# Patient Record
Sex: FEMALE | Race: WHITE | NOT HISPANIC OR LATINO | Employment: UNEMPLOYED | RURAL
[De-identification: names, ages, dates, MRNs, and addresses within clinical notes are randomized per-mention and may not be internally consistent; named-entity substitution may affect disease eponyms.]

---

## 2020-06-10 ENCOUNTER — HISTORICAL (OUTPATIENT)
Dept: ADMINISTRATIVE | Facility: HOSPITAL | Age: 85
End: 2020-06-10

## 2020-06-10 LAB
ALBUMIN SERPL BCP-MCNC: 3.6 G/DL (ref 3.5–5)
ALBUMIN/GLOB SERPL: 1.2 {RATIO}
ALP SERPL-CCNC: 91 U/L (ref 55–142)
ALT SERPL W P-5'-P-CCNC: 25 U/L (ref 13–56)
AST SERPL W P-5'-P-CCNC: 19 U/L (ref 15–37)
BASOPHILS # BLD AUTO: 0.06 X10E3/UL (ref 0–0.2)
BASOPHILS NFR BLD AUTO: 0.5 % (ref 0–1)
BILIRUB SERPL-MCNC: 0.4 MG/DL (ref 0–1.2)
BUN SERPL-MCNC: 19 MG/DL (ref 7–18)
BUN/CREAT SERPL: 21.8
CALCIUM SERPL-MCNC: 8.9 MG/DL (ref 8.5–10.1)
CHLORIDE SERPL-SCNC: 110 MMOL/L (ref 98–107)
CO2 SERPL-SCNC: 32 MMOL/L (ref 21–32)
CREAT SERPL-MCNC: 0.87 MG/DL (ref 0.55–1.02)
EOSINOPHIL # BLD AUTO: 0.16 X10E3/UL (ref 0–0.5)
EOSINOPHIL NFR BLD AUTO: 1.3 % (ref 1–4)
EOSINOPHIL NFR BLD MANUAL: 1 % (ref 1–4)
ERYTHROCYTE [DISTWIDTH] IN BLOOD BY AUTOMATED COUNT: 14 % (ref 11.5–14.5)
GLOBULIN SER-MCNC: 3 G/DL (ref 2–4)
GLUCOSE SERPL-MCNC: 114 MG/DL (ref 74–106)
HCT VFR BLD AUTO: 43 % (ref 38–47)
HGB BLD-MCNC: 13.7 G/DL (ref 12–16)
LYMPHOCYTES # BLD AUTO: 7.12 X10E3/UL (ref 1–4.8)
LYMPHOCYTES NFR BLD AUTO: 58.1 % (ref 27–41)
LYMPHOCYTES NFR BLD MANUAL: 67 % (ref 27–41)
MCH RBC QN AUTO: 30.5 PG (ref 27–31)
MCHC RBC AUTO-ENTMCNC: 31.9 G/DL (ref 32–36)
MCV RBC AUTO: 96 FL (ref 80–96)
MONOCYTES # BLD AUTO: 1.26 X10E3/UL (ref 0–0.8)
MONOCYTES NFR BLD AUTO: 10.3 % (ref 2–6)
MPC BLD CALC-MCNC: 10.5 FL (ref 9.4–12.4)
NEUTROPHILS # BLD AUTO: 3.65 X10E3/UL (ref 1.8–7.7)
NEUTROPHILS NFR BLD AUTO: 29.8 % (ref 53–65)
NEUTS SEG NFR BLD MANUAL: 32 % (ref 50–62)
PLATELET # BLD AUTO: 162 X10E3/UL (ref 150–400)
PLATELET MORPHOLOGY: NORMAL
POTASSIUM SERPL-SCNC: 4.1 MMOL/L (ref 3.5–5.1)
PROT SERPL-MCNC: 6.6 G/DL (ref 6.4–8.2)
RBC # BLD AUTO: 4.49 X10E6/UL (ref 4.2–5.4)
RBC MORPH BLD: NORMAL
REACTIVE LYMPHOCYTES: ABNORMAL
SMUDGE CELLS BLD QL SMEAR: ABNORMAL
SODIUM SERPL-SCNC: 147 MMOL/L (ref 136–145)
WBC # BLD AUTO: 12.25 X10E3/UL (ref 4.5–11)

## 2020-08-10 ENCOUNTER — HISTORICAL (OUTPATIENT)
Dept: ADMINISTRATIVE | Facility: HOSPITAL | Age: 85
End: 2020-08-10

## 2020-08-10 LAB
ALBUMIN SERPL BCP-MCNC: 3.1 G/DL (ref 3.5–5)
ALBUMIN/GLOB SERPL: 0.8 {RATIO}
ALP SERPL-CCNC: 91 U/L (ref 55–142)
ALT SERPL W P-5'-P-CCNC: 26 U/L (ref 13–56)
ANION GAP SERPL CALCULATED.3IONS-SCNC: 9 MMOL/L
AST SERPL W P-5'-P-CCNC: 24 U/L (ref 15–37)
BACTERIA #/AREA URNS HPF: ABNORMAL /HPF
BASOPHILS # BLD AUTO: 0.04 X10E3/UL (ref 0–0.2)
BASOPHILS NFR BLD AUTO: 0.5 % (ref 0–1)
BILIRUB SERPL-MCNC: 0.3 MG/DL (ref 0–1.2)
BILIRUB UR QL STRIP: ABNORMAL MG/DL
BUN SERPL-MCNC: 15 MG/DL (ref 7–18)
BUN/CREAT SERPL: 16.7
CALCIUM SERPL-MCNC: 8.5 MG/DL (ref 8.5–10.1)
CHLORIDE SERPL-SCNC: 103 MMOL/L (ref 98–107)
CLARITY UR: ABNORMAL
CLARITY UR: ABNORMAL
CO2 SERPL-SCNC: 31 MMOL/L (ref 21–32)
COLOR UR: ABNORMAL
COLOR UR: ABNORMAL
CREAT SERPL-MCNC: 0.9 MG/DL (ref 0.55–1.02)
D DIMER PPP FEU-MCNC: 0.58 MG/ML (ref 0–0.47)
EOSINOPHIL # BLD AUTO: 0.06 X10E3/UL (ref 0–0.5)
EOSINOPHIL NFR BLD AUTO: 0.7 % (ref 1–4)
EOSINOPHIL NFR BLD MANUAL: 1 % (ref 1–4)
ERYTHROCYTE [DISTWIDTH] IN BLOOD BY AUTOMATED COUNT: 13.9 % (ref 11.5–14.5)
GLOBULIN SER-MCNC: 3.7 G/DL (ref 2–4)
GLUCOSE SERPL-MCNC: 132 MG/DL (ref 74–106)
GLUCOSE UR STRIP-MCNC: NEGATIVE MG/DL
HCT VFR BLD AUTO: 42.4 % (ref 38–47)
HGB BLD-MCNC: 13.8 G/DL (ref 12–16)
KETONES UR STRIP-SCNC: ABNORMAL MG/DL
LEUKOCYTE ESTERASE UR QL STRIP: ABNORMAL LEU/UL
LYMPHOCYTES # BLD AUTO: 3.77 X10E3/UL (ref 1–4.8)
LYMPHOCYTES NFR BLD AUTO: 43.5 % (ref 27–41)
LYMPHOCYTES NFR BLD MANUAL: 47 % (ref 27–41)
MCH RBC QN AUTO: 30.1 PG (ref 27–31)
MCHC RBC AUTO-ENTMCNC: 32.5 G/DL (ref 32–36)
MCV RBC AUTO: 93 FL (ref 80–96)
MONOCYTES # BLD AUTO: 1.53 X10E3/UL (ref 0–0.8)
MONOCYTES NFR BLD AUTO: 17.7 % (ref 2–6)
MONOCYTES NFR BLD MANUAL: 6 % (ref 2–6)
MPC BLD CALC-MCNC: 10.6 FL (ref 9.4–12.4)
NEUTROPHILS # BLD AUTO: 3.26 X10E3/UL (ref 1.8–7.7)
NEUTROPHILS NFR BLD AUTO: 37.6 % (ref 53–65)
NEUTS BAND NFR BLD MANUAL: 9 % (ref 1–5)
NEUTS SEG NFR BLD MANUAL: 37 % (ref 50–62)
NITRITE UR QL STRIP: NEGATIVE
PH UR STRIP: 5.5 PH UNITS (ref 5–8)
PLATELET # BLD AUTO: 101 X10E3/UL (ref 150–400)
PLATELET MORPHOLOGY: ABNORMAL
POTASSIUM SERPL-SCNC: 3.3 MMOL/L (ref 3.5–5.1)
PROT SERPL-MCNC: 6.8 G/DL (ref 6.4–8.2)
PROT UR QL STRIP: 100 MG/DL
RBC # BLD AUTO: 4.58 X10E6/UL (ref 4.2–5.4)
RBC # UR STRIP: ABNORMAL ERY/UL
RBC #/AREA URNS HPF: ABNORMAL /HPF (ref 0–3)
RBC MORPH BLD: NORMAL
REACTIVE LYMPHOCYTES: ABNORMAL
SMUDGE CELLS BLD QL SMEAR: ABNORMAL
SODIUM SERPL-SCNC: 140 MMOL/L (ref 136–145)
SP GR UR STRIP: 1.02 (ref 1–1.03)
SQUAMOUS #/AREA URNS LPF: ABNORMAL /LPF
UROBILINOGEN UR STRIP-ACNC: 1 MG/DL
WBC # BLD AUTO: 8.66 X10E3/UL (ref 4.5–11)
WBC #/AREA URNS HPF: ABNORMAL /HPF (ref 0–5)

## 2020-08-12 LAB
REPORT: NORMAL

## 2020-08-13 ENCOUNTER — HISTORICAL (OUTPATIENT)
Dept: ADMINISTRATIVE | Facility: HOSPITAL | Age: 85
End: 2020-08-13

## 2020-08-13 LAB
ALBUMIN SERPL BCP-MCNC: 2.5 G/DL (ref 3.5–5)
ALBUMIN/GLOB SERPL: 0.7 {RATIO}
ALP SERPL-CCNC: 83 U/L (ref 55–142)
ALT SERPL W P-5'-P-CCNC: 36 U/L (ref 13–56)
ANION GAP SERPL CALCULATED.3IONS-SCNC: 7 MMOL/L
ANISOCYTOSIS BLD QL SMEAR: ABNORMAL
AST SERPL W P-5'-P-CCNC: 35 U/L (ref 15–37)
BACTERIA #/AREA URNS HPF: ABNORMAL /HPF
BASOPHILS # BLD AUTO: 0.02 X10E3/UL (ref 0–0.2)
BASOPHILS NFR BLD AUTO: 0.2 % (ref 0–1)
BILIRUB SERPL-MCNC: 0.2 MG/DL (ref 0–1.2)
BILIRUB UR QL STRIP: NEGATIVE MG/DL
BUN SERPL-MCNC: 15 MG/DL (ref 7–18)
BUN/CREAT SERPL: 17.6
CALCIUM SERPL-MCNC: 8.3 MG/DL (ref 8.5–10.1)
CHLORIDE SERPL-SCNC: 107 MMOL/L (ref 98–107)
CLARITY UR: CLEAR
CLARITY UR: CLEAR
CO2 SERPL-SCNC: 32 MMOL/L (ref 21–32)
COLOR UR: YELLOW
COLOR UR: YELLOW
CREAT SERPL-MCNC: 0.85 MG/DL (ref 0.55–1.02)
CRP SERPL-MCNC: 8.77 MG/DL (ref 0–0.8)
EOSINOPHIL # BLD AUTO: 0 X10E3/UL (ref 0–0.5)
EOSINOPHIL NFR BLD AUTO: 0 % (ref 1–4)
EOSINOPHIL NFR BLD MANUAL: 6 % (ref 1–4)
ERYTHROCYTE [DISTWIDTH] IN BLOOD BY AUTOMATED COUNT: 13.9 % (ref 11.5–14.5)
ERYTHROCYTE [SEDIMENTATION RATE] IN BLOOD BY WESTERGREN METHOD: 50 MM/HR (ref 0–42)
GLOBULIN SER-MCNC: 3.4 G/DL (ref 2–4)
GLUCOSE SERPL-MCNC: 180 MG/DL (ref 74–106)
GLUCOSE UR STRIP-MCNC: ABNORMAL MG/DL
HCT VFR BLD AUTO: 36.7 % (ref 38–47)
HGB BLD-MCNC: 14.6 G/DL (ref 12–16)
KETONES UR STRIP-SCNC: NEGATIVE MG/DL
LEUKOCYTE ESTERASE UR QL STRIP: ABNORMAL LEU/UL
LYMPHOCYTES # BLD AUTO: 3.67 X10E3/UL (ref 1–4.8)
LYMPHOCYTES NFR BLD AUTO: 39 % (ref 27–41)
LYMPHOCYTES NFR BLD MANUAL: 40 % (ref 27–41)
MACROCYTES BLD QL SMEAR: ABNORMAL
MAGNESIUM SERPL-MCNC: 1.4 MG/DL (ref 1.7–2.3)
MCH RBC QN AUTO: 37 PG (ref 27–31)
MCHC RBC AUTO-ENTMCNC: 39.8 G/DL (ref 32–36)
MCV RBC AUTO: 93 FL (ref 80–96)
MICROCYTES BLD QL SMEAR: ABNORMAL
MONOCYTES # BLD AUTO: 1.14 X10E3/UL (ref 0–0.8)
MONOCYTES NFR BLD AUTO: 12.1 % (ref 2–6)
MONOCYTES NFR BLD MANUAL: 6 % (ref 2–6)
MPC BLD CALC-MCNC: 11 FL (ref 9.4–12.4)
NEUTROPHILS # BLD AUTO: 4.59 X10E3/UL (ref 1.8–7.7)
NEUTROPHILS NFR BLD AUTO: 48.7 % (ref 53–65)
NEUTS BAND NFR BLD MANUAL: 8 % (ref 1–5)
NEUTS SEG NFR BLD MANUAL: 40 % (ref 50–62)
NITRITE UR QL STRIP: NEGATIVE
PH UR STRIP: 6 PH UNITS (ref 5–8)
PLATELET # BLD AUTO: 133 X10E3/UL (ref 150–400)
PLATELET MORPHOLOGY: ABNORMAL
POTASSIUM SERPL-SCNC: 3.6 MMOL/L (ref 3.5–5.1)
PREALB SERPL NEPH-MCNC: 8 MG/DL (ref 20–40)
PROT SERPL-MCNC: 5.9 G/DL (ref 6.4–8.2)
PROT UR QL STRIP: NEGATIVE MG/DL
RBC # BLD AUTO: 3.95 X10E6/UL (ref 4.2–5.4)
RBC # UR STRIP: ABNORMAL ERY/UL
RBC #/AREA URNS HPF: ABNORMAL /HPF (ref 0–3)
RBC MORPH BLD: NORMAL
SODIUM SERPL-SCNC: 142 MMOL/L (ref 136–145)
SP GR UR STRIP: 1.02 (ref 1–1.03)
SQUAMOUS #/AREA URNS LPF: ABNORMAL /LPF
TSH SERPL DL<=0.005 MIU/L-ACNC: 0.61 UIU/ML (ref 0.36–3.74)
UROBILINOGEN UR STRIP-ACNC: 0.2 MG/DL
WBC # BLD AUTO: 9.42 X10E3/UL (ref 4.5–11)
WBC #/AREA URNS HPF: ABNORMAL /HPF (ref 0–5)

## 2020-08-16 ENCOUNTER — HISTORICAL (OUTPATIENT)
Dept: ADMINISTRATIVE | Facility: HOSPITAL | Age: 85
End: 2020-08-16

## 2020-08-16 LAB
HCO3 UR-SCNC: 37 MMOL/L (ref 21–28)
PCO2 BLDA: 61 MM HG (ref 35–48)
PH SMN: 7.39 PH UNITS (ref 7.35–7.45)
PO2 BLDA: 51 MM HG (ref 83–108)
POC A-ADO2: 229
POC BASE EXCESS ARTERIAL: 9.8 MMOL/L (ref -2–3)
POC SATURATED O2: 85 % (ref 95–98)
REPORT: NORMAL

## 2020-08-17 ENCOUNTER — HISTORICAL (OUTPATIENT)
Dept: ADMINISTRATIVE | Facility: HOSPITAL | Age: 85
End: 2020-08-17

## 2020-08-17 LAB
ALBUMIN SERPL BCP-MCNC: 2.7 G/DL (ref 3.5–5)
ALBUMIN/GLOB SERPL: 0.6 {RATIO}
ALP SERPL-CCNC: 98 U/L (ref 55–142)
ALT SERPL W P-5'-P-CCNC: 48 U/L (ref 13–56)
ANION GAP SERPL CALCULATED.3IONS-SCNC: 7 MMOL/L
AST SERPL W P-5'-P-CCNC: 50 U/L (ref 15–37)
BASOPHILS # BLD AUTO: 0.04 X10E3/UL (ref 0–0.2)
BASOPHILS NFR BLD AUTO: 0.1 % (ref 0–1)
BILIRUB SERPL-MCNC: 0.6 MG/DL (ref 0–1.2)
BUN SERPL-MCNC: 12 MG/DL (ref 7–18)
BUN/CREAT SERPL: 16.9
CALCIUM SERPL-MCNC: 8.4 MG/DL (ref 8.5–10.1)
CHLORIDE SERPL-SCNC: 99 MMOL/L (ref 98–107)
CO2 SERPL-SCNC: 36 MMOL/L (ref 21–32)
CREAT SERPL-MCNC: 0.71 MG/DL (ref 0.55–1.02)
CRP SERPL-MCNC: >18 UG/ML (ref 0–0.8)
D DIMER PPP FEU-MCNC: 1.19 UG/ML (ref 0–0.47)
EOSINOPHIL # BLD AUTO: 0 X10E3/UL (ref 0–0.5)
EOSINOPHIL NFR BLD AUTO: 0 % (ref 1–4)
ERYTHROCYTE [DISTWIDTH] IN BLOOD BY AUTOMATED COUNT: 13.2 % (ref 11.5–14.5)
FERRITIN SERPL-MCNC: 350 NG/ML (ref 8–252)
GLOBULIN SER-MCNC: 4.5 G/DL (ref 2–4)
GLUCOSE SERPL-MCNC: 165 MG/DL (ref 74–106)
GLUCOSE SERPL-MCNC: 181 MG/DL (ref 70–105)
GLUCOSE SERPL-MCNC: 182 MG/DL (ref 70–105)
GLUCOSE SERPL-MCNC: 185 MG/DL (ref 70–105)
HCO3 UR-SCNC: 43 MMOL/L (ref 21–28)
HCT VFR BLD AUTO: 41.3 % (ref 38–47)
HGB BLD-MCNC: 13.7 G/DL (ref 12–16)
IMM GRANULOCYTES # BLD AUTO: 0.27 X10E3/UL (ref 0–0.04)
IMM GRANULOCYTES NFR BLD: 1 % (ref 0–0.4)
LYMPHOCYTES # BLD AUTO: 10.75 X10E3/UL (ref 1–4.8)
LYMPHOCYTES NFR BLD AUTO: 39.6 % (ref 27–41)
LYMPHOCYTES NFR BLD MANUAL: 41 % (ref 27–41)
MAGNESIUM SERPL-MCNC: 1.8 MG/DL (ref 1.7–2.3)
MCH RBC QN AUTO: 29.7 PG (ref 27–31)
MCHC RBC AUTO-ENTMCNC: 33.2 G/DL (ref 32–36)
MCV RBC AUTO: 89.4 FL (ref 80–96)
MONOCYTES # BLD AUTO: 1.49 X10E3/UL (ref 0–0.8)
MONOCYTES NFR BLD AUTO: 5.5 % (ref 2–6)
MONOCYTES NFR BLD MANUAL: 6 % (ref 2–6)
MPC BLD CALC-MCNC: 10.1 FL (ref 9.4–12.4)
NEUTROPHILS # BLD AUTO: 14.62 X10E3/UL (ref 1.8–7.7)
NEUTROPHILS NFR BLD AUTO: 53.8 % (ref 53–65)
NEUTS BAND NFR BLD MANUAL: 6 % (ref 1–5)
NEUTS SEG NFR BLD MANUAL: 47 % (ref 50–62)
NRBC # BLD AUTO: 0 X10E3/UL (ref 0–0)
NRBC BLD MANUAL-RTO: 1 /100 (ref 0–0)
NRBC, AUTO (.00): 0 /100 (ref 0–0)
OVALOCYTES BLD QL SMEAR: ABNORMAL
PCO2 BLDA: 65 MM HG (ref 35–48)
PH SMN: 7.43 PH UNITS (ref 7.35–7.45)
PLATELET # BLD AUTO: 265 X10E3/UL (ref 150–400)
PLATELET MORPHOLOGY: ABNORMAL
PO2 BLDA: 55 MM HG (ref 83–108)
POC A-ADO2: 399
POC BASE EXCESS ARTERIAL: 15.8 MMOL/L (ref -2–3)
POC SATURATED O2: 89 % (ref 95–98)
POLYCHROMASIA BLD QL SMEAR: ABNORMAL
POTASSIUM SERPL-SCNC: 2.7 MMOL/L (ref 3.5–5.1)
PROT SERPL-MCNC: 7.2 G/DL (ref 6.4–8.2)
RBC # BLD AUTO: 4.62 X10E6/UL (ref 4.2–5.4)
SMUDGE CELLS BLD QL SMEAR: 33
SODIUM SERPL-SCNC: 139 MMOL/L (ref 136–145)
WBC # BLD AUTO: 27.17 X10E3/UL (ref 4.5–11)

## 2020-08-18 ENCOUNTER — HISTORICAL (OUTPATIENT)
Dept: ADMINISTRATIVE | Facility: HOSPITAL | Age: 85
End: 2020-08-18

## 2020-08-18 LAB
BASOPHILS # BLD AUTO: 0.02 X10E3/UL (ref 0–0.2)
BASOPHILS NFR BLD AUTO: 0.1 % (ref 0–1)
EOSINOPHIL # BLD AUTO: 0 X10E3/UL (ref 0–0.5)
EOSINOPHIL NFR BLD AUTO: 0 % (ref 1–4)
ERYTHROCYTE [DISTWIDTH] IN BLOOD BY AUTOMATED COUNT: 13 % (ref 11.5–14.5)
GLUCOSE SERPL-MCNC: 140 MG/DL (ref 70–105)
GLUCOSE SERPL-MCNC: 214 MG/DL (ref 70–105)
GLUCOSE SERPL-MCNC: 231 MG/DL (ref 70–105)
GLUCOSE SERPL-MCNC: 251 MG/DL (ref 70–105)
HCT VFR BLD AUTO: 41.3 % (ref 38–47)
HGB BLD-MCNC: 13.7 G/DL (ref 12–16)
IMM GRANULOCYTES # BLD AUTO: 0.13 X10E3/UL (ref 0–0.04)
IMM GRANULOCYTES NFR BLD: 0.7 % (ref 0–0.4)
LYMPHOCYTES # BLD AUTO: 7.95 X10E3/UL (ref 1–4.8)
LYMPHOCYTES NFR BLD AUTO: 42.7 % (ref 27–41)
LYMPHOCYTES NFR BLD MANUAL: 17 % (ref 27–41)
MAGNESIUM SERPL-MCNC: 2.4 MG/DL (ref 1.7–2.3)
MCH RBC QN AUTO: 29.3 PG (ref 27–31)
MCHC RBC AUTO-ENTMCNC: 33.2 G/DL (ref 32–36)
MCV RBC AUTO: 88.4 FL (ref 80–96)
MONOCYTES # BLD AUTO: 0.36 X10E3/UL (ref 0–0.8)
MONOCYTES NFR BLD AUTO: 1.9 % (ref 2–6)
MONOCYTES NFR BLD MANUAL: 1 % (ref 2–6)
MPC BLD CALC-MCNC: 10.3 FL (ref 9.4–12.4)
NEUTROPHILS # BLD AUTO: 10.17 X10E3/UL (ref 1.8–7.7)
NEUTROPHILS NFR BLD AUTO: 54.6 % (ref 53–65)
NEUTS BAND NFR BLD MANUAL: 1 % (ref 1–5)
NEUTS SEG NFR BLD MANUAL: 81 % (ref 50–62)
NRBC # BLD AUTO: 0 X10E3/UL (ref 0–0)
NRBC, AUTO (.00): 0 /100 (ref 0–0)
OVALOCYTES BLD QL SMEAR: ABNORMAL
PLATELET # BLD AUTO: 237 X10E3/UL (ref 150–400)
PLATELET MORPHOLOGY: ABNORMAL
POTASSIUM SERPL-SCNC: 3.1 MMOL/L (ref 3.5–5.1)
RBC # BLD AUTO: 4.67 X10E6/UL (ref 4.2–5.4)
SMUDGE CELLS BLD QL SMEAR: 36
WBC # BLD AUTO: 18.63 X10E3/UL (ref 4.5–11)

## 2020-08-19 ENCOUNTER — HISTORICAL (OUTPATIENT)
Dept: ADMINISTRATIVE | Facility: HOSPITAL | Age: 85
End: 2020-08-19

## 2020-08-19 LAB
ANION GAP SERPL CALCULATED.3IONS-SCNC: 4 MMOL/L
BASOPHILS # BLD AUTO: 0.03 X10E3/UL (ref 0–0.2)
BASOPHILS NFR BLD AUTO: 0.2 % (ref 0–1)
BUN SERPL-MCNC: 41 MG/DL (ref 7–18)
CALCIUM SERPL-MCNC: 8.7 MG/DL (ref 8.5–10.1)
CHLORIDE SERPL-SCNC: 99 MMOL/L (ref 98–107)
CO2 SERPL-SCNC: 43 MMOL/L (ref 21–32)
CREAT SERPL-MCNC: 1.18 MG/DL (ref 0.55–1.02)
EOSINOPHIL # BLD AUTO: 0 X10E3/UL (ref 0–0.5)
EOSINOPHIL NFR BLD AUTO: 0 % (ref 1–4)
ERYTHROCYTE [DISTWIDTH] IN BLOOD BY AUTOMATED COUNT: 12.6 % (ref 11.5–14.5)
GLUCOSE SERPL-MCNC: 154 MG/DL (ref 74–106)
GLUCOSE SERPL-MCNC: 239 MG/DL (ref 70–105)
HCT VFR BLD AUTO: 41.7 % (ref 38–47)
HGB BLD-MCNC: 13.4 G/DL (ref 12–16)
IMM GRANULOCYTES # BLD AUTO: 0.2 X10E3/UL (ref 0–0.04)
IMM GRANULOCYTES NFR BLD: 1 % (ref 0–0.4)
LYMPHOCYTES # BLD AUTO: 9.28 X10E3/UL (ref 1–4.8)
LYMPHOCYTES NFR BLD AUTO: 47 % (ref 27–41)
LYMPHOCYTES NFR BLD MANUAL: 35 % (ref 27–41)
MCH RBC QN AUTO: 29.3 PG (ref 27–31)
MCHC RBC AUTO-ENTMCNC: 32.1 G/DL (ref 32–36)
MCV RBC AUTO: 91.2 FL (ref 80–96)
MONOCYTES # BLD AUTO: 0.57 X10E3/UL (ref 0–0.8)
MONOCYTES NFR BLD AUTO: 2.9 % (ref 2–6)
MONOCYTES NFR BLD MANUAL: 1 % (ref 2–6)
MPC BLD CALC-MCNC: 10.6 FL (ref 9.4–12.4)
NEUTROPHILS # BLD AUTO: 9.65 X10E3/UL (ref 1.8–7.7)
NEUTROPHILS NFR BLD AUTO: 48.9 % (ref 53–65)
NEUTS BAND NFR BLD MANUAL: 1 % (ref 1–5)
NEUTS SEG NFR BLD MANUAL: 63 % (ref 50–62)
NRBC # BLD AUTO: 0 X10E3/UL (ref 0–0)
NRBC, AUTO (.00): 0 /100 (ref 0–0)
OVALOCYTES BLD QL SMEAR: ABNORMAL
PLATELET # BLD AUTO: 274 X10E3/UL (ref 150–400)
PLATELET MORPHOLOGY: ABNORMAL
POTASSIUM SERPL-SCNC: 3.2 MMOL/L (ref 3.5–5.1)
RBC # BLD AUTO: 4.57 X10E6/UL (ref 4.2–5.4)
SMUDGE CELLS BLD QL SMEAR: 31
SODIUM SERPL-SCNC: 143 MMOL/L (ref 136–145)
WBC # BLD AUTO: 19.73 X10E3/UL (ref 4.5–11)

## 2020-08-20 ENCOUNTER — HISTORICAL (OUTPATIENT)
Dept: ADMINISTRATIVE | Facility: HOSPITAL | Age: 85
End: 2020-08-20

## 2020-08-20 LAB
GLUCOSE SERPL-MCNC: 144 MG/DL (ref 70–105)
GLUCOSE SERPL-MCNC: 224 MG/DL (ref 70–105)

## 2020-08-21 ENCOUNTER — HISTORICAL (OUTPATIENT)
Dept: ADMINISTRATIVE | Facility: HOSPITAL | Age: 85
End: 2020-08-21

## 2020-08-21 LAB
ANION GAP SERPL CALCULATED.3IONS-SCNC: 5 MMOL/L
BASOPHILS # BLD AUTO: 0.03 X10E3/UL (ref 0–0.2)
BASOPHILS NFR BLD AUTO: 0.2 % (ref 0–1)
BUN SERPL-MCNC: 34 MG/DL (ref 7–18)
CALCIUM SERPL-MCNC: 8.6 MG/DL (ref 8.5–10.1)
CHLORIDE SERPL-SCNC: 99 MMOL/L (ref 98–107)
CO2 SERPL-SCNC: 44 MMOL/L (ref 21–32)
CREAT SERPL-MCNC: 0.89 MG/DL (ref 0.55–1.02)
EOSINOPHIL # BLD AUTO: 0.03 X10E3/UL (ref 0–0.5)
EOSINOPHIL NFR BLD AUTO: 0.2 % (ref 1–4)
ERYTHROCYTE [DISTWIDTH] IN BLOOD BY AUTOMATED COUNT: 12.8 % (ref 11.5–14.5)
GLUCOSE SERPL-MCNC: 118 MG/DL (ref 74–106)
GLUCOSE SERPL-MCNC: 219 MG/DL (ref 70–105)
GLUCOSE SERPL-MCNC: 232 MG/DL (ref 70–105)
GLUCOSE SERPL-MCNC: 309 MG/DL (ref 70–105)
HCT VFR BLD AUTO: 45.6 % (ref 38–47)
HGB BLD-MCNC: 14.5 G/DL (ref 12–16)
IMM GRANULOCYTES # BLD AUTO: 0.24 X10E3/UL (ref 0–0.04)
IMM GRANULOCYTES NFR BLD: 1.2 % (ref 0–0.4)
LYMPHOCYTES # BLD AUTO: 10.34 X10E3/UL (ref 1–4.8)
LYMPHOCYTES NFR BLD AUTO: 53.5 % (ref 27–41)
LYMPHOCYTES NFR BLD MANUAL: 38 % (ref 27–41)
MCH RBC QN AUTO: 29.1 PG (ref 27–31)
MCHC RBC AUTO-ENTMCNC: 31.8 G/DL (ref 32–36)
MCV RBC AUTO: 91.6 FL (ref 80–96)
MONOCYTES # BLD AUTO: 0.72 X10E3/UL (ref 0–0.8)
MONOCYTES NFR BLD AUTO: 3.7 % (ref 2–6)
MONOCYTES NFR BLD MANUAL: 4 % (ref 2–6)
MPC BLD CALC-MCNC: 10.4 FL (ref 9.4–12.4)
NEUTROPHILS # BLD AUTO: 7.98 X10E3/UL (ref 1.8–7.7)
NEUTROPHILS NFR BLD AUTO: 41.2 % (ref 53–65)
NEUTS BAND NFR BLD MANUAL: 7 % (ref 1–5)
NEUTS SEG NFR BLD MANUAL: 51 % (ref 50–62)
NRBC # BLD AUTO: 0 X10E3/UL (ref 0–0)
NRBC, AUTO (.00): 0 /100 (ref 0–0)
OVALOCYTES BLD QL SMEAR: ABNORMAL
PLATELET # BLD AUTO: 266 X10E3/UL (ref 150–400)
PLATELET MORPHOLOGY: ABNORMAL
POTASSIUM SERPL-SCNC: 2.7 MMOL/L (ref 3.5–5.1)
RBC # BLD AUTO: 4.98 X10E6/UL (ref 4.2–5.4)
SMUDGE CELLS BLD QL SMEAR: 61
SODIUM SERPL-SCNC: 145 MMOL/L (ref 136–145)
WBC # BLD AUTO: 19.34 X10E3/UL (ref 4.5–11)

## 2020-08-22 ENCOUNTER — HISTORICAL (OUTPATIENT)
Dept: ADMINISTRATIVE | Facility: HOSPITAL | Age: 85
End: 2020-08-22

## 2020-08-22 LAB
ANION GAP SERPL CALCULATED.3IONS-SCNC: 7 MMOL/L
BASOPHILS # BLD AUTO: 0.04 X10E3/UL (ref 0–0.2)
BASOPHILS NFR BLD AUTO: 0.2 % (ref 0–1)
BUN SERPL-MCNC: 34 MG/DL (ref 7–18)
CALCIUM SERPL-MCNC: 8.7 MG/DL (ref 8.5–10.1)
CHLORIDE SERPL-SCNC: 102 MMOL/L (ref 98–107)
CO2 SERPL-SCNC: 39 MMOL/L (ref 21–32)
CREAT SERPL-MCNC: 0.88 MG/DL (ref 0.55–1.02)
EOSINOPHIL # BLD AUTO: 0.03 X10E3/UL (ref 0–0.5)
EOSINOPHIL NFR BLD AUTO: 0.1 % (ref 1–4)
EOSINOPHIL NFR BLD MANUAL: 1 % (ref 1–4)
ERYTHROCYTE [DISTWIDTH] IN BLOOD BY AUTOMATED COUNT: 12.7 % (ref 11.5–14.5)
GLUCOSE SERPL-MCNC: 115 MG/DL (ref 74–106)
GLUCOSE SERPL-MCNC: 208 MG/DL (ref 70–105)
GLUCOSE SERPL-MCNC: 223 MG/DL (ref 70–105)
GLUCOSE SERPL-MCNC: 311 MG/DL (ref 70–105)
HCT VFR BLD AUTO: 43.3 % (ref 38–47)
HGB BLD-MCNC: 13.9 G/DL (ref 12–16)
IMM GRANULOCYTES # BLD AUTO: 0.32 X10E3/UL (ref 0–0.04)
IMM GRANULOCYTES NFR BLD: 1.6 % (ref 0–0.4)
LYMPHOCYTES # BLD AUTO: 9.24 X10E3/UL (ref 1–4.8)
LYMPHOCYTES NFR BLD AUTO: 46.2 % (ref 27–41)
LYMPHOCYTES NFR BLD MANUAL: 39 % (ref 27–41)
MCH RBC QN AUTO: 30 PG (ref 27–31)
MCHC RBC AUTO-ENTMCNC: 32.1 G/DL (ref 32–36)
MCV RBC AUTO: 93.5 FL (ref 80–96)
MONOCYTES # BLD AUTO: 0.64 X10E3/UL (ref 0–0.8)
MONOCYTES NFR BLD AUTO: 3.2 % (ref 2–6)
MONOCYTES NFR BLD MANUAL: 2 % (ref 2–6)
MPC BLD CALC-MCNC: 10.9 FL (ref 9.4–12.4)
NEUTROPHILS # BLD AUTO: 9.75 X10E3/UL (ref 1.8–7.7)
NEUTROPHILS NFR BLD AUTO: 48.7 % (ref 53–65)
NEUTS SEG NFR BLD MANUAL: 58 % (ref 50–62)
NRBC # BLD AUTO: 0 X10E3/UL (ref 0–0)
NRBC, AUTO (.00): 0 /100 (ref 0–0)
PLATELET # BLD AUTO: 242 X10E3/UL (ref 150–400)
PLATELET MORPHOLOGY: ABNORMAL
POTASSIUM SERPL-SCNC: 4.2 MMOL/L (ref 3.5–5.1)
RBC # BLD AUTO: 4.63 X10E6/UL (ref 4.2–5.4)
RBC MORPH BLD: NORMAL
SMUDGE CELLS BLD QL SMEAR: 47
SODIUM SERPL-SCNC: 144 MMOL/L (ref 136–145)
WBC # BLD AUTO: 20.02 X10E3/UL (ref 4.5–11)

## 2020-08-23 ENCOUNTER — HISTORICAL (OUTPATIENT)
Dept: ADMINISTRATIVE | Facility: HOSPITAL | Age: 85
End: 2020-08-23

## 2020-08-23 LAB
ANION GAP SERPL CALCULATED.3IONS-SCNC: 9 MMOL/L
BASOPHILS # BLD AUTO: 0.03 X10E3/UL (ref 0–0.2)
BASOPHILS NFR BLD AUTO: 0.2 % (ref 0–1)
BUN SERPL-MCNC: 30 MG/DL (ref 7–18)
CALCIUM SERPL-MCNC: 9.1 MG/DL (ref 8.5–10.1)
CHLORIDE SERPL-SCNC: 104 MMOL/L (ref 98–107)
CO2 SERPL-SCNC: 33 MMOL/L (ref 21–32)
CREAT SERPL-MCNC: 0.79 MG/DL (ref 0.55–1.02)
CRENATED CELLS: ABNORMAL
EOSINOPHIL # BLD AUTO: 0.02 X10E3/UL (ref 0–0.5)
EOSINOPHIL NFR BLD AUTO: 0.1 % (ref 1–4)
ERYTHROCYTE [DISTWIDTH] IN BLOOD BY AUTOMATED COUNT: 12.8 % (ref 11.5–14.5)
GLUCOSE SERPL-MCNC: 165 MG/DL (ref 70–105)
GLUCOSE SERPL-MCNC: 208 MG/DL (ref 70–105)
GLUCOSE SERPL-MCNC: 224 MG/DL (ref 74–106)
HCT VFR BLD AUTO: 42.1 % (ref 38–47)
HGB BLD-MCNC: 13.1 G/DL (ref 12–16)
IMM GRANULOCYTES # BLD AUTO: 0.34 X10E3/UL (ref 0–0.04)
IMM GRANULOCYTES NFR BLD: 1.8 % (ref 0–0.4)
LYMPHOCYTES # BLD AUTO: 8.65 X10E3/UL (ref 1–4.8)
LYMPHOCYTES NFR BLD AUTO: 45.8 % (ref 27–41)
LYMPHOCYTES NFR BLD MANUAL: 37 % (ref 27–41)
MCH RBC QN AUTO: 29.4 PG (ref 27–31)
MCHC RBC AUTO-ENTMCNC: 31.1 G/DL (ref 32–36)
MCV RBC AUTO: 94.6 FL (ref 80–96)
MONOCYTES # BLD AUTO: 0.35 X10E3/UL (ref 0–0.8)
MONOCYTES NFR BLD AUTO: 1.9 % (ref 2–6)
MONOCYTES NFR BLD MANUAL: 1 % (ref 2–6)
MPC BLD CALC-MCNC: 11.2 FL (ref 9.4–12.4)
NEUTROPHILS # BLD AUTO: 9.5 X10E3/UL (ref 1.8–7.7)
NEUTROPHILS NFR BLD AUTO: 50.2 % (ref 53–65)
NEUTS BAND NFR BLD MANUAL: 10 % (ref 1–5)
NEUTS SEG NFR BLD MANUAL: 52 % (ref 50–62)
NRBC # BLD AUTO: 0 X10E3/UL (ref 0–0)
NRBC, AUTO (.00): 0 /100 (ref 0–0)
OVALOCYTES BLD QL SMEAR: ABNORMAL
PLATELET # BLD AUTO: 241 X10E3/UL (ref 150–400)
PLATELET MORPHOLOGY: ABNORMAL
POTASSIUM SERPL-SCNC: 5.6 MMOL/L (ref 3.5–5.1)
RBC # BLD AUTO: 4.45 X10E6/UL (ref 4.2–5.4)
SMUDGE CELLS BLD QL SMEAR: 56
SODIUM SERPL-SCNC: 140 MMOL/L (ref 136–145)
WBC # BLD AUTO: 18.89 X10E3/UL (ref 4.5–11)

## 2020-08-24 ENCOUNTER — HISTORICAL (OUTPATIENT)
Dept: ADMINISTRATIVE | Facility: HOSPITAL | Age: 85
End: 2020-08-24

## 2020-08-24 LAB
ANION GAP SERPL CALCULATED.3IONS-SCNC: 5 MMOL/L
BUN SERPL-MCNC: 26 MG/DL (ref 7–18)
CALCIUM SERPL-MCNC: 8.4 MG/DL (ref 8.5–10.1)
CHLORIDE SERPL-SCNC: 107 MMOL/L (ref 98–107)
CO2 SERPL-SCNC: 35 MMOL/L (ref 21–32)
CREAT SERPL-MCNC: 0.77 MG/DL (ref 0.55–1.02)
GLUCOSE SERPL-MCNC: 115 MG/DL (ref 74–106)
MAGNESIUM SERPL-MCNC: 2.2 MG/DL (ref 1.7–2.3)
PHOSPHATE SERPL-MCNC: 3.8 MG/DL (ref 2.5–4.5)
POC IONIZED CALCIUM: 1.22 MMOL/L (ref 1.15–1.35)
POTASSIUM SERPL-SCNC: 4.2 MMOL/L (ref 3.5–5.1)
SODIUM SERPL-SCNC: 143 MMOL/L (ref 136–145)

## 2020-08-25 ENCOUNTER — HISTORICAL (OUTPATIENT)
Dept: ADMINISTRATIVE | Facility: HOSPITAL | Age: 85
End: 2020-08-25

## 2020-08-25 LAB
ANION GAP SERPL CALCULATED.3IONS-SCNC: 4 MMOL/L
BASOPHILS # BLD AUTO: 0.03 X10E3/UL (ref 0–0.2)
BASOPHILS NFR BLD AUTO: 0.2 % (ref 0–1)
BUN SERPL-MCNC: 27 MG/DL (ref 7–18)
CALCIUM SERPL-MCNC: 8.7 MG/DL (ref 8.5–10.1)
CHLORIDE SERPL-SCNC: 106 MMOL/L (ref 98–107)
CO2 SERPL-SCNC: 35 MMOL/L (ref 21–32)
CREAT SERPL-MCNC: 0.74 MG/DL (ref 0.55–1.02)
EOSINOPHIL # BLD AUTO: 0.02 X10E3/UL (ref 0–0.5)
EOSINOPHIL NFR BLD AUTO: 0.1 % (ref 1–4)
ERYTHROCYTE [DISTWIDTH] IN BLOOD BY AUTOMATED COUNT: 13 % (ref 11.5–14.5)
GLUCOSE SERPL-MCNC: 162 MG/DL (ref 74–106)
GLUCOSE SERPL-MCNC: 192 MG/DL (ref 70–105)
HCT VFR BLD AUTO: 39.5 % (ref 38–47)
HGB BLD-MCNC: 12.1 G/DL (ref 12–16)
IMM GRANULOCYTES # BLD AUTO: 0.36 X10E3/UL (ref 0–0.04)
IMM GRANULOCYTES NFR BLD: 2.2 % (ref 0–0.4)
LYMPHOCYTES # BLD AUTO: 6.92 X10E3/UL (ref 1–4.8)
LYMPHOCYTES NFR BLD AUTO: 41.4 % (ref 27–41)
LYMPHOCYTES NFR BLD MANUAL: 28 % (ref 27–41)
MCH RBC QN AUTO: 29.3 PG (ref 27–31)
MCHC RBC AUTO-ENTMCNC: 30.6 G/DL (ref 32–36)
MCV RBC AUTO: 95.6 FL (ref 80–96)
MONOCYTES # BLD AUTO: 0.64 X10E3/UL (ref 0–0.8)
MONOCYTES NFR BLD AUTO: 3.8 % (ref 2–6)
MONOCYTES NFR BLD MANUAL: 3 % (ref 2–6)
MPC BLD CALC-MCNC: 11.6 FL (ref 9.4–12.4)
NEUTROPHILS # BLD AUTO: 8.75 X10E3/UL (ref 1.8–7.7)
NEUTROPHILS NFR BLD AUTO: 52.3 % (ref 53–65)
NEUTS BAND NFR BLD MANUAL: 6 % (ref 1–5)
NEUTS SEG NFR BLD MANUAL: 63 % (ref 50–62)
NRBC # BLD AUTO: 0 X10E3/UL (ref 0–0)
NRBC, AUTO (.00): 0 /100 (ref 0–0)
OVALOCYTES BLD QL SMEAR: ABNORMAL
PLATELET # BLD AUTO: 226 X10E3/UL (ref 150–400)
PLATELET MORPHOLOGY: ABNORMAL
POTASSIUM SERPL-SCNC: 5 MMOL/L (ref 3.5–5.1)
RBC # BLD AUTO: 4.13 X10E6/UL (ref 4.2–5.4)
SMUDGE CELLS BLD QL SMEAR: 31
SODIUM SERPL-SCNC: 140 MMOL/L (ref 136–145)
WBC # BLD AUTO: 16.72 X10E3/UL (ref 4.5–11)

## 2020-08-26 ENCOUNTER — HISTORICAL (OUTPATIENT)
Dept: ADMINISTRATIVE | Facility: HOSPITAL | Age: 85
End: 2020-08-26

## 2020-08-26 LAB
ANION GAP SERPL CALCULATED.3IONS-SCNC: 4 MMOL/L
ANISOCYTOSIS BLD QL SMEAR: ABNORMAL
BASOPHILS # BLD AUTO: 0.04 X10E3/UL (ref 0–0.2)
BASOPHILS NFR BLD AUTO: 0.3 % (ref 0–1)
BUN SERPL-MCNC: 30 MG/DL (ref 7–18)
CALCIUM SERPL-MCNC: 9.1 MG/DL (ref 8.5–10.1)
CHLORIDE SERPL-SCNC: 108 MMOL/L (ref 98–107)
CO2 SERPL-SCNC: 34 MMOL/L (ref 21–32)
CREAT SERPL-MCNC: 0.72 MG/DL (ref 0.55–1.02)
CRENATED CELLS: ABNORMAL
EOSINOPHIL # BLD AUTO: 0.02 X10E3/UL (ref 0–0.5)
EOSINOPHIL NFR BLD AUTO: 0.1 % (ref 1–4)
EOSINOPHIL NFR BLD MANUAL: 1 % (ref 1–4)
ERYTHROCYTE [DISTWIDTH] IN BLOOD BY AUTOMATED COUNT: 13.2 % (ref 11.5–14.5)
GLUCOSE SERPL-MCNC: 135 MG/DL (ref 74–106)
HCT VFR BLD AUTO: 35.8 % (ref 38–47)
HGB BLD-MCNC: 11 G/DL (ref 12–16)
IMM GRANULOCYTES # BLD AUTO: 0.35 X10E3/UL (ref 0–0.04)
IMM GRANULOCYTES NFR BLD: 2.6 % (ref 0–0.4)
LYMPHOCYTES # BLD AUTO: 5.56 X10E3/UL (ref 1–4.8)
LYMPHOCYTES NFR BLD AUTO: 40.9 % (ref 27–41)
LYMPHOCYTES NFR BLD MANUAL: 40 % (ref 27–41)
MCH RBC QN AUTO: 29.5 PG (ref 27–31)
MCHC RBC AUTO-ENTMCNC: 30.7 G/DL (ref 32–36)
MCV RBC AUTO: 96 FL (ref 80–96)
MONOCYTES # BLD AUTO: 0.66 X10E3/UL (ref 0–0.8)
MONOCYTES NFR BLD AUTO: 4.8 % (ref 2–6)
MONOCYTES NFR BLD MANUAL: 2 % (ref 2–6)
MPC BLD CALC-MCNC: 12.4 FL (ref 9.4–12.4)
NEUTROPHILS # BLD AUTO: 6.98 X10E3/UL (ref 1.8–7.7)
NEUTROPHILS NFR BLD AUTO: 51.3 % (ref 53–65)
NEUTS SEG NFR BLD MANUAL: 57 % (ref 50–62)
NRBC # BLD AUTO: 0 X10E3/UL (ref 0–0)
NRBC, AUTO (.00): 0 /100 (ref 0–0)
OVALOCYTES BLD QL SMEAR: ABNORMAL
PLATELET # BLD AUTO: 159 X10E3/UL (ref 150–400)
PLATELET MORPHOLOGY: ABNORMAL
POTASSIUM SERPL-SCNC: 4.1 MMOL/L (ref 3.5–5.1)
RBC # BLD AUTO: 3.73 X10E6/UL (ref 4.2–5.4)
SODIUM SERPL-SCNC: 142 MMOL/L (ref 136–145)
WBC # BLD AUTO: 13.61 X10E3/UL (ref 4.5–11)

## 2020-08-27 ENCOUNTER — HISTORICAL (OUTPATIENT)
Dept: ADMINISTRATIVE | Facility: HOSPITAL | Age: 85
End: 2020-08-27

## 2020-08-27 LAB — GLUCOSE SERPL-MCNC: 214 MG/DL (ref 70–105)

## 2020-08-28 ENCOUNTER — HISTORICAL (OUTPATIENT)
Dept: ADMINISTRATIVE | Facility: HOSPITAL | Age: 85
End: 2020-08-28

## 2020-08-28 LAB — GLUCOSE SERPL-MCNC: 121 MG/DL (ref 70–105)

## 2020-10-05 ENCOUNTER — HISTORICAL (OUTPATIENT)
Dept: ADMINISTRATIVE | Facility: HOSPITAL | Age: 85
End: 2020-10-05

## 2020-10-07 LAB
INSULIN SERPL-ACNC: NORMAL U[IU]/ML
LAB AP CLINICAL INFORMATION: NORMAL
LAB AP CORRECTED - HISTORICAL: NORMAL
LAB AP DIAGNOSIS - HISTORICAL: NORMAL
LAB AP GROSS PATHOLOGY - HISTORICAL: NORMAL
LAB AP SPECIMEN SUBMITTED - HISTORICAL: NORMAL

## 2021-09-24 ENCOUNTER — OFFICE VISIT (OUTPATIENT)
Dept: DERMATOLOGY | Facility: CLINIC | Age: 86
End: 2021-09-24
Payer: MEDICARE

## 2021-09-24 VITALS — HEIGHT: 62 IN | RESPIRATION RATE: 18 BRPM | WEIGHT: 149 LBS | BODY MASS INDEX: 27.42 KG/M2

## 2021-09-24 DIAGNOSIS — L57.0 ACTINIC KERATOSES: ICD-10-CM

## 2021-09-24 DIAGNOSIS — L82.1 SEBORRHEIC KERATOSES: ICD-10-CM

## 2021-09-24 DIAGNOSIS — Z80.8 FAMILY HISTORY OF MELANOMA: ICD-10-CM

## 2021-09-24 DIAGNOSIS — L82.0 SEBORRHEIC KERATOSES, INFLAMED: ICD-10-CM

## 2021-09-24 DIAGNOSIS — L57.8 OTHER SKIN CHANGES DUE TO CHRONIC EXPOSURE TO NONIONIZING RADIATION: Primary | ICD-10-CM

## 2021-09-24 PROCEDURE — 17003 DESTRUCT PREMALG LES 2-14: CPT | Mod: XU,,, | Performed by: DERMATOLOGY

## 2021-09-24 PROCEDURE — 1160F PR REVIEW ALL MEDS BY PRESCRIBER/CLIN PHARMACIST DOCUMENTED: ICD-10-PCS | Mod: CPTII,,, | Performed by: DERMATOLOGY

## 2021-09-24 PROCEDURE — 1101F PR PT FALLS ASSESS DOC 0-1 FALLS W/OUT INJ PAST YR: ICD-10-PCS | Mod: CPTII,,, | Performed by: DERMATOLOGY

## 2021-09-24 PROCEDURE — 3288F PR FALLS RISK ASSESSMENT DOCUMENTED: ICD-10-PCS | Mod: CPTII,,, | Performed by: DERMATOLOGY

## 2021-09-24 PROCEDURE — 17000 PR DESTRUCTION(LASER SURGERY,CRYOSURGERY,CHEMOSURGERY),PREMALIGNANT LESIONS,FIRST LESION: ICD-10-PCS | Mod: XU,,, | Performed by: DERMATOLOGY

## 2021-09-24 PROCEDURE — 1159F PR MEDICATION LIST DOCUMENTED IN MEDICAL RECORD: ICD-10-PCS | Mod: CPTII,,, | Performed by: DERMATOLOGY

## 2021-09-24 PROCEDURE — 99213 PR OFFICE/OUTPT VISIT, EST, LEVL III, 20-29 MIN: ICD-10-PCS | Mod: 25,,, | Performed by: DERMATOLOGY

## 2021-09-24 PROCEDURE — 17110 DESTRUCTION B9 LES UP TO 14: CPT | Mod: ,,, | Performed by: DERMATOLOGY

## 2021-09-24 PROCEDURE — 17000 DESTRUCT PREMALG LESION: CPT | Mod: XU,,, | Performed by: DERMATOLOGY

## 2021-09-24 PROCEDURE — 1159F MED LIST DOCD IN RCRD: CPT | Mod: CPTII,,, | Performed by: DERMATOLOGY

## 2021-09-24 PROCEDURE — 1101F PT FALLS ASSESS-DOCD LE1/YR: CPT | Mod: CPTII,,, | Performed by: DERMATOLOGY

## 2021-09-24 PROCEDURE — 1160F RVW MEDS BY RX/DR IN RCRD: CPT | Mod: CPTII,,, | Performed by: DERMATOLOGY

## 2021-09-24 PROCEDURE — 99213 OFFICE O/P EST LOW 20 MIN: CPT | Mod: 25,,, | Performed by: DERMATOLOGY

## 2021-09-24 PROCEDURE — 3288F FALL RISK ASSESSMENT DOCD: CPT | Mod: CPTII,,, | Performed by: DERMATOLOGY

## 2021-09-24 PROCEDURE — 17110 PR DESTRUCTION BENIGN LESIONS UP TO 14: ICD-10-PCS | Mod: ,,, | Performed by: DERMATOLOGY

## 2021-09-24 PROCEDURE — 17003 DESTRUCTION, PREMALIGNANT LESIONS; SECOND THROUGH 14 LESIONS: ICD-10-PCS | Mod: XU,,, | Performed by: DERMATOLOGY

## 2021-09-24 RX ORDER — ROPINIROLE 0.5 MG/1
0.5 TABLET, FILM COATED ORAL NIGHTLY PRN
COMMUNITY
Start: 2021-09-10

## 2021-09-24 RX ORDER — MELOXICAM 7.5 MG/1
7.5 TABLET ORAL 2 TIMES DAILY
Status: ON HOLD | COMMUNITY
Start: 2021-08-29 | End: 2022-12-01

## 2021-09-24 RX ORDER — ROSUVASTATIN CALCIUM 10 MG/1
10 TABLET, COATED ORAL DAILY
Status: ON HOLD | COMMUNITY
Start: 2021-09-10 | End: 2022-10-31

## 2021-09-24 RX ORDER — POTASSIUM CHLORIDE 750 MG/1
TABLET, EXTENDED RELEASE ORAL
Status: ON HOLD | COMMUNITY
Start: 2021-06-16 | End: 2022-10-31

## 2021-09-24 RX ORDER — NITROFURANTOIN 25; 75 MG/1; MG/1
100 CAPSULE ORAL NIGHTLY
Status: ON HOLD | COMMUNITY
Start: 2021-07-01 | End: 2022-12-01

## 2021-09-24 RX ORDER — ISOSORBIDE MONONITRATE 30 MG/1
TABLET, EXTENDED RELEASE ORAL
Status: ON HOLD | COMMUNITY
Start: 2021-08-29 | End: 2022-10-31

## 2021-09-24 RX ORDER — LEVOTHYROXINE SODIUM 50 UG/1
50 TABLET ORAL
COMMUNITY
Start: 2021-09-10

## 2021-09-24 RX ORDER — MONTELUKAST SODIUM 10 MG/1
TABLET ORAL NIGHTLY
COMMUNITY
Start: 2021-09-10

## 2021-09-24 RX ORDER — OMEPRAZOLE 40 MG/1
40 CAPSULE, DELAYED RELEASE ORAL EVERY MORNING
COMMUNITY
Start: 2021-09-10

## 2021-09-24 RX ORDER — TRAVOPROST OPHTHALMIC SOLUTION 0.04 MG/ML
1 SOLUTION OPHTHALMIC NIGHTLY
COMMUNITY
Start: 2021-08-27

## 2021-09-24 RX ORDER — DICLOFENAC SODIUM 10 MG/G
GEL TOPICAL
Status: ON HOLD | COMMUNITY
Start: 2021-08-03 | End: 2022-12-01

## 2021-09-24 RX ORDER — NIFEDIPINE 30 MG/1
TABLET, FILM COATED, EXTENDED RELEASE ORAL
Status: ON HOLD | COMMUNITY
Start: 2021-09-10 | End: 2022-10-31

## 2021-09-24 RX ORDER — AMPICILLIN 500 MG/1
CAPSULE ORAL
Status: ON HOLD | COMMUNITY
Start: 2021-07-08 | End: 2022-10-31

## 2021-09-24 RX ORDER — PRIMIDONE 50 MG/1
100 TABLET ORAL 2 TIMES DAILY
Status: ON HOLD | COMMUNITY
Start: 2021-09-10 | End: 2022-12-01

## 2021-09-24 RX ORDER — LOSARTAN POTASSIUM 50 MG/1
50 TABLET ORAL 2 TIMES DAILY
Status: ON HOLD | COMMUNITY
Start: 2021-09-10 | End: 2022-10-31

## 2021-09-24 RX ORDER — GABAPENTIN 100 MG/1
100 CAPSULE ORAL NIGHTLY
COMMUNITY
Start: 2021-09-10 | End: 2023-05-22 | Stop reason: ALTCHOICE

## 2021-11-21 ENCOUNTER — HOSPITAL ENCOUNTER (EMERGENCY)
Facility: HOSPITAL | Age: 86
Discharge: HOME | End: 2021-11-21
Payer: MEDICARE

## 2021-11-21 VITALS
OXYGEN SATURATION: 92 % | TEMPERATURE: 98 F | HEIGHT: 62 IN | WEIGHT: 178 LBS | HEART RATE: 68 BPM | BODY MASS INDEX: 32.76 KG/M2 | SYSTOLIC BLOOD PRESSURE: 104 MMHG | RESPIRATION RATE: 19 BRPM | DIASTOLIC BLOOD PRESSURE: 74 MMHG

## 2021-11-21 DIAGNOSIS — R10.9 ABDOMINAL PAIN: Primary | ICD-10-CM

## 2021-11-21 LAB
BACTERIA #/AREA URNS HPF: ABNORMAL /HPF
BILIRUB UR QL STRIP: ABNORMAL
CLARITY UR: CLEAR
COLOR UR: ABNORMAL
GLUCOSE UR STRIP-MCNC: 250 MG/DL
KETONES UR STRIP-SCNC: 15 MG/DL
LEUKOCYTE ESTERASE UR QL STRIP: ABNORMAL
MUCOUS THREADS #/AREA URNS HPF: ABNORMAL /HPF
NITRITE UR QL STRIP: POSITIVE
PH UR STRIP: 5 PH UNITS
PROT UR QL STRIP: >=300
RBC # UR STRIP: NEGATIVE /UL
RBC #/AREA URNS HPF: ABNORMAL /HPF
SP GR UR STRIP: 1.02
SQUAMOUS #/AREA URNS LPF: ABNORMAL /LPF
UROBILINOGEN UR STRIP-ACNC: 4 MG/DL
WBC #/AREA URNS HPF: ABNORMAL /HPF

## 2021-11-21 PROCEDURE — 99283 EMERGENCY DEPT VISIT LOW MDM: CPT | Mod: GF | Performed by: NURSE PRACTITIONER

## 2021-11-21 PROCEDURE — 81001 URINALYSIS AUTO W/SCOPE: CPT | Performed by: NURSE PRACTITIONER

## 2021-11-21 PROCEDURE — 96372 THER/PROPH/DIAG INJ SC/IM: CPT

## 2021-11-21 PROCEDURE — 25000003 PHARM REV CODE 250: Performed by: NURSE PRACTITIONER

## 2021-11-21 PROCEDURE — 99284 EMERGENCY DEPT VISIT MOD MDM: CPT

## 2021-11-21 PROCEDURE — 63600175 PHARM REV CODE 636 W HCPCS: Performed by: NURSE PRACTITIONER

## 2021-11-21 PROCEDURE — 81003 URINALYSIS AUTO W/O SCOPE: CPT | Performed by: NURSE PRACTITIONER

## 2021-11-21 RX ORDER — SYRING-NEEDL,DISP,INSUL,0.3 ML 29 G X1/2"
296 SYRINGE, EMPTY DISPOSABLE MISCELLANEOUS
Status: COMPLETED | OUTPATIENT
Start: 2021-11-21 | End: 2021-11-21

## 2021-11-21 RX ORDER — CIPROFLOXACIN 500 MG/1
500 TABLET ORAL 2 TIMES DAILY
Status: ON HOLD | COMMUNITY
End: 2022-10-31

## 2021-11-21 RX ORDER — KETOROLAC TROMETHAMINE 30 MG/ML
30 INJECTION, SOLUTION INTRAMUSCULAR; INTRAVENOUS
Status: COMPLETED | OUTPATIENT
Start: 2021-11-21 | End: 2021-11-21

## 2021-11-21 RX ORDER — PHENAZOPYRIDINE HYDROCHLORIDE 200 MG/1
200 TABLET, FILM COATED ORAL 3 TIMES DAILY PRN
Status: ON HOLD | COMMUNITY
End: 2022-12-01

## 2021-11-21 RX ADMIN — KETOROLAC TROMETHAMINE 30 MG: 30 INJECTION, SOLUTION INTRAMUSCULAR at 12:11

## 2021-11-21 RX ADMIN — Medication 296 ML: at 12:11

## 2021-11-23 ENCOUNTER — TELEPHONE (OUTPATIENT)
Dept: EMERGENCY MEDICINE | Facility: HOSPITAL | Age: 86
End: 2021-11-23
Payer: MEDICARE

## 2021-11-27 ENCOUNTER — HOSPITAL ENCOUNTER (INPATIENT)
Facility: HOSPITAL | Age: 86
LOS: 3 days | Discharge: HOME | DRG: 392 | End: 2021-11-30
Attending: FAMILY MEDICINE | Admitting: FAMILY MEDICINE
Payer: MEDICARE

## 2021-11-27 DIAGNOSIS — R10.9 INTRACTABLE ABDOMINAL PAIN: Primary | ICD-10-CM

## 2021-11-27 DIAGNOSIS — K59.00 CONSTIPATION: ICD-10-CM

## 2021-11-27 PROBLEM — I10 HTN (HYPERTENSION): Status: ACTIVE | Noted: 2021-11-27

## 2021-11-27 PROBLEM — D72.829 LEUKOCYTOSIS: Status: ACTIVE | Noted: 2021-11-27

## 2021-11-27 PROBLEM — E78.5 HLD (HYPERLIPIDEMIA): Status: ACTIVE | Noted: 2021-11-27

## 2021-11-27 PROBLEM — K21.9 GERD (GASTROESOPHAGEAL REFLUX DISEASE): Status: ACTIVE | Noted: 2021-11-27

## 2021-11-27 LAB
ALBUMIN SERPL BCP-MCNC: 3.3 G/DL (ref 3.5–5)
ALBUMIN/GLOB SERPL: 1 {RATIO}
ALP SERPL-CCNC: 85 U/L (ref 55–142)
ALT SERPL W P-5'-P-CCNC: 17 U/L (ref 13–56)
AMYLASE SERPL-CCNC: 37 U/L (ref 25–115)
ANION GAP SERPL CALCULATED.3IONS-SCNC: 10 MMOL/L (ref 7–16)
ANISOCYTOSIS BLD QL SMEAR: NORMAL
AST SERPL W P-5'-P-CCNC: 25 U/L (ref 15–37)
BACTERIA #/AREA URNS HPF: ABNORMAL /HPF
BASOPHILS # BLD AUTO: 0.03 K/UL (ref 0–0.2)
BASOPHILS NFR BLD AUTO: 0.2 % (ref 0–1)
BILIRUB SERPL-MCNC: 0.6 MG/DL (ref 0–1.2)
BILIRUB UR QL STRIP: ABNORMAL
BUN SERPL-MCNC: 18 MG/DL (ref 7–18)
BUN/CREAT SERPL: 26 (ref 6–20)
CALCIUM SERPL-MCNC: 8.8 MG/DL (ref 8.5–10.1)
CHLORIDE SERPL-SCNC: 105 MMOL/L (ref 98–107)
CLARITY UR: CLEAR
CO2 SERPL-SCNC: 30 MMOL/L (ref 21–32)
COLOR UR: YELLOW
CREAT SERPL-MCNC: 0.69 MG/DL (ref 0.55–1.02)
DIFFERENTIAL METHOD BLD: ABNORMAL
EOSINOPHIL # BLD AUTO: 0.1 K/UL (ref 0–0.5)
EOSINOPHIL NFR BLD AUTO: 0.5 % (ref 1–4)
ERYTHROCYTE [DISTWIDTH] IN BLOOD BY AUTOMATED COUNT: 14 % (ref 11.5–14.5)
FLUAV AG UPPER RESP QL IA.RAPID: NEGATIVE
FLUBV AG UPPER RESP QL IA.RAPID: NEGATIVE
GLOBULIN SER-MCNC: 3.4 G/DL (ref 2–4)
GLUCOSE SERPL-MCNC: 135 MG/DL (ref 74–106)
GLUCOSE UR STRIP-MCNC: NEGATIVE MG/DL
HCT VFR BLD AUTO: 40.9 % (ref 38–47)
HGB BLD-MCNC: 13.3 G/DL (ref 12–16)
KETONES UR STRIP-SCNC: ABNORMAL MG/DL
LACTATE SERPL-SCNC: 0.7 MMOL/L (ref 0.4–2)
LEUKOCYTE ESTERASE UR QL STRIP: NEGATIVE
LIPASE SERPL-CCNC: 79 U/L (ref 73–393)
LYMPHOCYTES # BLD AUTO: 8.37 K/UL (ref 1–4.8)
LYMPHOCYTES NFR BLD AUTO: 44.7 % (ref 27–41)
LYMPHOCYTES NFR BLD MANUAL: 40 % (ref 27–41)
MACROCYTES BLD QL SMEAR: NORMAL
MCH RBC QN AUTO: 30.9 PG (ref 27–31)
MCHC RBC AUTO-ENTMCNC: 32.5 G/DL (ref 32–36)
MCV RBC AUTO: 94.9 FL (ref 80–96)
MICROCYTES BLD QL SMEAR: NORMAL
MONOCYTES # BLD AUTO: 0.64 K/UL (ref 0–0.8)
MONOCYTES NFR BLD AUTO: 3.4 % (ref 2–6)
MONOCYTES NFR BLD MANUAL: 5 % (ref 2–6)
MPC BLD CALC-MCNC: 11.5 FL (ref 9.4–12.4)
NEUTROPHILS # BLD AUTO: 9.58 K/UL (ref 1.8–7.7)
NEUTROPHILS NFR BLD AUTO: 51.2 % (ref 53–65)
NEUTS BAND NFR BLD MANUAL: 5 % (ref 1–5)
NEUTS SEG NFR BLD MANUAL: 50 % (ref 50–62)
NITRITE UR QL STRIP: NEGATIVE
NRBC BLD MANUAL-RTO: NORMAL %
PH UR STRIP: 7 PH UNITS
PLATELET # BLD AUTO: 171 K/UL (ref 150–400)
POTASSIUM SERPL-SCNC: 4.7 MMOL/L (ref 3.5–5.1)
PROT SERPL-MCNC: 6.7 G/DL (ref 6.4–8.2)
PROT UR QL STRIP: 30
RBC # BLD AUTO: 4.31 M/UL (ref 4.2–5.4)
RBC # UR STRIP: NEGATIVE /UL
RBC #/AREA URNS HPF: ABNORMAL /HPF
SARS-COV+SARS-COV-2 AG RESP QL IA.RAPID: NEGATIVE
SODIUM SERPL-SCNC: 140 MMOL/L (ref 136–145)
SP GR UR STRIP: 1.02
SQUAMOUS #/AREA URNS LPF: ABNORMAL /LPF
UROBILINOGEN UR STRIP-ACNC: 0.2 MG/DL
WBC # BLD AUTO: 18.72 K/UL (ref 4.5–11)
WBC #/AREA URNS HPF: ABNORMAL /HPF

## 2021-11-27 PROCEDURE — 99285 EMERGENCY DEPT VISIT HI MDM: CPT | Mod: GF | Performed by: NURSE PRACTITIONER

## 2021-11-27 PROCEDURE — 80053 COMPREHEN METABOLIC PANEL: CPT | Performed by: NURSE PRACTITIONER

## 2021-11-27 PROCEDURE — S5010 5% DEXTROSE AND 0.45% SALINE: HCPCS | Performed by: NURSE PRACTITIONER

## 2021-11-27 PROCEDURE — 81003 URINALYSIS AUTO W/O SCOPE: CPT | Performed by: NURSE PRACTITIONER

## 2021-11-27 PROCEDURE — 36415 COLL VENOUS BLD VENIPUNCTURE: CPT | Performed by: NURSE PRACTITIONER

## 2021-11-27 PROCEDURE — 96375 TX/PRO/DX INJ NEW DRUG ADDON: CPT

## 2021-11-27 PROCEDURE — 11000001 HC ACUTE MED/SURG PRIVATE ROOM

## 2021-11-27 PROCEDURE — 81001 URINALYSIS AUTO W/SCOPE: CPT | Performed by: NURSE PRACTITIONER

## 2021-11-27 PROCEDURE — 96374 THER/PROPH/DIAG INJ IV PUSH: CPT

## 2021-11-27 PROCEDURE — 99285 EMERGENCY DEPT VISIT HI MDM: CPT | Mod: 25

## 2021-11-27 PROCEDURE — 85025 COMPLETE CBC W/AUTO DIFF WBC: CPT | Performed by: NURSE PRACTITIONER

## 2021-11-27 PROCEDURE — 83690 ASSAY OF LIPASE: CPT | Performed by: NURSE PRACTITIONER

## 2021-11-27 PROCEDURE — 87040 BLOOD CULTURE FOR BACTERIA: CPT | Performed by: NURSE PRACTITIONER

## 2021-11-27 PROCEDURE — 25000003 PHARM REV CODE 250: Performed by: NURSE PRACTITIONER

## 2021-11-27 PROCEDURE — G0378 HOSPITAL OBSERVATION PER HR: HCPCS

## 2021-11-27 PROCEDURE — 87428 SARSCOV & INF VIR A&B AG IA: CPT | Performed by: NURSE PRACTITIONER

## 2021-11-27 PROCEDURE — 82150 ASSAY OF AMYLASE: CPT | Performed by: NURSE PRACTITIONER

## 2021-11-27 PROCEDURE — 83605 ASSAY OF LACTIC ACID: CPT | Performed by: NURSE PRACTITIONER

## 2021-11-27 PROCEDURE — 99223 PR INITIAL HOSPITAL CARE,LEVL III: ICD-10-PCS | Mod: AI,,, | Performed by: FAMILY MEDICINE

## 2021-11-27 PROCEDURE — 96376 TX/PRO/DX INJ SAME DRUG ADON: CPT

## 2021-11-27 PROCEDURE — 87040 BLOOD CULTURE FOR BACTERIA: CPT | Mod: 91 | Performed by: NURSE PRACTITIONER

## 2021-11-27 PROCEDURE — 63600175 PHARM REV CODE 636 W HCPCS: Performed by: NURSE PRACTITIONER

## 2021-11-27 PROCEDURE — 94761 N-INVAS EAR/PLS OXIMETRY MLT: CPT

## 2021-11-27 PROCEDURE — 99900035 HC TECH TIME PER 15 MIN (STAT)

## 2021-11-27 PROCEDURE — 27000221 HC OXYGEN, UP TO 24 HOURS

## 2021-11-27 PROCEDURE — 99223 1ST HOSP IP/OBS HIGH 75: CPT | Mod: AI,,, | Performed by: FAMILY MEDICINE

## 2021-11-27 RX ORDER — ISOSORBIDE MONONITRATE 30 MG/1
30 TABLET, EXTENDED RELEASE ORAL DAILY
Status: DISCONTINUED | OUTPATIENT
Start: 2021-11-28 | End: 2021-11-30 | Stop reason: HOSPADM

## 2021-11-27 RX ORDER — ASPIRIN 81 MG/1
81 TABLET ORAL DAILY
COMMUNITY

## 2021-11-27 RX ORDER — POLYETHYLENE GLYCOL 3350 17 G/17G
17 POWDER, FOR SOLUTION ORAL DAILY
Status: DISCONTINUED | OUTPATIENT
Start: 2021-11-28 | End: 2021-11-30 | Stop reason: HOSPADM

## 2021-11-27 RX ORDER — CLINDAMYCIN PHOSPHATE 600 MG/50ML
600 INJECTION, SOLUTION INTRAVENOUS
Status: DISCONTINUED | OUTPATIENT
Start: 2021-11-27 | End: 2021-11-29

## 2021-11-27 RX ORDER — MORPHINE SULFATE 10 MG/ML
2 INJECTION INTRAMUSCULAR; INTRAVENOUS; SUBCUTANEOUS EVERY 4 HOURS PRN
Status: DISCONTINUED | OUTPATIENT
Start: 2021-11-27 | End: 2021-11-30 | Stop reason: HOSPADM

## 2021-11-27 RX ORDER — LEVOTHYROXINE SODIUM 50 UG/1
50 TABLET ORAL
Status: DISCONTINUED | OUTPATIENT
Start: 2021-11-28 | End: 2021-11-30 | Stop reason: HOSPADM

## 2021-11-27 RX ORDER — POTASSIUM CHLORIDE 750 MG/1
10 TABLET, EXTENDED RELEASE ORAL DAILY
Status: DISCONTINUED | OUTPATIENT
Start: 2021-11-28 | End: 2021-11-30 | Stop reason: HOSPADM

## 2021-11-27 RX ORDER — ONDANSETRON 2 MG/ML
4 INJECTION INTRAMUSCULAR; INTRAVENOUS
Status: COMPLETED | OUTPATIENT
Start: 2021-11-27 | End: 2021-11-27

## 2021-11-27 RX ORDER — DEXTROSE MONOHYDRATE AND SODIUM CHLORIDE 5; .45 G/100ML; G/100ML
INJECTION, SOLUTION INTRAVENOUS CONTINUOUS
Status: DISCONTINUED | OUTPATIENT
Start: 2021-11-27 | End: 2021-11-30 | Stop reason: HOSPADM

## 2021-11-27 RX ORDER — ACETAMINOPHEN 325 MG/1
650 TABLET ORAL EVERY 8 HOURS PRN
Status: DISCONTINUED | OUTPATIENT
Start: 2021-11-27 | End: 2021-11-30 | Stop reason: HOSPADM

## 2021-11-27 RX ORDER — ROPINIROLE 0.25 MG/1
0.5 TABLET, FILM COATED ORAL NIGHTLY
Status: DISCONTINUED | OUTPATIENT
Start: 2021-11-27 | End: 2021-11-30 | Stop reason: HOSPADM

## 2021-11-27 RX ORDER — PANTOPRAZOLE SODIUM 40 MG/1
40 TABLET, DELAYED RELEASE ORAL DAILY
Status: DISCONTINUED | OUTPATIENT
Start: 2021-11-28 | End: 2021-11-30 | Stop reason: HOSPADM

## 2021-11-27 RX ORDER — LOSARTAN POTASSIUM 50 MG/1
50 TABLET ORAL DAILY
Status: DISCONTINUED | OUTPATIENT
Start: 2021-11-28 | End: 2021-11-30 | Stop reason: HOSPADM

## 2021-11-27 RX ORDER — NIFEDIPINE 30 MG/1
30 TABLET, EXTENDED RELEASE ORAL DAILY
Status: DISCONTINUED | OUTPATIENT
Start: 2021-11-28 | End: 2021-11-30 | Stop reason: HOSPADM

## 2021-11-27 RX ORDER — MONTELUKAST SODIUM 10 MG/1
10 TABLET ORAL NIGHTLY
Status: DISCONTINUED | OUTPATIENT
Start: 2021-11-27 | End: 2021-11-30 | Stop reason: HOSPADM

## 2021-11-27 RX ORDER — ONDANSETRON 2 MG/ML
4 INJECTION INTRAMUSCULAR; INTRAVENOUS EVERY 8 HOURS PRN
Status: DISCONTINUED | OUTPATIENT
Start: 2021-11-27 | End: 2021-11-30 | Stop reason: HOSPADM

## 2021-11-27 RX ORDER — MORPHINE SULFATE 10 MG/ML
2 INJECTION INTRAMUSCULAR; INTRAVENOUS; SUBCUTANEOUS
Status: COMPLETED | OUTPATIENT
Start: 2021-11-27 | End: 2021-11-27

## 2021-11-27 RX ORDER — GABAPENTIN 100 MG/1
100 CAPSULE ORAL NIGHTLY
Status: DISCONTINUED | OUTPATIENT
Start: 2021-11-27 | End: 2021-11-30 | Stop reason: HOSPADM

## 2021-11-27 RX ORDER — TALC
6 POWDER (GRAM) TOPICAL NIGHTLY PRN
Status: DISCONTINUED | OUTPATIENT
Start: 2021-11-27 | End: 2021-11-30 | Stop reason: HOSPADM

## 2021-11-27 RX ORDER — ASPIRIN 81 MG/1
81 TABLET ORAL DAILY
Status: DISCONTINUED | OUTPATIENT
Start: 2021-11-28 | End: 2021-11-30 | Stop reason: HOSPADM

## 2021-11-27 RX ORDER — SODIUM CHLORIDE 0.9 % (FLUSH) 0.9 %
10 SYRINGE (ML) INJECTION
Status: DISCONTINUED | OUTPATIENT
Start: 2021-11-27 | End: 2021-11-30 | Stop reason: HOSPADM

## 2021-11-27 RX ORDER — PRIMIDONE 50 MG/1
50 TABLET ORAL NIGHTLY
Status: DISCONTINUED | OUTPATIENT
Start: 2021-11-27 | End: 2021-11-30 | Stop reason: HOSPADM

## 2021-11-27 RX ORDER — ATORVASTATIN CALCIUM 40 MG/1
40 TABLET, FILM COATED ORAL DAILY
Status: DISCONTINUED | OUTPATIENT
Start: 2021-11-28 | End: 2021-11-30 | Stop reason: HOSPADM

## 2021-11-27 RX ADMIN — GABAPENTIN 100 MG: 100 CAPSULE ORAL at 08:11

## 2021-11-27 RX ADMIN — PRIMIDONE 50 MG: 50 TABLET ORAL at 08:11

## 2021-11-27 RX ADMIN — MONTELUKAST SODIUM 10 MG: 10 TABLET, COATED ORAL at 08:11

## 2021-11-27 RX ADMIN — DEXTROSE AND SODIUM CHLORIDE: 5; 450 INJECTION, SOLUTION INTRAVENOUS at 07:11

## 2021-11-27 RX ADMIN — ONDANSETRON 4 MG: 2 INJECTION INTRAMUSCULAR; INTRAVENOUS at 02:11

## 2021-11-27 RX ADMIN — MORPHINE SULFATE 2 MG: 10 INJECTION INTRAVENOUS at 02:11

## 2021-11-27 RX ADMIN — ROPINIROLE HYDROCHLORIDE 0.5 MG: 0.25 TABLET, FILM COATED ORAL at 08:11

## 2021-11-27 RX ADMIN — CLINDAMYCIN IN 5 PERCENT DEXTROSE 600 MG: 12 INJECTION, SOLUTION INTRAVENOUS at 06:11

## 2021-11-28 PROBLEM — D72.829 LEUKOCYTOSIS: Status: RESOLVED | Noted: 2021-11-27 | Resolved: 2021-11-28

## 2021-11-28 LAB
ALBUMIN SERPL BCP-MCNC: 2.8 G/DL (ref 3.5–5)
ALBUMIN/GLOB SERPL: 1 {RATIO}
ALP SERPL-CCNC: 70 U/L (ref 55–142)
ALT SERPL W P-5'-P-CCNC: 15 U/L (ref 13–56)
ANION GAP SERPL CALCULATED.3IONS-SCNC: 6 MMOL/L (ref 7–16)
AST SERPL W P-5'-P-CCNC: 9 U/L (ref 15–37)
BASOPHILS # BLD AUTO: 0.02 K/UL (ref 0–0.2)
BASOPHILS NFR BLD AUTO: 0.2 % (ref 0–1)
BILIRUB SERPL-MCNC: 0.3 MG/DL (ref 0–1.2)
BUN SERPL-MCNC: 15 MG/DL (ref 7–18)
BUN/CREAT SERPL: 16 (ref 6–20)
CALCIUM SERPL-MCNC: 8.3 MG/DL (ref 8.5–10.1)
CHLORIDE SERPL-SCNC: 105 MMOL/L (ref 98–107)
CO2 SERPL-SCNC: 33 MMOL/L (ref 21–32)
CREAT SERPL-MCNC: 0.96 MG/DL (ref 0.55–1.02)
DIFFERENTIAL METHOD BLD: ABNORMAL
EOSINOPHIL # BLD AUTO: 0.16 K/UL (ref 0–0.5)
EOSINOPHIL NFR BLD AUTO: 1.7 % (ref 1–4)
EOSINOPHIL NFR BLD MANUAL: 1 % (ref 1–4)
ERYTHROCYTE [DISTWIDTH] IN BLOOD BY AUTOMATED COUNT: 14 % (ref 11.5–14.5)
GLOBULIN SER-MCNC: 2.9 G/DL (ref 2–4)
GLUCOSE SERPL-MCNC: 124 MG/DL (ref 74–106)
HCT VFR BLD AUTO: 38.5 % (ref 38–47)
HGB BLD-MCNC: 11.9 G/DL (ref 12–16)
LYMPHOCYTES # BLD AUTO: 5.19 K/UL (ref 1–4.8)
LYMPHOCYTES NFR BLD AUTO: 53.6 % (ref 27–41)
LYMPHOCYTES NFR BLD MANUAL: 49 % (ref 27–41)
MCH RBC QN AUTO: 30.2 PG (ref 27–31)
MCHC RBC AUTO-ENTMCNC: 30.9 G/DL (ref 32–36)
MCV RBC AUTO: 97.7 FL (ref 80–96)
MONOCYTES # BLD AUTO: 0.54 K/UL (ref 0–0.8)
MONOCYTES NFR BLD AUTO: 5.6 % (ref 2–6)
MONOCYTES NFR BLD MANUAL: 7 % (ref 2–6)
MPC BLD CALC-MCNC: 10.9 FL (ref 9.4–12.4)
NEUTROPHILS # BLD AUTO: 3.78 K/UL (ref 1.8–7.7)
NEUTROPHILS NFR BLD AUTO: 38.9 % (ref 53–65)
NEUTS SEG NFR BLD MANUAL: 42 % (ref 50–62)
NRBC BLD MANUAL-RTO: ABNORMAL %
PLATELET # BLD AUTO: 147 K/UL (ref 150–400)
PLATELET MORPHOLOGY: NORMAL
POTASSIUM SERPL-SCNC: 4.1 MMOL/L (ref 3.5–5.1)
PROT SERPL-MCNC: 5.7 G/DL (ref 6.4–8.2)
RBC # BLD AUTO: 3.94 M/UL (ref 4.2–5.4)
RBC MORPH BLD: NORMAL
SODIUM SERPL-SCNC: 140 MMOL/L (ref 136–145)
WBC # BLD AUTO: 9.69 K/UL (ref 4.5–11)

## 2021-11-28 PROCEDURE — 27000947

## 2021-11-28 PROCEDURE — 11000001 HC ACUTE MED/SURG PRIVATE ROOM

## 2021-11-28 PROCEDURE — 85025 COMPLETE CBC W/AUTO DIFF WBC: CPT | Performed by: NURSE PRACTITIONER

## 2021-11-28 PROCEDURE — 27000676 HC TUBING PRIMARY PLUMSET

## 2021-11-28 PROCEDURE — 80053 COMPREHEN METABOLIC PANEL: CPT | Performed by: NURSE PRACTITIONER

## 2021-11-28 PROCEDURE — 36415 COLL VENOUS BLD VENIPUNCTURE: CPT | Performed by: NURSE PRACTITIONER

## 2021-11-28 PROCEDURE — 99232 SBSQ HOSP IP/OBS MODERATE 35: CPT | Mod: ,,, | Performed by: FAMILY MEDICINE

## 2021-11-28 PROCEDURE — 94761 N-INVAS EAR/PLS OXIMETRY MLT: CPT

## 2021-11-28 PROCEDURE — 96365 THER/PROPH/DIAG IV INF INIT: CPT

## 2021-11-28 PROCEDURE — 84075 ASSAY ALKALINE PHOSPHATASE: CPT | Performed by: NURSE PRACTITIONER

## 2021-11-28 PROCEDURE — 96376 TX/PRO/DX INJ SAME DRUG ADON: CPT

## 2021-11-28 PROCEDURE — S5010 5% DEXTROSE AND 0.45% SALINE: HCPCS | Performed by: NURSE PRACTITIONER

## 2021-11-28 PROCEDURE — 25000003 PHARM REV CODE 250: Performed by: NURSE PRACTITIONER

## 2021-11-28 PROCEDURE — 84460 ALANINE AMINO (ALT) (SGPT): CPT | Performed by: NURSE PRACTITIONER

## 2021-11-28 PROCEDURE — 99900035 HC TECH TIME PER 15 MIN (STAT)

## 2021-11-28 PROCEDURE — 63600175 PHARM REV CODE 636 W HCPCS: Performed by: NURSE PRACTITIONER

## 2021-11-28 PROCEDURE — 27000221 HC OXYGEN, UP TO 24 HOURS

## 2021-11-28 PROCEDURE — G0378 HOSPITAL OBSERVATION PER HR: HCPCS

## 2021-11-28 PROCEDURE — 99232 PR SUBSEQUENT HOSPITAL CARE,LEVL II: ICD-10-PCS | Mod: ,,, | Performed by: FAMILY MEDICINE

## 2021-11-28 RX ORDER — DEXTROSE MONOHYDRATE 5 G/100ML
INJECTION INTRAVENOUS
Status: DISPENSED
Start: 2021-11-28 | End: 2021-11-28

## 2021-11-28 RX ORDER — DEXTROSE MONOHYDRATE 5 G/100ML
INJECTION INTRAVENOUS
Status: DISPENSED
Start: 2021-11-28 | End: 2021-11-29

## 2021-11-28 RX ADMIN — LOSARTAN POTASSIUM 50 MG: 50 TABLET, FILM COATED ORAL at 09:11

## 2021-11-28 RX ADMIN — ROPINIROLE HYDROCHLORIDE 0.5 MG: 0.25 TABLET, FILM COATED ORAL at 09:11

## 2021-11-28 RX ADMIN — NIFEDIPINE 30 MG: 30 TABLET, FILM COATED, EXTENDED RELEASE ORAL at 09:11

## 2021-11-28 RX ADMIN — LEVOTHYROXINE SODIUM 50 MCG: 0.05 TABLET ORAL at 06:11

## 2021-11-28 RX ADMIN — DEXTROSE AND SODIUM CHLORIDE: 5; 450 INJECTION, SOLUTION INTRAVENOUS at 06:11

## 2021-11-28 RX ADMIN — ATORVASTATIN CALCIUM 40 MG: 40 TABLET, FILM COATED ORAL at 09:11

## 2021-11-28 RX ADMIN — PRIMIDONE 50 MG: 50 TABLET ORAL at 09:11

## 2021-11-28 RX ADMIN — GABAPENTIN 100 MG: 100 CAPSULE ORAL at 09:11

## 2021-11-28 RX ADMIN — ASPIRIN 81 MG: 81 TABLET, COATED ORAL at 09:11

## 2021-11-28 RX ADMIN — ISOSORBIDE MONONITRATE 30 MG: 30 TABLET, EXTENDED RELEASE ORAL at 09:11

## 2021-11-28 RX ADMIN — CLINDAMYCIN IN 5 PERCENT DEXTROSE 600 MG: 12 INJECTION, SOLUTION INTRAVENOUS at 02:11

## 2021-11-28 RX ADMIN — POTASSIUM CHLORIDE 10 MEQ: 10 TABLET, EXTENDED RELEASE ORAL at 09:11

## 2021-11-28 RX ADMIN — MORPHINE SULFATE 2 MG: 10 INJECTION INTRAVENOUS at 11:11

## 2021-11-28 RX ADMIN — POLYETHYLENE GLYCOL 3350 17 G: 17 POWDER, FOR SOLUTION ORAL at 09:11

## 2021-11-28 RX ADMIN — CLINDAMYCIN IN 5 PERCENT DEXTROSE 600 MG: 12 INJECTION, SOLUTION INTRAVENOUS at 10:11

## 2021-11-28 RX ADMIN — MORPHINE SULFATE 2 MG: 10 INJECTION INTRAVENOUS at 02:11

## 2021-11-28 RX ADMIN — MONTELUKAST SODIUM 10 MG: 10 TABLET, COATED ORAL at 09:11

## 2021-11-28 RX ADMIN — DEXTROSE AND SODIUM CHLORIDE: 5; 450 INJECTION, SOLUTION INTRAVENOUS at 09:11

## 2021-11-28 RX ADMIN — PANTOPRAZOLE SODIUM 40 MG: 40 TABLET, DELAYED RELEASE ORAL at 09:11

## 2021-11-28 RX ADMIN — CLINDAMYCIN IN 5 PERCENT DEXTROSE 600 MG: 12 INJECTION, SOLUTION INTRAVENOUS at 06:11

## 2021-11-29 LAB
ALBUMIN SERPL BCP-MCNC: 2.9 G/DL (ref 3.5–5)
ALBUMIN/GLOB SERPL: 0.9 {RATIO}
ALP SERPL-CCNC: 73 U/L (ref 55–142)
ALT SERPL W P-5'-P-CCNC: 12 U/L (ref 13–56)
ANION GAP SERPL CALCULATED.3IONS-SCNC: 11 MMOL/L (ref 7–16)
AST SERPL W P-5'-P-CCNC: 12 U/L (ref 15–37)
BILIRUB SERPL-MCNC: 0.3 MG/DL (ref 0–1.2)
BUN SERPL-MCNC: 10 MG/DL (ref 7–18)
BUN/CREAT SERPL: 13 (ref 6–20)
CALCIUM SERPL-MCNC: 8.7 MG/DL (ref 8.5–10.1)
CHLORIDE SERPL-SCNC: 105 MMOL/L (ref 98–107)
CO2 SERPL-SCNC: 29 MMOL/L (ref 21–32)
CREAT SERPL-MCNC: 0.75 MG/DL (ref 0.55–1.02)
GLOBULIN SER-MCNC: 3.1 G/DL (ref 2–4)
GLUCOSE SERPL-MCNC: 128 MG/DL (ref 74–106)
POTASSIUM SERPL-SCNC: 4.3 MMOL/L (ref 3.5–5.1)
PROT SERPL-MCNC: 6 G/DL (ref 6.4–8.2)
SODIUM SERPL-SCNC: 141 MMOL/L (ref 136–145)

## 2021-11-29 PROCEDURE — S5010 5% DEXTROSE AND 0.45% SALINE: HCPCS | Performed by: NURSE PRACTITIONER

## 2021-11-29 PROCEDURE — 96376 TX/PRO/DX INJ SAME DRUG ADON: CPT

## 2021-11-29 PROCEDURE — 63600175 PHARM REV CODE 636 W HCPCS: Performed by: NURSE PRACTITIONER

## 2021-11-29 PROCEDURE — 96361 HYDRATE IV INFUSION ADD-ON: CPT

## 2021-11-29 PROCEDURE — 96374 THER/PROPH/DIAG INJ IV PUSH: CPT

## 2021-11-29 PROCEDURE — 11000001 HC ACUTE MED/SURG PRIVATE ROOM

## 2021-11-29 PROCEDURE — 99232 SBSQ HOSP IP/OBS MODERATE 35: CPT | Mod: ,,, | Performed by: FAMILY MEDICINE

## 2021-11-29 PROCEDURE — 25000003 PHARM REV CODE 250: Performed by: FAMILY MEDICINE

## 2021-11-29 PROCEDURE — 25000003 PHARM REV CODE 250: Performed by: NURSE PRACTITIONER

## 2021-11-29 PROCEDURE — 96365 THER/PROPH/DIAG IV INF INIT: CPT

## 2021-11-29 PROCEDURE — 27000221 HC OXYGEN, UP TO 24 HOURS

## 2021-11-29 PROCEDURE — 80053 COMPREHEN METABOLIC PANEL: CPT | Performed by: NURSE PRACTITIONER

## 2021-11-29 PROCEDURE — 36415 COLL VENOUS BLD VENIPUNCTURE: CPT | Performed by: NURSE PRACTITIONER

## 2021-11-29 PROCEDURE — 96375 TX/PRO/DX INJ NEW DRUG ADDON: CPT

## 2021-11-29 PROCEDURE — 25000003 PHARM REV CODE 250

## 2021-11-29 PROCEDURE — 99232 PR SUBSEQUENT HOSPITAL CARE,LEVL II: ICD-10-PCS | Mod: ,,, | Performed by: FAMILY MEDICINE

## 2021-11-29 PROCEDURE — 94761 N-INVAS EAR/PLS OXIMETRY MLT: CPT

## 2021-11-29 PROCEDURE — G0378 HOSPITAL OBSERVATION PER HR: HCPCS

## 2021-11-29 PROCEDURE — 27000947

## 2021-11-29 RX ORDER — CLINDAMYCIN PHOSPHATE 150 MG/ML
INJECTION, SOLUTION INTRAVENOUS
Status: COMPLETED
Start: 2021-11-29 | End: 2021-11-29

## 2021-11-29 RX ORDER — CLINDAMYCIN PHOSPHATE 600 MG/50ML
600 INJECTION, SOLUTION INTRAVENOUS
Status: DISCONTINUED | OUTPATIENT
Start: 2021-11-29 | End: 2021-11-29

## 2021-11-29 RX ORDER — SODIUM CHLORIDE 9 MG/ML
INJECTION, SOLUTION INTRAVENOUS
Status: COMPLETED
Start: 2021-11-29 | End: 2021-11-29

## 2021-11-29 RX ORDER — CLINDAMYCIN PHOSPHATE 600 MG/50ML
600 INJECTION, SOLUTION INTRAVENOUS
Status: DISCONTINUED | OUTPATIENT
Start: 2021-11-29 | End: 2021-11-30 | Stop reason: HOSPADM

## 2021-11-29 RX ORDER — DEXTROSE MONOHYDRATE 5 G/100ML
INJECTION INTRAVENOUS
Status: COMPLETED
Start: 2021-11-29 | End: 2021-11-29

## 2021-11-29 RX ADMIN — LOSARTAN POTASSIUM 50 MG: 50 TABLET, FILM COATED ORAL at 08:11

## 2021-11-29 RX ADMIN — MORPHINE SULFATE 2 MG: 10 INJECTION INTRAVENOUS at 11:11

## 2021-11-29 RX ADMIN — GABAPENTIN 100 MG: 100 CAPSULE ORAL at 08:11

## 2021-11-29 RX ADMIN — POTASSIUM CHLORIDE 10 MEQ: 10 TABLET, EXTENDED RELEASE ORAL at 08:11

## 2021-11-29 RX ADMIN — CLINDAMYCIN IN 5 PERCENT DEXTROSE 600 MG: 12 INJECTION, SOLUTION INTRAVENOUS at 06:11

## 2021-11-29 RX ADMIN — DEXTROSE AND SODIUM CHLORIDE: 5; 450 INJECTION, SOLUTION INTRAVENOUS at 06:11

## 2021-11-29 RX ADMIN — LEVOTHYROXINE SODIUM 50 MCG: 0.05 TABLET ORAL at 06:11

## 2021-11-29 RX ADMIN — ATORVASTATIN CALCIUM 40 MG: 40 TABLET, FILM COATED ORAL at 08:11

## 2021-11-29 RX ADMIN — PANTOPRAZOLE SODIUM 40 MG: 40 TABLET, DELAYED RELEASE ORAL at 08:11

## 2021-11-29 RX ADMIN — ASPIRIN 81 MG: 81 TABLET, COATED ORAL at 08:11

## 2021-11-29 RX ADMIN — ROPINIROLE HYDROCHLORIDE 0.5 MG: 0.25 TABLET, FILM COATED ORAL at 08:11

## 2021-11-29 RX ADMIN — CLINDAMYCIN IN 5 PERCENT DEXTROSE 600 MG: 12 INJECTION, SOLUTION INTRAVENOUS at 10:11

## 2021-11-29 RX ADMIN — MORPHINE SULFATE 2 MG: 10 INJECTION INTRAVENOUS at 03:11

## 2021-11-29 RX ADMIN — CLINDAMYCIN PHOSPHATE 600 MG: 150 INJECTION, SOLUTION INTRAMUSCULAR; INTRAVENOUS at 03:11

## 2021-11-29 RX ADMIN — PRIMIDONE 50 MG: 50 TABLET ORAL at 08:11

## 2021-11-29 RX ADMIN — ISOSORBIDE MONONITRATE 30 MG: 30 TABLET, EXTENDED RELEASE ORAL at 08:11

## 2021-11-29 RX ADMIN — SODIUM CHLORIDE 100 ML: 9 INJECTION, SOLUTION INTRAVENOUS at 10:11

## 2021-11-29 RX ADMIN — DEXTROSE MONOHYDRATE 50 ML: 5 INJECTION INTRAVENOUS at 02:11

## 2021-11-29 RX ADMIN — POLYETHYLENE GLYCOL 3350 17 G: 17 POWDER, FOR SOLUTION ORAL at 08:11

## 2021-11-29 RX ADMIN — MONTELUKAST SODIUM 10 MG: 10 TABLET, COATED ORAL at 08:11

## 2021-11-29 RX ADMIN — NIFEDIPINE 30 MG: 30 TABLET, FILM COATED, EXTENDED RELEASE ORAL at 08:11

## 2021-11-29 RX ADMIN — CLINDAMYCIN PHOSPHATE 600 MG: 150 INJECTION, SOLUTION INTRAMUSCULAR; INTRAVENOUS at 10:11

## 2021-11-30 VITALS
TEMPERATURE: 98 F | RESPIRATION RATE: 20 BRPM | HEIGHT: 62 IN | BODY MASS INDEX: 27.05 KG/M2 | DIASTOLIC BLOOD PRESSURE: 63 MMHG | WEIGHT: 147 LBS | OXYGEN SATURATION: 96 % | SYSTOLIC BLOOD PRESSURE: 172 MMHG | HEART RATE: 68 BPM

## 2021-11-30 LAB
ALBUMIN SERPL BCP-MCNC: 2.9 G/DL (ref 3.5–5)
ALBUMIN/GLOB SERPL: 0.9 {RATIO}
ALP SERPL-CCNC: 76 U/L (ref 55–142)
ALT SERPL W P-5'-P-CCNC: 12 U/L (ref 13–56)
ANION GAP SERPL CALCULATED.3IONS-SCNC: 8 MMOL/L (ref 7–16)
AST SERPL W P-5'-P-CCNC: 12 U/L (ref 15–37)
BILIRUB SERPL-MCNC: 0.3 MG/DL (ref 0–1.2)
BUN SERPL-MCNC: 6 MG/DL (ref 7–18)
BUN/CREAT SERPL: 8 (ref 6–20)
CALCIUM SERPL-MCNC: 8.7 MG/DL (ref 8.5–10.1)
CHLORIDE SERPL-SCNC: 106 MMOL/L (ref 98–107)
CO2 SERPL-SCNC: 30 MMOL/L (ref 21–32)
CREAT SERPL-MCNC: 0.76 MG/DL (ref 0.55–1.02)
GLOBULIN SER-MCNC: 3.1 G/DL (ref 2–4)
GLUCOSE SERPL-MCNC: 137 MG/DL (ref 74–106)
POTASSIUM SERPL-SCNC: 3.9 MMOL/L (ref 3.5–5.1)
PROT SERPL-MCNC: 6 G/DL (ref 6.4–8.2)
SODIUM SERPL-SCNC: 140 MMOL/L (ref 136–145)

## 2021-11-30 PROCEDURE — 96366 THER/PROPH/DIAG IV INF ADDON: CPT

## 2021-11-30 PROCEDURE — 96376 TX/PRO/DX INJ SAME DRUG ADON: CPT

## 2021-11-30 PROCEDURE — 99239 HOSP IP/OBS DSCHRG MGMT >30: CPT | Mod: ,,, | Performed by: FAMILY MEDICINE

## 2021-11-30 PROCEDURE — 99239 PR HOSPITAL DISCHARGE DAY,>30 MIN: ICD-10-PCS | Mod: ,,, | Performed by: FAMILY MEDICINE

## 2021-11-30 PROCEDURE — 27000941

## 2021-11-30 PROCEDURE — 36415 COLL VENOUS BLD VENIPUNCTURE: CPT | Performed by: NURSE PRACTITIONER

## 2021-11-30 PROCEDURE — S5010 5% DEXTROSE AND 0.45% SALINE: HCPCS | Performed by: NURSE PRACTITIONER

## 2021-11-30 PROCEDURE — 94761 N-INVAS EAR/PLS OXIMETRY MLT: CPT

## 2021-11-30 PROCEDURE — 27000221 HC OXYGEN, UP TO 24 HOURS

## 2021-11-30 PROCEDURE — 96361 HYDRATE IV INFUSION ADD-ON: CPT

## 2021-11-30 PROCEDURE — G0378 HOSPITAL OBSERVATION PER HR: HCPCS

## 2021-11-30 PROCEDURE — 25000003 PHARM REV CODE 250: Performed by: FAMILY MEDICINE

## 2021-11-30 PROCEDURE — 80053 COMPREHEN METABOLIC PANEL: CPT | Performed by: NURSE PRACTITIONER

## 2021-11-30 PROCEDURE — 25000003 PHARM REV CODE 250: Performed by: NURSE PRACTITIONER

## 2021-11-30 RX ADMIN — LEVOTHYROXINE SODIUM 50 MCG: 0.05 TABLET ORAL at 05:11

## 2021-11-30 RX ADMIN — ASPIRIN 81 MG: 81 TABLET, COATED ORAL at 08:11

## 2021-11-30 RX ADMIN — DEXTROSE AND SODIUM CHLORIDE: 5; 450 INJECTION, SOLUTION INTRAVENOUS at 05:11

## 2021-11-30 RX ADMIN — CLINDAMYCIN IN 5 PERCENT DEXTROSE 600 MG: 12 INJECTION, SOLUTION INTRAVENOUS at 11:11

## 2021-11-30 RX ADMIN — POLYETHYLENE GLYCOL 3350 17 G: 17 POWDER, FOR SOLUTION ORAL at 08:11

## 2021-11-30 RX ADMIN — ISOSORBIDE MONONITRATE 30 MG: 30 TABLET, EXTENDED RELEASE ORAL at 08:11

## 2021-11-30 RX ADMIN — PANTOPRAZOLE SODIUM 40 MG: 40 TABLET, DELAYED RELEASE ORAL at 08:11

## 2021-11-30 RX ADMIN — POTASSIUM CHLORIDE 10 MEQ: 10 TABLET, EXTENDED RELEASE ORAL at 08:11

## 2021-11-30 RX ADMIN — ATORVASTATIN CALCIUM 40 MG: 40 TABLET, FILM COATED ORAL at 08:11

## 2021-11-30 RX ADMIN — CLINDAMYCIN IN 5 PERCENT DEXTROSE 600 MG: 12 INJECTION, SOLUTION INTRAVENOUS at 03:11

## 2021-11-30 RX ADMIN — LOSARTAN POTASSIUM 50 MG: 50 TABLET, FILM COATED ORAL at 08:11

## 2021-11-30 RX ADMIN — NIFEDIPINE 30 MG: 30 TABLET, FILM COATED, EXTENDED RELEASE ORAL at 08:11

## 2021-12-02 RX ORDER — VALACYCLOVIR HYDROCHLORIDE 1 G/1
1000 TABLET, FILM COATED ORAL 3 TIMES DAILY
Qty: 30 TABLET | Refills: 0 | Status: SHIPPED | OUTPATIENT
Start: 2021-12-02 | End: 2021-12-03 | Stop reason: SDUPTHER

## 2021-12-03 RX ORDER — VALACYCLOVIR HYDROCHLORIDE 1 G/1
1000 TABLET, FILM COATED ORAL 3 TIMES DAILY
Qty: 21 TABLET | Refills: 0 | Status: SHIPPED | OUTPATIENT
Start: 2021-12-03 | End: 2021-12-10

## 2021-12-03 RX ORDER — VALACYCLOVIR HYDROCHLORIDE 1 G/1
1000 TABLET, FILM COATED ORAL 3 TIMES DAILY
Qty: 30 TABLET | Refills: 0 | Status: ON HOLD | OUTPATIENT
Start: 2021-12-03 | End: 2022-12-08 | Stop reason: HOSPADM

## 2021-12-04 LAB
BACTERIA BLD CULT: NORMAL
BACTERIA BLD CULT: NORMAL

## 2022-07-25 ENCOUNTER — HOSPITAL ENCOUNTER (EMERGENCY)
Facility: HOSPITAL | Age: 87
Discharge: HOME | End: 2022-07-25
Attending: FAMILY MEDICINE | Admitting: FAMILY MEDICINE
Payer: MEDICARE

## 2022-07-25 VITALS
SYSTOLIC BLOOD PRESSURE: 164 MMHG | HEIGHT: 62 IN | OXYGEN SATURATION: 94 % | TEMPERATURE: 98 F | BODY MASS INDEX: 26.68 KG/M2 | DIASTOLIC BLOOD PRESSURE: 82 MMHG | WEIGHT: 145 LBS | HEART RATE: 62 BPM | RESPIRATION RATE: 18 BRPM

## 2022-07-25 DIAGNOSIS — S22.41XA CLOSED FRACTURE OF MULTIPLE RIBS OF RIGHT SIDE, INITIAL ENCOUNTER: Primary | ICD-10-CM
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
## 2022-07-25 DIAGNOSIS — T14.90XA TRAUMA: ICD-10-CM

## 2022-07-25 PROCEDURE — 99284 EMERGENCY DEPT VISIT MOD MDM: CPT | Mod: 25

## 2022-07-25 PROCEDURE — 99283 EMERGENCY DEPT VISIT LOW MDM: CPT | Performed by: FAMILY MEDICINE

## 2022-07-25 RX ORDER — TIZANIDINE 4 MG/1
4 TABLET ORAL EVERY 6 HOURS PRN
Qty: 30 TABLET | Refills: 2 | Status: SHIPPED | OUTPATIENT
Start: 2022-07-25 | End: 2022-07-25 | Stop reason: SDUPTHER

## 2022-07-25 RX ORDER — TIZANIDINE 4 MG/1
4 TABLET ORAL EVERY 6 HOURS PRN
Qty: 30 TABLET | Refills: 2 | Status: ON HOLD | OUTPATIENT
Start: 2022-07-25 | End: 2022-12-01

## 2022-07-25 NOTE — ED PROVIDER NOTES
Encounter Date: 7/25/2022       History     Chief Complaint   Patient presents with    Fall     Patient fell on a board that was loose on the floor and fell to her right ribcage.      Review of patient's allergies indicates:   Allergen Reactions    Bactrim [sulfamethoxazole-trimethoprim]     Ceftin [cefuroxime axetil]     Iodinated contrast media Other (See Comments)     Past Medical History:   Diagnosis Date    Actinic keratoses     Arthritis     COPD (chronic obstructive pulmonary disease)     Frequent UTI     GERD (gastroesophageal reflux disease)     High cholesterol     HTN (hypertension)      Past Surgical History:   Procedure Laterality Date    APPENDECTOMY      HYSTERECTOMY      pessary       Family History   Problem Relation Age of Onset    Hypertension Daughter     Diabetes Daughter     Melanoma Daughter      Social History     Tobacco Use    Smoking status: Never    Smokeless tobacco: Never   Substance Use Topics    Alcohol use: Never    Drug use: Never     Review of Systems   Constitutional: Negative.  Negative for fever.   HENT: Negative.  Negative for sore throat.    Eyes: Negative.    Respiratory: Negative.  Negative for shortness of breath.    Cardiovascular: Negative.  Negative for chest pain.   Gastrointestinal: Negative.  Negative for nausea.   Endocrine: Negative.    Genitourinary: Negative.  Negative for dysuria.   Musculoskeletal: Negative.  Negative for back pain.        Patient with right rib pain and left knee pain   Skin: Negative.  Negative for rash.   Allergic/Immunologic: Negative.    Neurological: Negative.  Negative for weakness.   Hematological: Negative.  Does not bruise/bleed easily.   Psychiatric/Behavioral: Negative.     All other systems reviewed and are negative.    Physical Exam     Initial Vitals [07/25/22 1424]   BP Pulse Resp Temp SpO2   (!) 164/82 62 18 97.9 °F (36.6 °C) (!) 94 %      MAP       --         Physical Exam    Constitutional: She appears well-developed and  well-nourished.   HENT:   Head: Normocephalic and atraumatic.   Right Ear: External ear normal.   Left Ear: External ear normal.   Nose: Nose normal.   Mouth/Throat: Oropharynx is clear and moist.   Eyes: Conjunctivae and EOM are normal. Pupils are equal, round, and reactive to light.   Neck: Neck supple.   Normal range of motion.  Cardiovascular:  Normal rate, regular rhythm, normal heart sounds and intact distal pulses.           Pulmonary/Chest: Breath sounds normal.   Abdominal: Abdomen is soft. Bowel sounds are normal.   Genitourinary:    Vagina and uterus normal.     Musculoskeletal:         General: Normal range of motion.      Cervical back: Normal range of motion and neck supple.      Comments: Pain and tenderness to the right knee no sign of fracture or trauma     Neurological: She is alert and oriented to person, place, and time. She has normal strength and normal reflexes.   Skin: Skin is warm. Capillary refill takes less than 2 seconds.   Psychiatric: She has a normal mood and affect. Her behavior is normal. Judgment and thought content normal.       Medical Screening Exam   See Full Note    ED Course   Procedures  Labs Reviewed - No data to display       Imaging Results              X-Ray Ribs 2 View Right (Final result)  Result time 07/25/22 15:19:06      Final result by Chester Parson II, MD (07/25/22 15:19:06)                   Impression:      Rib fracture as described above..      Electronically signed by: Chester Parson  Date:    07/25/2022  Time:    15:19               Narrative:    EXAMINATION:  XR RIBS 2 VIEW RIGHT    CLINICAL HISTORY:  Injury, unspecified, initial encounter    COMPARISON:  None.    FINDINGS:  There is fracture of the right lateral 8th and 9th ribs with minimal displacement, detail limited osteopenia.  No subpleural hematoma or pneumothorax is present.                                       X-Ray Chest PA And Lateral (Final result)  Result time 07/25/22 15:17:14      Final  result by Chester Parson II, MD (07/25/22 15:17:14)                   Impression:      Chronic lung changes.  No acute process.      Electronically signed by: Chester Parson  Date:    07/25/2022  Time:    15:17               Narrative:    EXAMINATION:  XR CHEST PA AND LATERAL    CLINICAL HISTORY:  Injury, unspecified, initial encounter    COMPARISON:  17 August 2020    FINDINGS:  The heart and mediastinum are normal in size and configuration.  The pulmonary vascularity is normal in caliber. Lung volumes are increased with prominent bronchial markings.  No lung infiltrates, effusions, pneumothorax or other abnormality is demonstrated.                                       X-Ray Knee 1 or 2 View Left (Final result)  Result time 07/25/22 15:19:40      Final result by Chester Parson II, MD (07/25/22 15:19:40)                   Impression:      No acute injury.      Electronically signed by: Chester Parson  Date:    07/25/2022  Time:    15:19               Narrative:    EXAMINATION:  XR KNEE 1 OR 2 VIEW LEFT    CLINICAL HISTORY:  Unspecified fall, initial encounter    COMPARISON:  None available    FINDINGS:  No evidence of fracture seen.  The alignment of the joints appears normal.  Mild to moderate medial compartment and mild remaining compartment degenerative change is present.  No soft tissue abnormality is seen.                                       Medications - No data to display                    Clinical Impression:   Final diagnoses:  [W19.XXXA] Fall  [S22.41XA] Closed fracture of multiple ribs of right side, initial encounter (Primary)        ED Disposition Condition    Discharge Stable          ED Prescriptions       Medication Sig Dispense Start Date End Date Auth. Provider    tiZANidine (ZANAFLEX) 4 MG tablet  (Status: Discontinued) Take 1 tablet (4 mg total) by mouth every 6 (six) hours as needed. 30 tablet 7/25/2022 7/25/2022 Anmol Reese,     tiZANidine (ZANAFLEX) 4 MG tablet Take 1  tablet (4 mg total) by mouth every 6 (six) hours as needed. 30 tablet 7/25/2022 -- Anmol Reese, DO          Follow-up Information    None          Anmol Reese,   09/29/22 2945

## 2022-07-25 NOTE — ED NOTES
Recd to ED with c/o fall on Saturday on rt side under arm and rib area, pt guarding. Left knee swollen and pt unable to move very well. Son at BS, pt states hurts to move and turn on rt side under arm. New orders given

## 2022-07-26 ENCOUNTER — TELEPHONE (OUTPATIENT)
Dept: EMERGENCY MEDICINE | Facility: HOSPITAL | Age: 87
End: 2022-07-26
Payer: MEDICARE

## 2022-09-29 ENCOUNTER — OFFICE VISIT (OUTPATIENT)
Dept: DERMATOLOGY | Facility: CLINIC | Age: 87
End: 2022-09-29
Payer: MEDICARE

## 2022-09-29 DIAGNOSIS — L57.0 AK (ACTINIC KERATOSIS): ICD-10-CM

## 2022-09-29 DIAGNOSIS — L82.1 SK (SEBORRHEIC KERATOSIS): ICD-10-CM

## 2022-09-29 DIAGNOSIS — L57.8 OTHER SKIN CHANGES DUE TO CHRONIC EXPOSURE TO NONIONIZING RADIATION: Primary | ICD-10-CM

## 2022-09-29 PROCEDURE — 99213 OFFICE O/P EST LOW 20 MIN: CPT | Mod: 25,,, | Performed by: DERMATOLOGY

## 2022-09-29 PROCEDURE — 17000 DESTRUCT PREMALG LESION: CPT | Mod: ,,, | Performed by: DERMATOLOGY

## 2022-09-29 PROCEDURE — 1159F PR MEDICATION LIST DOCUMENTED IN MEDICAL RECORD: ICD-10-PCS | Mod: CPTII,,, | Performed by: DERMATOLOGY

## 2022-09-29 PROCEDURE — 17003 DESTRUCTION, PREMALIGNANT LESIONS; SECOND THROUGH 14 LESIONS: ICD-10-PCS | Mod: ,,, | Performed by: DERMATOLOGY

## 2022-09-29 PROCEDURE — 1160F RVW MEDS BY RX/DR IN RCRD: CPT | Mod: CPTII,,, | Performed by: DERMATOLOGY

## 2022-09-29 PROCEDURE — 99213 PR OFFICE/OUTPT VISIT, EST, LEVL III, 20-29 MIN: ICD-10-PCS | Mod: 25,,, | Performed by: DERMATOLOGY

## 2022-09-29 PROCEDURE — 1159F MED LIST DOCD IN RCRD: CPT | Mod: CPTII,,, | Performed by: DERMATOLOGY

## 2022-09-29 PROCEDURE — 17000 PR DESTRUCTION(LASER SURGERY,CRYOSURGERY,CHEMOSURGERY),PREMALIGNANT LESIONS,FIRST LESION: ICD-10-PCS | Mod: ,,, | Performed by: DERMATOLOGY

## 2022-09-29 PROCEDURE — 17003 DESTRUCT PREMALG LES 2-14: CPT | Mod: ,,, | Performed by: DERMATOLOGY

## 2022-09-29 PROCEDURE — 1160F PR REVIEW ALL MEDS BY PRESCRIBER/CLIN PHARMACIST DOCUMENTED: ICD-10-PCS | Mod: CPTII,,, | Performed by: DERMATOLOGY

## 2022-09-29 NOTE — PATIENT INSTRUCTIONS
Sunscreen Recommendations  I recommended a broad spectrum sunscreen with a SPF of 30 or higher that is water-resistant. SPF 30 sunscreens block approximately 97 percent of the sun's harmful rays.   Sunscreens should be applied at least 15 minutes prior to expected sun exposure and then every 90 minutes after that as long as sun exposure continues.   If swimming or exercising sunscreen should be reapplied every 45 minutes to an hour after getting wet or sweating.  One ounce, or the equivalent of a shot glass full of sunscreen, is adequate to protect the skin not covered by a bathing suit.   I also recommended a lip balm with a sunscreen as well.   Healthy Sun Protective Behaviors  Sun protective clothing can be used in lieu of sunscreen but must be worn the entire time you are exposed to the sun's rays.  Seek shade between 10 a.m. and 2 p.m.  Use extra caution near water, snow, or sand as they reflect sun rays  Avoid tanning beds and consider sunless self-tanning products instead  Perform monthly self skin exams         Cryotherapy  There will likely be a blister.   Clean the area daily with dial antibacterial soap and water.   Apply vaseline as needed.   The area will take 1-2 weeks to heal.

## 2022-09-29 NOTE — PROGRESS NOTES
[facility]   Kiara Silva MD    Patient Name: Pilar Reardon  Patient YOB: 1932   Date of Service: 9/29/22    CC: Full Skin Exam    HPI: Pilar Reardon is a 89 y.o. female presents today for a full skin exam.  Patient was last seen 09/21 and dermatologic history includes Aks and family history of melanoma. Patient is concerned today about a lesion located on the left cheek.  It has been present for 6 month(s). It has not been treated in the past.  Patient is also concerned about lesions on the forehead and right eye.    Past Medical History:   Diagnosis Date    Actinic keratoses     Arthritis     COPD (chronic obstructive pulmonary disease)     Frequent UTI     GERD (gastroesophageal reflux disease)     High cholesterol     HTN (hypertension)      Past Surgical History:   Procedure Laterality Date    APPENDECTOMY      HYSTERECTOMY      pessary       Review of patient's allergies indicates:   Allergen Reactions    Bactrim [sulfamethoxazole-trimethoprim]     Ceftin [cefuroxime axetil]     Iodinated contrast media Other (See Comments)       Current Outpatient Medications:     ampicillin (PRINCIPEN) 500 MG capsule, , Disp: , Rfl:     aspirin (ECOTRIN) 81 MG EC tablet, Take 81 mg by mouth once daily., Disp: , Rfl:     ciprofloxacin HCl (CIPRO) 500 MG tablet, Take 500 mg by mouth 2 (two) times daily., Disp: , Rfl:     diclofenac sodium (VOLTAREN) 1 % Gel, , Disp: , Rfl:     gabapentin (NEURONTIN) 100 MG capsule, , Disp: , Rfl:     isosorbide mononitrate (IMDUR) 30 MG 24 hr tablet, , Disp: , Rfl:     levothyroxine (SYNTHROID) 50 MCG tablet, , Disp: , Rfl:     losartan (COZAAR) 100 MG tablet, , Disp: , Rfl:     meloxicam (MOBIC) 7.5 MG tablet, , Disp: , Rfl:     montelukast (SINGULAIR) 10 mg tablet, , Disp: , Rfl:     NIFEdipine (ADALAT CC) 30 MG TbSR, , Disp: , Rfl:     nitrofurantoin, macrocrystal-monohydrate, (MACROBID) 100 MG capsule, , Disp: , Rfl:     omeprazole (PRILOSEC) 40 MG capsule,  , Disp: , Rfl:     phenazopyridine (PYRIDIUM) 200 MG tablet, Take 200 mg by mouth 3 (three) times daily as needed for Pain., Disp: , Rfl:     potassium chloride SA (K-DUR,KLOR-CON) 10 MEQ tablet, , Disp: , Rfl:     primidone (MYSOLINE) 50 MG Tab, , Disp: , Rfl:     rOPINIRole (REQUIP) 0.5 MG tablet, , Disp: , Rfl:     rosuvastatin (CRESTOR) 10 MG tablet, , Disp: , Rfl:     tiZANidine (ZANAFLEX) 4 MG tablet, Take 1 tablet (4 mg total) by mouth every 6 (six) hours as needed., Disp: 30 tablet, Rfl: 2    travoprost (TRAVATAN Z) 0.004 % ophthalmic solution, , Disp: , Rfl:     valACYclovir (VALTREX) 1000 MG tablet, Take 1 tablet (1,000 mg total) by mouth 3 (three) times daily. for 10 days, Disp: 30 tablet, Rfl: 0    ROS: A focused review of systems was obtained and negative.     Exam: A full skin exam was performed including scalp, hair, face, neck, chest, back, abdomen, right arm, left arm, right hand, left hand, nails, right leg, and left leg.  All areas examined were normal expect as per below in assessment and plan.  General Appearance of the patient is well developed and well nourished.  Orientation: alert and oriented x 3.  Mood and affect: pleasant.    Assessment:   The primary encounter diagnosis was Other skin changes due to chronic exposure to nonionizing radiation. Diagnoses of SK (seborrheic keratosis) and AK (actinic keratosis) were also pertinent to this visit.    Plan:      Seborrheic Keratosis (L82.1)  - Stuck-on, warty, greasy brown papule with pseudo-horn cysts scattered on the trunk and extremities    Plan: Counseling.  I counseled the patient regarding the following:  Skin Care: Seborrheic Keratoses are benign. No treatment is necessary.  Expectations: Seborrheic Keratoses are benign warty growths. Patients get more of them as they age    Plan: Reassurance    Actinic Keratoses(L57.0)  - Erythematous patches and papules with hyperkeratotic scale distributed on the face and right dorsal hand.    Plan:  Counseling.  I counseled the patient regarding the following:  Skin Care: Sun protective clothing and broad spectrum sunscreen can prevent the formation of Actinic  Keratoses. AKs can resolve with cryotherapy, photodynamic therapy, imiquimod, topical 5-FU.  Expectations: Actinic Keratoses are precancerous proliferations that occur within sun damaged skin. If untreated,  a small subset of AKs can develop into Squamous Cell Carcinoma.  Contact Office if: If AKs fail to resolve despite treatment, or if you develop a side effect from therapy, such as  unbearable crusting, scabbing, redness and tenderness.    Plan: Liquid Nitrogen.  A total of 9 lesions were treated with liquid nitrogen for 2 freeze-thaw cycles lasting 5 seconds, located on the above locations.   The patient's consent was obtained including but not limited to risks of crusting, scabbing,  blistering, scarring, darker or lighter pigmentary change, recurrence, incomplete removal and infection.    Other Skin Changes Due to Chronic Exposure of Nonionizing Radiation (L57.8)    Plan: Monitoring.     Plan: Sunscreen Recommendations.  I recommended a broad spectrum sunscreen with a SPF of 30 or higher. I explained that SPF 30 sunscreens block approximately 97 percent of the  sun's harmful rays. Sunscreens should be applied at least 15 minutes prior to expected sun exposure and then every 2 hours after that as long as  sun exposure continues. If swimming or exercising sunscreen should be reapplied every 45 minutes to an hour after getting wet or sweating. One  ounce, or the equivalent of a shot glass full of sunscreen, is adequate to protect the skin not covered by a bathing suit. I also recommended a lip  balm with a sunscreen as well. Sun protective clothing can be used in lieu of sunscreen but must be worn the entire time you are exposed to the  sun's rays.          Follow up in about 1 year (around 9/29/2023).    Kiara Silva MD

## 2022-10-24 ENCOUNTER — HOSPITAL ENCOUNTER (INPATIENT)
Facility: HOSPITAL | Age: 87
LOS: 5 days | Discharge: NURSING, REHAB OR LONG-TERM CARE | DRG: 176 | End: 2022-10-31
Attending: FAMILY MEDICINE | Admitting: FAMILY MEDICINE
Payer: MEDICARE

## 2022-10-24 DIAGNOSIS — I26.99 PULMONARY EMBOLISM: ICD-10-CM

## 2022-10-24 DIAGNOSIS — R40.0 SOMNOLENCE: Primary | ICD-10-CM

## 2022-10-24 DIAGNOSIS — R42 DIZZINESS: ICD-10-CM

## 2022-10-24 LAB
ALBUMIN SERPL BCP-MCNC: 3.2 G/DL (ref 3.5–5)
ALBUMIN/GLOB SERPL: 0.9 {RATIO}
ALP SERPL-CCNC: 101 U/L (ref 55–142)
ALT SERPL W P-5'-P-CCNC: 15 U/L (ref 13–56)
ANION GAP SERPL CALCULATED.3IONS-SCNC: 4 MMOL/L (ref 7–16)
AST SERPL W P-5'-P-CCNC: 16 U/L (ref 15–37)
BACTERIA #/AREA URNS HPF: ABNORMAL /HPF
BASOPHILS # BLD AUTO: 0.02 K/UL (ref 0–0.2)
BASOPHILS NFR BLD AUTO: 0.2 % (ref 0–1)
BILIRUB SERPL-MCNC: 0.3 MG/DL (ref ?–1.2)
BILIRUB UR QL STRIP: ABNORMAL
BUN SERPL-MCNC: 23 MG/DL (ref 7–18)
BUN/CREAT SERPL: 29 (ref 6–20)
CALCIUM SERPL-MCNC: 8.2 MG/DL (ref 8.5–10.1)
CHLORIDE SERPL-SCNC: 107 MMOL/L (ref 98–107)
CLARITY UR: CLEAR
CO2 SERPL-SCNC: 37 MMOL/L (ref 21–32)
COLOR UR: YELLOW
CREAT SERPL-MCNC: 0.79 MG/DL (ref 0.55–1.02)
DIFFERENTIAL METHOD BLD: ABNORMAL
EGFR (NO RACE VARIABLE) (RUSH/TITUS): 71 ML/MIN/1.73M²
EOSINOPHIL # BLD AUTO: 0.14 K/UL (ref 0–0.5)
EOSINOPHIL NFR BLD AUTO: 1.5 % (ref 1–4)
EOSINOPHIL NFR BLD MANUAL: 1 % (ref 1–4)
ERYTHROCYTE [DISTWIDTH] IN BLOOD BY AUTOMATED COUNT: 14.4 % (ref 11.5–14.5)
GLOBULIN SER-MCNC: 3.5 G/DL (ref 2–4)
GLUCOSE SERPL-MCNC: 111 MG/DL (ref 74–106)
GLUCOSE UR STRIP-MCNC: NEGATIVE MG/DL
HCT VFR BLD AUTO: 43 % (ref 38–47)
HGB BLD-MCNC: 12.9 G/DL (ref 12–16)
KETONES UR STRIP-SCNC: NEGATIVE MG/DL
LEUKOCYTE ESTERASE UR QL STRIP: NEGATIVE
LYMPHOCYTES # BLD AUTO: 4.93 K/UL (ref 1–4.8)
LYMPHOCYTES NFR BLD AUTO: 54.2 % (ref 27–41)
LYMPHOCYTES NFR BLD MANUAL: 57 % (ref 27–41)
MACROCYTES BLD QL SMEAR: ABNORMAL
MCH RBC QN AUTO: 30.3 PG (ref 27–31)
MCHC RBC AUTO-ENTMCNC: 30 G/DL (ref 32–36)
MCV RBC AUTO: 100.9 FL (ref 80–96)
MONOCYTES # BLD AUTO: 0.52 K/UL (ref 0–0.8)
MONOCYTES NFR BLD AUTO: 5.7 % (ref 2–6)
MONOCYTES NFR BLD MANUAL: 3 % (ref 2–6)
MPC BLD CALC-MCNC: 10.8 FL (ref 9.4–12.4)
MUCOUS THREADS #/AREA URNS HPF: ABNORMAL /HPF
NEUTROPHILS # BLD AUTO: 3.48 K/UL (ref 1.8–7.7)
NEUTROPHILS NFR BLD AUTO: 38.4 % (ref 53–65)
NEUTS SEG NFR BLD MANUAL: 39 % (ref 50–62)
NITRITE UR QL STRIP: NEGATIVE
NRBC BLD MANUAL-RTO: ABNORMAL %
PH UR STRIP: 6 PH UNITS
PLATELET # BLD AUTO: 144 K/UL (ref 150–400)
PLATELET MORPHOLOGY: NORMAL
POTASSIUM SERPL-SCNC: 4.3 MMOL/L (ref 3.5–5.1)
PROT SERPL-MCNC: 6.7 G/DL (ref 6.4–8.2)
PROT UR QL STRIP: 30
RBC # BLD AUTO: 4.26 M/UL (ref 4.2–5.4)
RBC # UR STRIP: NEGATIVE /UL
RBC #/AREA URNS HPF: ABNORMAL /HPF
SODIUM SERPL-SCNC: 144 MMOL/L (ref 136–145)
SP GR UR STRIP: >=1.03
SQUAMOUS #/AREA URNS LPF: ABNORMAL /LPF
UROBILINOGEN UR STRIP-ACNC: 0.2 MG/DL
WBC # BLD AUTO: 9.09 K/UL (ref 4.5–11)
WBC #/AREA URNS HPF: ABNORMAL /HPF

## 2022-10-24 PROCEDURE — G0378 HOSPITAL OBSERVATION PER HR: HCPCS

## 2022-10-24 PROCEDURE — 80053 COMPREHEN METABOLIC PANEL: CPT | Performed by: FAMILY MEDICINE

## 2022-10-24 PROCEDURE — 27000941

## 2022-10-24 PROCEDURE — 94761 N-INVAS EAR/PLS OXIMETRY MLT: CPT

## 2022-10-24 PROCEDURE — 99900035 HC TECH TIME PER 15 MIN (STAT)

## 2022-10-24 PROCEDURE — 99284 EMERGENCY DEPT VISIT MOD MDM: CPT | Mod: 25 | Performed by: UROLOGY

## 2022-10-24 PROCEDURE — 81001 URINALYSIS AUTO W/SCOPE: CPT | Performed by: FAMILY MEDICINE

## 2022-10-24 PROCEDURE — 81003 URINALYSIS AUTO W/O SCOPE: CPT | Performed by: FAMILY MEDICINE

## 2022-10-24 PROCEDURE — A4216 STERILE WATER/SALINE, 10 ML: HCPCS | Performed by: FAMILY MEDICINE

## 2022-10-24 PROCEDURE — 25000003 PHARM REV CODE 250: Performed by: REGISTERED NURSE

## 2022-10-24 PROCEDURE — 85025 COMPLETE CBC W/AUTO DIFF WBC: CPT | Performed by: FAMILY MEDICINE

## 2022-10-24 PROCEDURE — 27000221 HC OXYGEN, UP TO 24 HOURS

## 2022-10-24 PROCEDURE — 25000003 PHARM REV CODE 250: Performed by: FAMILY MEDICINE

## 2022-10-24 PROCEDURE — 99285 EMERGENCY DEPT VISIT HI MDM: CPT | Mod: 25

## 2022-10-24 PROCEDURE — 36415 COLL VENOUS BLD VENIPUNCTURE: CPT | Performed by: FAMILY MEDICINE

## 2022-10-24 RX ORDER — IPRATROPIUM BROMIDE AND ALBUTEROL SULFATE 2.5; .5 MG/3ML; MG/3ML
3 SOLUTION RESPIRATORY (INHALATION) EVERY 6 HOURS PRN
Status: DISCONTINUED | OUTPATIENT
Start: 2022-10-24 | End: 2022-10-31 | Stop reason: HOSPADM

## 2022-10-24 RX ORDER — ACETAMINOPHEN 500 MG
5000 TABLET ORAL DAILY
Status: DISCONTINUED | OUTPATIENT
Start: 2022-10-25 | End: 2022-10-31 | Stop reason: HOSPADM

## 2022-10-24 RX ORDER — IPRATROPIUM BROMIDE AND ALBUTEROL SULFATE 2.5; .5 MG/3ML; MG/3ML
3 SOLUTION RESPIRATORY (INHALATION) EVERY 6 HOURS
Status: DISCONTINUED | OUTPATIENT
Start: 2022-10-25 | End: 2022-10-24

## 2022-10-24 RX ORDER — DEXAMETHASONE 1 MG/1
2 TABLET ORAL
Status: DISCONTINUED | OUTPATIENT
Start: 2022-10-26 | End: 2022-10-31 | Stop reason: HOSPADM

## 2022-10-24 RX ORDER — MELOXICAM 7.5 MG/1
7.5 TABLET ORAL 2 TIMES DAILY
Status: DISCONTINUED | OUTPATIENT
Start: 2022-10-24 | End: 2022-10-31 | Stop reason: HOSPADM

## 2022-10-24 RX ORDER — SERTRALINE HYDROCHLORIDE 25 MG/1
25 TABLET, FILM COATED ORAL DAILY
COMMUNITY

## 2022-10-24 RX ORDER — PHENAZOPYRIDINE HYDROCHLORIDE 100 MG/1
200 TABLET, FILM COATED ORAL 3 TIMES DAILY PRN
Status: DISCONTINUED | OUTPATIENT
Start: 2022-10-24 | End: 2022-10-31 | Stop reason: HOSPADM

## 2022-10-24 RX ORDER — TRAVOPROST OPHTHALMIC SOLUTION 0.04 MG/ML
1 SOLUTION OPHTHALMIC NIGHTLY
Status: DISCONTINUED | OUTPATIENT
Start: 2022-10-24 | End: 2022-10-31 | Stop reason: HOSPADM

## 2022-10-24 RX ORDER — ONDANSETRON 2 MG/ML
4 INJECTION INTRAMUSCULAR; INTRAVENOUS EVERY 8 HOURS PRN
Status: DISCONTINUED | OUTPATIENT
Start: 2022-10-24 | End: 2022-10-31 | Stop reason: HOSPADM

## 2022-10-24 RX ORDER — LOSARTAN POTASSIUM 50 MG/1
50 TABLET ORAL 2 TIMES DAILY
Status: DISCONTINUED | OUTPATIENT
Start: 2022-10-24 | End: 2022-10-31 | Stop reason: HOSPADM

## 2022-10-24 RX ORDER — NITROFURANTOIN 25; 75 MG/1; MG/1
100 CAPSULE ORAL NIGHTLY
Status: DISCONTINUED | OUTPATIENT
Start: 2022-10-24 | End: 2022-10-31 | Stop reason: HOSPADM

## 2022-10-24 RX ORDER — ONDANSETRON 4 MG/1
4 TABLET, FILM COATED ORAL EVERY 6 HOURS PRN
Status: DISCONTINUED | OUTPATIENT
Start: 2022-10-24 | End: 2022-10-24

## 2022-10-24 RX ORDER — ROPINIROLE 0.25 MG/1
0.5 TABLET, FILM COATED ORAL NIGHTLY
Status: DISCONTINUED | OUTPATIENT
Start: 2022-10-24 | End: 2022-10-31 | Stop reason: HOSPADM

## 2022-10-24 RX ORDER — TALC
6 POWDER (GRAM) TOPICAL NIGHTLY PRN
Status: DISCONTINUED | OUTPATIENT
Start: 2022-10-24 | End: 2022-10-31 | Stop reason: HOSPADM

## 2022-10-24 RX ORDER — MONTELUKAST SODIUM 10 MG/1
10 TABLET ORAL NIGHTLY
Status: DISCONTINUED | OUTPATIENT
Start: 2022-10-24 | End: 2022-10-31 | Stop reason: HOSPADM

## 2022-10-24 RX ORDER — ATORVASTATIN CALCIUM 40 MG/1
40 TABLET, FILM COATED ORAL NIGHTLY
Status: DISCONTINUED | OUTPATIENT
Start: 2022-10-24 | End: 2022-10-31 | Stop reason: HOSPADM

## 2022-10-24 RX ORDER — UMECLIDINIUM BROMIDE AND VILANTEROL TRIFENATATE 62.5; 25 UG/1; UG/1
2 POWDER RESPIRATORY (INHALATION) DAILY
Status: ON HOLD | COMMUNITY
End: 2022-12-08 | Stop reason: HOSPADM

## 2022-10-24 RX ORDER — ONDANSETRON 4 MG/1
4 TABLET, ORALLY DISINTEGRATING ORAL EVERY 6 HOURS PRN
Status: DISCONTINUED | OUTPATIENT
Start: 2022-10-24 | End: 2022-10-31 | Stop reason: HOSPADM

## 2022-10-24 RX ORDER — PANTOPRAZOLE SODIUM 40 MG/1
40 TABLET, DELAYED RELEASE ORAL DAILY
Status: DISCONTINUED | OUTPATIENT
Start: 2022-10-25 | End: 2022-10-31 | Stop reason: HOSPADM

## 2022-10-24 RX ORDER — LEVOTHYROXINE SODIUM 50 UG/1
50 TABLET ORAL
Status: DISCONTINUED | OUTPATIENT
Start: 2022-10-25 | End: 2022-10-31 | Stop reason: HOSPADM

## 2022-10-24 RX ORDER — SERTRALINE HYDROCHLORIDE 25 MG/1
25 TABLET, FILM COATED ORAL DAILY
Status: DISCONTINUED | OUTPATIENT
Start: 2022-10-25 | End: 2022-10-31 | Stop reason: HOSPADM

## 2022-10-24 RX ORDER — CLONIDINE HYDROCHLORIDE 0.1 MG/1
0.1 TABLET ORAL 2 TIMES DAILY PRN
COMMUNITY

## 2022-10-24 RX ORDER — CLONIDINE HYDROCHLORIDE 0.1 MG/1
0.1 TABLET ORAL EVERY 4 HOURS PRN
Status: DISCONTINUED | OUTPATIENT
Start: 2022-10-24 | End: 2022-10-31 | Stop reason: HOSPADM

## 2022-10-24 RX ORDER — ACETAMINOPHEN 500 MG
5000 TABLET ORAL DAILY
Status: ON HOLD | COMMUNITY
End: 2022-12-01

## 2022-10-24 RX ORDER — ASPIRIN 81 MG/1
81 TABLET ORAL DAILY
Status: DISCONTINUED | OUTPATIENT
Start: 2022-10-25 | End: 2022-10-31 | Stop reason: HOSPADM

## 2022-10-24 RX ORDER — ONDANSETRON 4 MG/1
4 TABLET, FILM COATED ORAL EVERY 6 HOURS PRN
Status: ON HOLD | COMMUNITY
End: 2022-12-01

## 2022-10-24 RX ORDER — PRIMIDONE 50 MG/1
100 TABLET ORAL 2 TIMES DAILY
Status: DISCONTINUED | OUTPATIENT
Start: 2022-10-24 | End: 2022-10-31 | Stop reason: HOSPADM

## 2022-10-24 RX ORDER — GABAPENTIN 100 MG/1
100 CAPSULE ORAL NIGHTLY
Status: DISCONTINUED | OUTPATIENT
Start: 2022-10-24 | End: 2022-10-31 | Stop reason: HOSPADM

## 2022-10-24 RX ORDER — DOXAZOSIN 1 MG/1
2 TABLET ORAL 2 TIMES DAILY
Status: DISCONTINUED | OUTPATIENT
Start: 2022-10-24 | End: 2022-10-31 | Stop reason: HOSPADM

## 2022-10-24 RX ORDER — DICLOFENAC SODIUM 10 MG/G
2 GEL TOPICAL DAILY PRN
Status: DISCONTINUED | OUTPATIENT
Start: 2022-10-24 | End: 2022-10-31 | Stop reason: HOSPADM

## 2022-10-24 RX ORDER — CYANOCOBALAMIN 1000 UG/ML
1000 INJECTION, SOLUTION INTRAMUSCULAR; SUBCUTANEOUS
COMMUNITY

## 2022-10-24 RX ORDER — DEXAMETHASONE 2 MG/1
2 TABLET ORAL 2 TIMES DAILY
COMMUNITY
End: 2023-05-22 | Stop reason: CLARIF

## 2022-10-24 RX ORDER — SODIUM CHLORIDE 0.9 % (FLUSH) 0.9 %
10 SYRINGE (ML) INJECTION EVERY 8 HOURS
Status: DISCONTINUED | OUTPATIENT
Start: 2022-10-24 | End: 2022-10-31 | Stop reason: HOSPADM

## 2022-10-24 RX ORDER — TIZANIDINE 4 MG/1
4 TABLET ORAL EVERY 6 HOURS PRN
Status: DISCONTINUED | OUTPATIENT
Start: 2022-10-24 | End: 2022-10-31 | Stop reason: HOSPADM

## 2022-10-24 RX ORDER — DOXAZOSIN 2 MG/1
2 TABLET ORAL 2 TIMES DAILY
Status: ON HOLD | COMMUNITY
End: 2022-12-01

## 2022-10-24 RX ORDER — SODIUM CHLORIDE 9 MG/ML
INJECTION, SOLUTION INTRAVENOUS CONTINUOUS
Status: DISCONTINUED | OUTPATIENT
Start: 2022-10-24 | End: 2022-10-29

## 2022-10-24 RX ADMIN — DOXAZOSIN 2 MG: 1 TABLET ORAL at 08:10

## 2022-10-24 RX ADMIN — SODIUM CHLORIDE: 9 INJECTION, SOLUTION INTRAVENOUS at 03:10

## 2022-10-24 RX ADMIN — NITROFURANTOIN MONOHYDRATE/MACROCRYSTALS 100 MG: 75; 25 CAPSULE ORAL at 08:10

## 2022-10-24 RX ADMIN — LOSARTAN POTASSIUM 50 MG: 50 TABLET, FILM COATED ORAL at 08:10

## 2022-10-24 RX ADMIN — GABAPENTIN 100 MG: 100 CAPSULE ORAL at 08:10

## 2022-10-24 RX ADMIN — ROPINIROLE HYDROCHLORIDE 0.5 MG: 0.25 TABLET, FILM COATED ORAL at 08:10

## 2022-10-24 RX ADMIN — MONTELUKAST 10 MG: 10 TABLET, FILM COATED ORAL at 08:10

## 2022-10-24 RX ADMIN — SODIUM CHLORIDE, PRESERVATIVE FREE 10 ML: 5 INJECTION INTRAVENOUS at 03:10

## 2022-10-24 RX ADMIN — MELOXICAM 7.5 MG: 7.5 TABLET ORAL at 08:10

## 2022-10-24 RX ADMIN — ATORVASTATIN CALCIUM 40 MG: 40 TABLET, FILM COATED ORAL at 08:10

## 2022-10-24 RX ADMIN — PRIMIDONE 100 MG: 50 TABLET ORAL at 08:10

## 2022-10-24 NOTE — ED PROVIDER NOTES
Encounter Date: 10/24/2022       History     Chief Complaint   Patient presents with    Fatigue     Daughter reports weakness and lethargy that has been going on for the past few days.      At the present time the patient comes in with altered mental status that she has experienced now for last 2 weeks.  She been diagnosed with urinary tract infection actually put on Cipro.  She seen about 3-4 doctor's at the [facility] including Cardiology and family practice but still they cannot figure out what is causing mental status change.  She has history of CML and it could be that her leukemia is causing her generalized weakness and fatigue.  But as per her daughter she is not herself.      Review of patient's allergies indicates:   Allergen Reactions    Bactrim [sulfamethoxazole-trimethoprim]     Ceftin [cefuroxime axetil]     Iodinated contrast media Other (See Comments)     Past Medical History:   Diagnosis Date    Actinic keratoses     Arthritis     COPD (chronic obstructive pulmonary disease)     Frequent UTI     GERD (gastroesophageal reflux disease)     High cholesterol     HTN (hypertension)      Past Surgical History:   Procedure Laterality Date    APPENDECTOMY      HYSTERECTOMY      pessary       Family History   Problem Relation Age of Onset    Hypertension Daughter     Diabetes Daughter     Melanoma Daughter      Social History     Tobacco Use    Smoking status: Never    Smokeless tobacco: Never   Substance Use Topics    Alcohol use: Never    Drug use: Never     Review of Systems   Constitutional:  Positive for activity change, appetite change and fatigue. Negative for fever.   HENT: Negative.  Negative for sore throat.    Eyes: Negative.    Respiratory: Negative.  Negative for shortness of breath.    Cardiovascular: Negative.  Negative for chest pain.   Gastrointestinal: Negative.  Negative for nausea.   Endocrine: Negative.    Genitourinary: Negative.  Negative for dysuria.    Musculoskeletal: Negative.  Negative for back pain.   Skin: Negative.  Negative for rash.   Allergic/Immunologic: Negative.    Neurological:  Positive for weakness.   Hematological: Negative.  Does not bruise/bleed easily.   Psychiatric/Behavioral:  Positive for dysphoric mood.    All other systems reviewed and are negative.    Physical Exam     Initial Vitals [10/24/22 1053]   BP Pulse Resp Temp SpO2   (!) 138/58 73 20 97.9 °F (36.6 °C) (!) 91 %      MAP       --         Physical Exam    Constitutional: She appears well-developed and well-nourished.   HENT:   Head: Normocephalic and atraumatic.   Right Ear: External ear normal.   Left Ear: External ear normal.   Nose: Nose normal.   Mouth/Throat: Oropharynx is clear and moist.   Eyes: Conjunctivae and EOM are normal. Pupils are equal, round, and reactive to light.   Neck: Neck supple.   Normal range of motion.  Cardiovascular:  Normal rate, regular rhythm, normal heart sounds and intact distal pulses.           Pulmonary/Chest: Breath sounds normal.   Abdominal: Abdomen is soft. Bowel sounds are normal.   Genitourinary:    Vagina and uterus normal.     Musculoskeletal:         General: Normal range of motion.      Cervical back: Normal range of motion and neck supple.     Neurological: She is alert and oriented to person, place, and time. She has normal strength and normal reflexes.   Skin: Skin is warm. Capillary refill takes less than 2 seconds.   Psychiatric: She has a normal mood and affect. Her behavior is normal. Judgment and thought content normal.       Medical Screening Exam   See Full Note    ED Course   Procedures  Labs Reviewed   URINALYSIS - Abnormal; Notable for the following components:       Result Value    Protein, UA 30 (*)     Bilirubin, UA Small (*)     Specific Gravity, UA >=1.030 (*)     All other components within normal limits   CBC WITH DIFFERENTIAL - Abnormal; Notable for the following components:    .9 (*)     MCHC 30.0 (*)      Platelet Count 144 (*)     Neutrophils % 38.4 (*)     Lymphocytes % 54.2 (*)     Lymphocytes, Absolute 4.93 (*)     All other components within normal limits   COMPREHENSIVE METABOLIC PANEL - Abnormal; Notable for the following components:    CO2 37 (*)     Anion Gap 4 (*)     Glucose 111 (*)     BUN 23 (*)     BUN/Creatinine Ratio 29 (*)     Calcium 8.2 (*)     Albumin 3.2 (*)     All other components within normal limits   MANUAL DIFFERENTIAL - Abnormal; Notable for the following components:    Segmented Neutrophils, Man % 39 (*)     Lymphocytes, Man % 57 (*)     All other components within normal limits   URINALYSIS, MICROSCOPIC - Abnormal; Notable for the following components:    Bacteria, UA Few (*)     Squamous Epithelial Cells, UA Few (*)     Mucus, UA Many (*)     All other components within normal limits   CBC W/ AUTO DIFFERENTIAL    Narrative:     The following orders were created for panel order CBC auto differential.  Procedure                               Abnormality         Status                     ---------                               -----------         ------                     CBC with Differential[[id number]]        Abnormal            Final result               Manual Differential[[id number]]          Abnormal            Final result                 Please view results for these tests on the individual orders.          Imaging Results    None          Medications   sodium chloride 0.9% flush 10 mL (10 mLs Intravenous Not Given 10/27/22 0600)   ondansetron injection 4 mg (has no administration in time range)   ondansetron injection 4 mg (has no administration in time range)   melatonin tablet 6 mg (has no administration in time range)   0.9%  NaCl infusion ( Intravenous New Bag 10/27/22 0956)   aspirin EC tablet 81 mg (81 mg Oral Given 10/27/22 0956)   cholecalciferol (vitamin D3) 125 mcg (5,000 unit) tablet 5,000 Units (5,000 Units Oral Given 10/27/22 0957)   cloNIDine tablet 0.1 mg (has no  administration in time range)   dexAMETHasone tablet 2 mg (2 mg Oral Given 10/26/22 1638)   diclofenac sodium 1 % gel 2 g (has no administration in time range)   doxazosin tablet 2 mg (2 mg Oral Given 10/27/22 0956)   gabapentin capsule 100 mg (100 mg Oral Given 10/26/22 2053)   levothyroxine tablet 50 mcg (50 mcg Oral Given 10/27/22 0550)   losartan tablet 50 mg (50 mg Oral Given 10/27/22 0957)   meloxicam tablet 7.5 mg (7.5 mg Oral Given 10/27/22 0957)   montelukast tablet 10 mg (10 mg Oral Given 10/26/22 2053)   nitrofurantoin (macrocrystal-monohydrate) 100 MG capsule 100 mg (100 mg Oral Given 10/26/22 2053)   pantoprazole EC tablet 40 mg (40 mg Oral Given 10/27/22 0957)   phenazopyridine tablet 200 mg (has no administration in time range)   primidone tablet 100 mg (100 mg Oral Given 10/27/22 0957)   rOPINIRole tablet 0.5 mg (0.5 mg Oral Given 10/26/22 2052)   atorvastatin tablet 40 mg (40 mg Oral Given 10/26/22 2053)   sertraline tablet 25 mg (25 mg Oral Given 10/27/22 0957)   tiZANidine tablet 4 mg (has no administration in time range)   travoprost 0.004 % ophthalmic solution 1 drop (1 drop Both Eyes Given 10/26/22 2108)   ondansetron disintegrating tablet 4 mg (has no administration in time range)   albuterol-ipratropium 2.5 mg-0.5 mg/3 mL nebulizer solution 3 mL (3 mLs Nebulization Given 10/27/22 0753)   famotidine (PF) 20 mg/2 mL injection (  Not Given 10/26/22 1515)   methylPREDNISolone sodium succinate (SOLU-MEDROL) 125 mg injection (  Not Given 10/26/22 1515)   diphenhydrAMINE (BENADRYL) 50 mg/mL injection (  Not Given 10/26/22 1515)   enoxaparin injection 40 mg (40 mg Subcutaneous Given 10/26/22 1755)   methylPREDNISolone sodium succinate injection 125 mg (125 mg Intravenous Given 10/26/22 1507)   diphenhydrAMINE injection 25 mg (25 mg Intravenous Given 10/26/22 1508)   famotidine (PF) injection 20 mg (20 mg Intravenous Given 10/26/22 1508)   iopamidoL (ISOVUE-370) injection 100 mL (100 mLs Intravenous  Given 10/26/22 4995)      Patient will be admitted for observation with mental status changes.  Be given IV fluids and doing a CT scan for further evaluation.  Patient is on a regular diet                 Clinical Impression:   Final diagnoses:  [R40.0] Somnolence (Primary)      ED Disposition Condition    Observation Stable                Anmol Reese,   10/27/22 1626

## 2022-10-24 NOTE — PLAN OF CARE
Problem: Adult Inpatient Plan of Care  Goal: Plan of Care Review  Outcome: Ongoing, Progressing  Goal: Patient-Specific Goal (Individualized)  Outcome: Ongoing, Progressing  Goal: Absence of Hospital-Acquired Illness or Injury  Outcome: Ongoing, Progressing  Goal: Optimal Comfort and Wellbeing  Outcome: Ongoing, Progressing  Goal: Readiness for Transition of Care  Outcome: Ongoing, Progressing     Problem: Fall Injury Risk  Goal: Absence of Fall and Fall-Related Injury  Outcome: Ongoing, Progressing     Problem: Skin Injury Risk Increased  Goal: Skin Health and Integrity  Outcome: Ongoing, Progressing     Problem: Electrolyte Imbalance  Goal: Electrolyte Balance  Outcome: Ongoing, Progressing     Problem: Adjustment to Illness COPD (Chronic Obstructive Pulmonary Disease)  Goal: Optimal Chronic Illness Coping  Outcome: Ongoing, Progressing     Problem: Functional Ability Impaired COPD (Chronic Obstructive Pulmonary Disease)  Goal: Optimal Level of Functional [place]  Outcome: Ongoing, Progressing     Problem: Infection COPD (Chronic Obstructive Pulmonary Disease)  Goal: Absence of Infection Signs and Symptoms  Outcome: Ongoing, Progressing     Problem: Oral Intake Inadequate COPD (Chronic Obstructive Pulmonary Disease)  Goal: Improved Nutrition Intake  Outcome: Ongoing, Progressing     Problem: Respiratory Compromise COPD (Chronic Obstructive Pulmonary Disease)  Goal: Effective Oxygenation and Ventilation  Outcome: Ongoing, Progressing     Problem: UTI (Urinary Tract Infection)  Goal: Improved Infection Symptoms  Outcome: Ongoing, Progressing

## 2022-10-25 LAB
ALBUMIN SERPL BCP-MCNC: 2.8 G/DL (ref 3.5–5)
ALBUMIN/GLOB SERPL: 0.9 {RATIO}
ALP SERPL-CCNC: 95 U/L (ref 55–142)
ALT SERPL W P-5'-P-CCNC: 15 U/L (ref 13–56)
ANION GAP SERPL CALCULATED.3IONS-SCNC: 6 MMOL/L (ref 7–16)
AST SERPL W P-5'-P-CCNC: 12 U/L (ref 15–37)
BASE EXCESS ARTERIAL: 11.3 MMOL/L (ref -2–2)
BASOPHILS # BLD AUTO: 0.03 K/UL (ref 0–0.2)
BASOPHILS NFR BLD AUTO: 0.3 % (ref 0–1)
BILIRUB SERPL-MCNC: 0.2 MG/DL (ref ?–1.2)
BUN SERPL-MCNC: 21 MG/DL (ref 7–18)
BUN/CREAT SERPL: 30 (ref 6–20)
CALCIUM SERPL-MCNC: 7.5 MG/DL (ref 8.5–10.1)
CHLORIDE SERPL-SCNC: 108 MMOL/L (ref 98–107)
CO2 SERPL-SCNC: 35 MMOL/L (ref 21–32)
CREAT SERPL-MCNC: 0.7 MG/DL (ref 0.55–1.02)
DIFFERENTIAL METHOD BLD: ABNORMAL
EGFR (NO RACE VARIABLE) (RUSH/TITUS): 82 ML/MIN/1.73M²
EOSINOPHIL # BLD AUTO: 0.12 K/UL (ref 0–0.5)
EOSINOPHIL NFR BLD AUTO: 1.1 % (ref 1–4)
ERYTHROCYTE [DISTWIDTH] IN BLOOD BY AUTOMATED COUNT: 14.3 % (ref 11.5–14.5)
GLOBULIN SER-MCNC: 3 G/DL (ref 2–4)
GLUCOSE SERPL-MCNC: 161 MG/DL (ref 74–106)
HCO3 ARTERIAL: 39.7 MMOL/L (ref 18–23)
HCT VFR BLD AUTO: 37.3 % (ref 38–47)
HGB BLD-MCNC: 11.4 G/DL (ref 12–16)
HGB BLD-MCNC: ABNORMAL G/DL
LYMPHOCYTES # BLD AUTO: 3.89 K/UL (ref 1–4.8)
LYMPHOCYTES NFR BLD AUTO: 37 % (ref 27–41)
MCH RBC QN AUTO: 30.6 PG (ref 27–31)
MCHC RBC AUTO-ENTMCNC: 30.6 G/DL (ref 32–36)
MCV RBC AUTO: 100.3 FL (ref 80–96)
MONOCYTES # BLD AUTO: 0.44 K/UL (ref 0–0.8)
MONOCYTES NFR BLD AUTO: 4.2 % (ref 2–6)
MPC BLD CALC-MCNC: 10.5 FL (ref 9.4–12.4)
NEUTROPHILS # BLD AUTO: 6.04 K/UL (ref 1.8–7.7)
NEUTROPHILS NFR BLD AUTO: 57.4 % (ref 53–65)
PCO2 ARTERIAL: 72
PCO2 BLDA: ABNORMAL MM[HG]
PH ARTERIAL: 7.35
PLATELET # BLD AUTO: 142 K/UL (ref 150–400)
PO2 ARTERIAL: 50
POC COHB: ABNORMAL
POC FIO2: ABNORMAL
POC IONIZED CALCIUM: ABNORMAL
POC METHB: ABNORMAL
POC O2HB: ABNORMAL
POTASSIUM BLD-SCNC: ABNORMAL MMOL/L
POTASSIUM SERPL-SCNC: 3.9 MMOL/L (ref 3.5–5.1)
PROT SERPL-MCNC: 5.8 G/DL (ref 6.4–8.2)
RBC # BLD AUTO: 3.72 M/UL (ref 4.2–5.4)
SATURATED O2 ARTERIAL, I-STAT: 83
SODIUM BLD-SCNC: ABNORMAL MMOL/L
SODIUM SERPL-SCNC: 145 MMOL/L (ref 136–145)
WBC # BLD AUTO: 10.52 K/UL (ref 4.5–11)

## 2022-10-25 PROCEDURE — 27000941

## 2022-10-25 PROCEDURE — G0378 HOSPITAL OBSERVATION PER HR: HCPCS

## 2022-10-25 PROCEDURE — 25000242 PHARM REV CODE 250 ALT 637 W/ HCPCS: Mod: GY | Performed by: REGISTERED NURSE

## 2022-10-25 PROCEDURE — 80053 COMPREHEN METABOLIC PANEL: CPT | Performed by: FAMILY MEDICINE

## 2022-10-25 PROCEDURE — 27000190 HC CPAP FULL FACE MASK W/VALVE

## 2022-10-25 PROCEDURE — 94761 N-INVAS EAR/PLS OXIMETRY MLT: CPT | Mod: XB

## 2022-10-25 PROCEDURE — 99900031 HC PATIENT EDUCATION (STAT)

## 2022-10-25 PROCEDURE — 27000221 HC OXYGEN, UP TO 24 HOURS

## 2022-10-25 PROCEDURE — 82803 BLOOD GASES ANY COMBINATION: CPT

## 2022-10-25 PROCEDURE — 99223 1ST HOSP IP/OBS HIGH 75: CPT | Mod: AI | Performed by: FAMILY MEDICINE

## 2022-10-25 PROCEDURE — 85025 COMPLETE CBC W/AUTO DIFF WBC: CPT | Performed by: FAMILY MEDICINE

## 2022-10-25 PROCEDURE — 36415 COLL VENOUS BLD VENIPUNCTURE: CPT | Performed by: FAMILY MEDICINE

## 2022-10-25 PROCEDURE — 99900035 HC TECH TIME PER 15 MIN (STAT)

## 2022-10-25 PROCEDURE — 94640 AIRWAY INHALATION TREATMENT: CPT | Mod: XB

## 2022-10-25 PROCEDURE — 25000003 PHARM REV CODE 250: Performed by: FAMILY MEDICINE

## 2022-10-25 PROCEDURE — 36600 WITHDRAWAL OF ARTERIAL BLOOD: CPT

## 2022-10-25 PROCEDURE — 25000003 PHARM REV CODE 250: Performed by: REGISTERED NURSE

## 2022-10-25 PROCEDURE — 94660 CPAP INITIATION&MGMT: CPT

## 2022-10-25 RX ADMIN — IPRATROPIUM BROMIDE AND ALBUTEROL SULFATE 3 ML: 2.5; .5 SOLUTION RESPIRATORY (INHALATION) at 01:10

## 2022-10-25 RX ADMIN — SERTRALINE HYDROCHLORIDE 25 MG: 25 TABLET ORAL at 09:10

## 2022-10-25 RX ADMIN — DOXAZOSIN 2 MG: 1 TABLET ORAL at 09:10

## 2022-10-25 RX ADMIN — ATORVASTATIN CALCIUM 40 MG: 40 TABLET, FILM COATED ORAL at 08:10

## 2022-10-25 RX ADMIN — SODIUM CHLORIDE: 9 INJECTION, SOLUTION INTRAVENOUS at 04:10

## 2022-10-25 RX ADMIN — CHOLECALCIFEROL TAB 125 MCG (5000 UNIT) 5000 UNITS: 125 TAB at 09:10

## 2022-10-25 RX ADMIN — MELOXICAM 7.5 MG: 7.5 TABLET ORAL at 08:10

## 2022-10-25 RX ADMIN — MELOXICAM 7.5 MG: 7.5 TABLET ORAL at 09:10

## 2022-10-25 RX ADMIN — PANTOPRAZOLE SODIUM 40 MG: 40 TABLET, DELAYED RELEASE ORAL at 09:10

## 2022-10-25 RX ADMIN — ASPIRIN 81 MG: 81 TABLET, COATED ORAL at 09:10

## 2022-10-25 RX ADMIN — DOXAZOSIN 2 MG: 1 TABLET ORAL at 08:10

## 2022-10-25 RX ADMIN — SODIUM CHLORIDE: 9 INJECTION, SOLUTION INTRAVENOUS at 03:10

## 2022-10-25 RX ADMIN — MONTELUKAST 10 MG: 10 TABLET, FILM COATED ORAL at 08:10

## 2022-10-25 RX ADMIN — IPRATROPIUM BROMIDE AND ALBUTEROL SULFATE 3 ML: 2.5; .5 SOLUTION RESPIRATORY (INHALATION) at 08:10

## 2022-10-25 RX ADMIN — GABAPENTIN 100 MG: 100 CAPSULE ORAL at 08:10

## 2022-10-25 RX ADMIN — TRAVOPROST OPHTHALMIC SOLUTION 1 DROP: 0.04 SOLUTION OPHTHALMIC at 08:10

## 2022-10-25 RX ADMIN — ROPINIROLE HYDROCHLORIDE 0.5 MG: 0.25 TABLET, FILM COATED ORAL at 08:10

## 2022-10-25 RX ADMIN — PRIMIDONE 100 MG: 50 TABLET ORAL at 09:10

## 2022-10-25 RX ADMIN — LEVOTHYROXINE SODIUM 50 MCG: 0.05 TABLET ORAL at 05:10

## 2022-10-25 RX ADMIN — NITROFURANTOIN MONOHYDRATE/MACROCRYSTALS 100 MG: 75; 25 CAPSULE ORAL at 08:10

## 2022-10-25 RX ADMIN — PRIMIDONE 100 MG: 50 TABLET ORAL at 08:10

## 2022-10-25 RX ADMIN — LOSARTAN POTASSIUM 50 MG: 50 TABLET, FILM COATED ORAL at 09:10

## 2022-10-25 RX ADMIN — LOSARTAN POTASSIUM 50 MG: 50 TABLET, FILM COATED ORAL at 08:10

## 2022-10-25 NOTE — H&P
[facility] - Medical Surgical Unit  Hospital Medicine  History & Physical    Patient Name: Pilar Reardon  MRN: [id number]  Patient Class: OP- Observation  Admission Date: 10/24/2022  Attending Physician: Anmol Reese DO   Primary Care Provider: Marilee Witt MD         Patient information was obtained from ER records.     Subjective:     Principal Problem:<principal problem not specified>    Chief Complaint:   Chief Complaint   Patient presents with    Fatigue     Daughter reports weakness and lethargy that has been going on for the past few days.         HPI: Patient admitted in observation because of a sudden onset of mental status change.  This has been ongoing for almost 2 weeks she has seen multiple physicians and [facility].  She has never had a sleep study.  She seems somnolent the majority of the time she is alert orient x3.  In reviewing her case I feel that hospitalization for observation to watch in going over her mental status changes would benefit her.  Ill also found that her CO2 count was elevated to 37.  She seems to be retaining CO2.  We may try a BiPAP with the settings of 10/5 this evening or tonight.  In see if it helps her.  The patient has no fever and her urinalysis seems to be normal.  She has history of CML or leukemia.  This is followed by Oncology.  She has had no recent syncopal episodes or strokes.  She has been fully evaluated by Cardiology.      Past Medical History:   Diagnosis Date    Actinic keratoses     Arthritis     COPD (chronic obstructive pulmonary disease)     Frequent UTI     GERD (gastroesophageal reflux disease)     High cholesterol     HTN (hypertension)        Past Surgical History:   Procedure Laterality Date    APPENDECTOMY      HYSTERECTOMY      pessary         Review of patient's allergies indicates:   Allergen Reactions    Bactrim [sulfamethoxazole-trimethoprim]     Ceftin [cefuroxime axetil]     Iodinated contrast media Other  (See Comments)       No current facility-administered medications on file prior to encounter.     Current Outpatient Medications on File Prior to Encounter   Medication Sig    aspirin (ECOTRIN) 81 MG EC tablet Take 81 mg by mouth once daily.    cholecalciferol, vitamin D3, 125 mcg (5,000 unit) Tab Take 5,000 Units by mouth once daily.    cloNIDine (CATAPRES) 0.1 MG tablet Take 0.1 mg by mouth every 4 (four) hours as needed. For systolic blood pressure  >170    cyanocobalamin 1,000 mcg/mL injection Inject 1,000 mcg into the muscle every 30 days. Next dose due this week    dexAMETHasone (DECADRON) 2 MG tablet Take 2 mg by mouth every Mon, Wed, Fri.    diclofenac sodium (VOLTAREN) 1 % Gel     doxazosin (CARDURA) 2 MG tablet Take 2 mg by mouth 2 (two) times a day.    gabapentin (NEURONTIN) 100 MG capsule Take 100 mg by mouth every evening.    levothyroxine (SYNTHROID) 50 MCG tablet Take 50 mcg by mouth before breakfast.    losartan (COZAAR) 50 MG tablet Take 50 mg by mouth 2 (two) times a day.    meloxicam (MOBIC) 7.5 MG tablet Take 7.5 mg by mouth 2 (two) times a day.    montelukast (SINGULAIR) 10 mg tablet Take by mouth every evening.    nitrofurantoin, macrocrystal-monohydrate, (MACROBID) 100 MG capsule Take 100 mg by mouth every evening.    omeprazole (PRILOSEC) 40 MG capsule Take 40 mg by mouth every morning.    ondansetron (ZOFRAN) 4 MG tablet Take 4 mg by mouth every 6 (six) hours as needed for Nausea.    phenazopyridine (PYRIDIUM) 200 MG tablet Take 200 mg by mouth 3 (three) times daily as needed for Pain.    primidone (MYSOLINE) 50 MG Tab Take 100 mg by mouth 2 (two) times a day.    rOPINIRole (REQUIP) 0.5 MG tablet Take 0.5 mg by mouth every evening.    rosuvastatin (CRESTOR) 10 MG tablet Take 10 mg by mouth once daily.    sertraline (ZOLOFT) 25 MG tablet Take 25 mg by mouth once daily.    tiZANidine (ZANAFLEX) 4 MG tablet Take 1 tablet (4 mg total) by mouth every 6 (six) hours as needed.     travoprost (TRAVATAN Z) 0.004 % ophthalmic solution Place 1 drop into both eyes every evening.    umeclidinium-vilanteroL (ANORO ELLIPTA) 62.5-25 mcg/actuation DsDv Inhale 2 puffs into the lungs once daily.    ampicillin (PRINCIPEN) 500 MG capsule     ciprofloxacin HCl (CIPRO) 500 MG tablet Take 500 mg by mouth 2 (two) times daily.    isosorbide mononitrate (IMDUR) 30 MG 24 hr tablet     NIFEdipine (ADALAT CC) 30 MG TbSR     potassium chloride SA (K-DUR,KLOR-CON) 10 MEQ tablet     valACYclovir (VALTREX) 1000 MG tablet Take 1 tablet (1,000 mg total) by mouth 3 (three) times daily. for 10 days     Family History       Problem Relation (Age of Onset)    Diabetes Daughter    Hypertension Daughter    Melanoma Daughter          Tobacco Use    Smoking status: Never    Smokeless tobacco: Never   Substance and Sexual Activity    Alcohol use: Never    Drug use: Never    Sexual activity: Not Currently     Review of Systems   Constitutional: Negative.    HENT: Negative.     Eyes: Negative.    Respiratory: Negative.     Cardiovascular: Negative.    Gastrointestinal: Negative.    Endocrine: Negative.    Genitourinary: Negative.    Musculoskeletal: Negative.    Skin: Negative.    Allergic/Immunologic: Negative.    Neurological: Negative.    Hematological: Negative.    Psychiatric/Behavioral: Negative.          Patient confused off and on with somnolent affect.  She is easily awaken though and can answer some questions.  She has no nausea vomiting diarrhea.  CO2 counts 37.   All other systems reviewed and are negative.  Objective:     Vital Signs (Most Recent):  Temp: 97.7 °F (36.5 °C) (10/25/22 1601)  Pulse: 80 (10/25/22 1601)  Resp: 18 (10/25/22 1601)  BP: (!) 144/72 (10/25/22 1601)  SpO2: (!) 94 % (10/25/22 1601)   Vital Signs (24h Range):  Temp:  [97.6 °F (36.4 °C)-98.7 °F (37.1 °C)] 97.7 °F (36.5 °C)  Pulse:  [68-86] 80  Resp:  [16-20] 18  SpO2:  [90 %-95 %] 94 %  BP: (120-179)/(55-82) 144/72     Weight: 63.5  kg (140 lb)  Body mass index is 25.61 kg/m².    Physical Exam  Vitals and nursing note reviewed.   Constitutional:       Appearance: Normal appearance. She is normal weight.   HENT:      Head: Normocephalic and atraumatic.      Right Ear: Tympanic membrane, ear canal and external ear normal.      Left Ear: Tympanic membrane and external ear normal.      Nose: Nose normal.      Mouth/Throat:      Mouth: Mucous membranes are moist.   Eyes:      Extraocular Movements: Extraocular movements intact.      Conjunctiva/sclera: Conjunctivae normal.      Pupils: Pupils are equal, round, and reactive to light.   Cardiovascular:      Rate and Rhythm: Normal rate.      Pulses: Normal pulses.      Heart sounds: Normal heart sounds.   Pulmonary:      Effort: Pulmonary effort is normal.      Breath sounds: Normal breath sounds.   Abdominal:      General: Abdomen is flat. Bowel sounds are normal.      Palpations: Abdomen is soft.   Musculoskeletal:         General: Normal range of motion.      Cervical back: Normal range of motion and neck supple.   Skin:     General: Skin is warm and dry.      Capillary Refill: Capillary refill takes less than 2 seconds.   Neurological:      General: No focal deficit present.      Mental Status: She is alert and oriented to person, place, and time. Mental status is at baseline.      Comments: As mentioned before the patient seems to be increased weakness with somnolence.  She has a elevated CO2 count 37.  No sign of UTI.  Blood pressure stable her oxygen saturations do fall when she is off oxygen she is on [place] home oxygen 2 L nasal cannula at all times.   Psychiatric:         Mood and Affect: Mood normal.         Behavior: Behavior normal.         Thought Content: Thought content normal.         Judgment: Judgment normal.         CRANIAL NERVES     CN III, IV, VI   Pupils are equal, round, and reactive to light.     Significant Labs: All pertinent labs within the past 24 hours have been  reviewed.    Significant Imaging: I have reviewed all pertinent imaging results/findings within the past 24 hours.    Assessment/Plan:     No notes have been filed under this hospital service.  Service: Hospital Medicine    VTE Risk Mitigation (From admission, onward)         Ordered     IP VTE HIGH RISK PATIENT  Once         10/24/22 1323     Place sequential compression device  Until discontinued         10/24/22 1323                   Anmol Reese DO  Department of Hospital Medicine   [facility] - Medical Surgical Unit

## 2022-10-25 NOTE — PLAN OF CARE
Problem: Adult Inpatient Plan of Care  Goal: Plan of Care Review  Flowsheets (Taken 10/25/2022 0328)  Plan of Care Reviewed With:   patient   daughter  Goal: Absence of Hospital-Acquired Illness or Injury  Intervention: Identify and Manage Fall Risk  Flowsheets (Taken 10/25/2022 0328)  Safety Promotion/Fall Prevention:   assistive device/personal item within reach   bed alarm set   nonskid shoes/socks when out of bed   side rails raised x 3   instructed to call staff for mobility  Intervention: Prevent Skin Injury  Flowsheets (Taken 10/25/2022 0328)  Body Position: position changed independently  Skin Protection: incontinence pads utilized  Intervention: Prevent and Manage VTE (Venous Thromboembolism) Risk  Flowsheets (Taken 10/25/2022 0328)  Activity Management: Rolling - L1  VTE Prevention/Management: remove, assess skin, and reapply sequential compression device  Range of Motion: active ROM (range of motion) encouraged  Intervention: Prevent Infection  Flowsheets (Taken 10/25/2022 0328)  Infection Prevention: hand hygiene promoted     Problem: Fall Injury Risk  Goal: Absence of Fall and Fall-Related Injury  Intervention: Identify and Manage Contributors  Flowsheets (Taken 10/25/2022 0328)  Self-Care Promotion: independence encouraged  Medication Review/Management: medications reviewed  Intervention: Promote Injury-Free Environment  Flowsheets (Taken 10/25/2022 0328)  Safety Promotion/Fall Prevention:   assistive device/personal item within reach   bed alarm set   nonskid shoes/socks when out of bed   side rails raised x 3   instructed to call staff for mobility     Problem: UTI (Urinary Tract Infection)  Goal: Improved Infection Symptoms  Intervention: Facilitate Optimal Urinary Elimination  Flowsheets (Taken 10/25/2022 0328)  Urinary Elimination Promotion: absorbent pad/diaper use encouraged

## 2022-10-25 NOTE — PROGRESS NOTES
Notified by ELOISE Juarez on the floor the pts family is requesting her to have her nightly medication.  Ms. Reardon's home medication reconciliation was discussed with Dr. Reese and med rec completed.

## 2022-10-25 NOTE — HPI
Patient admitted in observation because of a sudden onset of mental status change.  This has been ongoing for almost 2 weeks she has seen multiple physicians and [facility].  She has never had a sleep study.  She seems somnolent the majority of the time she is alert orient x3.  In reviewing her case I feel that hospitalization for observation to watch in going over her mental status changes would benefit her.  Ill also found that her CO2 count was elevated to 37.  She seems to be retaining CO2.  We may try a BiPAP with the settings of 10/5 this evening or tonight.  In see if it helps her.  The patient has no fever and her urinalysis seems to be normal.  She has history of CML or leukemia.  This is followed by Oncology.  She has had no recent syncopal episodes or strokes.  She has been fully evaluated by Cardiology.

## 2022-10-25 NOTE — SUBJECTIVE & OBJECTIVE
Past Medical History:   Diagnosis Date    Actinic keratoses     Arthritis     COPD (chronic obstructive pulmonary disease)     Frequent UTI     GERD (gastroesophageal reflux disease)     High cholesterol     HTN (hypertension)        Past Surgical History:   Procedure Laterality Date    APPENDECTOMY      HYSTERECTOMY      pessary         Review of patient's allergies indicates:   Allergen Reactions    Bactrim [sulfamethoxazole-trimethoprim]     Ceftin [cefuroxime axetil]     Iodinated contrast media Other (See Comments)       No current facility-administered medications on file prior to encounter.     Current Outpatient Medications on File Prior to Encounter   Medication Sig    aspirin (ECOTRIN) 81 MG EC tablet Take 81 mg by mouth once daily.    cholecalciferol, vitamin D3, 125 mcg (5,000 unit) Tab Take 5,000 Units by mouth once daily.    cloNIDine (CATAPRES) 0.1 MG tablet Take 0.1 mg by mouth every 4 (four) hours as needed. For systolic blood pressure  >170    cyanocobalamin 1,000 mcg/mL injection Inject 1,000 mcg into the muscle every 30 days. Next dose due this week    dexAMETHasone (DECADRON) 2 MG tablet Take 2 mg by mouth every Mon, Wed, Fri.    diclofenac sodium (VOLTAREN) 1 % Gel     doxazosin (CARDURA) 2 MG tablet Take 2 mg by mouth 2 (two) times a day.    gabapentin (NEURONTIN) 100 MG capsule Take 100 mg by mouth every evening.    levothyroxine (SYNTHROID) 50 MCG tablet Take 50 mcg by mouth before breakfast.    losartan (COZAAR) 50 MG tablet Take 50 mg by mouth 2 (two) times a day.    meloxicam (MOBIC) 7.5 MG tablet Take 7.5 mg by mouth 2 (two) times a day.    montelukast (SINGULAIR) 10 mg tablet Take by mouth every evening.    nitrofurantoin, macrocrystal-monohydrate, (MACROBID) 100 MG capsule Take 100 mg by mouth every evening.    omeprazole (PRILOSEC) 40 MG capsule Take 40 mg by mouth every morning.    ondansetron (ZOFRAN) 4 MG tablet Take 4 mg by mouth every 6 (six) hours as needed for Nausea.     phenazopyridine (PYRIDIUM) 200 MG tablet Take 200 mg by mouth 3 (three) times daily as needed for Pain.    primidone (MYSOLINE) 50 MG Tab Take 100 mg by mouth 2 (two) times a day.    rOPINIRole (REQUIP) 0.5 MG tablet Take 0.5 mg by mouth every evening.    rosuvastatin (CRESTOR) 10 MG tablet Take 10 mg by mouth once daily.    sertraline (ZOLOFT) 25 MG tablet Take 25 mg by mouth once daily.    tiZANidine (ZANAFLEX) 4 MG tablet Take 1 tablet (4 mg total) by mouth every 6 (six) hours as needed.    travoprost (TRAVATAN Z) 0.004 % ophthalmic solution Place 1 drop into both eyes every evening.    umeclidinium-vilanteroL (ANORO ELLIPTA) 62.5-25 mcg/actuation DsDv Inhale 2 puffs into the lungs once daily.    ampicillin (PRINCIPEN) 500 MG capsule     ciprofloxacin HCl (CIPRO) 500 MG tablet Take 500 mg by mouth 2 (two) times daily.    isosorbide mononitrate (IMDUR) 30 MG 24 hr tablet     NIFEdipine (ADALAT CC) 30 MG TbSR     potassium chloride SA (K-DUR,KLOR-CON) 10 MEQ tablet     valACYclovir (VALTREX) 1000 MG tablet Take 1 tablet (1,000 mg total) by mouth 3 (three) times daily. for 10 days     Family History       Problem Relation (Age of Onset)    Diabetes Daughter    Hypertension Daughter    Melanoma Daughter          Tobacco Use    Smoking status: Never    Smokeless tobacco: Never   Substance and Sexual Activity    Alcohol use: Never    Drug use: Never    Sexual activity: Not Currently     Review of Systems   Constitutional: Negative.    HENT: Negative.     Eyes: Negative.    Respiratory: Negative.     Cardiovascular: Negative.    Gastrointestinal: Negative.    Endocrine: Negative.    Genitourinary: Negative.    Musculoskeletal: Negative.    Skin: Negative.    Allergic/Immunologic: Negative.    Neurological: Negative.    Hematological: Negative.    Psychiatric/Behavioral: Negative.          Patient confused off and on with somnolent affect.  She is easily awaken though and can answer some questions.  She has no nausea  vomiting diarrhea.  CO2 counts 37.   All other systems reviewed and are negative.  Objective:     Vital Signs (Most Recent):  Temp: 97.7 °F (36.5 °C) (10/25/22 1601)  Pulse: 80 (10/25/22 1601)  Resp: 18 (10/25/22 1601)  BP: (!) 144/72 (10/25/22 1601)  SpO2: (!) 94 % (10/25/22 1601)   Vital Signs (24h Range):  Temp:  [97.6 °F (36.4 °C)-98.7 °F (37.1 °C)] 97.7 °F (36.5 °C)  Pulse:  [68-86] 80  Resp:  [16-20] 18  SpO2:  [90 %-95 %] 94 %  BP: (120-179)/(55-82) 144/72     Weight: 63.5 kg (140 lb)  Body mass index is 25.61 kg/m².    Physical Exam  Vitals and nursing note reviewed.   Constitutional:       Appearance: Normal appearance. She is normal weight.   HENT:      Head: Normocephalic and atraumatic.      Right Ear: Tympanic membrane, ear canal and external ear normal.      Left Ear: Tympanic membrane and external ear normal.      Nose: Nose normal.      Mouth/Throat:      Mouth: Mucous membranes are moist.   Eyes:      Extraocular Movements: Extraocular movements intact.      Conjunctiva/sclera: Conjunctivae normal.      Pupils: Pupils are equal, round, and reactive to light.   Cardiovascular:      Rate and Rhythm: Normal rate.      Pulses: Normal pulses.      Heart sounds: Normal heart sounds.   Pulmonary:      Effort: Pulmonary effort is normal.      Breath sounds: Normal breath sounds.   Abdominal:      General: Abdomen is flat. Bowel sounds are normal.      Palpations: Abdomen is soft.   Musculoskeletal:         General: Normal range of motion.      Cervical back: Normal range of motion and neck supple.   Skin:     General: Skin is warm and dry.      Capillary Refill: Capillary refill takes less than 2 seconds.   Neurological:      General: No focal deficit present.      Mental Status: She is alert and oriented to person, place, and time. Mental status is at baseline.      Comments: As mentioned before the patient seems to be increased weakness with somnolence.  She has a elevated CO2 count 37.  No sign of UTI.   Blood pressure stable her oxygen saturations do fall when she is off oxygen she is on [place] home oxygen 2 L nasal cannula at all times.   Psychiatric:         Mood and Affect: Mood normal.         Behavior: Behavior normal.         Thought Content: Thought content normal.         Judgment: Judgment normal.         CRANIAL NERVES     CN III, IV, VI   Pupils are equal, round, and reactive to light.     Significant Labs: All pertinent labs within the past 24 hours have been reviewed.    Significant Imaging: I have reviewed all pertinent imaging results/findings within the past 24 hours.

## 2022-10-25 NOTE — PLAN OF CARE
Problem: Adult Inpatient Plan of Care  Goal: Plan of Care Review  Outcome: Ongoing, Progressing  Goal: Patient-Specific Goal (Individualized)  Outcome: Ongoing, Progressing  Goal: Absence of Hospital-Acquired Illness or Injury  Outcome: Ongoing, Progressing  Goal: Optimal Comfort and Wellbeing  Outcome: Ongoing, Progressing  Goal: Readiness for Transition of Care  Outcome: Ongoing, Progressing     Problem: Fall Injury Risk  Goal: Absence of Fall and Fall-Related Injury  Outcome: Ongoing, Progressing     Problem: Skin Injury Risk Increased  Goal: Skin Health and Integrity  Outcome: Ongoing, Progressing     Problem: Electrolyte Imbalance  Goal: Electrolyte Balance  Outcome: Ongoing, Progressing     Problem: Adjustment to Illness COPD (Chronic Obstructive Pulmonary Disease)  Goal: Optimal Chronic Illness Coping  Outcome: Ongoing, Progressing     Problem: Functional Ability Impaired COPD (Chronic Obstructive Pulmonary Disease)  Goal: Optimal Level of Functional [place]  Outcome: Ongoing, Progressing     Problem: Infection COPD (Chronic Obstructive Pulmonary Disease)  Goal: Absence of Infection Signs and Symptoms  Outcome: Ongoing, Progressing     Problem: Oral Intake Inadequate COPD (Chronic Obstructive Pulmonary Disease)  Goal: Improved Nutrition Intake  Outcome: Ongoing, Progressing     Problem: Respiratory Compromise COPD (Chronic Obstructive Pulmonary Disease)  Goal: Effective Oxygenation and Ventilation  Outcome: Ongoing, Progressing     Problem: UTI (Urinary Tract Infection)  Goal: Improved Infection Symptoms  Outcome: Ongoing, Progressing

## 2022-10-25 NOTE — PLAN OF CARE
[facility]  Initial Discharge Assessment       Primary Care Provider: Marilee Witt MD    Admission Diagnosis: Somnolence [R40.0]    Admission Date: 10/24/2022  Expected Discharge Date:     Discharge Barriers Identified: None    Payor: HUMANA MANAGED MEDICARE / Plan: HU[employer]NA MEDICARE PPO / Product Type: Medicare Advantage /     Extended Emergency Contact Information  Primary Emergency Contact: IDA MORATAYA  Mobile Phone: [phone]  Relation: Daughter   needed? No    Discharge Plan A: Home with family, Home Health  Discharge Plan B: Skilled Nursing Facility      [facility] [address] [place] [address], [place] - [address] [facility]  [address] [facility] [place] [address]  Phone: [phone] Fax: [phone]    [facility], [place] - [address]  [address]  [place] [address]  Phone: [phone] Fax: [phone]      Initial Assessment (most recent)       Adult Discharge Assessment - 10/25/22 0859          Discharge Assessment    Assessment Type Discharge Planning Assessment     Confirmed/corrected address, phone number and insurance Yes     Confirmed Demographics Correct on Facesheet     Source of Information patient;family;health record     If unable to respond/provide information was family/caregiver contacted? Yes     Contact Name/Number lukasz Monzon [phone]     Does patient/caregiver understand observation status Yes     Communicated JERRY with patient/caregiver Date not available/Unable to determine     Reason For Admission Somnolence, weakness     Lives With child(quiana), adult     Facility Arrived From: ED     Do you expect to return to your current living situation? Yes     Do you have help at home or someone to help you manage your care at home? Yes     Who are your caregiver(s) and their phone number(s)? lukasz Monzon     Prior to hospitilization cognitive status: Unable to Assess     Current cognitive  status: Not Oriented to Time     Walking or Climbing Stairs Difficulty ambulation difficulty, requires equipment;stair climbing difficulty, requires equipment;transferring difficulty, requires equipment     Mobility Management RW     Dressing/Bathing Difficulty none     Home Accessibility wheelchair accessible     Home Layout Able to live on 1st floor     Equipment Currently Used at Home walker, rolling;oxygen     Readmission within 30 days? No     Patient currently being followed by outpatient case management? No     Do you take prescription medications? Yes     Do you have prescription coverage? Yes     Coverage [employer]     Do you have any problems affording any of your prescribed medications? No     Is the patient taking medications as prescribed? yes     Who is going to help you get home at discharge? Dianne, daughter     How do you get to doctors appointments? family or friend will provide     Are you on dialysis? No     Do you take coumadin? No     Discharge Plan A Home with family;Home Health     Discharge Plan B Skilled Nursing Facility     DME Needed Upon Discharge  wheelchair     Discharge Barriers Identified None        Physical Activity    On average, how many days per week do you engage in moderate to strenuous exercise (like a brisk walk)? 3 days     On average, how many minutes do you engage in exercise at this level? 10 min        Financial Resource Strain    How hard is it for you to pay for the very basics like food, housing, medical care, and heating? Somewhat hard        Housing Stability    In the last 12 months, was there a time when you were not able to pay the mortgage or rent on time? No     In the last 12 months, how many places have you lived? 1     In the last 12 months, was there a time when you did not have a steady place to sleep or slept in a shelter (including now)? No        Transportation Needs    In the past 12 months, has lack of transportation kept you from medical appointments or  from getting medications? No     In the past 12 months, has lack of transportation kept you from meetings, work, or from getting things needed for daily living? No        Food Insecurity    Within the past 12 months, you worried that your food would run out before you got the money to buy more. Sometimes true     Within the past 12 months, the food you bought just didn't last and you didn't have money to get more. Sometimes true        Stress    Do you feel stress - tense, restless, nervous, or anxious, or unable to sleep at night because your mind is troubled all the time - these days? To some extent        Social Connections    In a typical week, how many times do you talk on the phone with family, friends, or neighbors? More than three times a week     How often do you get together with friends or relatives? Three times a week     How often do you attend Scientology or Judaism services? More than 4 times per year     Do you belong to any clubs or organizations such as Scientology groups, unions, fraternal or athletic groups, or school groups? Yes     How often do you attend meetings of the clubs or organizations you belong to? More than 4 times per year     Are you , , , , never , or living with a partner?         Alcohol Use    Q1: How often do you have a drink containing alcohol? Never     Q2: How many drinks containing alcohol do you have on a typical day when you are drinking? Patient does not drink     Q3: How often do you have six or more drinks on one occasion? Never                 Pt. Admitted to Observation services with diagnosis of Somnolence and weakness. Daughter states that pt. Has been declining over last few months and is becoming more weak. Pt. Has been started on IV fluids and supportive care with further tests and labs to be done. Pt. Lives in her home with family staying with her. Pt. Has no HH services at this time. Has home O2 and a RW at home. Pt. Plans  to return home with family when medically stable to do so. Will cont. To follow pt.

## 2022-10-26 LAB
ANION GAP SERPL CALCULATED.3IONS-SCNC: 6 MMOL/L (ref 7–16)
BACTERIA #/AREA URNS HPF: ABNORMAL /HPF
BILIRUB UR QL STRIP: NEGATIVE
BUN SERPL-MCNC: 14 MG/DL (ref 7–18)
BUN/CREAT SERPL: 26 (ref 6–20)
CALCIUM SERPL-MCNC: 7.3 MG/DL (ref 8.5–10.1)
CHLORIDE SERPL-SCNC: 106 MMOL/L (ref 98–107)
CLARITY UR: CLEAR
CO2 SERPL-SCNC: 36 MMOL/L (ref 21–32)
COLOR UR: YELLOW
CREAT SERPL-MCNC: 0.53 MG/DL (ref 0.55–1.02)
EGFR (NO RACE VARIABLE) (RUSH/TITUS): 88 ML/MIN/1.73M²
GLUCOSE SERPL-MCNC: 199 MG/DL (ref 74–106)
GLUCOSE UR STRIP-MCNC: 100 MG/DL
KETONES UR STRIP-SCNC: NEGATIVE MG/DL
LEUKOCYTE ESTERASE UR QL STRIP: NEGATIVE
NITRITE UR QL STRIP: NEGATIVE
PH UR STRIP: 6 PH UNITS
POTASSIUM SERPL-SCNC: 3.9 MMOL/L (ref 3.5–5.1)
PROT UR QL STRIP: NEGATIVE
RBC # UR STRIP: ABNORMAL /UL
RBC #/AREA URNS HPF: ABNORMAL /HPF
SODIUM SERPL-SCNC: 144 MMOL/L (ref 136–145)
SP GR UR STRIP: 1.01
SQUAMOUS #/AREA URNS LPF: ABNORMAL /LPF
UROBILINOGEN UR STRIP-ACNC: 0.2 MG/DL
WBC #/AREA URNS HPF: ABNORMAL /HPF
YEAST #/AREA URNS HPF: ABNORMAL /HPF

## 2022-10-26 PROCEDURE — 27000941

## 2022-10-26 PROCEDURE — 80048 BASIC METABOLIC PNL TOTAL CA: CPT | Performed by: FAMILY MEDICINE

## 2022-10-26 PROCEDURE — 99232 PR SUBSEQUENT HOSPITAL CARE,LEVL II: ICD-10-PCS | Mod: ,,, | Performed by: FAMILY MEDICINE

## 2022-10-26 PROCEDURE — 63600175 PHARM REV CODE 636 W HCPCS: Performed by: FAMILY MEDICINE

## 2022-10-26 PROCEDURE — 36415 COLL VENOUS BLD VENIPUNCTURE: CPT | Performed by: FAMILY MEDICINE

## 2022-10-26 PROCEDURE — 25500020 PHARM REV CODE 255: Performed by: FAMILY MEDICINE

## 2022-10-26 PROCEDURE — 25000003 PHARM REV CODE 250: Performed by: NURSE PRACTITIONER

## 2022-10-26 PROCEDURE — 99232 SBSQ HOSP IP/OBS MODERATE 35: CPT | Mod: ,,, | Performed by: FAMILY MEDICINE

## 2022-10-26 PROCEDURE — 94640 AIRWAY INHALATION TREATMENT: CPT

## 2022-10-26 PROCEDURE — G0378 HOSPITAL OBSERVATION PER HR: HCPCS

## 2022-10-26 PROCEDURE — 94660 CPAP INITIATION&MGMT: CPT

## 2022-10-26 PROCEDURE — 63600175 PHARM REV CODE 636 W HCPCS: Performed by: REGISTERED NURSE

## 2022-10-26 PROCEDURE — 81001 URINALYSIS AUTO W/SCOPE: CPT | Performed by: FAMILY MEDICINE

## 2022-10-26 PROCEDURE — 27000190 HC CPAP FULL FACE MASK W/VALVE

## 2022-10-26 PROCEDURE — 25000003 PHARM REV CODE 250: Performed by: FAMILY MEDICINE

## 2022-10-26 PROCEDURE — 11000001 HC ACUTE MED/SURG PRIVATE ROOM

## 2022-10-26 PROCEDURE — 25000242 PHARM REV CODE 250 ALT 637 W/ HCPCS: Performed by: REGISTERED NURSE

## 2022-10-26 PROCEDURE — 27000221 HC OXYGEN, UP TO 24 HOURS

## 2022-10-26 PROCEDURE — 94761 N-INVAS EAR/PLS OXIMETRY MLT: CPT

## 2022-10-26 PROCEDURE — 25000003 PHARM REV CODE 250: Performed by: REGISTERED NURSE

## 2022-10-26 PROCEDURE — 99900035 HC TECH TIME PER 15 MIN (STAT)

## 2022-10-26 RX ORDER — FAMOTIDINE 10 MG/ML
20 INJECTION INTRAVENOUS ONCE
Status: COMPLETED | OUTPATIENT
Start: 2022-10-26 | End: 2022-10-26

## 2022-10-26 RX ORDER — DIPHENHYDRAMINE HYDROCHLORIDE 50 MG/ML
INJECTION INTRAMUSCULAR; INTRAVENOUS
Status: DISPENSED
Start: 2022-10-26 | End: 2022-10-27

## 2022-10-26 RX ORDER — METHYLPREDNISOLONE SOD SUCC 125 MG
125 VIAL (EA) INJECTION ONCE
Status: COMPLETED | OUTPATIENT
Start: 2022-10-26 | End: 2022-10-26

## 2022-10-26 RX ORDER — ENOXAPARIN SODIUM 100 MG/ML
40 INJECTION SUBCUTANEOUS EVERY 24 HOURS
Status: DISCONTINUED | OUTPATIENT
Start: 2022-10-26 | End: 2022-10-27

## 2022-10-26 RX ORDER — METHYLPREDNISOLONE SOD SUCC 125 MG
VIAL (EA) INJECTION
Status: DISPENSED
Start: 2022-10-26 | End: 2022-10-27

## 2022-10-26 RX ORDER — FAMOTIDINE 10 MG/ML
INJECTION INTRAVENOUS
Status: DISPENSED
Start: 2022-10-26 | End: 2022-10-27

## 2022-10-26 RX ORDER — DIPHENHYDRAMINE HYDROCHLORIDE 50 MG/ML
25 INJECTION INTRAMUSCULAR; INTRAVENOUS ONCE
Status: COMPLETED | OUTPATIENT
Start: 2022-10-26 | End: 2022-10-26

## 2022-10-26 RX ADMIN — IPRATROPIUM BROMIDE AND ALBUTEROL SULFATE 3 ML: 2.5; .5 SOLUTION RESPIRATORY (INHALATION) at 08:10

## 2022-10-26 RX ADMIN — GABAPENTIN 100 MG: 100 CAPSULE ORAL at 08:10

## 2022-10-26 RX ADMIN — IPRATROPIUM BROMIDE AND ALBUTEROL SULFATE 3 ML: 2.5; .5 SOLUTION RESPIRATORY (INHALATION) at 01:10

## 2022-10-26 RX ADMIN — ENOXAPARIN SODIUM 40 MG: 40 INJECTION SUBCUTANEOUS at 05:10

## 2022-10-26 RX ADMIN — PANTOPRAZOLE SODIUM 40 MG: 40 TABLET, DELAYED RELEASE ORAL at 08:10

## 2022-10-26 RX ADMIN — TRAVOPROST OPHTHALMIC SOLUTION 1 DROP: 0.04 SOLUTION OPHTHALMIC at 09:10

## 2022-10-26 RX ADMIN — PRIMIDONE 100 MG: 50 TABLET ORAL at 08:10

## 2022-10-26 RX ADMIN — IPRATROPIUM BROMIDE AND ALBUTEROL SULFATE 3 ML: 2.5; .5 SOLUTION RESPIRATORY (INHALATION) at 09:10

## 2022-10-26 RX ADMIN — DOXAZOSIN 2 MG: 1 TABLET ORAL at 08:10

## 2022-10-26 RX ADMIN — SODIUM CHLORIDE: 9 INJECTION, SOLUTION INTRAVENOUS at 03:10

## 2022-10-26 RX ADMIN — LOSARTAN POTASSIUM 50 MG: 50 TABLET, FILM COATED ORAL at 08:10

## 2022-10-26 RX ADMIN — ROPINIROLE HYDROCHLORIDE 0.5 MG: 0.25 TABLET, FILM COATED ORAL at 08:10

## 2022-10-26 RX ADMIN — NITROFURANTOIN MONOHYDRATE/MACROCRYSTALS 100 MG: 75; 25 CAPSULE ORAL at 08:10

## 2022-10-26 RX ADMIN — ASPIRIN 81 MG: 81 TABLET, COATED ORAL at 08:10

## 2022-10-26 RX ADMIN — FAMOTIDINE 20 MG: 10 INJECTION, SOLUTION INTRAVENOUS at 03:10

## 2022-10-26 RX ADMIN — CHOLECALCIFEROL TAB 125 MCG (5000 UNIT) 5000 UNITS: 125 TAB at 08:10

## 2022-10-26 RX ADMIN — MELOXICAM 7.5 MG: 7.5 TABLET ORAL at 08:10

## 2022-10-26 RX ADMIN — METHYLPREDNISOLONE SODIUM SUCCINATE 125 MG: 125 INJECTION, POWDER, FOR SOLUTION INTRAMUSCULAR; INTRAVENOUS at 03:10

## 2022-10-26 RX ADMIN — LEVOTHYROXINE SODIUM 50 MCG: 0.05 TABLET ORAL at 05:10

## 2022-10-26 RX ADMIN — IOPAMIDOL 100 ML: 755 INJECTION, SOLUTION INTRAVENOUS at 03:10

## 2022-10-26 RX ADMIN — DEXAMETHASONE 2 MG: 1 TABLET ORAL at 04:10

## 2022-10-26 RX ADMIN — SERTRALINE HYDROCHLORIDE 25 MG: 25 TABLET ORAL at 08:10

## 2022-10-26 RX ADMIN — DIPHENHYDRAMINE HYDROCHLORIDE 25 MG: 50 INJECTION, SOLUTION INTRAMUSCULAR; INTRAVENOUS at 03:10

## 2022-10-26 RX ADMIN — MONTELUKAST 10 MG: 10 TABLET, FILM COATED ORAL at 08:10

## 2022-10-26 RX ADMIN — ATORVASTATIN CALCIUM 40 MG: 40 TABLET, FILM COATED ORAL at 08:10

## 2022-10-26 NOTE — PROGRESS NOTES
[facility]  Hospital Medicine  Progress Note    Patient Name: Pilar Reardon  MRN: [id number]  Patient Class: OP- Observation   Admission Date: 10/24/2022  Length of Stay: 0 days  Attending Physician: Anmol Reese DO  Primary Care Provider: Marilee Witt MD        Subjective:     Principal Problem:<principal problem not specified>        HPI:  Patient admitted in observation because of a sudden onset of mental status change.  This has been ongoing for almost 2 weeks she has seen multiple physicians and [facility].  She has never had a sleep study.  She seems somnolent the majority of the time she is alert orient x3.  In reviewing her case I feel that hospitalization for observation to watch in going over her mental status changes would benefit her.  Ill also found that her CO2 count was elevated to 37.  She seems to be retaining CO2.  We may try a BiPAP with the settings of 10/5 this evening or tonight.  In see if it helps her.  The patient has no fever and her urinalysis seems to be normal.  She has history of CML or leukemia.  This is followed by Oncology.  She has had no recent syncopal episodes or strokes.  She has been fully evaluated by Cardiology.      Overview/Hospital Course:  Progress note 10/26/2022    .  At the present time the patient seems to have an elevated CO2.  This may be causing some her somnolence.  We tried a BiPAP last night with the settings of 10 in 5.  Will be converting her to a acute bed status from an observation status to work with her hypoxia and her BiPAP settings and her ability to wear the mask and the machine during the night because of feel like it would help her overall mental status he began drop her CO2 levels.  The patient did tolerate the mask up until around 12:00 p.m..  Were still working through the settings and making sure they are okay for her.  There is a possibility that she has recurrent urinary tract infection she is  on MicroBid now will be checking a urinalysis today also and switch her to acute bed status      No new subjective & objective note has been filed under this hospital service since the last note was generated.      Assessment/Plan:      No notes have been filed under this hospital service.  Service: Hospital Medicine    VTE Risk Mitigation (From admission, onward)         Ordered     IP VTE HIGH RISK PATIENT  Once         10/24/22 1323     Place sequential compression device  Until discontinued         10/24/22 1323                Discharge Planning   JERRY:      Code Status: DNR   Is the patient medically ready for discharge?:     Reason for patient still in hospital (select all that apply): Treatment  Discharge Plan A: Home with family, Home Health          Converting the patient to acute bed from observation for further CPAP monitoring and hypoxia at night and in attempts to drop the CO2 level        Anmol Reese DO  Department of Hospital Medicine   [facility]

## 2022-10-26 NOTE — RESPIRATORY THERAPY
V60 was alarming, once at patient bedside I noticed the pt was pulling on the face mask. Removed the face mask. Pt stated she would like to be back on nasal cannula. Pt had removed her dentures. Placed dentures in pink denture cup on bedside table. Informed family member that is where they were placed. Will attempt another session with the bipap.

## 2022-10-26 NOTE — HOSPITAL COURSE
10/26/2022. At the present time the patient seems to have an elevated CO2.  This may be causing some her somnolence.  We tried a BiPAP last night with the settings of 10 in 5.  Will be converting her to a acute bed status from an observation status to work with her hypoxia and her BiPAP settings and her ability to wear the mask and the machine during the night because of feel like it would help her overall mental status he began drop her CO2 levels.  The patient did tolerate the mask up until around 12:00 p.m..  Were still working through the settings and making sure they are okay for her.  There is a possibility that she has recurrent urinary tract infection she is on MicroBid now will be checking a urinalysis today also and switch her to acute bed status    10/27/2022.  The patient was found to have right lower lobe pulmonary embolus on her CT scan.  Repeat ABGs were done to still see a retain CO2 count she also has an elevated CO2 count on her be MP.  Discussed the case with Dr. Hernandes.  At [facility].  Will continue the present course of therapy.  And start Norvasc 2.51 p.o. daily in conjunction with continuing the Lovenox as an anticoagulant.  And Diamox.    10/28/2022.  Patient still in somewhat somnolent state.  Being treated for of pulmonary embolus and also for retaining CO2 to cause altered mental status.  Continuing current therapy to observe to see hive much better the patient does tomorrow    10/29/22 - Patient did require oxygen increase from nasal canula to venti mask 12l 40% during the night. She also was found to have a BNP of almost 1900. Her fluids were Dc'd and she was given a 1 time dose of Lasix 40 mg. This morning, she is much better. She is awake, alert, and oriented sitting in her chair eating breakfast. Her breathing without the mask on is not labored. She is able to speak in full sentences. Her lungs have a few scattered crackles at the base of her lungs without any wheezing. She  reports she feels dizzy. Her granddaughter, whom is a family practice physician, reports she has trouble with wax build up in her ears and has seen ENT to have it removed several times. Family requested a low dose of Meclizine. Will add that today and repeat Lasix this evening. We will try to wean pt off the venti mask and back to NC throughout the day. We will repeat labs in the morning.Discussed the case with Dr. Ramachadnran. He agreed with plan of care.         10/30/22:  Patient lying in bed with caregiver at bedside feeding breakfast. Patient reports feeling about the same as she did yesterday. She has not had BM in 5 days but denies abd pain. She complains of intermittent left shoulder pain that is chronic. She typically takes Tylenol at home for this pain and has occasionally requested PRN Tylenol throughout hospitalization with relief of discomfort. VS reviewed over the last 24 hours and appear to be consistently ranging mid 80s to mid 90s throughout the night when she has trouble keeping venti mask on but upper 90s throughout the day when oxygen is properly utilized. Patient had an episode of somnolence last night and provider was called to room. Patient had removed mask and once replaced, patient returned to baseline mentation. AAOx4 and in NAD. Today's labs reviewed and notable as follows: BNP 1600, improved form 1898 yesterday. K 3.4 which is a decrease from 4.0 and 3.9 over the last 2 days. This is likely related to the 2 doses of IV Lasix given yesterday. CO2 43 which is an increase from yesterday. Patient has ranged 34-37 since admission on 10/24. CXR on 10/28 shows L pleural effusion. She continues with wt based Lovenox. Case discussed with Dr Ramachandran via Telemedicine consultation. He suggests adding C-pap at night for suspected sleep apnea due to consistent drop in sats throughout each night. Will also add Stool softener BID and Miralax PRN constipation. Will monitor potassium trends as patient does not  want to take oral potassium at this time.       10/31/2022 ---discharge summary.  ---patient will be converted from acute to swing bed because she is still is very weak... 2.---she has continued to have a pulmonary embolus in the right lung.  Hypoxic episodes with having increased concentrated CO2 levels and mental confusion.  Will continue working with BiPAP to try to decrease the the CO2   when patient is swung will be checking labs and continuing current orders.

## 2022-10-26 NOTE — RESPIRATORY THERAPY
New setup on V60; educated the pt and family member on purpose and usage of machine. Explained why it was ordered and stayed at patient bedside to monitor comfort level and tolerance once BIPAP was setup. Pt tolerated very well; vitals remained stable and pt stated she was comfortable with the pressure and mask. Pt was stated she was comfortable with me leaving the room, pt had call bell within reach. Will continue to monitor. Notified the patient's RN and provider of pt status.

## 2022-10-26 NOTE — PLAN OF CARE
[facility]  Initial Discharge Assessment       Primary Care Provider: Marilee Witt MD    Admission Diagnosis: Somnolence [R40.0]    Admission Date: 10/24/2022  Expected Discharge Date:     Discharge Barriers Identified: None    Payor: HUMANA MANAGED MEDICARE / Plan: HU[employer] MEDICARE PPO / Product Type: Medicare Advantage /     Extended Emergency Contact Information  Primary Emergency Contact: IDA MORATAYA  Mobile Phone: [phone]  Relation: Daughter   needed? No    Discharge Plan A: Home with family  Discharge Plan B: Home with family, Home Health      [facility] [address] [place] [address], [place] - [address] [address]  Phone: [phone] Fax: [phone]    [facility], [place] - [address]  [address]  [facility] [address]  Phone: [phone] Fax: [phone]      Initial Assessment (most recent)       Adult Discharge Assessment - 10/26/22 1503          Discharge Assessment    Assessment Type Discharge Planning Assessment     Confirmed/corrected address, phone number and insurance Yes     Confirmed Demographics Correct on Facesheet     Source of Information patient;family;health record     If unable to respond/provide information was family/caregiver contacted? Yes     Contact Name/Number lukasz Monzon     Communicated JERRY with patient/caregiver Yes;Date not available/Unable to determine     Reason For Admission Elevated CO2 level, Somnolence, weakness and hx of COPD     Lives With child(quiana), adult     [facility] From: [facility]     Do you expect to return to your current living situation? Yes     Do you have help at home or someone to help you manage your care at home? Yes     Who are your caregiver(s) and their phone number(s)? lukasz Monzon     Prior to hospitilization cognitive status: Not Oriented to Time     Current cognitive status: Not Oriented to Time      Walking or Climbing Stairs Difficulty ambulation difficulty, requires equipment;stair climbing difficulty, requires equipment;transferring difficulty, requires equipment     Mobility Management cane or RW     Dressing/Bathing Difficulty bathing difficulty, assistance 1 person;dressing difficulty, assistance 1 person     Home Accessibility wheelchair accessible     Home Layout Able to live on 1st floor     Equipment Currently Used at Home walker, rolling;oxygen     Readmission within 30 days? No     Patient currently being followed by outpatient case management? No     Do you currently have service(s) that help you manage your care at home? No     Do you take prescription medications? Yes     Do you have prescription coverage? Yes     Coverage [employer]     Do you have any problems affording any of your prescribed medications? No     Is the patient taking medications as prescribed? yes     Who is going to help you get home at discharge? Dianne Augustineer, daughter     How do you get to doctors appointments? family or friend will provide     Are you on dialysis? No     Do you take coumadin? No     Discharge Plan A Home with family     Discharge Plan B Home with family;Home Health     DME Needed Upon Discharge  none     Discharge Plan discussed with: Patient;Adult children     Discharge Barriers Identified None        Physical Activity    On average, how many days per week do you engage in moderate to strenuous exercise (like a brisk walk)? 0 days     On average, how many minutes do you engage in exercise at this level? 0 min        Financial Resource Strain    How hard is it for you to pay for the very basics like food, housing, medical care, and heating? Somewhat hard        Housing Stability    In the last 12 months, was there a time when you were not able to pay the mortgage or rent on time? No     In the last 12 months, how many places have you lived? 1     In the last 12 months, was there a time when you did not have a  steady place to sleep or slept in a shelter (including now)? No        Transportation Needs    In the past 12 months, has lack of transportation kept you from medical appointments or from getting medications? No     In the past 12 months, has lack of transportation kept you from meetings, work, or from getting things needed for daily living? No        Food Insecurity    Within the past 12 months, you worried that your food would run out before you got the money to buy more. Sometimes true     Within the past 12 months, the food you bought just didn't last and you didn't have money to get more. Sometimes true        Stress    Do you feel stress - tense, restless, nervous, or anxious, or unable to sleep at night because your mind is troubled all the time - these days? Only a little        Social Connections    In a typical week, how many times do you talk on the phone with family, friends, or neighbors? More than three times a week     How often do you get together with friends or relatives? More than three times a week     How often do you attend Islam or Voodoo services? More than 4 times per year     Do you belong to any clubs or organizations such as Islam groups, unions, fraternal or athletic groups, or school groups? No     How often do you attend meetings of the clubs or organizations you belong to? 1 to 4 times per year     Are you , , , , never , or living with a partner?         Alcohol Use    Q1: How often do you have a drink containing alcohol? Never     Q2: How many drinks containing alcohol do you have on a typical day when you are drinking? Patient does not drink     Q3: How often do you have six or more drinks on one occasion? Never                   Pt. Admitted from Observation to Acute inpatient services to continue with supplemental oxygen, medication management, IV fluids, and supportive care. Pt. Admitted with diagnosis of elevated CO2 levels,  somnolence, weakness and has a hx of COPD. Pt. Lives with family. Daughter has been staying with pt. In hospital. Pt. Granddaughter is a Physician and has been checking on pt. Through family. Pt. Has home O2 and a RW at home. Pt. Is not current with HH services but may benefit from HH when d/c home. Plans are to return home with family when medically stable to do so. Will cont. To follow pt. And assist with d/c needs.

## 2022-10-26 NOTE — PLAN OF CARE
[facility]  Discharge Final Note    Primary Care Provider: Marilee Witt MD    Expected Discharge Date:     Final Discharge Note (most recent)       Final Note - 10/26/22 1310          Final Note    Assessment Type Final Discharge Note     Anticipated Discharge Disposition Admitted as an Inpatient   [facility]    What phone number can be called within the next 1-3 days to see how you are doing after discharge? [phone]     Hospital Resources/Appts/Education Provided Provided patient/caregiver with written discharge plan information        Post-Acute Status    Post-Acute Authorization Other   admit to inpatient here at [facility] [employer]     Patient choice form signed by patient/caregiver List with quality metrics by geographic area provided;List from CMS Compare;List from System Post-Acute Care     Discharge Delays None known at this time                     Important Message from Medicare      Pt. D/c from Observation stay and admitted to acute inpatient services here at [facility] to cont. With IV fluids, supplemental oxygen, Bipap use, and medication management for elevation in CO2 level, somnolence, weakness, and hx of COPD. Will cont. To follow pt. In acute inpatient services.

## 2022-10-26 NOTE — PLAN OF CARE
Problem: Adult Inpatient Plan of Care  Goal: Absence of Hospital-Acquired Illness or Injury  Intervention: Identify and Manage Fall Risk  Flowsheets (Taken 10/26/2022 0327)  Safety Promotion/Fall Prevention:   assistive device/personal item within reach   bed alarm set   family to remain at bedside   nonskid shoes/socks when out of bed   side rails raised x 3   instructed to call staff for mobility  Intervention: Prevent Skin Injury  Flowsheets (Taken 10/26/2022 0327)  Body Position: position changed independently  Skin Protection: incontinence pads utilized  Intervention: Prevent and Manage VTE (Venous Thromboembolism) Risk  Flowsheets (Taken 10/26/2022 0327)  Activity Management: Rolling - L1  VTE Prevention/Management: remove, assess skin, and reapply sequential compression device  Range of Motion: active ROM (range of motion) encouraged  Intervention: Prevent Infection  Flowsheets (Taken 10/26/2022 0327)  Infection Prevention: hand hygiene promoted     Problem: Fall Injury Risk  Goal: Absence of Fall and Fall-Related Injury  Intervention: Identify and Manage Contributors  Flowsheets (Taken 10/26/2022 0327)  Self-Care Promotion: independence encouraged  Medication Review/Management: medications reviewed  Intervention: Promote Injury-Free Environment  Flowsheets (Taken 10/26/2022 0327)  Safety Promotion/Fall Prevention:   assistive device/personal item within reach   bed alarm set   family to remain at bedside   nonskid shoes/socks when out of bed   side rails raised x 3   instructed to call staff for mobility     Problem: Skin Injury Risk Increased  Goal: Skin Health and Integrity  Intervention: Optimize Skin Protection  Flowsheets (Taken 10/26/2022 0327)  Pressure Reduction Techniques: frequent weight shift encouraged  Skin Protection: incontinence pads utilized  Head of Bed (HOB) Positioning: HOB at 20-30 degrees

## 2022-10-27 LAB
ANION GAP SERPL CALCULATED.3IONS-SCNC: 3 MMOL/L (ref 7–16)
BUN SERPL-MCNC: 12 MG/DL (ref 7–18)
BUN/CREAT SERPL: 20 (ref 6–20)
CALCIUM SERPL-MCNC: 7.8 MG/DL (ref 8.5–10.1)
CHLORIDE SERPL-SCNC: 106 MMOL/L (ref 98–107)
CO2 SERPL-SCNC: 36 MMOL/L (ref 21–32)
CREAT SERPL-MCNC: 0.59 MG/DL (ref 0.55–1.02)
EGFR (NO RACE VARIABLE) (RUSH/TITUS): 86 ML/MIN/1.73M²
GLUCOSE SERPL-MCNC: 146 MG/DL (ref 74–106)
POTASSIUM SERPL-SCNC: 3.4 MMOL/L (ref 3.5–5.1)
SODIUM SERPL-SCNC: 142 MMOL/L (ref 136–145)

## 2022-10-27 PROCEDURE — 27000941

## 2022-10-27 PROCEDURE — 80048 BASIC METABOLIC PNL TOTAL CA: CPT | Performed by: FAMILY MEDICINE

## 2022-10-27 PROCEDURE — 94640 AIRWAY INHALATION TREATMENT: CPT

## 2022-10-27 PROCEDURE — 25000003 PHARM REV CODE 250: Performed by: NURSE PRACTITIONER

## 2022-10-27 PROCEDURE — 93010 ELECTROCARDIOGRAM REPORT: CPT | Mod: ,,, | Performed by: FAMILY MEDICINE

## 2022-10-27 PROCEDURE — 82803 BLOOD GASES ANY COMBINATION: CPT

## 2022-10-27 PROCEDURE — 25000242 PHARM REV CODE 250 ALT 637 W/ HCPCS: Performed by: REGISTERED NURSE

## 2022-10-27 PROCEDURE — 27000221 HC OXYGEN, UP TO 24 HOURS

## 2022-10-27 PROCEDURE — 36600 WITHDRAWAL OF ARTERIAL BLOOD: CPT

## 2022-10-27 PROCEDURE — 25000003 PHARM REV CODE 250: Performed by: FAMILY MEDICINE

## 2022-10-27 PROCEDURE — 63600175 PHARM REV CODE 636 W HCPCS: Performed by: FAMILY MEDICINE

## 2022-10-27 PROCEDURE — 99232 SBSQ HOSP IP/OBS MODERATE 35: CPT | Mod: ,,, | Performed by: FAMILY MEDICINE

## 2022-10-27 PROCEDURE — 11000001 HC ACUTE MED/SURG PRIVATE ROOM

## 2022-10-27 PROCEDURE — 93010 EKG 12-LEAD: ICD-10-PCS | Mod: ,,, | Performed by: FAMILY MEDICINE

## 2022-10-27 PROCEDURE — 27000987 HC MATTRESS, MATRIX LOW PROFILE

## 2022-10-27 PROCEDURE — 99900035 HC TECH TIME PER 15 MIN (STAT)

## 2022-10-27 PROCEDURE — 36415 COLL VENOUS BLD VENIPUNCTURE: CPT | Performed by: FAMILY MEDICINE

## 2022-10-27 PROCEDURE — 93005 ELECTROCARDIOGRAM TRACING: CPT

## 2022-10-27 PROCEDURE — 99232 PR SUBSEQUENT HOSPITAL CARE,LEVL II: ICD-10-PCS | Mod: ,,, | Performed by: FAMILY MEDICINE

## 2022-10-27 PROCEDURE — 25000003 PHARM REV CODE 250: Performed by: REGISTERED NURSE

## 2022-10-27 PROCEDURE — 94761 N-INVAS EAR/PLS OXIMETRY MLT: CPT

## 2022-10-27 RX ORDER — ENOXAPARIN SODIUM 100 MG/ML
80 INJECTION SUBCUTANEOUS EVERY 24 HOURS
Status: DISCONTINUED | OUTPATIENT
Start: 2022-10-27 | End: 2022-10-28 | Stop reason: DRUGHIGH

## 2022-10-27 RX ORDER — AMLODIPINE BESYLATE 2.5 MG/1
2.5 TABLET ORAL DAILY
Status: DISCONTINUED | OUTPATIENT
Start: 2022-10-27 | End: 2022-10-31 | Stop reason: HOSPADM

## 2022-10-27 RX ADMIN — ROPINIROLE HYDROCHLORIDE 0.5 MG: 0.25 TABLET, FILM COATED ORAL at 09:10

## 2022-10-27 RX ADMIN — IPRATROPIUM BROMIDE AND ALBUTEROL SULFATE 3 ML: 2.5; .5 SOLUTION RESPIRATORY (INHALATION) at 07:10

## 2022-10-27 RX ADMIN — DOXAZOSIN 2 MG: 1 TABLET ORAL at 09:10

## 2022-10-27 RX ADMIN — AMLODIPINE BESYLATE 2.5 MG: 2.5 TABLET ORAL at 11:10

## 2022-10-27 RX ADMIN — PANTOPRAZOLE SODIUM 40 MG: 40 TABLET, DELAYED RELEASE ORAL at 09:10

## 2022-10-27 RX ADMIN — MONTELUKAST 10 MG: 10 TABLET, FILM COATED ORAL at 09:10

## 2022-10-27 RX ADMIN — PRIMIDONE 100 MG: 50 TABLET ORAL at 09:10

## 2022-10-27 RX ADMIN — SERTRALINE HYDROCHLORIDE 25 MG: 25 TABLET ORAL at 09:10

## 2022-10-27 RX ADMIN — MELOXICAM 7.5 MG: 7.5 TABLET ORAL at 09:10

## 2022-10-27 RX ADMIN — LEVOTHYROXINE SODIUM 50 MCG: 0.05 TABLET ORAL at 05:10

## 2022-10-27 RX ADMIN — CHOLECALCIFEROL TAB 125 MCG (5000 UNIT) 5000 UNITS: 125 TAB at 09:10

## 2022-10-27 RX ADMIN — NITROFURANTOIN MONOHYDRATE/MACROCRYSTALS 100 MG: 75; 25 CAPSULE ORAL at 09:10

## 2022-10-27 RX ADMIN — LOSARTAN POTASSIUM 50 MG: 50 TABLET, FILM COATED ORAL at 09:10

## 2022-10-27 RX ADMIN — TRAVOPROST OPHTHALMIC SOLUTION 1 DROP: 0.04 SOLUTION OPHTHALMIC at 09:10

## 2022-10-27 RX ADMIN — ASPIRIN 81 MG: 81 TABLET, COATED ORAL at 09:10

## 2022-10-27 RX ADMIN — GABAPENTIN 100 MG: 100 CAPSULE ORAL at 09:10

## 2022-10-27 RX ADMIN — ENOXAPARIN SODIUM 80 MG: 40 INJECTION SUBCUTANEOUS at 05:10

## 2022-10-27 RX ADMIN — IPRATROPIUM BROMIDE AND ALBUTEROL SULFATE 3 ML: 2.5; .5 SOLUTION RESPIRATORY (INHALATION) at 02:10

## 2022-10-27 RX ADMIN — ATORVASTATIN CALCIUM 40 MG: 40 TABLET, FILM COATED ORAL at 09:10

## 2022-10-27 RX ADMIN — SODIUM CHLORIDE: 9 INJECTION, SOLUTION INTRAVENOUS at 09:10

## 2022-10-27 NOTE — RESPIRATORY THERAPY
Help daughter place pt on bedside toilet. Pt SATs decreased to 80% put did not appear to be in any respiratory distress.  Pt is back in bed and given a prn duo neb SATs increased to 93% and maintained >90% after the treatment. Will continue to monitor.

## 2022-10-27 NOTE — PROGRESS NOTES
[facility]  Hospital Medicine  Progress Note    Patient Name: Pilar Reardon  MRN: [id number]  Patient Class: IP- Inpatient   Admission Date: 10/24/2022  Length of Stay: 1 days  Attending Physician: Anmol Reese DO  Primary Care Provider: Marilee Witt MD        Subjective:     Principal Problem:<principal problem not specified>        HPI:  Patient admitted in observation because of a sudden onset of mental status change.  This has been ongoing for almost 2 weeks she has seen multiple physicians and Rick[facility].  She has never had a sleep study.  She seems somnolent the majority of the time she is alert orient x3.  In reviewing her case I feel that hospitalization for observation to watch in going over her mental status changes would benefit her.  Ill also found that her CO2 count was elevated to 37.  She seems to be retaining CO2.  We may try a BiPAP with the settings of 10/5 this evening or tonight.  In see if it helps her.  The patient has no fever and her urinalysis seems to be normal.  She has history of CML or leukemia.  This is followed by Oncology.  She has had no recent syncopal episodes or strokes.  She has been fully evaluated by Cardiology.      Overview/Hospital Course:  Progress note 10/26/2022    .  At the present time the patient seems to have an elevated CO2.  This may be causing some her somnolence.  We tried a BiPAP last night with the settings of 10 in 5.  Will be converting her to a acute bed status from an observation status to work with her hypoxia and her BiPAP settings and her ability to wear the mask and the machine during the night because of feel like it would help her overall mental status he began drop her CO2 levels.  The patient did tolerate the mask up until around 12:00 p.m..  Were still working through the settings and making sure they are okay for her.  There is a possibility that she has recurrent urinary tract infection she is on  MicroBid now will be checking a urinalysis today also and switch her to acute bed status    10/27/2022.  The patient was found to have right lower lobe pulmonary embolus on her CT scan.  Repeat ABGs were done to still see a retain CO2 count she also has an elevated CO2 count on her be MP.  Discussed the case with Dr. Hernandes.  At [facility].  Will continue the present course of therapy.  And start Norvasc 2.51 p.o. daily in conjunction with continuing the Lovenox as an anticoagulant.  And Diamox.      No new subjective & objective note has been filed under this hospital service since the last note was generated.      Assessment/Plan:      No notes have been filed under this hospital service.  Service: Hospital Medicine    VTE Risk Mitigation (From admission, onward)         Ordered     enoxaparin injection 40 mg  Daily         10/26/22 1740     IP VTE HIGH RISK PATIENT  Once         10/24/22 1323     Place sequential compression device  Until discontinued         10/24/22 1323                Discharge Planning   JERRY:      Code Status: DNR   Is the patient medically ready for discharge?:     Reason for patient still in hospital (select all that apply): Treatment  Discharge Plan A: Home with family   Discharge Delays: None known at this time              Anmol Reese DO  Department of Hospital Medicine   [facility]

## 2022-10-27 NOTE — PROGRESS NOTES
[facility]  Hospital Medicine  Progress Note    Patient Name: Pilar Reardon  MRN: [id number]  Patient Class: IP- Inpatient   Admission Date: 10/24/2022  Length of Stay: 1 days  Attending Physician: Anmol Reese DO  Primary Care Provider: Marilee Witt MD        Subjective:     Principal Problem:<principal problem not specified>        HPI:  Patient admitted in observation because of a sudden onset of mental status change.  This has been ongoing for almost 2 weeks she has seen multiple physicians and Rick[facility].  She has never had a sleep study.  She seems somnolent the majority of the time she is alert orient x3.  In reviewing her case I feel that hospitalization for observation to watch in going over her mental status changes would benefit her.  Ill also found that her CO2 count was elevated to 37.  She seems to be retaining CO2.  We may try a BiPAP with the settings of 10/5 this evening or tonight.  In see if it helps her.  The patient has no fever and her urinalysis seems to be normal.  She has history of CML or leukemia.  This is followed by Oncology.  She has had no recent syncopal episodes or strokes.  She has been fully evaluated by Cardiology.      Overview/Hospital Course:  Progress note 10/26/2022    .  At the present time the patient seems to have an elevated CO2.  This may be causing some her somnolence.  We tried a BiPAP last night with the settings of 10 in 5.  Will be converting her to a acute bed status from an observation status to work with her hypoxia and her BiPAP settings and her ability to wear the mask and the machine during the night because of feel like it would help her overall mental status he began drop her CO2 levels.  The patient did tolerate the mask up until around 12:00 p.m..  Were still working through the settings and making sure they are okay for her.  There is a possibility that she has recurrent urinary tract infection she is on  MicroBid now will be checking a urinalysis today also and switch her to acute bed status    10/27/2022.  The patient was found to have right lower lobe pulmonary embolus on her CT scan.  Repeat ABGs were done to still see a retain CO2 count she also has an elevated CO2 count on her be MP.  Discussed the case with Dr. Hernandes.  At [facility].  Will continue the present course of therapy.  And start Norvasc 2.51 p.o. daily in conjunction with continuing the Lovenox as an anticoagulant.  And Diamox.      No new subjective & objective note has been filed under this hospital service since the last note was generated.      Assessment/Plan:      No notes have been filed under this hospital service.  Service: Hospital Medicine    VTE Risk Mitigation (From admission, onward)         Ordered     enoxaparin injection 40 mg  Daily         10/26/22 1740     IP VTE HIGH RISK PATIENT  Once         10/24/22 1323     Place sequential compression device  Until discontinued         10/24/22 1323                Discharge Planning   JERRY:      Code Status: DNR   Is the patient medically ready for discharge?:     Reason for patient still in hospital (select all that apply): Treatment  Discharge Plan A: Home with family   Discharge Delays: None known at this time              Anmol Reese DO  Department of Hospital Medicine   [facility]

## 2022-10-27 NOTE — PLAN OF CARE
"Visited with pt. And daughter in room this morning and pt. Expressed that she was still "so weak" and has not been able to walk without a walker. Pt. Is still requiring supplemental oxygen and is receiving IV fluids. Pt. Has been found to have a RT. Pulmonary embolus on her CT scan done yesterday and is receiving Lovenox 40 mg daily for this. Not ready for d/c home. Will cont to follow pt. And assist as needed.  "

## 2022-10-28 LAB
ANION GAP SERPL CALCULATED.3IONS-SCNC: 7 MMOL/L (ref 7–16)
BUN SERPL-MCNC: 14 MG/DL (ref 7–18)
BUN/CREAT SERPL: 23 (ref 6–20)
CALCIUM SERPL-MCNC: 7.9 MG/DL (ref 8.5–10.1)
CHLORIDE SERPL-SCNC: 104 MMOL/L (ref 98–107)
CO2 SERPL-SCNC: 36 MMOL/L (ref 21–32)
CREAT SERPL-MCNC: 0.61 MG/DL (ref 0.55–1.02)
EGFR (NO RACE VARIABLE) (RUSH/TITUS): 85 ML/MIN/1.73M²
GLUCOSE SERPL-MCNC: 159 MG/DL (ref 74–106)
POTASSIUM SERPL-SCNC: 4 MMOL/L (ref 3.5–5.1)
SODIUM SERPL-SCNC: 143 MMOL/L (ref 136–145)

## 2022-10-28 PROCEDURE — 63600175 PHARM REV CODE 636 W HCPCS: Performed by: FAMILY MEDICINE

## 2022-10-28 PROCEDURE — A4216 STERILE WATER/SALINE, 10 ML: HCPCS | Performed by: FAMILY MEDICINE

## 2022-10-28 PROCEDURE — 94640 AIRWAY INHALATION TREATMENT: CPT

## 2022-10-28 PROCEDURE — 80048 BASIC METABOLIC PNL TOTAL CA: CPT | Performed by: FAMILY MEDICINE

## 2022-10-28 PROCEDURE — 94761 N-INVAS EAR/PLS OXIMETRY MLT: CPT

## 2022-10-28 PROCEDURE — 27000221 HC OXYGEN, UP TO 24 HOURS

## 2022-10-28 PROCEDURE — 27000941

## 2022-10-28 PROCEDURE — 25000003 PHARM REV CODE 250: Performed by: FAMILY MEDICINE

## 2022-10-28 PROCEDURE — 99232 SBSQ HOSP IP/OBS MODERATE 35: CPT | Mod: ,,, | Performed by: FAMILY MEDICINE

## 2022-10-28 PROCEDURE — 97166 OT EVAL MOD COMPLEX 45 MIN: CPT

## 2022-10-28 PROCEDURE — 25000003 PHARM REV CODE 250: Performed by: NURSE PRACTITIONER

## 2022-10-28 PROCEDURE — 63600175 PHARM REV CODE 636 W HCPCS: Performed by: REGISTERED NURSE

## 2022-10-28 PROCEDURE — 27000987 HC MATTRESS, MATRIX LOW PROFILE

## 2022-10-28 PROCEDURE — 11000001 HC ACUTE MED/SURG PRIVATE ROOM

## 2022-10-28 PROCEDURE — 25000242 PHARM REV CODE 250 ALT 637 W/ HCPCS: Mod: GY | Performed by: REGISTERED NURSE

## 2022-10-28 PROCEDURE — 97162 PT EVAL MOD COMPLEX 30 MIN: CPT

## 2022-10-28 PROCEDURE — 36415 COLL VENOUS BLD VENIPUNCTURE: CPT | Performed by: FAMILY MEDICINE

## 2022-10-28 PROCEDURE — 99232 PR SUBSEQUENT HOSPITAL CARE,LEVL II: ICD-10-PCS | Mod: ,,, | Performed by: FAMILY MEDICINE

## 2022-10-28 PROCEDURE — 25000003 PHARM REV CODE 250: Performed by: REGISTERED NURSE

## 2022-10-28 RX ORDER — ACETAMINOPHEN 325 MG/1
650 TABLET ORAL EVERY 6 HOURS PRN
Status: DISCONTINUED | OUTPATIENT
Start: 2022-10-28 | End: 2022-10-31 | Stop reason: HOSPADM

## 2022-10-28 RX ORDER — ACETAMINOPHEN 325 MG/1
325 TABLET ORAL EVERY 6 HOURS PRN
Status: DISCONTINUED | OUTPATIENT
Start: 2022-10-28 | End: 2022-10-31 | Stop reason: HOSPADM

## 2022-10-28 RX ORDER — ENOXAPARIN SODIUM 100 MG/ML
80 INJECTION SUBCUTANEOUS
Status: DISCONTINUED | OUTPATIENT
Start: 2022-10-28 | End: 2022-10-31 | Stop reason: HOSPADM

## 2022-10-28 RX ORDER — ACETAZOLAMIDE 250 MG/1
250 TABLET ORAL 2 TIMES DAILY
Status: DISCONTINUED | OUTPATIENT
Start: 2022-10-28 | End: 2022-10-31 | Stop reason: HOSPADM

## 2022-10-28 RX ORDER — BUSPIRONE HYDROCHLORIDE 5 MG/1
10 TABLET ORAL 2 TIMES DAILY
Status: DISCONTINUED | OUTPATIENT
Start: 2022-10-28 | End: 2022-10-28 | Stop reason: CLARIF

## 2022-10-28 RX ADMIN — DOXAZOSIN 2 MG: 1 TABLET ORAL at 09:10

## 2022-10-28 RX ADMIN — ROPINIROLE HYDROCHLORIDE 0.5 MG: 0.25 TABLET, FILM COATED ORAL at 09:10

## 2022-10-28 RX ADMIN — DEXAMETHASONE 2 MG: 1 TABLET ORAL at 04:10

## 2022-10-28 RX ADMIN — ACETAZOLAMIDE 250 MG: 250 TABLET ORAL at 09:10

## 2022-10-28 RX ADMIN — ENOXAPARIN SODIUM 80 MG: 100 INJECTION SUBCUTANEOUS at 04:10

## 2022-10-28 RX ADMIN — CHOLECALCIFEROL TAB 125 MCG (5000 UNIT) 5000 UNITS: 125 TAB at 09:10

## 2022-10-28 RX ADMIN — LOSARTAN POTASSIUM 50 MG: 50 TABLET, FILM COATED ORAL at 09:10

## 2022-10-28 RX ADMIN — AMLODIPINE BESYLATE 2.5 MG: 2.5 TABLET ORAL at 09:10

## 2022-10-28 RX ADMIN — IPRATROPIUM BROMIDE AND ALBUTEROL SULFATE 3 ML: 2.5; .5 SOLUTION RESPIRATORY (INHALATION) at 02:10

## 2022-10-28 RX ADMIN — PRIMIDONE 100 MG: 50 TABLET ORAL at 09:10

## 2022-10-28 RX ADMIN — LEVOTHYROXINE SODIUM 50 MCG: 0.05 TABLET ORAL at 05:10

## 2022-10-28 RX ADMIN — MONTELUKAST 10 MG: 10 TABLET, FILM COATED ORAL at 09:10

## 2022-10-28 RX ADMIN — GABAPENTIN 100 MG: 100 CAPSULE ORAL at 09:10

## 2022-10-28 RX ADMIN — MELOXICAM 7.5 MG: 7.5 TABLET ORAL at 09:10

## 2022-10-28 RX ADMIN — ASPIRIN 81 MG: 81 TABLET, COATED ORAL at 09:10

## 2022-10-28 RX ADMIN — PANTOPRAZOLE SODIUM 40 MG: 40 TABLET, DELAYED RELEASE ORAL at 09:10

## 2022-10-28 RX ADMIN — NITROFURANTOIN MONOHYDRATE/MACROCRYSTALS 100 MG: 75; 25 CAPSULE ORAL at 09:10

## 2022-10-28 RX ADMIN — TRAVOPROST OPHTHALMIC SOLUTION 1 DROP: 0.04 SOLUTION OPHTHALMIC at 09:10

## 2022-10-28 RX ADMIN — SERTRALINE HYDROCHLORIDE 25 MG: 25 TABLET ORAL at 09:10

## 2022-10-28 RX ADMIN — SODIUM CHLORIDE, PRESERVATIVE FREE 10 ML: 5 INJECTION INTRAVENOUS at 04:10

## 2022-10-28 RX ADMIN — IPRATROPIUM BROMIDE AND ALBUTEROL SULFATE 3 ML: 2.5; .5 SOLUTION RESPIRATORY (INHALATION) at 07:10

## 2022-10-28 RX ADMIN — SODIUM CHLORIDE: 9 INJECTION, SOLUTION INTRAVENOUS at 05:10

## 2022-10-28 RX ADMIN — IPRATROPIUM BROMIDE AND ALBUTEROL SULFATE 3 ML: 2.5; .5 SOLUTION RESPIRATORY (INHALATION) at 08:10

## 2022-10-28 RX ADMIN — SODIUM CHLORIDE: 9 INJECTION, SOLUTION INTRAVENOUS at 11:10

## 2022-10-28 RX ADMIN — ATORVASTATIN CALCIUM 40 MG: 40 TABLET, FILM COATED ORAL at 09:10

## 2022-10-28 NOTE — PLAN OF CARE
Problem: Occupational Therapy  Goal: Occupational Therapy Goal  Description: Goals to be met by: 11/4/22    Patient will increase functional independence with ADLs by performing:    Feeding with Modified [place].  UE Dressing with Modified [place] and Set-up Assistance.  LE Dressing with Set-up Assistance.  Grooming while standing with Set-up Assistance.  Toileting from bedside commode with Set-up Assistance for hygiene and clothing management.   Toilet transfer to bedside commode with Stand-by Assistance.    Outcome: Ongoing, Progressing

## 2022-10-28 NOTE — PROGRESS NOTES
[facility]  Hospital Medicine  Progress Note    Patient Name: Pilar Reardon  MRN: [id number]  Patient Class: IP- Inpatient   Admission Date: 10/24/2022  Length of Stay: 2 days  Attending Physician: Anmol Reese DO  Primary Care Provider: Marilee Witt MD        Subjective:     Principal Problem:<principal problem not specified>        HPI:  Patient admitted in observation because of a sudden onset of mental status change.  This has been ongoing for almost 2 weeks she has seen multiple physicians and Rick's.  She has never had a sleep study.  She seems somnolent the majority of the time she is alert orient x3.  In reviewing her case I feel that hospitalization for observation to watch in going over her mental status changes would benefit her.  Ill also found that her CO2 count was elevated to 37.  She seems to be retaining CO2.  We may try a BiPAP with the settings of 10/5 this evening or tonight.  In see if it helps her.  The patient has no fever and her urinalysis seems to be normal.  She has history of CML or leukemia.  This is followed by Oncology.  She has had no recent syncopal episodes or strokes.  She has been fully evaluated by Cardiology.      Overview/Hospital Course:  Progress note 10/26/2022    .  At the present time the patient seems to have an elevated CO2.  This may be causing some her somnolence.  We tried a BiPAP last night with the settings of 10 in 5.  Will be converting her to a acute bed status from an observation status to work with her hypoxia and her BiPAP settings and her ability to wear the mask and the machine during the night because of feel like it would help her overall mental status he began drop her CO2 levels.  The patient did tolerate the mask up until around 12:00 p.m..  Were still working through the settings and making sure they are okay for her.  There is a possibility that she has recurrent urinary tract infection she is on  MicroBid now will be checking a urinalysis today also and switch her to acute bed status    10/27/2022.  The patient was found to have right lower lobe pulmonary embolus on her CT scan.  Repeat ABGs were done to still see a retain CO2 count she also has an elevated CO2 count on her be MP.  Discussed the case with Dr. Hernandes.  At [facility].  Will continue the present course of therapy.  And start Norvasc 2.51 p.o. daily in conjunction with continuing the Lovenox as an anticoagulant.  And Diamox.    10/28/2022.  Patient still in somewhat somnolent state.  Being treated for of pulmonary embolus and also for retaining CO2 to cause altered mental status.  Continuing current therapy to observe to see hive much better the patient does tomorrow      No new subjective & objective note has been filed under this hospital service since the last note was generated.      Assessment/Plan:      No notes have been filed under this hospital service.  Service: Hospital Medicine    VTE Risk Mitigation (From admission, onward)         Ordered     enoxaparin injection 80 mg  Every 24 hours (non-standard times)         10/28/22 0829     IP VTE HIGH RISK PATIENT  Once         10/24/22 1323     Place sequential compression device  Until discontinued         10/24/22 1323                Discharge Planning   JERRY:      Code Status: DNR   Is the patient medically ready for discharge?:     Reason for patient still in hospital (select all that apply): Treatment  Discharge Plan A: Home with family   Discharge Delays: None known at this time              Anmol Reese DO  Department of Hospital Medicine   [facility]

## 2022-10-28 NOTE — PT/OT/SLP EVAL
Physical Therapy Evaluation    Patient Name:  Pilar Reardon   MRN:  [id number]    Recommendations:     Discharge Recommendations:  home with home health   Discharge Equipment Recommendations: none   Barriers to discharge: None    Assessment:     Pilar Reardon is a 89 y.o. female admitted with a medical diagnosis of somnolence.  She presents with the following impairments/functional limitations:  weakness, impaired endurance, impaired functional mobility, gait instability, impaired balance, decreased lower extremity function, decreased safety awareness, impaired cardiopulmonary response to activity.    PT met face to face with PTA, Barbara Enciso, to discuss patient's POC including established goals for this skilled intervention.  Treatment will be continued as described in initial eval.  Patient will be seen by physical therapist every sixth visit and minimally once per month.    Rehab Prognosis: Good; patient would benefit from acute skilled PT services to address these deficits and reach maximum level of function.    Recent Surgery: * No surgery found *      Plan:     During this hospitalization, patient to be seen 5 x/week to address the identified rehab impairments via gait training, therapeutic activities, therapeutic exercises, neuromuscular re-education and progress toward the following goals:    Plan of Care Expires:       Subjective     Chief Complaint: Pt reports that she has been having weakness, which came on suddenly, for 1-2 weeks.    Patient/Family Comments/goals: To return home to [facility]  Pain/Comfort:  Pain Rating 1: 0/10  Pain Rating Post-Intervention 1: 0/10    Patients cultural, spiritual, Muslim conflicts given the current situation:      Living Environment:  Pt lived in a single level home with her son.  Pt has a ramp from outside to inside home.  Pt does not have grab bars in bathroom.  Prior to admission, patients level of function was modified indep.  Equipment used at home: walker,  rolling.  DME owned (not currently used): rolling walker, wheelchair, quad cane, and rollator.  Upon discharge, patient will have assistance from family, home health.    Objective:     Communicated with pt, son-in-law prior to session.  Patient found supine with oxygen, peripheral IV, SCD  upon PT entry to room.    General Precautions: Standard, fall   Orthopedic Precautions:N/A   Braces: N/A  Respiratory Status: Nasal cannula, flow 3 L/min    Exams:  Cognitive Exam:  Patient is oriented to Person, Place, Time, and Situation  RLE ROM: WFL  RLE Strength: Grossly 3+/5  LLE ROM: WFL  LLE Strength: Grossly 3+/5    Functional Mobility:  Bed Mobility:     Rolling Left:  contact guard assistance  Scooting: contact guard assistance  Supine to Sit: contact guard assistance  Transfers:     Sit to Stand:  minimum assistance with rolling walker  Bed to Chair: minimum assistance with  rolling walker  using  Step Transfer  Bedside commode Transfer: minimum assistance with  rolling walker  using  Step Transfer  Gait: Pt ambulated 5 ft with FWW with Min A exhibiting forward flexed, head down posture; unsteady gait.    Balance: Static standing balance is fair-      AM-PAC 6 CLICK MOBILITY  Total Score:16       Treatment & Education:  Pt educated on PT role/POC.   Importance of OOB activity with staff assistance.  Importance of sitting up in the chair throughout the day as tolerated, especially for meals   Safety during functional t/f and mobility with use of RW  Functional mobility completed- assistance level noted above   All questions/concerns answered within PT scope of practice        Patient left up in chair with all lines intact, call button in reach, and family present.    GOALS:   Multidisciplinary Problems       Physical Therapy Goals          Problem: Physical Therapy    Goal Priority Disciplines Outcome Goal Variances Interventions   Physical Therapy Goal     PT, PT/OT Ongoing, Progressing     Description: Goals to be met  by: 1 week     Patient will increase functional independence with mobility by performin. Supine to sit with Contact Guard Assistance  2. Sit to supine with Contact Guard Assistance  3. Sit to stand transfer with Contact Guard Assistance  4. Bed to chair transfer with Contact Guard Assistance using Rolling Walker  5. Gait  x 20 feet with Minimal Assistance using Rolling Walker.                          History:     Past Medical History:   Diagnosis Date    Actinic keratoses     Arthritis     COPD (chronic obstructive pulmonary disease)     Frequent UTI     GERD (gastroesophageal reflux disease)     High cholesterol     HTN (hypertension)        Past Surgical History:   Procedure Laterality Date    APPENDECTOMY      HYSTERECTOMY      pessary         Time Tracking:     PT Received On: 10/28/22  PT Start Time: 1330     PT Stop Time: 1420  PT Total Time (min): 50 min     Billable Minutes: Evaluation 50      10/28/2022

## 2022-10-28 NOTE — PLAN OF CARE
Problem: Physical Therapy  Goal: Physical Therapy Goal  Description: Goals to be met by: 1 week     Patient will increase functional independence with mobility by performin. Supine to sit with Contact Guard Assistance  2. Sit to supine with Contact Guard Assistance  3. Sit to stand transfer with Contact Guard Assistance  4. Bed to chair transfer with Contact Guard Assistance using Rolling Walker  5. Gait  x 20 feet with Minimal Assistance using Rolling Walker.     Outcome: Ongoing, Progressing

## 2022-10-28 NOTE — PLAN OF CARE
Problem: Adult Inpatient Plan of Care  Goal: Plan of Care Review  Outcome: Ongoing, Progressing  Goal: Patient-Specific Goal (Individualized)  Outcome: Ongoing, Progressing  Goal: Absence of Hospital-Acquired Illness or Injury  Outcome: Ongoing, Progressing  Goal: Optimal Comfort and Wellbeing  Outcome: Ongoing, Progressing  Goal: Readiness for Transition of Care  Outcome: Ongoing, Progressing     Problem: Fall Injury Risk  Goal: Absence of Fall and Fall-Related Injury  Outcome: Ongoing, Progressing     Problem: Skin Injury Risk Increased  Goal: Skin Health and Integrity  Outcome: Ongoing, Progressing     Problem: Electrolyte Imbalance  Goal: Electrolyte Balance  Outcome: Ongoing, Progressing     Problem: Adjustment to Illness COPD (Chronic Obstructive Pulmonary Disease)  Goal: Optimal Chronic Illness Coping  Outcome: Ongoing, Progressing     Problem: Functional Ability Impaired COPD (Chronic Obstructive Pulmonary Disease)  Goal: Optimal Level of Functional [place]  Outcome: Ongoing, Progressing     Problem: Infection COPD (Chronic Obstructive Pulmonary Disease)  Goal: Absence of Infection Signs and Symptoms  Outcome: Ongoing, Progressing     Problem: Oral Intake Inadequate COPD (Chronic Obstructive Pulmonary Disease)  Goal: Improved Nutrition Intake  Outcome: Ongoing, Progressing

## 2022-10-28 NOTE — PT/OT/SLP EVAL
Occupational Therapy   Evaluation    Name: Pilar Reardon  MRN: [id number]  Admitting Diagnosis:  Somnolence, weakness; pleural effusion  Recent Surgery: * No surgery found *      Recommendations:     Discharge Recommendations: home with home health  Discharge Equipment Recommendations:  walker, rolling  Barriers to discharge:       Assessment:     Pilar Reardon is a 89 y.o. female with a medical diagnosis of Somnolence, weakness.  She presents with decreased, balance, safety strength and mobility. Performance deficits affecting function: weakness, impaired endurance, impaired self care skills, impaired functional mobility, decreased upper extremity function, decreased lower extremity function, decreased safety awareness.  [facility] and was found to have lung pleural effusion. Pt was agreeable to tx and to participate in OT eval. Pt was I at PLOF with no use of a/e.    Rehab Prognosis: Fair; patient would benefit from acute skilled OT services to address these deficits and reach maximum level of function.       Plan:     Patient to be seen 5 x/week to address the above listed problems via self-care/home management, therapeutic activities, therapeutic exercises, neuromuscular re-education  Plan of Care Expires:    Plan of Care Reviewed with: patient    Subjective     Chief Complaint: decreased balance, strength and mobility  Patient/Family Comments/goals: increase to PLOF or Mod I    Occupational Profile:  Living Environment: Pt lives at home alone  Previous level of function: i  Equipment Used at Home:  walker, rolling  Assistance upon Discharge: TBD    Pain/Comfort:  Pain Rating 1: 0/10  Pain Rating Post-Intervention 1: 0/10    Patients cultural, spiritual, Congregation conflicts given the current situation:      Objective:     Communicated with: Pt prior to session.  Patient found supine with   upon OT entry to room.    General Precautions: Standard, fall   Orthopedic Precautions:     Braces:    Respiratory Status: Room air    Occupational Performance:    Bed Mobility:    Pt presented sitting sitting upright in w/c upon Ot's arrival.     Functional Mobility/Transfers:  Patient completed Sit <> Stand Transfer with minimum assistance  with  rolling walker   Patient completed Bed <> Chair Transfer using Step Transfer technique with minimum assistance with rolling walker  Functional Mobility: Pt completed a functional t/f from w/c to bed.    Activities of Daily Living:  Lower Body Dressing: maximal assistance with RW  Toileting: maximal assistance with RW    Cognitive/Visual Perceptual:  Cognitive/Psychosocial Skills:     -       Oriented to: Person, Place, Time, and Situation   -       Safety awareness/insight to disability: fair     Physical Exam:  Balance:    -       Fair minus  Upper Extremity Range of Motion:     -       Right Upper Extremity: 1/2 ROM  -       Left Upper Extremity: 1/2 ROM  Upper Extremity Strength:    -       Right Upper Extremity: 2/5  -       Left Upper Extremity: 2/5    AMPAC 6 Click ADL:  AMPAC Total Score: 14    Treatment & Education:  OT eval    Patient left supine with all lines intact and call button in reach    GOALS:   Multidisciplinary Problems       Occupational Therapy Goals          Problem: Occupational Therapy    Goal Priority Disciplines Outcome Interventions   Occupational Therapy Goal     OT, PT/OT Ongoing, Progressing    Description: Goals to be met by: 11/4/22    Patient will increase functional independence with ADLs by performing:    Feeding with Modified [place].  UE Dressing with Modified [place] and Set-up Assistance.  LE Dressing with Set-up Assistance.  Grooming while standing with Set-up Assistance.  Toileting from bedside commode with Set-up Assistance for hygiene and clothing management.   Toilet transfer to bedside commode with Stand-by Assistance.                         History:     Past Medical History:   Diagnosis Date    Actinic  keratoses     Arthritis     COPD (chronic obstructive pulmonary disease)     Frequent UTI     GERD (gastroesophageal reflux disease)     High cholesterol     HTN (hypertension)          Past Surgical History:   Procedure Laterality Date    APPENDECTOMY      HYSTERECTOMY      pessary         Time Tracking:     OT Date of Treatment:    OT Start Time:  4:00  OT Stop Time:  4:30  OT Total Time (min):      Billable Minutes:Evaluation 30    10/28/2022

## 2022-10-28 NOTE — PLAN OF CARE
Pt. Lovenox dose has been increased to 80 mg daily for diagnosis of Rt. Pulmonary embolus. Pt. Also started on Norvasc yesterday per recommendation pf Dr. Hernandes, Pulmonology. Pt. Is still receiving supplemental oxygen, nebs, IV fluids, and supportive care. Pt. Is very weak from being in hospital and may benefit from swing bed. Will discuss this with family. Pt. Is not ready for d/c home yet. Will cont. To follow pt.

## 2022-10-28 NOTE — PLAN OF CARE
Problem: Adult Inpatient Plan of Care  Goal: Plan of Care Review  Outcome: Ongoing, Progressing  Goal: Patient-Specific Goal (Individualized)  Outcome: Ongoing, Progressing  Goal: Absence of Hospital-Acquired Illness or Injury  Outcome: Ongoing, Progressing  Goal: Optimal Comfort and Wellbeing  Outcome: Ongoing, Progressing  Goal: Readiness for Transition of Care  Outcome: Ongoing, Progressing     Problem: Fall Injury Risk  Goal: Absence of Fall and Fall-Related Injury  Outcome: Ongoing, Progressing     Problem: Skin Injury Risk Increased  Goal: Skin Health and Integrity  Outcome: Ongoing, Progressing     Problem: Electrolyte Imbalance  Goal: Electrolyte Balance  Outcome: Ongoing, Progressing     Problem: Adjustment to Illness COPD (Chronic Obstructive Pulmonary Disease)  Goal: Optimal Chronic Illness Coping  Outcome: Ongoing, Progressing     Problem: Functional Ability Impaired COPD (Chronic Obstructive Pulmonary Disease)  Goal: Optimal Level of Functional [place]  Outcome: Ongoing, Progressing     Problem: Infection COPD (Chronic Obstructive Pulmonary Disease)  Goal: Absence of Infection Signs and Symptoms  Outcome: Ongoing, Progressing     Problem: Oral Intake Inadequate COPD (Chronic Obstructive Pulmonary Disease)  Goal: Improved Nutrition Intake  Outcome: Ongoing, Progressing     Problem: Respiratory Compromise COPD (Chronic Obstructive Pulmonary Disease)  Goal: Effective Oxygenation and Ventilation  Outcome: Ongoing, Progressing     Problem: UTI (Urinary Tract Infection)  Goal: Improved Infection Symptoms  Outcome: Ongoing, Progressing

## 2022-10-29 PROBLEM — I50.9 CHF (CONGESTIVE HEART FAILURE): Status: ACTIVE | Noted: 2022-10-29

## 2022-10-29 PROBLEM — I26.99 PULMONARY EMBOLISM: Status: ACTIVE | Noted: 2022-10-29

## 2022-10-29 LAB
ANION GAP SERPL CALCULATED.3IONS-SCNC: 9 MMOL/L (ref 7–16)
BASOPHILS # BLD AUTO: 0.02 K/UL (ref 0–0.2)
BASOPHILS NFR BLD AUTO: 0.2 % (ref 0–1)
BUN SERPL-MCNC: 12 MG/DL (ref 7–18)
BUN/CREAT SERPL: 21 (ref 6–20)
CALCIUM SERPL-MCNC: 8.1 MG/DL (ref 8.5–10.1)
CHLORIDE SERPL-SCNC: 103 MMOL/L (ref 98–107)
CO2 SERPL-SCNC: 34 MMOL/L (ref 21–32)
CREAT SERPL-MCNC: 0.57 MG/DL (ref 0.55–1.02)
EGFR (NO RACE VARIABLE) (RUSH/TITUS): 86 ML/MIN/1.73M²
EOSINOPHIL # BLD AUTO: 0.16 K/UL (ref 0–0.5)
EOSINOPHIL NFR BLD AUTO: 1.8 % (ref 1–4)
ERYTHROCYTE [DISTWIDTH] IN BLOOD BY AUTOMATED COUNT: 14.1 % (ref 11.5–14.5)
GLUCOSE SERPL-MCNC: 143 MG/DL (ref 74–106)
HCO3 UR-SCNC: 39.7 MMOL/L (ref 21–28)
HCT VFR BLD AUTO: 38.5 % (ref 38–47)
HGB BLD-MCNC: 11.4 G/DL (ref 12–16)
LYMPHOCYTES # BLD AUTO: 3.76 K/UL (ref 1–4.8)
LYMPHOCYTES NFR BLD AUTO: 42.2 % (ref 27–41)
MCH RBC QN AUTO: 30.1 PG (ref 27–31)
MCHC RBC AUTO-ENTMCNC: 29.6 G/DL (ref 32–36)
MCV RBC AUTO: 101.6 FL (ref 80–96)
MONOCYTES # BLD AUTO: 0.43 K/UL (ref 0–0.8)
MONOCYTES NFR BLD AUTO: 4.8 % (ref 2–6)
MPC BLD CALC-MCNC: 12.3 FL (ref 9.4–12.4)
NEUTROPHILS # BLD AUTO: 4.55 K/UL (ref 1.8–7.7)
NEUTROPHILS NFR BLD AUTO: 51 % (ref 53–65)
NT-PROBNP SERPL-MCNC: 1898 PG/ML (ref 1–450)
PCO2 BLDA: 72 MMHG (ref 35–48)
PH SMN: 7.35 [PH] (ref 7.35–7.45)
PLATELET # BLD AUTO: 130 K/UL (ref 150–400)
PO2 BLDA: 50 MMHG (ref 83–108)
POC BASE EXCESS: 11.3 MMOL/L (ref -2–3)
POC CO2: 41.9 MMOL/L
POC SATURATED O2: 83 %
POTASSIUM SERPL-SCNC: 3.9 MMOL/L (ref 3.5–5.1)
RBC # BLD AUTO: 3.79 M/UL (ref 4.2–5.4)
SODIUM SERPL-SCNC: 142 MMOL/L (ref 136–145)
WBC # BLD AUTO: 8.92 K/UL (ref 4.5–11)

## 2022-10-29 PROCEDURE — 83880 ASSAY OF NATRIURETIC PEPTIDE: CPT | Performed by: FAMILY MEDICINE

## 2022-10-29 PROCEDURE — 63600175 PHARM REV CODE 636 W HCPCS: Performed by: PHYSICIAN ASSISTANT

## 2022-10-29 PROCEDURE — 25000003 PHARM REV CODE 250: Performed by: REGISTERED NURSE

## 2022-10-29 PROCEDURE — 25000003 PHARM REV CODE 250: Performed by: NURSE PRACTITIONER

## 2022-10-29 PROCEDURE — 36415 COLL VENOUS BLD VENIPUNCTURE: CPT | Performed by: FAMILY MEDICINE

## 2022-10-29 PROCEDURE — 11000001 HC ACUTE MED/SURG PRIVATE ROOM

## 2022-10-29 PROCEDURE — 27000941

## 2022-10-29 PROCEDURE — 63600175 PHARM REV CODE 636 W HCPCS: Performed by: FAMILY MEDICINE

## 2022-10-29 PROCEDURE — 80048 BASIC METABOLIC PNL TOTAL CA: CPT | Performed by: PHYSICIAN ASSISTANT

## 2022-10-29 PROCEDURE — 27000221 HC OXYGEN, UP TO 24 HOURS

## 2022-10-29 PROCEDURE — 94640 AIRWAY INHALATION TREATMENT: CPT

## 2022-10-29 PROCEDURE — 25000242 PHARM REV CODE 250 ALT 637 W/ HCPCS: Mod: GY | Performed by: REGISTERED NURSE

## 2022-10-29 PROCEDURE — 25000003 PHARM REV CODE 250: Performed by: FAMILY MEDICINE

## 2022-10-29 PROCEDURE — 63600175 PHARM REV CODE 636 W HCPCS: Performed by: NURSE PRACTITIONER

## 2022-10-29 PROCEDURE — 85025 COMPLETE CBC W/AUTO DIFF WBC: CPT | Performed by: PHYSICIAN ASSISTANT

## 2022-10-29 PROCEDURE — 99232 SBSQ HOSP IP/OBS MODERATE 35: CPT | Mod: ,,, | Performed by: FAMILY MEDICINE

## 2022-10-29 PROCEDURE — 94761 N-INVAS EAR/PLS OXIMETRY MLT: CPT

## 2022-10-29 PROCEDURE — 36415 COLL VENOUS BLD VENIPUNCTURE: CPT | Performed by: PHYSICIAN ASSISTANT

## 2022-10-29 PROCEDURE — 99232 PR SUBSEQUENT HOSPITAL CARE,LEVL II: ICD-10-PCS | Mod: ,,, | Performed by: FAMILY MEDICINE

## 2022-10-29 PROCEDURE — A4216 STERILE WATER/SALINE, 10 ML: HCPCS | Performed by: FAMILY MEDICINE

## 2022-10-29 RX ORDER — MECLIZINE HCL 12.5 MG 12.5 MG/1
12.5 TABLET ORAL 3 TIMES DAILY PRN
Status: DISCONTINUED | OUTPATIENT
Start: 2022-10-29 | End: 2022-10-31 | Stop reason: HOSPADM

## 2022-10-29 RX ORDER — FUROSEMIDE 10 MG/ML
40 INJECTION INTRAMUSCULAR; INTRAVENOUS
Status: COMPLETED | OUTPATIENT
Start: 2022-10-29 | End: 2022-10-29

## 2022-10-29 RX ORDER — FUROSEMIDE 10 MG/ML
40 INJECTION INTRAMUSCULAR; INTRAVENOUS ONCE
Status: COMPLETED | OUTPATIENT
Start: 2022-10-29 | End: 2022-10-29

## 2022-10-29 RX ADMIN — PRIMIDONE 100 MG: 50 TABLET ORAL at 09:10

## 2022-10-29 RX ADMIN — CHOLECALCIFEROL TAB 125 MCG (5000 UNIT) 5000 UNITS: 125 TAB at 09:10

## 2022-10-29 RX ADMIN — FUROSEMIDE 40 MG: 10 INJECTION, SOLUTION INTRAMUSCULAR; INTRAVENOUS at 05:10

## 2022-10-29 RX ADMIN — DOXAZOSIN 2 MG: 1 TABLET ORAL at 09:10

## 2022-10-29 RX ADMIN — PANTOPRAZOLE SODIUM 40 MG: 40 TABLET, DELAYED RELEASE ORAL at 09:10

## 2022-10-29 RX ADMIN — LOSARTAN POTASSIUM 50 MG: 50 TABLET, FILM COATED ORAL at 09:10

## 2022-10-29 RX ADMIN — ACETAZOLAMIDE 250 MG: 250 TABLET ORAL at 09:10

## 2022-10-29 RX ADMIN — SODIUM CHLORIDE, PRESERVATIVE FREE 10 ML: 5 INJECTION INTRAVENOUS at 02:10

## 2022-10-29 RX ADMIN — AMLODIPINE BESYLATE 2.5 MG: 2.5 TABLET ORAL at 09:10

## 2022-10-29 RX ADMIN — ENOXAPARIN SODIUM 80 MG: 100 INJECTION SUBCUTANEOUS at 05:10

## 2022-10-29 RX ADMIN — MONTELUKAST 10 MG: 10 TABLET, FILM COATED ORAL at 09:10

## 2022-10-29 RX ADMIN — IPRATROPIUM BROMIDE AND ALBUTEROL SULFATE 3 ML: 2.5; .5 SOLUTION RESPIRATORY (INHALATION) at 08:10

## 2022-10-29 RX ADMIN — ROPINIROLE HYDROCHLORIDE 0.5 MG: 0.25 TABLET, FILM COATED ORAL at 09:10

## 2022-10-29 RX ADMIN — SODIUM CHLORIDE, PRESERVATIVE FREE 10 ML: 5 INJECTION INTRAVENOUS at 05:10

## 2022-10-29 RX ADMIN — NITROFURANTOIN MONOHYDRATE/MACROCRYSTALS 100 MG: 75; 25 CAPSULE ORAL at 09:10

## 2022-10-29 RX ADMIN — GABAPENTIN 100 MG: 100 CAPSULE ORAL at 09:10

## 2022-10-29 RX ADMIN — MECLIZINE 12.5 MG: 12.5 TABLET ORAL at 09:10

## 2022-10-29 RX ADMIN — LEVOTHYROXINE SODIUM 50 MCG: 0.05 TABLET ORAL at 09:10

## 2022-10-29 RX ADMIN — SERTRALINE HYDROCHLORIDE 25 MG: 25 TABLET ORAL at 09:10

## 2022-10-29 RX ADMIN — SODIUM CHLORIDE, PRESERVATIVE FREE 10 ML: 5 INJECTION INTRAVENOUS at 09:10

## 2022-10-29 RX ADMIN — FUROSEMIDE 40 MG: 10 INJECTION, SOLUTION INTRAMUSCULAR; INTRAVENOUS at 02:10

## 2022-10-29 RX ADMIN — TRAVOPROST OPHTHALMIC SOLUTION 1 DROP: 0.04 SOLUTION OPHTHALMIC at 09:10

## 2022-10-29 RX ADMIN — IPRATROPIUM BROMIDE AND ALBUTEROL SULFATE 3 ML: 2.5; .5 SOLUTION RESPIRATORY (INHALATION) at 04:10

## 2022-10-29 RX ADMIN — ASPIRIN 81 MG: 81 TABLET, COATED ORAL at 09:10

## 2022-10-29 RX ADMIN — ATORVASTATIN CALCIUM 40 MG: 40 TABLET, FILM COATED ORAL at 09:10

## 2022-10-29 RX ADMIN — MELOXICAM 7.5 MG: 7.5 TABLET ORAL at 09:10

## 2022-10-29 NOTE — RESPIRATORY THERAPY
Called to pt bedside, pt short of breath with audible wheezing. Started neb tx; increased pt oxygen and continued to monitor the pt. When questioned, Pt stated she felt a little better during the tx.  Still presented with labored breathing with use of abdominal muscles. Notified RN and provider of pt condition. Continued to increased oxygen to meet pt's needs. Placed pt on a venti mask to help stabilize sats and pt comfort. Stayed at bedside and continued to monitor pt to be sure sats were maintained.

## 2022-10-29 NOTE — RESPIRATORY THERAPY
Upon entering room pt Venti Mask was being placed back on by family member. Family at bedside stated pt just finished eating and they had just placed O2 back on pt. PO check done. O2 sat 80% on monitor. Venti Mask 40% 12L in place. Encouraged pt to take a few deep breaths through her nose. O2 sats increased to 96%. O2 sats remained 96% after 5 minutes. Placed pt on NC at 3L and O2 sat remained 98%. Will continue to monitor.

## 2022-10-29 NOTE — PROGRESS NOTES
[facility] - Medical Surgical Unit  Hospital Medicine  Progress Note    Patient Name: Pilar Reardon  MRN: [id number]  Patient Class: IP- Inpatient   Admission Date: 10/24/2022  Length of Stay: 3 days  Attending Physician: Anmol Reese DO  Primary Care Provider: Marilee Witt MD        Subjective:     Principal Problem:Pulmonary embolism        HPI:  Patient admitted in observation because of a sudden onset of mental status change.  This has been ongoing for almost 2 weeks she has seen multiple physicians and [facility].  She has never had a sleep study.  She seems somnolent the majority of the time she is alert orient x3.  In reviewing her case I feel that hospitalization for observation to watch in going over her mental status changes would benefit her.  Ill also found that her CO2 count was elevated to 37.  She seems to be retaining CO2.  We may try a BiPAP with the settings of 10/5 this evening or tonight.  In see if it helps her.  The patient has no fever and her urinalysis seems to be normal.  She has history of CML or leukemia.  This is followed by Oncology.  She has had no recent syncopal episodes or strokes.  She has been fully evaluated by Cardiology.      Overview/Hospital Course:  Progress note 10/26/2022    .  At the present time the patient seems to have an elevated CO2.  This may be causing some her somnolence.  We tried a BiPAP last night with the settings of 10 in 5.  Will be converting her to a acute bed status from an observation status to work with her hypoxia and her BiPAP settings and her ability to wear the mask and the machine during the night because of feel like it would help her overall mental status he began drop her CO2 levels.  The patient did tolerate the mask up until around 12:00 p.m..  Were still working through the settings and making sure they are okay for her.  There is a possibility that she has recurrent urinary tract infection she is on MicroBid now  will be checking a urinalysis today also and switch her to acute bed status    10/27/2022.  The patient was found to have right lower lobe pulmonary embolus on her CT scan.  Repeat ABGs were done to still see a retain CO2 count she also has an elevated CO2 count on her be MP.  Discussed the case with Dr. Hernandes.  At [facility].  Will continue the present course of therapy.  And start Norvasc 2.51 p.o. daily in conjunction with continuing the Lovenox as an anticoagulant.  And Diamox.    10/28/2022.  Patient still in somewhat somnolent state.  Being treated for of pulmonary embolus and also for retaining CO2 to cause altered mental status.  Continuing current therapy to observe to see hive much better the patient does tomorrow    10/29/22 - Patient did require oxygen increase from nasal canula to venti mask 12l 40% during the night. She also was found to have a BNP of almost 1900. Her fluids were Dc'd and she was given a 1 time dose of Lasix 40 mg. This morning, she is much better. She is awake, alert, and oriented sitting in her chair eating breakfast. Her breathing without the mask on is not labored. She is able to speak in full sentences. Her lungs have a few scattered crackles at the base of her lungs without any wheezing. She reports she feels dizzy. Her granddaughter, whom is a family practice physician, reports she has trouble with wax build up in her ears and has seen ENT to have it removed several times. Family requested a low dose of Meclizine. Will add that today and repeat Lasix this evening. We will try to wean pt off the venti mask and back to NC throughout the day. We will repeat labs in the morning.Discussed the case with Dr. Ramachandran. He agreed with plan of care.       Interval History:     Review of Systems   Constitutional: Negative.    HENT: Negative.     Eyes: Negative.    Respiratory:  Positive for shortness of breath.    Cardiovascular: Negative.  Negative for chest pain, palpitations and  leg swelling.   Gastrointestinal: Negative.  Negative for diarrhea, nausea and vomiting.   Endocrine: Negative.    Genitourinary: Negative.    Musculoskeletal: Negative.  Negative for arthralgias, back pain and neck pain.   Skin: Negative.    Neurological:  Positive for dizziness.   Psychiatric/Behavioral: Negative.     All other systems reviewed and are negative.  Objective:     Vital Signs (Most Recent):  Temp: 98 °F (36.7 °C) (10/29/22 0400)  Pulse: 72 (10/29/22 0851)  Resp: (!) 22 (10/29/22 0748)  BP: (!) 187/97 (10/29/22 0400)  SpO2: 98 % (10/29/22 0851)   Vital Signs (24h Range):  Temp:  [97.9 °F (36.6 °C)-98.2 °F (36.8 °C)] 98 °F (36.7 °C)  Pulse:  [72-86] 72  Resp:  [18-28] 22  SpO2:  [84 %-98 %] 98 %  BP: (145-187)/(71-97) 187/97     Weight: 68 kg (150 lb)  Body mass index is 27.44 kg/m².    Intake/Output Summary (Last 24 hours) at 10/29/2022 0912  Last data filed at 10/28/2022 1800  Gross per 24 hour   Intake 360 ml   Output --   Net 360 ml      Physical Exam  Vitals and nursing note reviewed. Exam conducted with a chaperone present.   Constitutional:       Appearance: Normal appearance.      Comments: Eldery, frail   HENT:      Head: Normocephalic and atraumatic.      Nose: Nose normal.      Mouth/Throat:      Mouth: Mucous membranes are moist.      Pharynx: Oropharynx is clear.   Eyes:      Extraocular Movements: Extraocular movements intact.      Pupils: Pupils are equal, round, and reactive to light.   Cardiovascular:      Rate and Rhythm: Normal rate and regular rhythm.      Pulses: Normal pulses.   Pulmonary:      Effort: Pulmonary effort is normal.      Breath sounds: Rales (scattered to bilateral lower lobes) present. No wheezing or rhonchi.   Abdominal:      General: Bowel sounds are normal. There is no distension.      Palpations: Abdomen is soft.      Tenderness: There is no abdominal tenderness. There is no guarding.   Musculoskeletal:         General: No swelling or tenderness. Normal range of  motion.      Cervical back: Normal range of motion.      Right lower leg: No edema.      Left lower leg: No edema.   Skin:     General: Skin is warm and dry.   Neurological:      General: No focal deficit present.      Mental Status: She is alert and oriented to person, place, and time. Mental status is at baseline.   Psychiatric:         Mood and Affect: Mood normal.       Significant Labs: All pertinent labs within the past 24 hours have been reviewed.    Significant Imaging: I have reviewed all pertinent imaging results/findings within the past 24 hours.      Assessment/Plan:      No notes have been filed under this hospital service.  Service: Hospital Medicine    VTE Risk Mitigation (From admission, onward)         Ordered     enoxaparin injection 80 mg  Every 24 hours (non-standard times)         10/28/22 0829     IP VTE HIGH RISK PATIENT  Once         10/24/22 1323     Place sequential compression device  Until discontinued         10/24/22 1323                Discharge Planning   JERRY:      Code Status: DNR   Is the patient medically ready for discharge?:     Reason for patient still in hospital (select all that apply): Treatment  Discharge Plan A: Home with family   Discharge Delays: None known at this time              BLANCO Pacheco  Department of Hospital Medicine   [facility] - Medical Surgical Unit

## 2022-10-29 NOTE — PLAN OF CARE
Problem: Adult Inpatient Plan of Care  Goal: Absence of Hospital-Acquired Illness or Injury  Intervention: Identify and Manage Fall Risk  Flowsheets (Taken 10/29/2022 0212)  Safety Promotion/Fall Prevention:   assistive device/personal item within reach   bed alarm set   family to remain at bedside   nonskid shoes/socks when out of bed   side rails raised x 3   instructed to call staff for mobility  Intervention: Prevent Skin Injury  Flowsheets (Taken 10/29/2022 0212)  Body Position: position changed independently  Skin Protection: incontinence pads utilized  Intervention: Prevent and Manage VTE (Venous Thromboembolism) Risk  Flowsheets (Taken 10/29/2022 0212)  Activity Management: Rolling - L1  VTE Prevention/Management: fluids promoted  Range of Motion: active ROM (range of motion) encouraged  Intervention: Prevent Infection  Flowsheets (Taken 10/29/2022 0212)  Infection Prevention: hand hygiene promoted     Problem: Fall Injury Risk  Goal: Absence of Fall and Fall-Related Injury  Intervention: Identify and Manage Contributors  Flowsheets (Taken 10/29/2022 0212)  Self-Care Promotion: independence encouraged  Medication Review/Management: medications reviewed  Intervention: Promote Injury-Free Environment  Flowsheets (Taken 10/29/2022 0212)  Safety Promotion/Fall Prevention:   assistive device/personal item within reach   bed alarm set   family to remain at bedside   nonskid shoes/socks when out of bed   side rails raised x 3   instructed to call staff for mobility

## 2022-10-29 NOTE — NURSING
T/C to provider. Informed of patient's SOB and low sats after getting up to go to bathroom. Not recovering well. Respiratory therapy giving breathing treatment at present. Family requests proBNP. Provider states will order.

## 2022-10-29 NOTE — RESPIRATORY THERAPY
Spoke with pt and family member while giving neb tx. Explained what I was hearing, and stated I would notify the nurse as well. RN was notified crackles in the lungs. Looked at pt's most current chest x-ray with shows pulmonary edema.

## 2022-10-29 NOTE — SUBJECTIVE & OBJECTIVE
Interval History:     Review of Systems   Constitutional: Negative.    HENT: Negative.     Eyes: Negative.    Respiratory:  Positive for shortness of breath.    Cardiovascular: Negative.  Negative for chest pain, palpitations and leg swelling.   Gastrointestinal: Negative.  Negative for diarrhea, nausea and vomiting.   Endocrine: Negative.    Genitourinary: Negative.    Musculoskeletal: Negative.  Negative for arthralgias, back pain and neck pain.   Skin: Negative.    Neurological:  Positive for dizziness.   Psychiatric/Behavioral: Negative.     All other systems reviewed and are negative.  Objective:     Vital Signs (Most Recent):  Temp: 98 °F (36.7 °C) (10/29/22 0400)  Pulse: 72 (10/29/22 0851)  Resp: (!) 22 (10/29/22 0748)  BP: (!) 187/97 (10/29/22 0400)  SpO2: 98 % (10/29/22 0851)   Vital Signs (24h Range):  Temp:  [97.9 °F (36.6 °C)-98.2 °F (36.8 °C)] 98 °F (36.7 °C)  Pulse:  [72-86] 72  Resp:  [18-28] 22  SpO2:  [84 %-98 %] 98 %  BP: (145-187)/(71-97) 187/97     Weight: 68 kg (150 lb)  Body mass index is 27.44 kg/m².    Intake/Output Summary (Last 24 hours) at 10/29/2022 0912  Last data filed at 10/28/2022 1800  Gross per 24 hour   Intake 360 ml   Output --   Net 360 ml      Physical Exam  Vitals and nursing note reviewed. Exam conducted with a chaperone present.   Constitutional:       Appearance: Normal appearance.      Comments: Eldery, frail   HENT:      Head: Normocephalic and atraumatic.      Nose: Nose normal.      Mouth/Throat:      Mouth: Mucous membranes are moist.      Pharynx: Oropharynx is clear.   Eyes:      Extraocular Movements: Extraocular movements intact.      Pupils: Pupils are equal, round, and reactive to light.   Cardiovascular:      Rate and Rhythm: Normal rate and regular rhythm.      Pulses: Normal pulses.   Pulmonary:      Effort: Pulmonary effort is normal.      Breath sounds: Rales (scattered to bilateral lower lobes) present. No wheezing or rhonchi.   Abdominal:      General:  Bowel sounds are normal. There is no distension.      Palpations: Abdomen is soft.      Tenderness: There is no abdominal tenderness. There is no guarding.   Musculoskeletal:         General: No swelling or tenderness. Normal range of motion.      Cervical back: Normal range of motion.      Right lower leg: No edema.      Left lower leg: No edema.   Skin:     General: Skin is warm and dry.   Neurological:      General: No focal deficit present.      Mental Status: She is alert and oriented to person, place, and time. Mental status is at baseline.   Psychiatric:         Mood and Affect: Mood normal.       Significant Labs: All pertinent labs within the past 24 hours have been reviewed.    Significant Imaging: I have reviewed all pertinent imaging results/findings within the past 24 hours.

## 2022-10-30 PROBLEM — Z79.899 DVT PROPHYLAXIS: Status: ACTIVE | Noted: 2022-10-30

## 2022-10-30 PROBLEM — M25.512 PAIN IN JOINT OF LEFT SHOULDER: Status: ACTIVE | Noted: 2022-10-30

## 2022-10-30 PROBLEM — E87.6 HYPOKALEMIA: Status: ACTIVE | Noted: 2022-10-30

## 2022-10-30 PROBLEM — K59.00 CONSTIPATION: Status: ACTIVE | Noted: 2022-10-30

## 2022-10-30 PROBLEM — R09.02 HYPOXIA: Status: ACTIVE | Noted: 2022-10-30

## 2022-10-30 LAB
ANION GAP SERPL CALCULATED.3IONS-SCNC: 1 MMOL/L (ref 7–16)
BASOPHILS # BLD AUTO: 0.01 K/UL (ref 0–0.2)
BASOPHILS NFR BLD AUTO: 0.2 % (ref 0–1)
BUN SERPL-MCNC: 18 MG/DL (ref 7–18)
BUN/CREAT SERPL: 21 (ref 6–20)
CALCIUM SERPL-MCNC: 8 MG/DL (ref 8.5–10.1)
CHLORIDE SERPL-SCNC: 102 MMOL/L (ref 98–107)
CO2 SERPL-SCNC: 43 MMOL/L (ref 21–32)
CREAT SERPL-MCNC: 0.84 MG/DL (ref 0.55–1.02)
DIFFERENTIAL METHOD BLD: ABNORMAL
EGFR (NO RACE VARIABLE) (RUSH/TITUS): 66 ML/MIN/1.73M²
EOSINOPHIL # BLD AUTO: 0.22 K/UL (ref 0–0.5)
EOSINOPHIL NFR BLD AUTO: 3.3 % (ref 1–4)
ERYTHROCYTE [DISTWIDTH] IN BLOOD BY AUTOMATED COUNT: 13.8 % (ref 11.5–14.5)
GLUCOSE SERPL-MCNC: 115 MG/DL (ref 74–106)
HCO3 UR-SCNC: 42 MMOL/L (ref 21–28)
HCT VFR BLD AUTO: 36.3 % (ref 38–47)
HGB BLD-MCNC: 10.7 G/DL (ref 12–16)
LYMPHOCYTES # BLD AUTO: 3.21 K/UL (ref 1–4.8)
LYMPHOCYTES NFR BLD AUTO: 48.4 % (ref 27–41)
MCH RBC QN AUTO: 30.1 PG (ref 27–31)
MCHC RBC AUTO-ENTMCNC: 29.5 G/DL (ref 32–36)
MCV RBC AUTO: 102 FL (ref 80–96)
MONOCYTES # BLD AUTO: 0.29 K/UL (ref 0–0.8)
MONOCYTES NFR BLD AUTO: 4.4 % (ref 2–6)
MPC BLD CALC-MCNC: 11.2 FL (ref 9.4–12.4)
NEUTROPHILS # BLD AUTO: 2.9 K/UL (ref 1.8–7.7)
NEUTROPHILS NFR BLD AUTO: 43.7 % (ref 53–65)
NT-PROBNP SERPL-MCNC: 1611 PG/ML (ref 1–450)
PCO2 BLDA: 76 MMHG (ref 35–48)
PH SMN: 7.35 [PH] (ref 7.35–7.45)
PLATELET # BLD AUTO: 134 K/UL (ref 150–400)
PO2 BLDA: 43 MMHG (ref 83–108)
POC BASE EXCESS: 13.2 MMOL/L (ref -2–3)
POC CO2: 44.3 MMOL/L
POC SATURATED O2: 76 %
POTASSIUM SERPL-SCNC: 3.4 MMOL/L (ref 3.5–5.1)
RBC # BLD AUTO: 3.56 M/UL (ref 4.2–5.4)
SODIUM SERPL-SCNC: 143 MMOL/L (ref 136–145)
WBC # BLD AUTO: 6.63 K/UL (ref 4.5–11)

## 2022-10-30 PROCEDURE — 27000221 HC OXYGEN, UP TO 24 HOURS

## 2022-10-30 PROCEDURE — 94640 AIRWAY INHALATION TREATMENT: CPT

## 2022-10-30 PROCEDURE — A4216 STERILE WATER/SALINE, 10 ML: HCPCS | Performed by: FAMILY MEDICINE

## 2022-10-30 PROCEDURE — 27000941

## 2022-10-30 PROCEDURE — 85025 COMPLETE CBC W/AUTO DIFF WBC: CPT | Performed by: NURSE PRACTITIONER

## 2022-10-30 PROCEDURE — 99900031 HC PATIENT EDUCATION (STAT)

## 2022-10-30 PROCEDURE — 83880 ASSAY OF NATRIURETIC PEPTIDE: CPT | Performed by: NURSE PRACTITIONER

## 2022-10-30 PROCEDURE — 99232 SBSQ HOSP IP/OBS MODERATE 35: CPT | Mod: ,,, | Performed by: FAMILY MEDICINE

## 2022-10-30 PROCEDURE — 25000242 PHARM REV CODE 250 ALT 637 W/ HCPCS: Mod: GY | Performed by: REGISTERED NURSE

## 2022-10-30 PROCEDURE — 94660 CPAP INITIATION&MGMT: CPT

## 2022-10-30 PROCEDURE — 99232 PR SUBSEQUENT HOSPITAL CARE,LEVL II: ICD-10-PCS | Mod: ,,, | Performed by: FAMILY MEDICINE

## 2022-10-30 PROCEDURE — 63600175 PHARM REV CODE 636 W HCPCS: Performed by: FAMILY MEDICINE

## 2022-10-30 PROCEDURE — 94761 N-INVAS EAR/PLS OXIMETRY MLT: CPT

## 2022-10-30 PROCEDURE — 25000003 PHARM REV CODE 250: Performed by: REGISTERED NURSE

## 2022-10-30 PROCEDURE — 80048 BASIC METABOLIC PNL TOTAL CA: CPT | Performed by: NURSE PRACTITIONER

## 2022-10-30 PROCEDURE — 27000190 HC CPAP FULL FACE MASK W/VALVE

## 2022-10-30 PROCEDURE — 25000003 PHARM REV CODE 250: Performed by: FAMILY MEDICINE

## 2022-10-30 PROCEDURE — 99900035 HC TECH TIME PER 15 MIN (STAT)

## 2022-10-30 PROCEDURE — 11000001 HC ACUTE MED/SURG PRIVATE ROOM

## 2022-10-30 PROCEDURE — 25000003 PHARM REV CODE 250: Performed by: NURSE PRACTITIONER

## 2022-10-30 PROCEDURE — 36415 COLL VENOUS BLD VENIPUNCTURE: CPT | Performed by: NURSE PRACTITIONER

## 2022-10-30 RX ORDER — DOCUSATE SODIUM 100 MG/1
100 CAPSULE, LIQUID FILLED ORAL 2 TIMES DAILY
Status: DISCONTINUED | OUTPATIENT
Start: 2022-10-30 | End: 2022-10-31 | Stop reason: HOSPADM

## 2022-10-30 RX ORDER — POLYETHYLENE GLYCOL 3350 17 G/17G
17 POWDER, FOR SOLUTION ORAL 2 TIMES DAILY PRN
Status: DISCONTINUED | OUTPATIENT
Start: 2022-10-30 | End: 2022-10-31 | Stop reason: HOSPADM

## 2022-10-30 RX ADMIN — PRIMIDONE 100 MG: 50 TABLET ORAL at 09:10

## 2022-10-30 RX ADMIN — ACETAZOLAMIDE 250 MG: 250 TABLET ORAL at 09:10

## 2022-10-30 RX ADMIN — DOCUSATE SODIUM 100 MG: 100 CAPSULE, LIQUID FILLED ORAL at 09:10

## 2022-10-30 RX ADMIN — SODIUM CHLORIDE, PRESERVATIVE FREE 10 ML: 5 INJECTION INTRAVENOUS at 09:10

## 2022-10-30 RX ADMIN — CHOLECALCIFEROL TAB 125 MCG (5000 UNIT) 5000 UNITS: 125 TAB at 09:10

## 2022-10-30 RX ADMIN — GABAPENTIN 100 MG: 100 CAPSULE ORAL at 09:10

## 2022-10-30 RX ADMIN — DOXAZOSIN 2 MG: 1 TABLET ORAL at 09:10

## 2022-10-30 RX ADMIN — LOSARTAN POTASSIUM 50 MG: 50 TABLET, FILM COATED ORAL at 09:10

## 2022-10-30 RX ADMIN — ATORVASTATIN CALCIUM 40 MG: 40 TABLET, FILM COATED ORAL at 09:10

## 2022-10-30 RX ADMIN — ENOXAPARIN SODIUM 80 MG: 100 INJECTION SUBCUTANEOUS at 04:10

## 2022-10-30 RX ADMIN — PANTOPRAZOLE SODIUM 40 MG: 40 TABLET, DELAYED RELEASE ORAL at 09:10

## 2022-10-30 RX ADMIN — IPRATROPIUM BROMIDE AND ALBUTEROL SULFATE 3 ML: 2.5; .5 SOLUTION RESPIRATORY (INHALATION) at 02:10

## 2022-10-30 RX ADMIN — IPRATROPIUM BROMIDE AND ALBUTEROL SULFATE 3 ML: 2.5; .5 SOLUTION RESPIRATORY (INHALATION) at 08:10

## 2022-10-30 RX ADMIN — MONTELUKAST 10 MG: 10 TABLET, FILM COATED ORAL at 09:10

## 2022-10-30 RX ADMIN — SODIUM CHLORIDE, PRESERVATIVE FREE 10 ML: 5 INJECTION INTRAVENOUS at 04:10

## 2022-10-30 RX ADMIN — NITROFURANTOIN MONOHYDRATE/MACROCRYSTALS 100 MG: 75; 25 CAPSULE ORAL at 09:10

## 2022-10-30 RX ADMIN — SODIUM CHLORIDE, PRESERVATIVE FREE 10 ML: 5 INJECTION INTRAVENOUS at 06:10

## 2022-10-30 RX ADMIN — MELOXICAM 7.5 MG: 7.5 TABLET ORAL at 09:10

## 2022-10-30 RX ADMIN — ASPIRIN 81 MG: 81 TABLET, COATED ORAL at 09:10

## 2022-10-30 RX ADMIN — ACETAMINOPHEN 325 MG: 325 TABLET ORAL at 09:10

## 2022-10-30 RX ADMIN — TRAVOPROST OPHTHALMIC SOLUTION 1 DROP: 0.04 SOLUTION OPHTHALMIC at 09:10

## 2022-10-30 RX ADMIN — SERTRALINE HYDROCHLORIDE 25 MG: 25 TABLET ORAL at 09:10

## 2022-10-30 RX ADMIN — ROPINIROLE HYDROCHLORIDE 0.5 MG: 0.25 TABLET, FILM COATED ORAL at 09:10

## 2022-10-30 RX ADMIN — AMLODIPINE BESYLATE 2.5 MG: 2.5 TABLET ORAL at 09:10

## 2022-10-30 NOTE — PLAN OF CARE
Problem: Adult Inpatient Plan of Care  Goal: Plan of Care Review  Outcome: Ongoing, Progressing  Goal: Patient-Specific Goal (Individualized)  Outcome: Ongoing, Progressing  Goal: Absence of Hospital-Acquired Illness or Injury  Outcome: Ongoing, Progressing  Goal: Optimal Comfort and Wellbeing  Outcome: Ongoing, Progressing  Goal: Readiness for Transition of Care  Outcome: Ongoing, Progressing     Problem: Fall Injury Risk  Goal: Absence of Fall and Fall-Related Injury  Outcome: Ongoing, Progressing     Problem: Skin Injury Risk Increased  Goal: Skin Health and Integrity  Outcome: Ongoing, Progressing     Problem: Electrolyte Imbalance  Goal: Electrolyte Balance  Outcome: Ongoing, Progressing     Problem: Adjustment to Illness COPD (Chronic Obstructive Pulmonary Disease)  Goal: Optimal Chronic Illness Coping  Outcome: Ongoing, Progressing     Problem: Functional Ability Impaired COPD (Chronic Obstructive Pulmonary Disease)  Goal: Optimal Level of Functional [place]  Outcome: Ongoing, Progressing     Problem: Infection COPD (Chronic Obstructive Pulmonary Disease)  Goal: Absence of Infection Signs and Symptoms  Outcome: Ongoing, Progressing     Problem: Oral Intake Inadequate COPD (Chronic Obstructive Pulmonary Disease)  Goal: Improved Nutrition Intake  Outcome: Ongoing, Progressing     Problem: Respiratory Compromise COPD (Chronic Obstructive Pulmonary Disease)  Goal: Effective Oxygenation and Ventilation  Outcome: Ongoing, Progressing     Problem: UTI (Urinary Tract Infection)  Goal: Improved Infection Symptoms  Outcome: Ongoing, Progressing

## 2022-10-30 NOTE — SUBJECTIVE & OBJECTIVE
Interval History:  Patient seen and examined. She had a drop in sats last night likely from inadvertently removing oxygen. She laso reports chronic left shoulder pain and constipation without abd discomfort. Will add stool softener and Miralax PRN. Will place on C-pap HS. Continue current POC.     Review of Systems   Constitutional:  Positive for activity change, appetite change and fatigue. Negative for chills and diaphoresis.   Respiratory:  Positive for cough and shortness of breath. Negative for chest tightness and wheezing.    Cardiovascular:  Negative for chest pain and palpitations.   Gastrointestinal:  Positive for constipation. Negative for abdominal pain, diarrhea, nausea and vomiting.   Genitourinary:  Negative for difficulty urinating, dysuria and frequency.   Musculoskeletal:  Positive for arthralgias. Negative for neck pain and neck stiffness.   Skin:  Negative for rash.   Neurological:  Positive for weakness (generalized). Negative for dizziness and light-headedness.   Psychiatric/Behavioral:  Negative for confusion. The patient is not nervous/anxious.    All other systems reviewed and are negative.  Objective:     Vital Signs (Most Recent):  Temp: 98 °F (36.7 °C) (10/30/22 0808)  Pulse: 76 (10/30/22 0808)  Resp: 16 (10/30/22 0808)  BP: (!) 161/73 (10/30/22 0808)  SpO2: 96 % (10/30/22 0808)   Vital Signs (24h Range):  Temp:  [97.8 °F (36.6 °C)-99 °F (37.2 °C)] 98 °F (36.7 °C)  Pulse:  [70-88] 76  Resp:  [16-28] 16  SpO2:  [86 %-100 %] 96 %  BP: (127-161)/(58-73) 161/73     Weight: 68 kg (150 lb)  Body mass index is 27.44 kg/m².    Intake/Output Summary (Last 24 hours) at 10/30/2022 1211  Last data filed at 10/30/2022 0950  Gross per 24 hour   Intake 480 ml   Output --   Net 480 ml      Physical Exam  Vitals and nursing note reviewed.   Constitutional:       Appearance: She is ill-appearing.   HENT:      Head: Normocephalic.      Nose: Nose normal.      Mouth/Throat:      Mouth: Mucous membranes are  dry.      Pharynx: Oropharynx is clear.   Eyes:      General: No scleral icterus.     Pupils: Pupils are equal, round, and reactive to light.   Cardiovascular:      Rate and Rhythm: Normal rate and regular rhythm.      Pulses: Normal pulses.      Heart sounds: Normal heart sounds.   Pulmonary:      Effort: Pulmonary effort is normal. No respiratory distress.      Breath sounds: Normal breath sounds.   Chest:      Chest wall: No tenderness.   Abdominal:      General: Bowel sounds are decreased. There is no distension.      Palpations: Abdomen is soft.      Tenderness: There is no abdominal tenderness.   Musculoskeletal:         General: Tenderness present. Normal range of motion.      Left shoulder: Tenderness present.        Arms:       Cervical back: Normal range of motion and neck supple.      Right lower leg: No edema.      Left lower leg: No edema.   Skin:     General: Skin is warm and dry.      Capillary Refill: Capillary refill takes 2 to 3 seconds.   Neurological:      General: No focal deficit present.      Mental Status: She is alert and oriented to person, place, and time. Mental status is at baseline.      Motor: Weakness (generalized) present.       Significant Labs: All pertinent labs within the past 24 hours have been reviewed.  Recent Lab Results         10/30/22  0550        Anion Gap 1       Baso # 0.01       Basophil % 0.2       BUN 18       BUN/CREAT RATIO 21       Calcium 8.0       Chloride 102       CO2 43       Creatinine 0.84       Differential Type Auto       eGFR 66       Eos # 0.22       Eosinophil % 3.3       Glucose 115       Hematocrit 36.3       Hemoglobin 10.7       Lymph # 3.21       Lymph % 48.4       MCH 30.1       MCHC 29.5       .0       Mono # 0.29       Mono % 4.4       MPV 11.2       Neutrophils, Abs 2.90       Neutrophils Relative 43.7       NT-proBNP 1,611       Platelets 134       Potassium 3.4       RBC 3.56       RDW 13.8       Sodium 143       WBC 6.63                Significant Imaging: I have reviewed all pertinent imaging results/findings within the past 24 hours.

## 2022-10-30 NOTE — ASSESSMENT & PLAN NOTE
Daily BMP   10/30: 3.4- could be from Lasix administration yesterday. Patient refusing potassium at this time. Monitor daily trends.

## 2022-10-30 NOTE — PLAN OF CARE
Problem: Adult Inpatient Plan of Care  Goal: Plan of Care Review  Outcome: Ongoing, Progressing  Goal: Patient-Specific Goal (Individualized)  Outcome: Ongoing, Progressing  Goal: Absence of Hospital-Acquired Illness or Injury  Outcome: Ongoing, Progressing  Goal: Optimal Comfort and Wellbeing  Outcome: Ongoing, Progressing  Goal: Readiness for Transition of Care  Outcome: Ongoing, Progressing     Problem: Fall Injury Risk  Goal: Absence of Fall and Fall-Related Injury  Outcome: Ongoing, Progressing     Problem: Skin Injury Risk Increased  Goal: Skin Health and Integrity  Outcome: Ongoing, Progressing     Problem: Electrolyte Imbalance  Goal: Electrolyte Balance  Outcome: Ongoing, Progressing     Problem: Adjustment to Illness COPD (Chronic Obstructive Pulmonary Disease)  Goal: Optimal Chronic Illness Coping  Outcome: Ongoing, Progressing     Problem: Functional Ability Impaired COPD (Chronic Obstructive Pulmonary Disease)  Goal: Optimal Level of Functional [place]  Outcome: Ongoing, Progressing     Problem: Infection COPD (Chronic Obstructive Pulmonary Disease)  Goal: Absence of Infection Signs and Symptoms  Outcome: Ongoing, Progressing     Problem: Oral Intake Inadequate COPD (Chronic Obstructive Pulmonary Disease)  Goal: Improved Nutrition Intake  Outcome: Ongoing, Progressing     Problem: Respiratory Compromise COPD (Chronic Obstructive Pulmonary Disease)  Goal: Effective Oxygenation and Ventilation  Outcome: Ongoing, Progressing     Problem: UTI (Urinary Tract Infection)  Goal: Improved Infection Symptoms  Outcome: Ongoing, Progressing     Problem: Adult Inpatient Plan of Care  Goal: Plan of Care Review  Outcome: Ongoing, Progressing  Goal: Patient-Specific Goal (Individualized)  Outcome: Ongoing, Progressing  Goal: Absence of Hospital-Acquired Illness or Injury  Outcome: Ongoing, Progressing  Goal: Optimal Comfort and Wellbeing  Outcome: Ongoing, Progressing  Goal: Readiness for Transition of  Care  Outcome: Ongoing, Progressing     Problem: Fall Injury Risk  Goal: Absence of Fall and Fall-Related Injury  Outcome: Ongoing, Progressing     Problem: Skin Injury Risk Increased  Goal: Skin Health and Integrity  Outcome: Ongoing, Progressing     Problem: Electrolyte Imbalance  Goal: Electrolyte Balance  Outcome: Ongoing, Progressing     Problem: Adjustment to Illness COPD (Chronic Obstructive Pulmonary Disease)  Goal: Optimal Chronic Illness Coping  Outcome: Ongoing, Progressing     Problem: Functional Ability Impaired COPD (Chronic Obstructive Pulmonary Disease)  Goal: Optimal Level of Functional [place]  Outcome: Ongoing, Progressing     Problem: Infection COPD (Chronic Obstructive Pulmonary Disease)  Goal: Absence of Infection Signs and Symptoms  Outcome: Ongoing, Progressing     Problem: Oral Intake Inadequate COPD (Chronic Obstructive Pulmonary Disease)  Goal: Improved Nutrition Intake  Outcome: Ongoing, Progressing     Problem: Respiratory Compromise COPD (Chronic Obstructive Pulmonary Disease)  Goal: Effective Oxygenation and Ventilation  Outcome: Ongoing, Progressing     Problem: UTI (Urinary Tract Infection)  Goal: Improved Infection Symptoms  Outcome: Ongoing, Progressing

## 2022-10-30 NOTE — NURSING
In patient's room to give meds. Patient does not respond to voice. Has slight response to sternal rub. Notified BOB Thompson.

## 2022-10-30 NOTE — ASSESSMENT & PLAN NOTE
CXR 10/24: Left pleural effusion  10/29: BNP 1898  Lasix 40 mg x2 doses with good UOP    10/30: BNP 1611- monitor for signs of fluid overload

## 2022-10-30 NOTE — RESPIRATORY THERAPY
Spoke with pt's RN about pt somnolence, family member stated there was a lot of activity today with visitors. Will continue to monitor.

## 2022-10-30 NOTE — ASSESSMENT & PLAN NOTE
Lovenox 80 mg SQ daily  Daily BMP     Skin normal color for race, warm, dry and intact. No evidence of rash.

## 2022-10-30 NOTE — PROGRESS NOTES
[facility] - [facility] Surgical Unit  Hospital Medicine  Progress Note    Patient Name: Pilar Reardon  MRN: [id number]  Patient Class: IP- Inpatient   Admission Date: 10/24/2022  Length of Stay: 4 days  Attending Physician: Anmol Reese DO  Primary Care Provider: Marilee Witt MD        Subjective:     Principal Problem:Pulmonary embolism        HPI:  Patient admitted in observation because of a sudden onset of mental status change.  This has been ongoing for almost 2 weeks she has seen multiple physicians and Rick's.  She has never had a sleep study.  She seems somnolent the majority of the time she is alert orient x3.  In reviewing her case I feel that hospitalization for observation to watch in going over her mental status changes would benefit her.  Ill also found that her CO2 count was elevated to 37.  She seems to be retaining CO2.  We may try a BiPAP with the settings of 10/5 this evening or tonight.  In see if it helps her.  The patient has no fever and her urinalysis seems to be normal.  She has history of CML or leukemia.  This is followed by Oncology.  She has had no recent syncopal episodes or strokes.  She has been fully evaluated by Cardiology.      Overview/Hospital Course:  10/26/2022. At the present time the patient seems to have an elevated CO2.  This may be causing some her somnolence.  We tried a BiPAP last night with the settings of 10 in 5.  Will be converting her to a acute bed status from an observation status to work with her hypoxia and her BiPAP settings and her ability to wear the mask and the machine during the night because of feel like it would help her overall mental status he began drop her CO2 levels.  The patient did tolerate the mask up until around 12:00 p.m..  Were still working through the settings and making sure they are okay for her.  There is a possibility that she has recurrent urinary tract infection she is on MicroBid now will be checking a  urinalysis today also and switch her to acute bed status    10/27/2022.  The patient was found to have right lower lobe pulmonary embolus on her CT scan.  Repeat ABGs were done to still see a retain CO2 count she also has an elevated CO2 count on her be MP.  Discussed the case with Dr. Hernandes.  At [facility].  Will continue the present course of therapy.  And start Norvasc 2.51 p.o. daily in conjunction with continuing the Lovenox as an anticoagulant.  And Diamox.    10/28/2022.  Patient still in somewhat somnolent state.  Being treated for of pulmonary embolus and also for retaining CO2 to cause altered mental status.  Continuing current therapy to observe to see hive much better the patient does tomorrow    10/29/22 - Patient did require oxygen increase from nasal canula to venti mask 12l 40% during the night. She also was found to have a BNP of almost 1900. Her fluids were Dc'd and she was given a 1 time dose of Lasix 40 mg. This morning, she is much better. She is awake, alert, and oriented sitting in her chair eating breakfast. Her breathing without the mask on is not labored. She is able to speak in full sentences. Her lungs have a few scattered crackles at the base of her lungs without any wheezing. She reports she feels dizzy. Her granddaughter, whom is a family practice physician, reports she has trouble with wax build up in her ears and has seen ENT to have it removed several times. Family requested a low dose of Meclizine. Will add that today and repeat Lasix this evening. We will try to wean pt off the venti mask and back to NC throughout the day. We will repeat labs in the morning.Discussed the case with Dr. Ramachandran. He agreed with plan of care.     10/30/22:  Patient lying in bed with caregiver at bedside feeding breakfast. Patient reports feeling about the same as she did yesterday. She has not had BM in 5 days but denies abd pain. She complains of intermittent left shoulder pain that is  chronic. She typically takes Tylenol at home for this pain and has occasionally requested PRN Tylenol throughout hospitalization with relief of discomfort. VS reviewed over the last 24 hours and appear to be consistently ranging mid 80s to mid 90s throughout the night when she has trouble keeping venti mask on but upper 90s throughout the day when oxygen is properly utilized. Patient had an episode of somnolence last night and provider was called to room. Patient had removed mask and once replaced, patient returned to baseline mentation. AAOx4 and in NAD. Today's labs reviewed and notable as follows: BNP 1600, improved form 1898 yesterday. K 3.4 which is a decrease from 4.0 and 3.9 over the last 2 days. This is likely related to the 2 doses of IV Lasix given yesterday. CO2 43 which is an increase from yesterday. Patient has ranged 34-37 since admission on 10/24. CXR on 10/28 shows L pleural effusion. She continues with wt based Lovenox. Case discussed with Dr Ramachandran via Telemedicine consultation. He suggests adding C-pap at night for suspected sleep apnea due to consistent drop in sats throughout each night. Will also add Stool softener BID and Miralax PRN constipation. Will monitor potassium trends as patient does not want to take oral potassium at this time.       Interval History:  Patient seen and examined. She had a drop in sats last night likely from inadvertently removing oxygen. She laso reports chronic left shoulder pain and constipation without abd discomfort. Will add stool softener and Miralax PRN. Will place on C-pap HS. Continue current POC.     Review of Systems   Constitutional:  Positive for activity change, appetite change and fatigue. Negative for chills and diaphoresis.   Respiratory:  Positive for cough and shortness of breath. Negative for chest tightness and wheezing.    Cardiovascular:  Negative for chest pain and palpitations.   Gastrointestinal:  Positive for constipation. Negative for  abdominal pain, diarrhea, nausea and vomiting.   Genitourinary:  Negative for difficulty urinating, dysuria and frequency.   Musculoskeletal:  Positive for arthralgias. Negative for neck pain and neck stiffness.   Skin:  Negative for rash.   Neurological:  Positive for weakness (generalized). Negative for dizziness and light-headedness.   Psychiatric/Behavioral:  Negative for confusion. The patient is not nervous/anxious.    All other systems reviewed and are negative.  Objective:     Vital Signs (Most Recent):  Temp: 98 °F (36.7 °C) (10/30/22 0808)  Pulse: 76 (10/30/22 0808)  Resp: 16 (10/30/22 0808)  BP: (!) 161/73 (10/30/22 0808)  SpO2: 96 % (10/30/22 0808)   Vital Signs (24h Range):  Temp:  [97.8 °F (36.6 °C)-99 °F (37.2 °C)] 98 °F (36.7 °C)  Pulse:  [70-88] 76  Resp:  [16-28] 16  SpO2:  [86 %-100 %] 96 %  BP: (127-161)/(58-73) 161/73     Weight: 68 kg (150 lb)  Body mass index is 27.44 kg/m².    Intake/Output Summary (Last 24 hours) at 10/30/2022 1211  Last data filed at 10/30/2022 0950  Gross per 24 hour   Intake 480 ml   Output --   Net 480 ml      Physical Exam  Vitals and nursing note reviewed.   Constitutional:       Appearance: She is ill-appearing.   HENT:      Head: Normocephalic.      Nose: Nose normal.      Mouth/Throat:      Mouth: Mucous membranes are dry.      Pharynx: Oropharynx is clear.   Eyes:      General: No scleral icterus.     Pupils: Pupils are equal, round, and reactive to light.   Cardiovascular:      Rate and Rhythm: Normal rate and regular rhythm.      Pulses: Normal pulses.      Heart sounds: Normal heart sounds.   Pulmonary:      Effort: Pulmonary effort is normal. No respiratory distress.      Breath sounds: Normal breath sounds.   Chest:      Chest wall: No tenderness.   Abdominal:      General: Bowel sounds are decreased. There is no distension.      Palpations: Abdomen is soft.      Tenderness: There is no abdominal tenderness.   Musculoskeletal:         General: Tenderness  present. Normal range of motion.      Left shoulder: Tenderness present.        Arms:       Cervical back: Normal range of motion and neck supple.      Right lower leg: No edema.      Left lower leg: No edema.   Skin:     General: Skin is warm and dry.      Capillary Refill: Capillary refill takes 2 to 3 seconds.   Neurological:      General: No focal deficit present.      Mental Status: She is alert and oriented to person, place, and time. Mental status is at baseline.      Motor: Weakness (generalized) present.       Significant Labs: All pertinent labs within the past 24 hours have been reviewed.  Recent Lab Results         10/30/22  0550        Anion Gap 1       Baso # 0.01       Basophil % 0.2       BUN 18       BUN/CREAT RATIO 21       Calcium 8.0       Chloride 102       CO2 43       Creatinine 0.84       Differential Type Auto       eGFR 66       Eos # 0.22       Eosinophil % 3.3       Glucose 115       Hematocrit 36.3       Hemoglobin 10.7       Lymph # 3.21       Lymph % 48.4       MCH 30.1       MCHC 29.5       .0       Mono # 0.29       Mono % 4.4       MPV 11.2       Neutrophils, Abs 2.90       Neutrophils Relative 43.7       NT-proBNP 1,611       Platelets 134       Potassium 3.4       RBC 3.56       RDW 13.8       Sodium 143       WBC 6.63               Significant Imaging: I have reviewed all pertinent imaging results/findings within the past 24 hours.      Assessment/Plan:      * Pulmonary embolism  Lovenox 80 mg SQ daily  Daily BMP      Hypoxia  O2 per NC, respiratory to titrate as necessary to keep sats above 90%  C-pap HS    Pain in joint of left shoulder  Frequent assessment of pain  Treat with PRN Tylenol  Continue home med, Meloxicam, Gabapentin    DVT prophylaxis  Lovenox 80 mg SQ daily      Constipation  Stool Softener BID  Miralax PRN      Hypokalemia  Daily BMP   10/30: 3.4- could be from Lasix administration yesterday. Patient refusing potassium at this time. Monitor daily  trends.      CHF (congestive heart failure)  CXR 10/24: Left pleural effusion  10/29: BNP 1898  Lasix 40 mg x2 doses with good UOP    10/30: BNP 1611- monitor for signs of fluid overload      HTN (hypertension)  Continue home meds  Routine VS monitoring      VTE Risk Mitigation (From admission, onward)         Ordered     enoxaparin injection 80 mg  Every 24 hours (non-standard times)         10/28/22 0829     IP VTE HIGH RISK PATIENT  Once         10/24/22 1323     Place sequential compression device  Until discontinued         10/24/22 1323                Discharge Planning   JERRY:      Code Status: DNR   Is the patient medically ready for discharge?:     Reason for patient still in hospital (select all that apply): Patient trending condition, Laboratory test and Treatment  Discharge Plan A: Home with family   Discharge Delays: None known at this time              BLANCO Renner  Department of Hospital Medicine   [facility]

## 2022-10-30 NOTE — PLAN OF CARE
Problem: Adult Inpatient Plan of Care  Goal: Absence of Hospital-Acquired Illness or Injury  Intervention: Identify and Manage Fall Risk  Flowsheets (Taken 10/30/2022 0154)  Safety Promotion/Fall Prevention:   assistive device/personal item within reach   bed alarm set   family to remain at bedside   nonskid shoes/socks when out of bed   instructed to call staff for mobility  Intervention: Prevent Skin Injury  Flowsheets (Taken 10/30/2022 0154)  Body Position: position changed independently  Skin Protection: incontinence pads utilized  Intervention: Prevent and Manage VTE (Venous Thromboembolism) Risk  Flowsheets (Taken 10/30/2022 0154)  Activity Management: Rolling - L1  VTE Prevention/Management: remove, assess skin, and reapply sequential compression device  Range of Motion: active ROM (range of motion) encouraged  Intervention: Prevent Infection  Flowsheets (Taken 10/30/2022 0154)  Infection Prevention: hand hygiene promoted  Goal: Optimal Comfort and Wellbeing  Intervention: Monitor Pain and Promote Comfort  Flowsheets (Taken 10/30/2022 0154)  Pain Management Interventions:   quiet environment facilitated   pillow support provided  Intervention: Provide Person-Centered Care  Flowsheets (Taken 10/30/2022 0154)  Trust Relationship/Rapport:   emotional support provided   reassurance provided     Problem: Fall Injury Risk  Goal: Absence of Fall and Fall-Related Injury  Intervention: Identify and Manage Contributors  Flowsheets (Taken 10/30/2022 0154)  Self-Care Promotion: independence encouraged  Medication Review/Management: medications reviewed  Intervention: Promote Injury-Free Environment  Flowsheets (Taken 10/30/2022 0154)  Safety Promotion/Fall Prevention:   assistive device/personal item within reach   bed alarm set   family to remain at bedside   nonskid shoes/socks when out of bed   instructed to call staff for mobility

## 2022-10-31 ENCOUNTER — HOSPITAL ENCOUNTER (INPATIENT)
Facility: HOSPITAL | Age: 87
LOS: 7 days | Discharge: HOME | DRG: 556 | End: 2022-11-07
Attending: FAMILY MEDICINE | Admitting: FAMILY MEDICINE
Payer: MEDICARE

## 2022-10-31 VITALS
RESPIRATION RATE: 22 BRPM | WEIGHT: 142.31 LBS | OXYGEN SATURATION: 99 % | HEIGHT: 62 IN | BODY MASS INDEX: 26.19 KG/M2 | TEMPERATURE: 98 F | DIASTOLIC BLOOD PRESSURE: 57 MMHG | HEART RATE: 74 BPM | SYSTOLIC BLOOD PRESSURE: 119 MMHG

## 2022-10-31 DIAGNOSIS — I26.99 PULMONARY EMBOLUS: ICD-10-CM

## 2022-10-31 PROCEDURE — 25000003 PHARM REV CODE 250: Performed by: REGISTERED NURSE

## 2022-10-31 PROCEDURE — 97530 THERAPEUTIC ACTIVITIES: CPT

## 2022-10-31 PROCEDURE — 94640 AIRWAY INHALATION TREATMENT: CPT

## 2022-10-31 PROCEDURE — 99239 PR HOSPITAL DISCHARGE DAY,>30 MIN: ICD-10-PCS | Mod: ,,, | Performed by: FAMILY MEDICINE

## 2022-10-31 PROCEDURE — 25000003 PHARM REV CODE 250: Performed by: FAMILY MEDICINE

## 2022-10-31 PROCEDURE — 63600175 PHARM REV CODE 636 W HCPCS: Performed by: FAMILY MEDICINE

## 2022-10-31 PROCEDURE — 25000242 PHARM REV CODE 250 ALT 637 W/ HCPCS: Mod: GY | Performed by: REGISTERED NURSE

## 2022-10-31 PROCEDURE — 94660 CPAP INITIATION&MGMT: CPT

## 2022-10-31 PROCEDURE — 99239 HOSP IP/OBS DSCHRG MGMT >30: CPT | Mod: ,,, | Performed by: FAMILY MEDICINE

## 2022-10-31 PROCEDURE — 27000221 HC OXYGEN, UP TO 24 HOURS

## 2022-10-31 PROCEDURE — 94761 N-INVAS EAR/PLS OXIMETRY MLT: CPT

## 2022-10-31 PROCEDURE — 25000003 PHARM REV CODE 250: Performed by: PHYSICIAN ASSISTANT

## 2022-10-31 PROCEDURE — 25000003 PHARM REV CODE 250: Performed by: NURSE PRACTITIONER

## 2022-10-31 PROCEDURE — 27000941

## 2022-10-31 PROCEDURE — 27000190 HC CPAP FULL FACE MASK W/VALVE

## 2022-10-31 PROCEDURE — 25000242 PHARM REV CODE 250 ALT 637 W/ HCPCS: Performed by: PHYSICIAN ASSISTANT

## 2022-10-31 PROCEDURE — A4216 STERILE WATER/SALINE, 10 ML: HCPCS | Performed by: FAMILY MEDICINE

## 2022-10-31 PROCEDURE — 99900035 HC TECH TIME PER 15 MIN (STAT)

## 2022-10-31 PROCEDURE — 11000004 HC SNF PRIVATE

## 2022-10-31 RX ORDER — SERTRALINE HYDROCHLORIDE 25 MG/1
25 TABLET, FILM COATED ORAL DAILY
Status: DISCONTINUED | OUTPATIENT
Start: 2022-11-01 | End: 2022-11-07 | Stop reason: HOSPADM

## 2022-10-31 RX ORDER — ROPINIROLE 0.25 MG/1
0.5 TABLET, FILM COATED ORAL NIGHTLY
Status: DISCONTINUED | OUTPATIENT
Start: 2022-10-31 | End: 2022-11-07 | Stop reason: HOSPADM

## 2022-10-31 RX ORDER — PANTOPRAZOLE SODIUM 40 MG/1
40 TABLET, DELAYED RELEASE ORAL DAILY
Status: DISCONTINUED | OUTPATIENT
Start: 2022-11-01 | End: 2022-11-07 | Stop reason: HOSPADM

## 2022-10-31 RX ORDER — ACETAMINOPHEN 500 MG
5000 TABLET ORAL DAILY
Status: DISCONTINUED | OUTPATIENT
Start: 2022-11-01 | End: 2022-11-07 | Stop reason: HOSPADM

## 2022-10-31 RX ORDER — DEXAMETHASONE 1 MG/1
2 TABLET ORAL
Status: DISCONTINUED | OUTPATIENT
Start: 2022-11-02 | End: 2022-11-07 | Stop reason: HOSPADM

## 2022-10-31 RX ORDER — IPRATROPIUM BROMIDE AND ALBUTEROL SULFATE 2.5; .5 MG/3ML; MG/3ML
3 SOLUTION RESPIRATORY (INHALATION) EVERY 6 HOURS PRN
Status: DISCONTINUED | OUTPATIENT
Start: 2022-10-31 | End: 2022-10-31

## 2022-10-31 RX ORDER — DICLOFENAC SODIUM 10 MG/G
2 GEL TOPICAL 2 TIMES DAILY
Status: DISCONTINUED | OUTPATIENT
Start: 2022-10-31 | End: 2022-10-31

## 2022-10-31 RX ORDER — IPRATROPIUM BROMIDE AND ALBUTEROL SULFATE 2.5; .5 MG/3ML; MG/3ML
3 SOLUTION RESPIRATORY (INHALATION) EVERY 6 HOURS
Status: DISCONTINUED | OUTPATIENT
Start: 2022-10-31 | End: 2022-11-07 | Stop reason: HOSPADM

## 2022-10-31 RX ORDER — NITROFURANTOIN 25; 75 MG/1; MG/1
100 CAPSULE ORAL NIGHTLY
Status: DISCONTINUED | OUTPATIENT
Start: 2022-10-31 | End: 2022-11-07 | Stop reason: HOSPADM

## 2022-10-31 RX ORDER — MELOXICAM 7.5 MG/1
7.5 TABLET ORAL 2 TIMES DAILY
Status: DISCONTINUED | OUTPATIENT
Start: 2022-10-31 | End: 2022-11-07 | Stop reason: HOSPADM

## 2022-10-31 RX ORDER — PHENAZOPYRIDINE HYDROCHLORIDE 100 MG/1
200 TABLET, FILM COATED ORAL 3 TIMES DAILY PRN
Status: DISCONTINUED | OUTPATIENT
Start: 2022-10-31 | End: 2022-11-07 | Stop reason: HOSPADM

## 2022-10-31 RX ORDER — ONDANSETRON 4 MG/1
4 TABLET, FILM COATED ORAL EVERY 6 HOURS PRN
Status: DISCONTINUED | OUTPATIENT
Start: 2022-10-31 | End: 2022-11-07 | Stop reason: HOSPADM

## 2022-10-31 RX ORDER — AMOXICILLIN 250 MG
1 CAPSULE ORAL 2 TIMES DAILY
Status: DISCONTINUED | OUTPATIENT
Start: 2022-10-31 | End: 2022-11-07 | Stop reason: HOSPADM

## 2022-10-31 RX ORDER — DOXAZOSIN 1 MG/1
2 TABLET ORAL EVERY 12 HOURS
Status: DISCONTINUED | OUTPATIENT
Start: 2022-10-31 | End: 2022-11-07 | Stop reason: HOSPADM

## 2022-10-31 RX ORDER — TALC
6 POWDER (GRAM) TOPICAL NIGHTLY PRN
Status: DISCONTINUED | OUTPATIENT
Start: 2022-10-31 | End: 2022-11-07 | Stop reason: HOSPADM

## 2022-10-31 RX ORDER — CLONIDINE HYDROCHLORIDE 0.1 MG/1
0.1 TABLET ORAL EVERY 4 HOURS PRN
Status: DISCONTINUED | OUTPATIENT
Start: 2022-10-31 | End: 2022-11-07 | Stop reason: HOSPADM

## 2022-10-31 RX ORDER — ACETAMINOPHEN 325 MG/1
650 TABLET ORAL EVERY 6 HOURS PRN
Status: DISCONTINUED | OUTPATIENT
Start: 2022-10-31 | End: 2022-11-07 | Stop reason: HOSPADM

## 2022-10-31 RX ORDER — TRAVOPROST OPHTHALMIC SOLUTION 0.04 MG/ML
1 SOLUTION OPHTHALMIC NIGHTLY
Status: DISCONTINUED | OUTPATIENT
Start: 2022-10-31 | End: 2022-11-01

## 2022-10-31 RX ORDER — CYANOCOBALAMIN 1000 UG/ML
1000 INJECTION, SOLUTION INTRAMUSCULAR; SUBCUTANEOUS
Status: DISCONTINUED | OUTPATIENT
Start: 2022-11-01 | End: 2022-11-07 | Stop reason: HOSPADM

## 2022-10-31 RX ORDER — DOCUSATE SODIUM 283 MG/5ML
1 LIQUID RECTAL DAILY PRN
Status: DISCONTINUED | OUTPATIENT
Start: 2022-10-31 | End: 2022-11-07 | Stop reason: HOSPADM

## 2022-10-31 RX ORDER — TIZANIDINE 4 MG/1
4 TABLET ORAL EVERY 6 HOURS PRN
Status: DISCONTINUED | OUTPATIENT
Start: 2022-10-31 | End: 2022-11-07 | Stop reason: HOSPADM

## 2022-10-31 RX ORDER — LEVOTHYROXINE SODIUM 50 UG/1
50 TABLET ORAL
Status: DISCONTINUED | OUTPATIENT
Start: 2022-11-01 | End: 2022-11-07 | Stop reason: HOSPADM

## 2022-10-31 RX ORDER — ENOXAPARIN SODIUM 100 MG/ML
80 INJECTION SUBCUTANEOUS EVERY 24 HOURS
Status: DISCONTINUED | OUTPATIENT
Start: 2022-10-31 | End: 2022-11-07 | Stop reason: HOSPADM

## 2022-10-31 RX ORDER — MONTELUKAST SODIUM 10 MG/1
10 TABLET ORAL NIGHTLY
Status: DISCONTINUED | OUTPATIENT
Start: 2022-10-31 | End: 2022-11-07 | Stop reason: HOSPADM

## 2022-10-31 RX ORDER — MAG HYDROX/ALUMINUM HYD/SIMETH 200-200-20
15 SUSPENSION, ORAL (FINAL DOSE FORM) ORAL EVERY 6 HOURS PRN
Status: DISCONTINUED | OUTPATIENT
Start: 2022-10-31 | End: 2022-11-07 | Stop reason: HOSPADM

## 2022-10-31 RX ORDER — DICLOFENAC SODIUM 10 MG/G
2 GEL TOPICAL 2 TIMES DAILY PRN
Status: DISCONTINUED | OUTPATIENT
Start: 2022-10-31 | End: 2022-11-07 | Stop reason: HOSPADM

## 2022-10-31 RX ORDER — ATORVASTATIN CALCIUM 40 MG/1
40 TABLET, FILM COATED ORAL NIGHTLY
Status: DISCONTINUED | OUTPATIENT
Start: 2022-10-31 | End: 2022-11-07 | Stop reason: HOSPADM

## 2022-10-31 RX ORDER — ACETAMINOPHEN 325 MG/1
650 TABLET ORAL EVERY 6 HOURS PRN
Status: DISCONTINUED | OUTPATIENT
Start: 2022-10-31 | End: 2022-11-01

## 2022-10-31 RX ORDER — PRIMIDONE 50 MG/1
100 TABLET ORAL 2 TIMES DAILY
Status: DISCONTINUED | OUTPATIENT
Start: 2022-10-31 | End: 2022-11-07 | Stop reason: HOSPADM

## 2022-10-31 RX ORDER — ASPIRIN 81 MG/1
81 TABLET ORAL DAILY
Status: DISCONTINUED | OUTPATIENT
Start: 2022-11-01 | End: 2022-11-07 | Stop reason: HOSPADM

## 2022-10-31 RX ORDER — POLYETHYLENE GLYCOL 3350 17 G/17G
17 POWDER, FOR SOLUTION ORAL 2 TIMES DAILY PRN
Status: DISCONTINUED | OUTPATIENT
Start: 2022-10-31 | End: 2022-11-07 | Stop reason: HOSPADM

## 2022-10-31 RX ORDER — GABAPENTIN 100 MG/1
100 CAPSULE ORAL NIGHTLY
Status: DISCONTINUED | OUTPATIENT
Start: 2022-10-31 | End: 2022-11-07 | Stop reason: HOSPADM

## 2022-10-31 RX ADMIN — TRAVOPROST 1 DROP: 0.04 SOLUTION/ DROPS OPHTHALMIC at 08:10

## 2022-10-31 RX ADMIN — ATORVASTATIN CALCIUM 40 MG: 40 TABLET, FILM COATED ORAL at 09:10

## 2022-10-31 RX ADMIN — ACETAZOLAMIDE 250 MG: 250 TABLET ORAL at 09:10

## 2022-10-31 RX ADMIN — MONTELUKAST 10 MG: 10 TABLET, FILM COATED ORAL at 08:10

## 2022-10-31 RX ADMIN — PRIMIDONE 100 MG: 50 TABLET ORAL at 09:10

## 2022-10-31 RX ADMIN — ENOXAPARIN SODIUM 80 MG: 100 INJECTION SUBCUTANEOUS at 05:10

## 2022-10-31 RX ADMIN — IPRATROPIUM BROMIDE AND ALBUTEROL SULFATE 3 ML: 2.5; .5 SOLUTION RESPIRATORY (INHALATION) at 07:10

## 2022-10-31 RX ADMIN — GABAPENTIN 100 MG: 100 CAPSULE ORAL at 08:10

## 2022-10-31 RX ADMIN — SODIUM CHLORIDE, PRESERVATIVE FREE 10 ML: 5 INJECTION INTRAVENOUS at 06:10

## 2022-10-31 RX ADMIN — LOSARTAN POTASSIUM 50 MG: 50 TABLET, FILM COATED ORAL at 09:10

## 2022-10-31 RX ADMIN — AMLODIPINE BESYLATE 2.5 MG: 2.5 TABLET ORAL at 09:10

## 2022-10-31 RX ADMIN — NITROFURANTOIN MONOHYDRATE/MACROCRYSTALS 100 MG: 75; 25 CAPSULE ORAL at 08:10

## 2022-10-31 RX ADMIN — PANTOPRAZOLE SODIUM 40 MG: 40 TABLET, DELAYED RELEASE ORAL at 09:10

## 2022-10-31 RX ADMIN — CHOLECALCIFEROL TAB 125 MCG (5000 UNIT) 5000 UNITS: 125 TAB at 09:10

## 2022-10-31 RX ADMIN — ROPINIROLE HYDROCHLORIDE 0.5 MG: 0.25 TABLET, FILM COATED ORAL at 08:10

## 2022-10-31 RX ADMIN — IPRATROPIUM BROMIDE AND ALBUTEROL SULFATE 3 ML: 2.5; .5 SOLUTION RESPIRATORY (INHALATION) at 03:10

## 2022-10-31 RX ADMIN — DOCUSATE SODIUM 100 MG: 100 CAPSULE, LIQUID FILLED ORAL at 09:10

## 2022-10-31 RX ADMIN — IPRATROPIUM BROMIDE AND ALBUTEROL SULFATE 3 ML: 2.5; .5 SOLUTION RESPIRATORY (INHALATION) at 08:10

## 2022-10-31 RX ADMIN — DOXAZOSIN 2 MG: 1 TABLET ORAL at 08:10

## 2022-10-31 RX ADMIN — IPRATROPIUM BROMIDE AND ALBUTEROL SULFATE 3 ML: 2.5; .5 SOLUTION RESPIRATORY (INHALATION) at 01:10

## 2022-10-31 RX ADMIN — PRIMIDONE 100 MG: 50 TABLET ORAL at 08:10

## 2022-10-31 RX ADMIN — MELOXICAM 7.5 MG: 7.5 TABLET ORAL at 08:10

## 2022-10-31 RX ADMIN — MELOXICAM 7.5 MG: 7.5 TABLET ORAL at 09:10

## 2022-10-31 RX ADMIN — DOXAZOSIN 2 MG: 1 TABLET ORAL at 09:10

## 2022-10-31 RX ADMIN — SERTRALINE HYDROCHLORIDE 25 MG: 25 TABLET ORAL at 09:10

## 2022-10-31 RX ADMIN — SENNOSIDES AND DOCUSATE SODIUM 1 TABLET: 8.6; 5 TABLET ORAL at 08:10

## 2022-10-31 RX ADMIN — ASPIRIN 81 MG: 81 TABLET, COATED ORAL at 09:10

## 2022-10-31 NOTE — PROGRESS NOTES
[facility]  Hospital Medicine  Progress Note    Patient Name: Pilar Reardon  MRN: [id number]  Patient Class: IP- Inpatient   Admission Date: 10/24/2022  Length of Stay: 5 days  Attending Physician: Anmol Reese DO  Primary Care Provider: Marilee Witt MD        Subjective:     Principal Problem:Pulmonary embolism        HPI:  Patient admitted in observation because of a sudden onset of mental status change.  This has been ongoing for almost 2 weeks she has seen multiple physicians and Rick's.  She has never had a sleep study.  She seems somnolent the majority of the time she is alert orient x3.  In reviewing her case I feel that hospitalization for observation to watch in going over her mental status changes would benefit her.  Ill also found that her CO2 count was elevated to 37.  She seems to be retaining CO2.  We may try a BiPAP with the settings of 10/5 this evening or tonight.  In see if it helps her.  The patient has no fever and her urinalysis seems to be normal.  She has history of CML or leukemia.  This is followed by Oncology.  She has had no recent syncopal episodes or strokes.  She has been fully evaluated by Cardiology.      Overview/Hospital Course:  10/26/2022. At the present time the patient seems to have an elevated CO2.  This may be causing some her somnolence.  We tried a BiPAP last night with the settings of 10 in 5.  Will be converting her to a acute bed status from an observation status to work with her hypoxia and her BiPAP settings and her ability to wear the mask and the machine during the night because of feel like it would help her overall mental status he began drop her CO2 levels.  The patient did tolerate the mask up until around 12:00 p.m..  Were still working through the settings and making sure they are okay for her.  There is a possibility that she has recurrent urinary tract infection she is on MicroBid now will be checking a  urinalysis today also and switch her to acute bed status    10/27/2022.  The patient was found to have right lower lobe pulmonary embolus on her CT scan.  Repeat ABGs were done to still see a retain CO2 count she also has an elevated CO2 count on her be MP.  Discussed the case with Dr. Hernandes.  At [facility].  Will continue the present course of therapy.  And start Norvasc 2.51 p.o. daily in conjunction with continuing the Lovenox as an anticoagulant.  And Diamox.    10/28/2022.  Patient still in somewhat somnolent state.  Being treated for of pulmonary embolus and also for retaining CO2 to cause altered mental status.  Continuing current therapy to observe to see hive much better the patient does tomorrow    10/29/22 - Patient did require oxygen increase from nasal canula to venti mask 12l 40% during the night. She also was found to have a BNP of almost 1900. Her fluids were Dc'd and she was given a 1 time dose of Lasix 40 mg. This morning, she is much better. She is awake, alert, and oriented sitting in her chair eating breakfast. Her breathing without the mask on is not labored. She is able to speak in full sentences. Her lungs have a few scattered crackles at the base of her lungs without any wheezing. She reports she feels dizzy. Her granddaughter, whom is a family practice physician, reports she has trouble with wax build up in her ears and has seen ENT to have it removed several times. Family requested a low dose of Meclizine. Will add that today and repeat Lasix this evening. We will try to wean pt off the venti mask and back to NC throughout the day. We will repeat labs in the morning.Discussed the case with Dr. Ramachandran. He agreed with plan of care.     10/30/22:  Patient lying in bed with caregiver at bedside feeding breakfast. Patient reports feeling about the same as she did yesterday. She has not had BM in 5 days but denies abd pain. She complains of intermittent left shoulder pain that is  chronic. She typically takes Tylenol at home for this pain and has occasionally requested PRN Tylenol throughout hospitalization with relief of discomfort. VS reviewed over the last 24 hours and appear to be consistently ranging mid 80s to mid 90s throughout the night when she has trouble keeping venti mask on but upper 90s throughout the day when oxygen is properly utilized. Patient had an episode of somnolence last night and provider was called to room. Patient had removed mask and once replaced, patient returned to baseline mentation. AAOx4 and in NAD. Today's labs reviewed and notable as follows: BNP 1600, improved form 1898 yesterday. K 3.4 which is a decrease from 4.0 and 3.9 over the last 2 days. This is likely related to the 2 doses of IV Lasix given yesterday. CO2 43 which is an increase from yesterday. Patient has ranged 34-37 since admission on 10/24. CXR on 10/28 shows L pleural effusion. She continues with wt based Lovenox. Case discussed with Dr Ramachandran via Telemedicine consultation. He suggests adding C-pap at night for suspected sleep apnea due to consistent drop in sats throughout each night. Will also add Stool softener BID and Miralax PRN constipation. Will monitor potassium trends as patient does not want to take oral potassium at this time.       No new subjective & objective note has been filed under this hospital service since the last note was generated.      Assessment/Plan:      * Pulmonary embolism  Lovenox 80 mg SQ daily  Daily BMP      Hypoxia  O2 per NC, respiratory to titrate as necessary to keep sats above 90%  C-pap HS    Pain in joint of left shoulder  Frequent assessment of pain  Treat with PRN Tylenol  Continue home med, Meloxicam, Gabapentin    DVT prophylaxis  Lovenox 80 mg SQ daily      Constipation  Stool Softener BID  Miralax PRN      Hypokalemia  Daily BMP   10/30: 3.4- could be from Lasix administration yesterday. Patient refusing potassium at this time. Monitor daily  trends.      CHF (congestive heart failure)  CXR 10/24: Left pleural effusion  10/29: BNP 1898  Lasix 40 mg x2 doses with good UOP    10/30: BNP 1611- monitor for signs of fluid overload      HTN (hypertension)  Continue home meds  Routine VS monitoring      VTE Risk Mitigation (From admission, onward)         Ordered     enoxaparin injection 80 mg  Every 24 hours (non-standard times)         10/28/22 0829     IP VTE HIGH RISK PATIENT  Once         10/24/22 1323     Place sequential compression device  Until discontinued         10/24/22 1323                Discharge Planning   JERRY:      Code Status: DNR   Is the patient medically ready for discharge?:     Reason for patient still in hospital (select all that apply): Treatment  Discharge Plan A: Home with family   Discharge Delays: None known at this time              Anmol Reese DO  Department of Hospital Medicine   [facility]

## 2022-10-31 NOTE — PLAN OF CARE
Problem: Fall Injury Risk  Goal: Absence of Fall and Fall-Related Injury  Intervention: Identify and Manage Contributors  Flowsheets (Taken 10/31/2022 0253)  Self-Care Promotion: independence encouraged  Medication Review/Management: medications reviewed  Intervention: Promote Injury-Free Environment  Flowsheets (Taken 10/31/2022 0253)  Safety Promotion/Fall Prevention:   assistive device/personal item within reach   bed alarm set   family to remain at bedside   nonskid shoes/socks when out of bed   side rails raised x 3   instructed to call staff for mobility     Problem: Adjustment to Illness COPD (Chronic Obstructive Pulmonary Disease)  Goal: Optimal Chronic Illness Coping  Intervention: Support and Optimize Psychosocial Response  Flowsheets (Taken 10/31/2022 0253)  Supportive Measures: active listening utilized  Family/Support System Care: support provided     Problem: Functional Ability Impaired COPD (Chronic Obstructive Pulmonary Disease)  Goal: Optimal Level of Functional [place]  Intervention: Optimize Functional Ability  Flowsheets (Taken 10/31/2022 0253)  Self-Care Promotion: independence encouraged  Activity Management: Rolling - L1  Environmental Support: calm environment promoted     Problem: Respiratory Compromise COPD (Chronic Obstructive Pulmonary Disease)  Goal: Effective Oxygenation and Ventilation  Intervention: Promote Airway Secretion Clearance  Flowsheets (Taken 10/31/2022 0253)  Activity Management: Rolling - L1  Intervention: Optimize Oxygenation and Ventilation  Flowsheets (Taken 10/31/2022 0253)  Fluid/Electrolyte Management: fluids provided  Head of Bed (HOB) Positioning: HOB at 30-45 degrees

## 2022-10-31 NOTE — PROGRESS NOTES
[facility] - [facility] Surgical Unit  Hospital Medicine  Progress Note    Patient Name: Pilar Reardon  MRN: [id number]  Patient Class: IP- Inpatient   Admission Date: 10/24/2022  Length of Stay: 5 days  Attending Physician: Anmol Reese DO  Primary Care Provider: Marilee Witt MD        Subjective:     Principal Problem:Pulmonary embolism        HPI:  Patient admitted in observation because of a sudden onset of mental status change.  This has been ongoing for almost 2 weeks she has seen multiple physicians and Rick's.  She has never had a sleep study.  She seems somnolent the majority of the time she is alert orient x3.  In reviewing her case I feel that hospitalization for observation to watch in going over her mental status changes would benefit her.  Ill also found that her CO2 count was elevated to 37.  She seems to be retaining CO2.  We may try a BiPAP with the settings of 10/5 this evening or tonight.  In see if it helps her.  The patient has no fever and her urinalysis seems to be normal.  She has history of CML or leukemia.  This is followed by Oncology.  She has had no recent syncopal episodes or strokes.  She has been fully evaluated by Cardiology.      Overview/Hospital Course:  10/26/2022. At the present time the patient seems to have an elevated CO2.  This may be causing some her somnolence.  We tried a BiPAP last night with the settings of 10 in 5.  Will be converting her to a acute bed status from an observation status to work with her hypoxia and her BiPAP settings and her ability to wear the mask and the machine during the night because of feel like it would help her overall mental status he began drop her CO2 levels.  The patient did tolerate the mask up until around 12:00 p.m..  Were still working through the settings and making sure they are okay for her.  There is a possibility that she has recurrent urinary tract infection she is on MicroBid now will be checking a  urinalysis today also and switch her to acute bed status    10/27/2022.  The patient was found to have right lower lobe pulmonary embolus on her CT scan.  Repeat ABGs were done to still see a retain CO2 count she also has an elevated CO2 count on her be MP.  Discussed the case with Dr. Hernandes.  At [facility].  Will continue the present course of therapy.  And start Norvasc 2.51 p.o. daily in conjunction with continuing the Lovenox as an anticoagulant.  And Diamox.    10/28/2022.  Patient still in somewhat somnolent state.  Being treated for of pulmonary embolus and also for retaining CO2 to cause altered mental status.  Continuing current therapy to observe to see hive much better the patient does tomorrow    10/29/22 - Patient did require oxygen increase from nasal canula to venti mask 12l 40% during the night. She also was found to have a BNP of almost 1900. Her fluids were Dc'd and she was given a 1 time dose of Lasix 40 mg. This morning, she is much better. She is awake, alert, and oriented sitting in her chair eating breakfast. Her breathing without the mask on is not labored. She is able to speak in full sentences. Her lungs have a few scattered crackles at the base of her lungs without any wheezing. She reports she feels dizzy. Her granddaughter, whom is a family practice physician, reports she has trouble with wax build up in her ears and has seen ENT to have it removed several times. Family requested a low dose of Meclizine. Will add that today and repeat Lasix this evening. We will try to wean pt off the venti mask and back to NC throughout the day. We will repeat labs in the morning.Discussed the case with Dr. Ramachandran. He agreed with plan of care.     10/30/22:  Patient lying in bed with caregiver at bedside feeding breakfast. Patient reports feeling about the same as she did yesterday. She has not had BM in 5 days but denies abd pain. She complains of intermittent left shoulder pain that is  chronic. She typically takes Tylenol at home for this pain and has occasionally requested PRN Tylenol throughout hospitalization with relief of discomfort. VS reviewed over the last 24 hours and appear to be consistently ranging mid 80s to mid 90s throughout the night when she has trouble keeping venti mask on but upper 90s throughout the day when oxygen is properly utilized. Patient had an episode of somnolence last night and provider was called to room. Patient had removed mask and once replaced, patient returned to baseline mentation. AAOx4 and in NAD. Today's labs reviewed and notable as follows: BNP 1600, improved form 1898 yesterday. K 3.4 which is a decrease from 4.0 and 3.9 over the last 2 days. This is likely related to the 2 doses of IV Lasix given yesterday. CO2 43 which is an increase from yesterday. Patient has ranged 34-37 since admission on 10/24. CXR on 10/28 shows L pleural effusion. She continues with wt based Lovenox. Case discussed with Dr Ramachandran via Telemedicine consultation. He suggests adding C-pap at night for suspected sleep apnea due to consistent drop in sats throughout each night. Will also add Stool softener BID and Miralax PRN constipation. Will monitor potassium trends as patient does not want to take oral potassium at this time.       No new subjective & objective note has been filed under this hospital service since the last note was generated.      Assessment/Plan:      * Pulmonary embolism  Lovenox 80 mg SQ daily  Daily BMP      Hypoxia  O2 per NC, respiratory to titrate as necessary to keep sats above 90%  C-pap HS    Pain in joint of left shoulder  Frequent assessment of pain  Treat with PRN Tylenol  Continue home med, Meloxicam, Gabapentin    DVT prophylaxis  Lovenox 80 mg SQ daily      Constipation  Stool Softener BID  Miralax PRN      Hypokalemia  Daily BMP   10/30: 3.4- could be from Lasix administration yesterday. Patient refusing potassium at this time. Monitor daily  trends.      CHF (congestive heart failure)  CXR 10/24: Left pleural effusion  10/29: BNP 1898  Lasix 40 mg x2 doses with good UOP    10/30: BNP 1611- monitor for signs of fluid overload      HTN (hypertension)  Continue home meds  Routine VS monitoring      VTE Risk Mitigation (From admission, onward)         Ordered     enoxaparin injection 80 mg  Every 24 hours (non-standard times)         10/28/22 0829     IP VTE HIGH RISK PATIENT  Once         10/24/22 1323     Place sequential compression device  Until discontinued         10/24/22 1323                Discharge Planning   JERRY:      Code Status: DNR   Is the patient medically ready for discharge?:     Reason for patient still in hospital (select all that apply  Treatment  sob    Discharge Plan A: Home with family   Discharge Delays: None known at this time              Anmol Reese DO  Department of Hospital Medicine   [facility]

## 2022-10-31 NOTE — PLAN OF CARE
Problem: Adult Inpatient Plan of Care  Goal: Plan of Care Review  10/31/2022 1540 by Latrice Reece RN  Outcome: Ongoing, Progressing  10/31/2022 1539 by Latrice Reece RN  Outcome: Ongoing, Progressing  Goal: Patient-Specific Goal (Individualized)  10/31/2022 1540 by Latrice Reece RN  Outcome: Ongoing, Progressing  10/31/2022 1539 by Latrice Reece RN  Outcome: Ongoing, Progressing  Goal: Absence of Hospital-Acquired Illness or Injury  10/31/2022 1540 by Latirce Reece RN  Outcome: Ongoing, Progressing  10/31/2022 1539 by Latrice Reece RN  Outcome: Ongoing, Progressing  Goal: Optimal Comfort and Wellbeing  10/31/2022 1540 by Latrice Reece RN  Outcome: Ongoing, Progressing  10/31/2022 1539 by Latrice Reece RN  Outcome: Ongoing, Progressing  Goal: Readiness for Transition of Care  10/31/2022 1540 by Latrice Reece RN  Outcome: Ongoing, Progressing  10/31/2022 1539 by Latrice Reece RN  Outcome: Ongoing, Progressing     Problem: Fall Injury Risk  Goal: Absence of Fall and Fall-Related Injury  10/31/2022 1540 by Latrice Reece RN  Outcome: Ongoing, Progressing  10/31/2022 1539 by Latrice Reece RN  Outcome: Ongoing, Progressing     Problem: Skin Injury Risk Increased  Goal: Skin Health and Integrity  10/31/2022 1540 by Latrice Reece RN  Outcome: Ongoing, Progressing  10/31/2022 1539 by Latrice Reece RN  Outcome: Ongoing, Progressing     Problem: Electrolyte Imbalance  Goal: Electrolyte Balance  10/31/2022 1540 by Latrice Reece RN  Outcome: Ongoing, Progressing  10/31/2022 1539 by Latrice Reece RN  Outcome: Ongoing, Progressing     Problem: Adjustment to Illness COPD (Chronic Obstructive Pulmonary Disease)  Goal: Optimal Chronic Illness Coping  10/31/2022 1540 by Latrice Reece RN  Outcome: Ongoing, Progressing  10/31/2022 1539 by Latrice Reece RN  Outcome: Ongoing, Progressing     Problem: Functional Ability Impaired COPD (Chronic Obstructive Pulmonary Disease)  Goal:  Optimal Level of Functional [place]  10/31/2022 1540 by Latrice Reece RN  Outcome: Ongoing, Progressing  10/31/2022 1539 by Latrice Reece RN  Outcome: Ongoing, Progressing     Problem: Infection COPD (Chronic Obstructive Pulmonary Disease)  Goal: Absence of Infection Signs and Symptoms  10/31/2022 1540 by Latrice Reece RN  Outcome: Ongoing, Progressing  10/31/2022 1539 by Latrice Reece RN  Outcome: Ongoing, Progressing     Problem: Oral Intake Inadequate COPD (Chronic Obstructive Pulmonary Disease)  Goal: Improved Nutrition Intake  10/31/2022 1540 by Latrice Reece RN  Outcome: Ongoing, Progressing  10/31/2022 1539 by Latrice Reece RN  Outcome: Ongoing, Progressing     Problem: Respiratory Compromise COPD (Chronic Obstructive Pulmonary Disease)  Goal: Effective Oxygenation and Ventilation  10/31/2022 1540 by Latrice Reece RN  Outcome: Ongoing, Progressing  10/31/2022 1539 by Latrice Reece RN  Outcome: Ongoing, Progressing     Problem: UTI (Urinary Tract Infection)  Goal: Improved Infection Symptoms  10/31/2022 1540 by Latrice Reece RN  Outcome: Ongoing, Progressing  10/31/2022 1539 by Latrice Reece RN  Outcome: Ongoing, Progressing

## 2022-10-31 NOTE — DISCHARGE SUMMARY
[facility]  Hospital Medicine  Discharge Summary      Patient Name: Pilar Reardon  MRN: [id number]  Patient Class: IP- Inpatient  Admission Date: 10/24/2022  Hospital Length of Stay: 5 days  Discharge Date and Time:  10/31/2022 3:40 PM  Attending Physician: Anmol Reese DO   Discharging Provider: Anmol Reese DO  Primary Care Provider: Marilee Witt MD      HPI:   Patient admitted in observation because of a sudden onset of mental status change.  This has been ongoing for almost 2 weeks she has seen multiple physicians and Rick's.  She has never had a sleep study.  She seems somnolent the majority of the time she is alert orient x3.  In reviewing her case I feel that hospitalization for observation to watch in going over her mental status changes would benefit her.  Ill also found that her CO2 count was elevated to 37.  She seems to be retaining CO2.  We may try a BiPAP with the settings of 10/5 this evening or tonight.  In see if it helps her.  The patient has no fever and her urinalysis seems to be normal.  She has history of CML or leukemia.  This is followed by Oncology.  She has had no recent syncopal episodes or strokes.  She has been fully evaluated by Cardiology.      * No surgery found *      Hospital Course:   10/26/2022. At the present time the patient seems to have an elevated CO2.  This may be causing some her somnolence.  We tried a BiPAP last night with the settings of 10 in 5.  Will be converting her to a acute bed status from an observation status to work with her hypoxia and her BiPAP settings and her ability to wear the mask and the machine during the night because of feel like it would help her overall mental status he began drop her CO2 levels.  The patient did tolerate the mask up until around 12:00 p.m..  Were still working through the settings and making sure they are okay for her.  There is a possibility that she has recurrent  urinary tract infection she is on MicroBid now will be checking a urinalysis today also and switch her to acute bed status    10/27/2022.  The patient was found to have right lower lobe pulmonary embolus on her CT scan.  Repeat ABGs were done to still see a retain CO2 count she also has an elevated CO2 count on her be MP.  Discussed the case with Dr. Hernandes.  At [facility].  Will continue the present course of therapy.  And start Norvasc 2.51 p.o. daily in conjunction with continuing the Lovenox as an anticoagulant.  And Diamox.    10/28/2022.  Patient still in somewhat somnolent state.  Being treated for of pulmonary embolus and also for retaining CO2 to cause altered mental status.  Continuing current therapy to observe to see hive much better the patient does tomorrow    10/29/22 - Patient did require oxygen increase from nasal canula to venti mask 12l 40% during the night. She also was found to have a BNP of almost 1900. Her fluids were Dc'd and she was given a 1 time dose of Lasix 40 mg. This morning, she is much better. She is awake, alert, and oriented sitting in her chair eating breakfast. Her breathing without the mask on is not labored. She is able to speak in full sentences. Her lungs have a few scattered crackles at the base of her lungs without any wheezing. She reports she feels dizzy. Her granddaughter, whom is a family practice physician, reports she has trouble with wax build up in her ears and has seen ENT to have it removed several times. Family requested a low dose of Meclizine. Will add that today and repeat Lasix this evening. We will try to wean pt off the venti mask and back to NC throughout the day. We will repeat labs in the morning.Discussed the case with Dr. Ramachandran. He agreed with plan of care.         10/30/22:  Patient lying in bed with caregiver at bedside feeding breakfast. Patient reports feeling about the same as she did yesterday. She has not had BM in 5 days but denies abd  pain. She complains of intermittent left shoulder pain that is chronic. She typically takes Tylenol at home for this pain and has occasionally requested PRN Tylenol throughout hospitalization with relief of discomfort. VS reviewed over the last 24 hours and appear to be consistently ranging mid 80s to mid 90s throughout the night when she has trouble keeping venti mask on but upper 90s throughout the day when oxygen is properly utilized. Patient had an episode of somnolence last night and provider was called to room. Patient had removed mask and once replaced, patient returned to baseline mentation. AAOx4 and in NAD. Today's labs reviewed and notable as follows: BNP 1600, improved form 1898 yesterday. K 3.4 which is a decrease from 4.0 and 3.9 over the last 2 days. This is likely related to the 2 doses of IV Lasix given yesterday. CO2 43 which is an increase from yesterday. Patient has ranged 34-37 since admission on 10/24. CXR on 10/28 shows L pleural effusion. She continues with wt based Lovenox. Case discussed with Dr Ramachandran via Telemedicine consultation. He suggests adding C-pap at night for suspected sleep apnea due to consistent drop in sats throughout each night. Will also add Stool softener BID and Miralax PRN constipation. Will monitor potassium trends as patient does not want to take oral potassium at this time.       10/31/2022 ---discharge summary.  ---patient will be converted from acute to swing bed because she is still is very weak... 2.---she has continued to have a pulmonary embolus in the right lung.  Hypoxic episodes with having increased concentrated CO2 levels and mental confusion.  Will continue working with BiPAP to try to decrease the the CO2   when patient is swung will be checking labs and continuing current orders.       Goals of Care Treatment Preferences:  Code Status: DNR      Consults:     No new Assessment & Plan notes have been filed under this hospital service since the last note  was generated.  Service: Hospital Medicine    Final Active Diagnoses:    Diagnosis Date Noted POA    PRINCIPAL PROBLEM:  Pulmonary embolism [I26.99] 10/29/2022 Yes    Hypokalemia [E87.6] 10/30/2022 No    Constipation [K59.00] 10/30/2022 No    DVT prophylaxis [Z29.9] 10/30/2022 Not Applicable    Pain in joint of left shoulder [M25.512] 10/30/2022 Yes    Hypoxia [R09.02] 10/30/2022 Yes    CHF (congestive heart failure) [I50.9] 10/29/2022 Yes      Problems Resolved During this Admission:       Discharged Condition: good    Disposition:     Follow Up:    Patient Instructions:      CT Head With Contrast   Standing Status: Future Standing Exp. Date: 10/24/23     Order Specific Question Answer Comments   Is the patient allergic to iodine or contrast? No    Is the patient on ANY Metformin drug such as Glucophage/Glucovance?           Should be off drug 48 hours after contrast. Check renal function before restart. Yes    History of Kidney Disease - including: decreased kidney function, dialysis, kidney transplay, single kidney, kidney cancer, kidney surgery? None    Does the patient have high blood pressure requiring medical treatment? Yes    Diabetes? No    May the Radiologist modify the order per protocol to meet the clinical needs of the patient? Yes        Significant Diagnostic Studies: Labs:   BMP:   Recent Labs   Lab 10/30/22  0550   *      K 3.4*      CO2 43*   BUN 18   CREATININE 0.84   CALCIUM 8.0*   , CMP   Recent Labs   Lab 10/30/22  0550      K 3.4*      CO2 43*   *   BUN 18   CREATININE 0.84   CALCIUM 8.0*   ANIONGAP 1*   , CBC   Recent Labs   Lab 10/30/22  0550   WBC 6.63   HGB 10.7*   HCT 36.3*   *   , INR No results found for: INR, PROTIME, Lipid Panel No results found for: CHOL, HDL, LDLCALC, TRIG, CHOLHDL and Troponin No results for input(s): TROPONINI in the last 168 hours.    Pending Diagnostic Studies:     None         Medications:  Reconciled Home  Medications:      Medication List      ASK your doctor about these medications    ampicillin 500 MG capsule  Commonly known as: PRINCIPEN     ANORO ELLIPTA 62.5-25 mcg/actuation Dsdv  Generic drug: umeclidinium-vilanteroL  Inhale 2 puffs into the lungs once daily.     aspirin 81 MG EC tablet  Commonly known as: ECOTRIN  Take 81 mg by mouth once daily.     cholecalciferol (vitamin D3) 125 mcg (5,000 unit) Tab  Take 5,000 Units by mouth once daily.     ciprofloxacin HCl 500 MG tablet  Commonly known as: CIPRO  Take 500 mg by mouth 2 (two) times daily.     cloNIDine 0.1 MG tablet  Commonly known as: CATAPRES  Take 0.1 mg by mouth every 4 (four) hours as needed. For systolic blood pressure  >170     cyanocobalamin 1,000 mcg/mL injection  Inject 1,000 mcg into the muscle every 30 days. Next dose due this week     dexAMETHasone 2 MG tablet  Commonly known as: DECADRON  Take 2 mg by mouth every Mon, Wed, Fri.     diclofenac sodium 1 % Gel  Commonly known as: VOLTAREN     doxazosin 2 MG tablet  Commonly known as: CARDURA  Take 2 mg by mouth 2 (two) times a day.     gabapentin 100 MG capsule  Commonly known as: NEURONTIN  Take 100 mg by mouth every evening.     isosorbide mononitrate 30 MG 24 hr tablet  Commonly known as: IMDUR     levothyroxine 50 MCG tablet  Commonly known as: SYNTHROID  Take 50 mcg by mouth before breakfast.     losartan 50 MG tablet  Commonly known as: COZAAR  Take 50 mg by mouth 2 (two) times a day.     meloxicam 7.5 MG tablet  Commonly known as: MOBIC  Take 7.5 mg by mouth 2 (two) times a day.     montelukast 10 mg tablet  Commonly known as: SINGULAIR  Take by mouth every evening.     NIFEdipine 30 MG Tbsr  Commonly known as: ADALAT CC     nitrofurantoin (macrocrystal-monohydrate) 100 MG capsule  Commonly known as: MACROBID  Take 100 mg by mouth every evening.     omeprazole 40 MG capsule  Commonly known as: PRILOSEC  Take 40 mg by mouth every morning.     ondansetron 4 MG tablet  Commonly known as:  ZOFRAN  Take 4 mg by mouth every 6 (six) hours as needed for Nausea.     phenazopyridine 200 MG tablet  Commonly known as: PYRIDIUM  Take 200 mg by mouth 3 (three) times daily as needed for Pain.     potassium chloride SA 10 MEQ tablet  Commonly known as: K-DUR,KLOR-CON M     primidone 50 MG Tab  Commonly known as: MYSOLINE  Take 100 mg by mouth 2 (two) times a day.     rOPINIRole 0.5 MG tablet  Commonly known as: REQUIP  Take 0.5 mg by mouth every evening.     rosuvastatin 10 MG tablet  Commonly known as: CRESTOR  Take 10 mg by mouth once daily.     sertraline 25 MG tablet  Commonly known as: ZOLOFT  Take 25 mg by mouth once daily.     tiZANidine 4 MG tablet  Commonly known as: ZANAFLEX  Take 1 tablet (4 mg total) by mouth every 6 (six) hours as needed.     travoprost 0.004 % ophthalmic solution  Commonly known as: TRAVATAN Z  Place 1 drop into both eyes every evening.     valACYclovir 1000 MG tablet  Commonly known as: VALTREX  Take 1 tablet (1,000 mg total) by mouth 3 (three) times daily. for 10 days            Indwelling Lines/Drains at time of discharge:   Lines/Drains/Airways     None                 Time spent on the discharge of patient: 45 minutes         Anmol Reese DO  Department of Hospital Medicine  [facility]

## 2022-10-31 NOTE — PLAN OF CARE
Problem: Adult Inpatient Plan of Care  Goal: Plan of Care Review  Outcome: Ongoing, Progressing  Goal: Patient-Specific Goal (Individualized)  Outcome: Ongoing, Progressing  Goal: Absence of Hospital-Acquired Illness or Injury  Outcome: Ongoing, Progressing  Goal: Optimal Comfort and Wellbeing  Outcome: Ongoing, Progressing  Goal: Readiness for Transition of Care  Outcome: Ongoing, Progressing     Problem: Fall Injury Risk  Goal: Absence of Fall and Fall-Related Injury  Outcome: Ongoing, Progressing     Problem: Skin Injury Risk Increased  Goal: Skin Health and Integrity  Outcome: Ongoing, Progressing     Problem: Electrolyte Imbalance  Goal: Electrolyte Balance  Outcome: Ongoing, Progressing     Problem: Adjustment to Illness COPD (Chronic Obstructive Pulmonary Disease)  Goal: Optimal Chronic Illness Coping  Outcome: Ongoing, Progressing     Problem: Functional Ability Impaired COPD (Chronic Obstructive Pulmonary Disease)  Goal: Optimal Level of Functional Transfer  Outcome: Ongoing, Progressing     Problem: Infection COPD (Chronic Obstructive Pulmonary Disease)  Goal: Absence of Infection Signs and Symptoms  Outcome: Ongoing, Progressing     Problem: Oral Intake Inadequate COPD (Chronic Obstructive Pulmonary Disease)  Goal: Improved Nutrition Intake  Outcome: Ongoing, Progressing     Problem: Respiratory Compromise COPD (Chronic Obstructive Pulmonary Disease)  Goal: Effective Oxygenation and Ventilation  Outcome: Ongoing, Progressing     Problem: UTI (Urinary Tract Infection)  Goal: Improved Infection Symptoms  Outcome: Ongoing, Progressing

## 2022-10-31 NOTE — RESPIRATORY THERAPY
At patient bedside to set up on CPAP while she sleeps. Pt tolerated mask and pressure setting. Sats dropped slightly once placed on CPAP; adjusted FiO2 several times and monitored the patient until sats stabilized around 95%. Notified the nurse and provider

## 2022-10-31 NOTE — PT/OT/SLP PROGRESS
Physical Therapy Treatment    Patient Name:  Pilar Reardon   MRN:  [id number]    Recommendations:     Discharge Recommendations:  home with home health   Discharge Equipment Recommendations: walker, rolling   Barriers to discharge: None    Assessment:     Pilar Reardon is a 89 y.o. female admitted with a medical diagnosis of Somnolence, weakness; pleural effusion.  She presents with the following impairments/functional limitations:  weakness, impaired endurance, impaired self care skills, impaired functional mobility, gait instability, impaired balance .    Patient presents with lethargy today and had difficulty keeping eyes open.    Rehab Prognosis: Fair; patient would benefit from acute skilled PT services to address these deficits and reach maximum level of function.    Recent Surgery: * No surgery found *      Plan:     During this hospitalization, patient to be seen 5 x/week to address the identified rehab impairments via gait training, therapeutic activities, therapeutic exercises, neuromuscular re-education and progress toward the following goals:    Plan of Care Expires:       Subjective     Chief Complaint: fatigue, weakness  Patient/Family Comments/goals: to improve strength and mobility  Pain/Comfort:  Pain Rating 1: 0/10      Objective:     Communicated with patient and spouse prior to session.  Patient found supine with oxygen upon PT entry to room.     General Precautions: Standard, fall   Orthopedic Precautions:N/A   Braces: N/A  Respiratory Status: Nasal cannula, flow 2 L/min     Functional Mobility:  Bed Mobility:     Rolling Left:  minimum assistance  Bridging: minimum assistance  Supine to Sit: moderate assistance  Sit to Supine: minimum assistance  Transfers:     Sit to Stand:  moderate assistance with rolling walker      AM-PAC 6 CLICK MOBILITY  Turning over in bed (including adjusting bedclothes, sheets and blankets)?: 3  Sitting down on and standing up from a chair with arms (e.g.,  wheelchair, bedside commode, etc.): 2  Moving from lying on back to sitting on the side of the bed?: 2  Moving to and from a bed to a chair (including a wheelchair)?: 1  Need to walk in hospital room?: 1  Climbing 3-5 steps with a railing?: 1  Basic Mobility Total Score: 10       Treatment & Education:  Patient sat edge of bed for 10 minutes with CGA.  Sit to stand form bedside Mod A x 2 efforts  Side stepping to head of bed using RW Min A  Verbal and tactile cues provided to reinforce posture and safety.    Patient left supine with call button in reach and family present..    GOALS:   Multidisciplinary Problems       Physical Therapy Goals          Problem: Physical Therapy    Goal Priority Disciplines Outcome Goal Variances Interventions   Physical Therapy Goal     PT, PT/OT Ongoing, Progressing     Description: Goals to be met by: 1 week     Patient will increase functional independence with mobility by performin. Supine to sit with Contact Guard Assistance  2. Sit to supine with Contact Guard Assistance  3. Sit to stand transfer with Contact Guard Assistance  4. Bed to chair transfer with Contact Guard Assistance using Rolling Walker  5. Gait  x 20 feet with Minimal Assistance using Rolling Walker.                          Time Tracking:     PT Received On: 10/31/22  PT Start Time: 1435     PT Stop Time: 1450  PT Total Time (min): 15 min     Billable Minutes: Therapeutic Activity 15    Treatment Type: Treatment  PT/PTA: PT     PTA Visit Number: 0     10/31/2022

## 2022-10-31 NOTE — PLAN OF CARE
[facility] - Medical Surgical Unit  Discharge Final Note    Primary Care Provider: Marilee Witt MD    Expected Discharge Date:     Final Discharge Note (most recent)       Final Note - 10/31/22 1058          Final Note    Assessment Type Final Discharge Note     Anticipated Discharge Disposition Swing Bed   [facility]    What phone number can be called within the next 1-3 days to see how you are doing after discharge? [phone]     Hospital Resources/Appts/Education Provided Provided patient/caregiver with written discharge plan information;Appointments scheduled and added to AVS        Post-Acute Status    Post-Acute Authorization Other   swing bed at [facility]    Coverage [employer]     Patient choice form signed by patient/caregiver List with quality metrics by geographic area provided;List from CMS Compare;List from System Post-Acute Care     Discharge Delays None known at this time                 Pt. Will d/c from [facility] to transfer to swing bed here at [facility] to cont. With supplemental oxygen, medication management, PT/OT services, and supportive care. Pt. And family are in agreement for swing bed. Will cont. To follow pt. In swing bed.    Important Message from Medicare

## 2022-11-01 LAB
ANION GAP SERPL CALCULATED.3IONS-SCNC: 5 MMOL/L (ref 7–16)
BASOPHILS # BLD AUTO: 0.01 K/UL (ref 0–0.2)
BASOPHILS NFR BLD AUTO: 0.2 % (ref 0–1)
BUN SERPL-MCNC: 27 MG/DL (ref 7–18)
BUN/CREAT SERPL: 34 (ref 6–20)
CALCIUM SERPL-MCNC: 8.1 MG/DL (ref 8.5–10.1)
CHLORIDE SERPL-SCNC: 103 MMOL/L (ref 98–107)
CO2 SERPL-SCNC: 40 MMOL/L (ref 21–32)
CREAT SERPL-MCNC: 0.8 MG/DL (ref 0.55–1.02)
DIFFERENTIAL METHOD BLD: ABNORMAL
EGFR (NO RACE VARIABLE) (RUSH/TITUS): 70 ML/MIN/1.73M²
EOSINOPHIL # BLD AUTO: 0.25 K/UL (ref 0–0.5)
EOSINOPHIL NFR BLD AUTO: 3.8 % (ref 1–4)
ERYTHROCYTE [DISTWIDTH] IN BLOOD BY AUTOMATED COUNT: 13.9 % (ref 11.5–14.5)
GLUCOSE SERPL-MCNC: 98 MG/DL (ref 74–106)
HCT VFR BLD AUTO: 36.1 % (ref 38–47)
HGB BLD-MCNC: 10.7 G/DL (ref 12–16)
LYMPHOCYTES # BLD AUTO: 3.52 K/UL (ref 1–4.8)
LYMPHOCYTES NFR BLD AUTO: 52.9 % (ref 27–41)
MCH RBC QN AUTO: 30.4 PG (ref 27–31)
MCHC RBC AUTO-ENTMCNC: 29.6 G/DL (ref 32–36)
MCV RBC AUTO: 102.6 FL (ref 80–96)
MONOCYTES # BLD AUTO: 0.28 K/UL (ref 0–0.8)
MONOCYTES NFR BLD AUTO: 4.2 % (ref 2–6)
MPC BLD CALC-MCNC: 11.9 FL (ref 9.4–12.4)
NEUTROPHILS # BLD AUTO: 2.59 K/UL (ref 1.8–7.7)
NEUTROPHILS NFR BLD AUTO: 38.9 % (ref 53–65)
PLATELET # BLD AUTO: 135 K/UL (ref 150–400)
POTASSIUM SERPL-SCNC: 3.8 MMOL/L (ref 3.5–5.1)
PREALB SERPL NEPH-MCNC: 15 MG/DL (ref 20–40)
RBC # BLD AUTO: 3.52 M/UL (ref 4.2–5.4)
SODIUM SERPL-SCNC: 144 MMOL/L (ref 136–145)
WBC # BLD AUTO: 6.65 K/UL (ref 4.5–11)

## 2022-11-01 PROCEDURE — 63600175 PHARM REV CODE 636 W HCPCS: Performed by: PHYSICIAN ASSISTANT

## 2022-11-01 PROCEDURE — 63600175 PHARM REV CODE 636 W HCPCS: Performed by: FAMILY MEDICINE

## 2022-11-01 PROCEDURE — 97162 PT EVAL MOD COMPLEX 30 MIN: CPT

## 2022-11-01 PROCEDURE — 27000221 HC OXYGEN, UP TO 24 HOURS

## 2022-11-01 PROCEDURE — 25000003 PHARM REV CODE 250: Performed by: FAMILY MEDICINE

## 2022-11-01 PROCEDURE — 80048 BASIC METABOLIC PNL TOTAL CA: CPT | Performed by: FAMILY MEDICINE

## 2022-11-01 PROCEDURE — 27000941

## 2022-11-01 PROCEDURE — 25000003 PHARM REV CODE 250: Performed by: PHYSICIAN ASSISTANT

## 2022-11-01 PROCEDURE — 99306 PR NURSING FACILITY CARE, INIT, HIGH SEVERITY: ICD-10-PCS | Mod: ,,, | Performed by: FAMILY MEDICINE

## 2022-11-01 PROCEDURE — 36415 COLL VENOUS BLD VENIPUNCTURE: CPT | Performed by: FAMILY MEDICINE

## 2022-11-01 PROCEDURE — 84134 ASSAY OF PREALBUMIN: CPT | Performed by: FAMILY MEDICINE

## 2022-11-01 PROCEDURE — 94761 N-INVAS EAR/PLS OXIMETRY MLT: CPT

## 2022-11-01 PROCEDURE — 94640 AIRWAY INHALATION TREATMENT: CPT

## 2022-11-01 PROCEDURE — 11000004 HC SNF PRIVATE

## 2022-11-01 PROCEDURE — 25000242 PHARM REV CODE 250 ALT 637 W/ HCPCS: Performed by: PHYSICIAN ASSISTANT

## 2022-11-01 PROCEDURE — 94660 CPAP INITIATION&MGMT: CPT

## 2022-11-01 PROCEDURE — 85025 COMPLETE CBC W/AUTO DIFF WBC: CPT | Performed by: FAMILY MEDICINE

## 2022-11-01 PROCEDURE — 27000987 HC MATTRESS, MATRIX LOW PROFILE

## 2022-11-01 PROCEDURE — 99900035 HC TECH TIME PER 15 MIN (STAT)

## 2022-11-01 PROCEDURE — 99306 1ST NF CARE HIGH MDM 50: CPT | Mod: ,,, | Performed by: FAMILY MEDICINE

## 2022-11-01 RX ORDER — ACETAMINOPHEN 325 MG/1
325 TABLET ORAL EVERY 6 HOURS PRN
Status: DISCONTINUED | OUTPATIENT
Start: 2022-11-01 | End: 2022-11-07 | Stop reason: HOSPADM

## 2022-11-01 RX ORDER — TRAVOPROST OPHTHALMIC SOLUTION 0.04 MG/ML
1 SOLUTION OPHTHALMIC NIGHTLY
Status: DISCONTINUED | OUTPATIENT
Start: 2022-11-01 | End: 2022-11-07 | Stop reason: HOSPADM

## 2022-11-01 RX ORDER — ACETAMINOPHEN 325 MG/1
650 TABLET ORAL EVERY 6 HOURS PRN
Status: DISCONTINUED | OUTPATIENT
Start: 2022-11-01 | End: 2022-11-07 | Stop reason: HOSPADM

## 2022-11-01 RX ADMIN — ASPIRIN 81 MG: 81 TABLET, COATED ORAL at 10:11

## 2022-11-01 RX ADMIN — SENNOSIDES AND DOCUSATE SODIUM 1 TABLET: 8.6; 5 TABLET ORAL at 10:11

## 2022-11-01 RX ADMIN — MONTELUKAST 10 MG: 10 TABLET, FILM COATED ORAL at 08:11

## 2022-11-01 RX ADMIN — POLYETHYLENE GLYCOL 3350 17 G: 17 POWDER, FOR SOLUTION ORAL at 03:11

## 2022-11-01 RX ADMIN — DOXAZOSIN 2 MG: 1 TABLET ORAL at 08:11

## 2022-11-01 RX ADMIN — ACETAMINOPHEN 650 MG: 325 TABLET ORAL at 03:11

## 2022-11-01 RX ADMIN — PRIMIDONE 100 MG: 50 TABLET ORAL at 10:11

## 2022-11-01 RX ADMIN — ATORVASTATIN CALCIUM 40 MG: 40 TABLET, FILM COATED ORAL at 08:11

## 2022-11-01 RX ADMIN — MELOXICAM 7.5 MG: 7.5 TABLET ORAL at 08:11

## 2022-11-01 RX ADMIN — IPRATROPIUM BROMIDE AND ALBUTEROL SULFATE 3 ML: 2.5; .5 SOLUTION RESPIRATORY (INHALATION) at 07:11

## 2022-11-01 RX ADMIN — IPRATROPIUM BROMIDE AND ALBUTEROL SULFATE 3 ML: 2.5; .5 SOLUTION RESPIRATORY (INHALATION) at 12:11

## 2022-11-01 RX ADMIN — DOXAZOSIN 2 MG: 1 TABLET ORAL at 10:11

## 2022-11-01 RX ADMIN — IPRATROPIUM BROMIDE AND ALBUTEROL SULFATE 3 ML: 2.5; .5 SOLUTION RESPIRATORY (INHALATION) at 01:11

## 2022-11-01 RX ADMIN — GABAPENTIN 100 MG: 100 CAPSULE ORAL at 08:11

## 2022-11-01 RX ADMIN — CYANOCOBALAMIN 1000 MCG: 1000 INJECTION, SOLUTION INTRAMUSCULAR; SUBCUTANEOUS at 10:11

## 2022-11-01 RX ADMIN — NITROFURANTOIN MONOHYDRATE/MACROCRYSTALS 100 MG: 75; 25 CAPSULE ORAL at 08:11

## 2022-11-01 RX ADMIN — TRAVOPROST OPHTHALMIC SOLUTION 1 DROP: 0.04 SOLUTION OPHTHALMIC at 08:11

## 2022-11-01 RX ADMIN — MELOXICAM 7.5 MG: 7.5 TABLET ORAL at 10:11

## 2022-11-01 RX ADMIN — PANTOPRAZOLE SODIUM 40 MG: 40 TABLET, DELAYED RELEASE ORAL at 10:11

## 2022-11-01 RX ADMIN — CHOLECALCIFEROL TAB 125 MCG (5000 UNIT) 5000 UNITS: 125 TAB at 10:11

## 2022-11-01 RX ADMIN — PRIMIDONE 100 MG: 50 TABLET ORAL at 08:11

## 2022-11-01 RX ADMIN — SENNOSIDES AND DOCUSATE SODIUM 1 TABLET: 8.6; 5 TABLET ORAL at 08:11

## 2022-11-01 RX ADMIN — ENOXAPARIN SODIUM 80 MG: 100 INJECTION SUBCUTANEOUS at 04:11

## 2022-11-01 RX ADMIN — SERTRALINE HYDROCHLORIDE 25 MG: 25 TABLET ORAL at 10:11

## 2022-11-01 RX ADMIN — LEVOTHYROXINE SODIUM 50 MCG: 0.05 TABLET ORAL at 05:11

## 2022-11-01 RX ADMIN — ROPINIROLE HYDROCHLORIDE 0.5 MG: 0.25 TABLET, FILM COATED ORAL at 08:11

## 2022-11-01 NOTE — PLAN OF CARE
Problem: Physical Therapy  Goal: Physical Therapy Goal  Description: Goals to be met by: 2 weeks     Patient will increase functional independence with mobility by performin. Supine to sit with Stand by Assistance  2. Sit to supine with Stand by Assistance  3. Sit to stand transfer with Contact Guard Assistance  4. Bed to chair transfer with Contact Guard Assistance using Rolling Walker  5. Gait  x 50 feet with Contact Guard Assistance using Rolling Walker.     Goals to be met by: 4 weeks     Patient will increase functional independence with mobility by performin. Supine to sit with Modified [place]  2. Sit to supine with Modified [place]  3. Sit to stand transfer with Modified [place]  4. Bed to chair transfer with Modified [place] using Rolling Walker  5. Gait  x 100 feet with Modified [place] using Rolling Walker.   6. Stand for 10 minutes with Supervision using Rolling Walker.    Outcome: Ongoing, Progressing

## 2022-11-01 NOTE — PROGRESS NOTES
Skilled OT ordered received and reviewed however the pt unable to participate due to increase confusion, alertness, and inability to follow commands  
no

## 2022-11-01 NOTE — SUBJECTIVE & OBJECTIVE
Past Medical History:   Diagnosis Date    Actinic keratoses     Arthritis     COPD (chronic obstructive pulmonary disease)     Frequent UTI     GERD (gastroesophageal reflux disease)     High cholesterol     HTN (hypertension)        Past Surgical History:   Procedure Laterality Date    APPENDECTOMY      HYSTERECTOMY      pessary         Review of patient's allergies indicates:   Allergen Reactions    Bactrim [sulfamethoxazole-trimethoprim]     Ceftin [cefuroxime axetil]     Iodinated contrast media Other (See Comments)       No current facility-administered medications on file prior to encounter.     Current Outpatient Medications on File Prior to Encounter   Medication Sig    aspirin (ECOTRIN) 81 MG EC tablet Take 81 mg by mouth once daily.    cholecalciferol, vitamin D3, 125 mcg (5,000 unit) Tab Take 5,000 Units by mouth once daily.    cloNIDine (CATAPRES) 0.1 MG tablet Take 0.1 mg by mouth every 4 (four) hours as needed. For systolic blood pressure  >170    cyanocobalamin 1,000 mcg/mL injection Inject 1,000 mcg into the muscle every 30 days. Next dose due this week    dexAMETHasone (DECADRON) 2 MG tablet Take 2 mg by mouth every Mon, Wed, Fri.    diclofenac sodium (VOLTAREN) 1 % Gel     doxazosin (CARDURA) 2 MG tablet Take 2 mg by mouth 2 (two) times a day.    gabapentin (NEURONTIN) 100 MG capsule Take 100 mg by mouth every evening.    levothyroxine (SYNTHROID) 50 MCG tablet Take 50 mcg by mouth before breakfast.    meloxicam (MOBIC) 7.5 MG tablet Take 7.5 mg by mouth 2 (two) times a day.    montelukast (SINGULAIR) 10 mg tablet Take by mouth every evening.    nitrofurantoin, macrocrystal-monohydrate, (MACROBID) 100 MG capsule Take 100 mg by mouth every evening.    omeprazole (PRILOSEC) 40 MG capsule Take 40 mg by mouth every morning.    ondansetron (ZOFRAN) 4 MG tablet Take 4 mg by mouth every 6 (six) hours as needed for Nausea.    phenazopyridine (PYRIDIUM) 200 MG tablet Take 200 mg by mouth 3 (three) times  daily as needed for Pain.    primidone (MYSOLINE) 50 MG Tab Take 100 mg by mouth 2 (two) times a day.    rOPINIRole (REQUIP) 0.5 MG tablet Take 0.5 mg by mouth every evening.    sertraline (ZOLOFT) 25 MG tablet Take 25 mg by mouth once daily.    travoprost (TRAVATAN Z) 0.004 % ophthalmic solution Place 1 drop into both eyes every evening.    umeclidinium-vilanteroL (ANORO ELLIPTA) 62.5-25 mcg/actuation DsDv Inhale 2 puffs into the lungs once daily.    tiZANidine (ZANAFLEX) 4 MG tablet Take 1 tablet (4 mg total) by mouth every 6 (six) hours as needed.    valACYclovir (VALTREX) 1000 MG tablet Take 1 tablet (1,000 mg total) by mouth 3 (three) times daily. for 10 days     Family History       Problem Relation (Age of Onset)    Diabetes Daughter    Hypertension Daughter    Melanoma Daughter          Tobacco Use    Smoking status: Never    Smokeless tobacco: Never   Substance and Sexual Activity    Alcohol use: Never    Drug use: Never    Sexual activity: Not Currently     Review of Systems   Constitutional: Negative.    HENT: Negative.     Eyes: Negative.    Respiratory: Negative.     Cardiovascular: Negative.    Gastrointestinal: Negative.    Endocrine: Negative.    Genitourinary: Negative.    Musculoskeletal: Negative.         Shortness of breath with generalized muscle weakness.  The patient gets exhausted upon getting an upright position.   Skin: Negative.    Allergic/Immunologic: Negative.    Neurological: Negative.    Hematological: Negative.    Psychiatric/Behavioral: Negative.     All other systems reviewed and are negative.  Objective:     Vital Signs (Most Recent):  Temp: 98.2 °F (36.8 °C) (11/01/22 0735)  Pulse: 70 (11/01/22 1354)  Resp: (!) 22 (11/01/22 1354)  BP: 123/60 (11/01/22 0735)  SpO2: 98 % (11/01/22 1354)   Vital Signs (24h Range):  Temp:  [97.6 °F (36.4 °C)-98.2 °F (36.8 °C)] 98.2 °F (36.8 °C)  Pulse:  [67-86] 70  Resp:  [18-25] 22  SpO2:  [88 %-98 %] 98 %  BP: (119-128)/(57-68) 123/60     Weight:  64.5 kg (142 lb 4.8 oz)  Body mass index is 26.03 kg/m².    Physical Exam  Vitals and nursing note reviewed.   Constitutional:       Appearance: Normal appearance. She is normal weight.   HENT:      Head: Normocephalic and atraumatic.      Right Ear: Tympanic membrane, ear canal and external ear normal.      Left Ear: Tympanic membrane and external ear normal.      Nose: Nose normal.      Mouth/Throat:      Mouth: Mucous membranes are moist.   Eyes:      Extraocular Movements: Extraocular movements intact.      Conjunctiva/sclera: Conjunctivae normal.      Pupils: Pupils are equal, round, and reactive to light.   Cardiovascular:      Rate and Rhythm: Normal rate.      Pulses: Normal pulses.      Heart sounds: Normal heart sounds.   Pulmonary:      Effort: Pulmonary effort is normal.      Breath sounds: Normal breath sounds.   Abdominal:      General: Abdomen is flat. Bowel sounds are normal.      Palpations: Abdomen is soft.   Musculoskeletal:         General: Normal range of motion.      Cervical back: Normal range of motion and neck supple.      Comments: Patient with generalized muscle weakness.  Shortness of breath on exertion.   Skin:     General: Skin is warm and dry.      Capillary Refill: Capillary refill takes less than 2 seconds.   Neurological:      General: No focal deficit present.      Mental Status: She is alert and oriented to person, place, and time. Mental status is at baseline.   Psychiatric:         Mood and Affect: Mood normal.         Behavior: Behavior normal.         Thought Content: Thought content normal.         Judgment: Judgment normal.         CRANIAL NERVES     CN III, IV, VI   Pupils are equal, round, and reactive to light.     Significant Labs: All pertinent labs within the past 24 hours have been reviewed.    Significant Imaging: I have reviewed all pertinent imaging results/findings within the past 24 hours.

## 2022-11-01 NOTE — HPI
Patient comes in with multiple problems which include shortness of breath.  Elevated Sous 2 counts.  Altered mental status secondary to elevated CO2 counts.  She also has what appears to be sleep apnea which is requiring BiPAP.  She has become increased weaker muscle weakness in the bed while admitted here.  She has working with physical therapy and will be trying to regain her strength she is eating well she is having regular bowel movements her somnolence is improving because the a mound of somnolence over week or to an attempt to get her CO2 down to where her mental status is better is caused her increased muscle weakness. ******** Is found that she had a pulmonary embolus that was causing hypoxia with to the degree that the patient has had confusion and has been no pneumonia.  The patient is on 80 of Lovenox daily.

## 2022-11-01 NOTE — PLAN OF CARE
[facility] - Medical Surgical Unit  Initial Discharge Assessment       Primary Care Provider: Marilee Witt MD    Admission Diagnosis: Pulmonary embolus [I26.99]    Admission Date: 10/31/2022  Expected Discharge Date:     Discharge Barriers Identified: None    Payor: HU[employer] MANAGED MEDICARE / Plan: HU[employer] MEDICARE PPO / Product Type: Medicare Advantage /     Extended Emergency Contact Information  Primary Emergency Contact: IDA MORATAYA  Mobile Phone: [phone]  Relation: Daughter   needed? No    Discharge Plan A: Home with family  Discharge Plan B: Home with family, Home Health      [facility] - [place] - [address]  [address]  [place] [address]  Phone: [phone] Fax: [phone]      Initial Assessment (most recent)       Adult Discharge Assessment - 11/01/22 1042          Discharge Assessment    Assessment Type Discharge Planning Assessment     Confirmed/corrected address, phone number and insurance Yes     Confirmed Demographics Correct on Facesheet     Source of Information patient;family;health record     If unable to respond/provide information was family/caregiver contacted? Yes     Contact Name/Number lukasz Monzon [phone]     Communicated JERRY with patient/caregiver Date not available/Unable to determine     Reason For Admission Pulmonary embolism, weakness, COPD     Lives With child(quiana), adult     Facility Arrived From: [facility]     Do you expect to return to your current living situation? Yes     Do you have help at home or someone to help you manage your care at home? Yes     Who are your caregiver(s) and their phone number(s)? lukasz Monzon     Prior to hospitilization cognitive status: Not Oriented to Time     Current cognitive status: Not Oriented to Time     Walking or Climbing Stairs Difficulty ambulation difficulty, requires equipment;stair climbing difficulty, requires equipment;transferring  difficulty, requires equipment     Mobility Management RW     Dressing/Bathing Difficulty bathing difficulty, assistance 1 person;dressing difficulty, assistance 1 person     Home Accessibility wheelchair accessible     Home Layout Able to live on 1st floor     Equipment Currently Used at Home walker, rolling;oxygen     Readmission within 30 days? No     Patient currently being followed by outpatient case management? No     Do you currently have service(s) that help you manage your care at home? No     Do you take prescription medications? Yes     Do you have prescription coverage? Yes     Coverage [employer]     Do you have any problems affording any of your prescribed medications? No     Is the patient taking medications as prescribed? yes     Who is going to help you get home at discharge? Dianne Arteaga, daughter     How do you get to doctors appointments? family or friend will provide     Are you on dialysis? No     Do you take coumadin? No     Discharge Plan A Home with family     Discharge Plan B Home with family;Home Health     DME Needed Upon Discharge  bedside commode;walker, rolling;wheelchair     Discharge Plan discussed with: Patient;Adult children     Discharge Barriers Identified None        Physical Activity    On average, how many days per week do you engage in moderate to strenuous exercise (like a brisk walk)? 2 days     On average, how many minutes do you engage in exercise at this level? 10 min        Financial Resource Strain    How hard is it for you to pay for the very basics like food, housing, medical care, and heating? Somewhat hard        Housing Stability    In the last 12 months, was there a time when you were not able to pay the mortgage or rent on time? No     In the last 12 months, how many places have you lived? 1     In the last 12 months, was there a time when you did not have a steady place to sleep or slept in a shelter (including now)? No        Transportation Needs    In the past  12 months, has lack of transportation kept you from medical appointments or from getting medications? No     In the past 12 months, has lack of transportation kept you from meetings, work, or from getting things needed for daily living? No        Food Insecurity    Within the past 12 months, you worried that your food would run out before you got the money to buy more. Sometimes true     Within the past 12 months, the food you bought just didn't last and you didn't have money to get more. Sometimes true        Stress    Do you feel stress - tense, restless, nervous, or anxious, or unable to sleep at night because your mind is troubled all the time - these days? Only a little        Social Connections    In a typical week, how many times do you talk on the phone with family, friends, or neighbors? More than three times a week     How often do you get together with friends or relatives? More than three times a week     How often do you attend Confucianism or Advent services? More than 4 times per year     Do you belong to any clubs or organizations such as Confucianism groups, unions, fraternal or athletic groups, or school groups? No     How often do you attend meetings of the clubs or organizations you belong to? Never     Are you , , , , never , or living with a partner?         Alcohol Use    Q1: How often do you have a drink containing alcohol? Never     Q2: How many drinks containing alcohol do you have on a typical day when you are drinking? Patient does not drink     Q3: How often do you have six or more drinks on one occasion? Never                   Pt. Admitted to swing bed services to continue with PT/OT, medication management, oxygen weaning, and supportive care. Pt. Had an acute inpatient stay with somnolence, weakness, pulmonary embolus, and COPD exacerbation. Pt. Continues on Lovenox 80 mg daily for pulmonary embolus. Pt. Lives in own home with family staying with  her at all times. Pt. Has a RW and home O2. Pt. Is not current with HH services but may need HH when she is d/c from swing bed. Daughter has been staying with pt. In room. Plans are to return home with family at d/c. Will cont. To follow and assist as needed throughout stay.

## 2022-11-01 NOTE — PT/OT/SLP EVAL
Physical Therapy Evaluation    Patient Name:  Pilar Reardon   MRN:  [id number]    Recommendations:     Discharge Recommendations:  home with home health   Discharge Equipment Recommendations: none   Barriers to discharge: None    Assessment:     Pilar Reardon is a 89 y.o. female admitted with a medical diagnosis of Pulmonary embolus.  She presents with the following impairments/functional limitations:  weakness, impaired endurance, impaired functional mobility, gait instability, impaired balance, decreased lower extremity function, decreased safety awareness.    PT met face to face with PTA, Barbara Enciso, to discuss patient's POC including established goals for this skilled intervention.  Treatment will be continued as described in initial eval.  Patient will be seen by physical therapist every sixth visit.    Rehab Prognosis: Good; patient would benefit from acute skilled PT services to address these deficits and reach maximum level of function.    Recent Surgery: * No surgery found *      Plan:     During this hospitalization, patient to be seen 5 x/week to address the identified rehab impairments via gait training, therapeutic activities, therapeutic exercises, neuromuscular re-education and progress toward the following goals:    Plan of Care Expires:       Subjective     Chief Complaint: Pt reports that she has just been so tired all day.  Daughter reports that pt has been on nasal canula at 2 L this afternoon and sats have stayed in 90's.  Patient/Family Comments/goals: To return to PLOF  Pain/Comfort:  Pain Rating 1: 0/10  Pain Rating Post-Intervention 1: 0/10    Patients cultural, spiritual, Hoahaoism conflicts given the current situation: no    Living Environment:  Pt lived in a single level home with son.  Pt has a ramp from outside to inside home.  Pt does not have grab bars in bathroom.  Prior to admission, patients level of function was modified indep.  Equipment used at home: walker, rolling.   DME owned (not currently used): rolling walker, wheelchair, quad cane, and rollator.  Upon discharge, patient will have assistance from family, home health.    Objective:     Communicated with pt, daughter prior to session.  Patient found supine with oxygen, SCD  upon PT entry to room.    General Precautions: Standard, fall   Orthopedic Precautions:N/A   Braces: N/A  Respiratory Status: Nasal cannula, flow 2 L/min    Exams:  Cognitive Exam:  Patient is oriented to Person, Place, Time, and Situation  RLE ROM: WFL  RLE Strength: Grossly 3+/5  LLE ROM: WFL  LLE Strength: Grossly 4-/5    Functional Mobility:  Bed Mobility:     Rolling Left:  contact guard assistance  Scooting: contact guard assistance and minimum assistance  Supine to Sit: minimum assistance  Sit to Supine: minimum assistance  Transfers:     Sit to Stand:  moderate assistance with rolling walker  Balance: Static standing balance is poor+, dynamic standing balance is poor      AM-PAC 6 CLICK MOBILITY  Total Score:11       Treatment & Education:  Pt educated on PT role/POC.   Importance of OOB activity with staff assistance.  Importance of sitting up in the chair throughout the day as tolerated, especially for meals   Safety during functional t/f and mobility with use of RW  Functional mobility completed- assistance level noted above   All questions/concerns answered within PT scope of practice        Patient left supine with all lines intact, call button in reach, and family present.    GOALS:   Multidisciplinary Problems       Physical Therapy Goals          Problem: Physical Therapy    Goal Priority Disciplines Outcome Goal Variances Interventions   Physical Therapy Goal     PT, PT/OT Ongoing, Progressing     Description: Goals to be met by: 2 weeks     Patient will increase functional independence with mobility by performin. Supine to sit with Stand by Assistance  2. Sit to supine with Stand by Assistance  3. Sit to stand transfer with Contact  Guard Assistance  4. Bed to chair transfer with Contact Guard Assistance using Rolling Walker  5. Gait  x 50 feet with Contact Guard Assistance using Rolling Walker.     Goals to be met by: 4 weeks     Patient will increase functional independence with mobility by performin. Supine to sit with Modified [place]  2. Sit to supine with Modified [place]  3. Sit to stand transfer with Modified [place]  4. Bed to chair transfer with Modified [place] using Rolling Walker  5. Gait  x 100 feet with Modified [place] using Rolling Walker.   6. Stand for 10 minutes with Supervision using Rolling Walker.                         History:     Past Medical History:   Diagnosis Date    Actinic keratoses     Arthritis     COPD (chronic obstructive pulmonary disease)     Frequent UTI     GERD (gastroesophageal reflux disease)     High cholesterol     HTN (hypertension)        Past Surgical History:   Procedure Laterality Date    APPENDECTOMY      HYSTERECTOMY      pessary         Time Tracking:     PT Received On: 22  PT Start Time: 1515     PT Stop Time: 1535  PT Total Time (min): 20 min     Billable Minutes: Evaluation 2022

## 2022-11-02 PROCEDURE — 63600175 PHARM REV CODE 636 W HCPCS: Performed by: FAMILY MEDICINE

## 2022-11-02 PROCEDURE — 27000987 HC MATTRESS, MATRIX LOW PROFILE

## 2022-11-02 PROCEDURE — 97166 OT EVAL MOD COMPLEX 45 MIN: CPT

## 2022-11-02 PROCEDURE — 27000221 HC OXYGEN, UP TO 24 HOURS

## 2022-11-02 PROCEDURE — 27000941

## 2022-11-02 PROCEDURE — 63600175 PHARM REV CODE 636 W HCPCS: Performed by: PHYSICIAN ASSISTANT

## 2022-11-02 PROCEDURE — 97110 THERAPEUTIC EXERCISES: CPT | Mod: CQ

## 2022-11-02 PROCEDURE — 25000003 PHARM REV CODE 250: Performed by: PHYSICIAN ASSISTANT

## 2022-11-02 PROCEDURE — 94640 AIRWAY INHALATION TREATMENT: CPT

## 2022-11-02 PROCEDURE — 25000003 PHARM REV CODE 250: Performed by: FAMILY MEDICINE

## 2022-11-02 PROCEDURE — 11000004 HC SNF PRIVATE

## 2022-11-02 PROCEDURE — 94761 N-INVAS EAR/PLS OXIMETRY MLT: CPT

## 2022-11-02 PROCEDURE — 94660 CPAP INITIATION&MGMT: CPT

## 2022-11-02 PROCEDURE — 25000242 PHARM REV CODE 250 ALT 637 W/ HCPCS: Performed by: PHYSICIAN ASSISTANT

## 2022-11-02 PROCEDURE — 99900035 HC TECH TIME PER 15 MIN (STAT)

## 2022-11-02 RX ADMIN — NITROFURANTOIN MONOHYDRATE/MACROCRYSTALS 100 MG: 75; 25 CAPSULE ORAL at 09:11

## 2022-11-02 RX ADMIN — IPRATROPIUM BROMIDE AND ALBUTEROL SULFATE 3 ML: 2.5; .5 SOLUTION RESPIRATORY (INHALATION) at 01:11

## 2022-11-02 RX ADMIN — MONTELUKAST 10 MG: 10 TABLET, FILM COATED ORAL at 09:11

## 2022-11-02 RX ADMIN — DOXAZOSIN 2 MG: 1 TABLET ORAL at 09:11

## 2022-11-02 RX ADMIN — IPRATROPIUM BROMIDE AND ALBUTEROL SULFATE 3 ML: 2.5; .5 SOLUTION RESPIRATORY (INHALATION) at 07:11

## 2022-11-02 RX ADMIN — DEXAMETHASONE 2 MG: 1 TABLET ORAL at 04:11

## 2022-11-02 RX ADMIN — POLYETHYLENE GLYCOL 3350 17 G: 17 POWDER, FOR SOLUTION ORAL at 09:11

## 2022-11-02 RX ADMIN — TRAVOPROST OPHTHALMIC SOLUTION 1 DROP: 0.04 SOLUTION OPHTHALMIC at 09:11

## 2022-11-02 RX ADMIN — SERTRALINE HYDROCHLORIDE 25 MG: 25 TABLET ORAL at 09:11

## 2022-11-02 RX ADMIN — MELOXICAM 7.5 MG: 7.5 TABLET ORAL at 09:11

## 2022-11-02 RX ADMIN — PRIMIDONE 100 MG: 50 TABLET ORAL at 09:11

## 2022-11-02 RX ADMIN — ATORVASTATIN CALCIUM 40 MG: 40 TABLET, FILM COATED ORAL at 09:11

## 2022-11-02 RX ADMIN — SENNOSIDES AND DOCUSATE SODIUM 1 TABLET: 8.6; 5 TABLET ORAL at 09:11

## 2022-11-02 RX ADMIN — ROPINIROLE HYDROCHLORIDE 0.5 MG: 0.25 TABLET, FILM COATED ORAL at 09:11

## 2022-11-02 RX ADMIN — ENOXAPARIN SODIUM 80 MG: 100 INJECTION SUBCUTANEOUS at 04:11

## 2022-11-02 RX ADMIN — LEVOTHYROXINE SODIUM 50 MCG: 0.05 TABLET ORAL at 05:11

## 2022-11-02 RX ADMIN — PANTOPRAZOLE SODIUM 40 MG: 40 TABLET, DELAYED RELEASE ORAL at 09:11

## 2022-11-02 RX ADMIN — CHOLECALCIFEROL TAB 125 MCG (5000 UNIT) 5000 UNITS: 125 TAB at 09:11

## 2022-11-02 RX ADMIN — GABAPENTIN 100 MG: 100 CAPSULE ORAL at 09:11

## 2022-11-02 RX ADMIN — ASPIRIN 81 MG: 81 TABLET, COATED ORAL at 09:11

## 2022-11-02 NOTE — PLAN OF CARE
Problem: Occupational Therapy  Goal: Occupational Therapy Goal  Description: Goals to be met by: 12/2/22     Patient will increase functional independence with ADLs by performing:    UE Dressing with Supervision.  LE Dressing with Supervision.  Grooming while standing with Modified [place] and Supervision.  Toileting from toilet with Modified [place] and Supervision for hygiene and clothing management.   Standing x5 minutes with Modified [place] and Supervision.  Step transfer with Modified [place] and Supervision  Toilet transfer to toilet with Modified [place] and Supervision.    Outcome: Ongoing, Progressing

## 2022-11-02 NOTE — NURSING
Patient's daughter requested that the last ct scan results be faxed to Dr. Kim's office. Release  of medical information form completed and signed at this time by daughter. Ct results faxed to [phone] at this time.

## 2022-11-02 NOTE — PT/OT/SLP PROGRESS
Physical Therapy Treatment    Patient Name:  Pilar Reardon   MRN:  [id number]    Recommendations:     Discharge Recommendations:  home with home health   Discharge Equipment Recommendations: none   Barriers to discharge: None    Assessment:     Pilar Reardon is a 89 y.o. female admitted with a medical diagnosis of Pulmonary embolus.  She presents with the following impairments/functional limitations:  weakness, impaired endurance, impaired balance, gait instability, impaired cognition, decreased safety awareness.    Pt tolerated Skilled PT well on this date with all exercises and standing using RW.     Rehab Prognosis: Fair; patient would benefit from acute skilled PT services to address these deficits and reach maximum level of function.    Recent Surgery: * No surgery found *      Plan:     During this hospitalization, patient to be seen 5 x/week to address the identified rehab impairments via therapeutic exercises and progress toward the following goals:    Plan of Care Expires:       Subjective     Chief Complaint: Pt reports she is tired and wants to go back to bed.  Patient/Family Comments/goals: return to PLOF  Pain/Comfort:  Pain Rating 1: 0/10      Objective:     Communicated with patient and family member prior to session.  Patient found up in chair with oxygen upon PT entry to room.     General Precautions: Standard, fall   Orthopedic Precautions:N/A   Braces:    Respiratory Status: Nasal cannula, flow 2 L/min     Functional Mobility:  Transfers:     Sit to Stand:  minimum assistance with rolling walker  Balance: Fair      AM-PAC 6 CLICK MOBILITY          Treatment & Education:  Seated LAQS 2 x 10, marches 2 x 10, ball squeezes 2 x 10  Sit to stand x 2 using RW Shaggy x 5 min total     Patient left up in chair with all lines intact, call button in reach, and family member present..    GOALS:   Multidisciplinary Problems       Physical Therapy Goals          Problem: Physical Therapy    Goal Priority  Disciplines Outcome Goal Variances Interventions   Physical Therapy Goal     PT, PT/OT Ongoing, Progressing     Description: Goals to be met by: 2 weeks     Patient will increase functional independence with mobility by performin. Supine to sit with Stand by Assistance  2. Sit to supine with Stand by Assistance  3. Sit to stand transfer with Contact Guard Assistance  4. Bed to chair transfer with Contact Guard Assistance using Rolling Walker  5. Gait  x 50 feet with Contact Guard Assistance using Rolling Walker.     Goals to be met by: 4 weeks     Patient will increase functional independence with mobility by performin. Supine to sit with Modified [place]  2. Sit to supine with Modified [place]  3. Sit to stand transfer with Modified [place]  4. Bed to chair transfer with Modified [place] using Rolling Walker  5. Gait  x 100 feet with Modified [place] using Rolling Walker.   6. Stand for 10 minutes with Supervision using Rolling Walker.                         Time Tracking:     PT Received On: 22  PT Start Time: 1150     PT Stop Time: 1213  PT Total Time (min): 23 min     Billable Minutes: Therapeutic Exercise 23    Treatment Type: Treatment  PT/PTA: PTA     PTA Visit Number: 1     2022

## 2022-11-02 NOTE — PT/OT/SLP EVAL
Occupational Therapy   Evaluation    Name: Pilar Reardon  MRN: [id number]  Admitting Diagnosis:  Pulmonary embolism, weakness  Recent Surgery: * No surgery found *      Recommendations:     Discharge Recommendations: home with home health  Discharge Equipment Recommendations:  none, walker, rolling  Barriers to discharge:       Assessment:     Pilar Reardon is a 89 y.o. female with a medical diagnosis of Pulmonary embolism, weakness.  She presents with decreased balance, safety strength, and mobility. Performance deficits affecting function: weakness, impaired endurance, impaired self care skills, impaired functional mobility, decreased coordination, decreased safety awareness, impaired balance.  Pt presented to [facility] with a PLOF of living with family. Pt uses a RW for mobility at baseline. Pt has struggled with decreased cognition and alertness during this hospitalization due to abnormal CO2 level. Pt was awake/oriented this morning and agreeable to tx. Pt completed functional mobs, t/f and ADLs with assistance this morning.    Rehab Prognosis: Fair; patient would benefit from acute skilled OT services to address these deficits and reach maximum level of function.       Plan:     Patient to be seen 5 x/week to address the above listed problems via self-care/home management, therapeutic activities, neuromuscular re-education  Plan of Care Expires: 12/02/22  Plan of Care Reviewed with: patient    Subjective     Chief Complaint: Pt complains of SOB and decreased balance at this time.  Patient/Family Comments/goals: increase mobility and ADL performance    Occupational Profile:  Living Environment: Pt lives with family   Previous level of function: SVN-Mod I  Equipment Used at Home:  walker, rolling  Assistance upon Discharge: TBD    Pain/Comfort:  Pain Rating 1: 0/10    Patients cultural, spiritual, Denominational conflicts given the current situation: no    Objective:     Communicated with: Pt and granddaugther  prior to session.  Patient found supine with oxygen, SCD upon OT entry to room.    General Precautions: Standard, fall, other (see comments) (respiratory)   Orthopedic Precautions:    Braces:    Respiratory Status: Nasal cannula, flow 3 L/min    Occupational Performance:    Bed Mobility:    Patient completed Rolling/Turning to Right with minimum assistance  Patient completed Supine to Sit with minimum assistance    Functional Mobility/Transfers:  Patient completed Sit <> Stand Transfer with minimum assistance  with  rolling walker   Patient completed Toilet Transfer Step Transfer technique with minimum assistance with  rolling walker  Functional Mobility: Pt completed functional ambulation to the restroom using the RW for balance and safety with Mod A from OT    Activities of Daily Living:  Lower Body Dressing: maximal assistance sitting EOB  Toileting: moderate assistance using toilet    Cognitive/Visual Perceptual:  Cognitive/Psychosocial Skills:     -       Oriented to: Person     Physical Exam:  Balance:    -       decreased standing balance at Fair minus  Upper Extremity Range of Motion:     -       Right Upper Extremity: WFL  -       Left Upper Extremity: WFL  Upper Extremity Strength:    -       Right Upper Extremity: 2/5  -       Left Upper Extremity: 2/5    AMPAC 6 Click ADL:  AMPAC Total Score: 15    Treatment & Education:  OT eval    Patient left supine with all lines intact and call button in reach    GOALS:   Multidisciplinary Problems       Occupational Therapy Goals          Problem: Occupational Therapy    Goal Priority Disciplines Outcome Interventions   Occupational Therapy Goal     OT, PT/OT Ongoing, Progressing    Description: Goals to be met by: 12/2/22     Patient will increase functional independence with ADLs by performing:    UE Dressing with Supervision.  LE Dressing with Supervision.  Grooming while standing with Modified [place] and Supervision.  Toileting from toilet with Modified  [place] and Supervision for hygiene and clothing management.   Standing x5 minutes with Modified [place] and Supervision.  Step transfer with Modified [place] and Supervision  Toilet transfer to toilet with Modified [place] and Supervision.                         History:     Past Medical History:   Diagnosis Date    Actinic keratoses     Arthritis     COPD (chronic obstructive pulmonary disease)     Frequent UTI     GERD (gastroesophageal reflux disease)     High cholesterol     HTN (hypertension)          Past Surgical History:   Procedure Laterality Date    APPENDECTOMY      HYSTERECTOMY      pessary         Time Tracking:     OT Date of Treatment:    OT Start Time:  8:00  OT Stop Time:  8:30  OT Total Time (min):      Billable Minutes:Evaluation 30    11/2/2022

## 2022-11-03 LAB
ALBUMIN SERPL BCP-MCNC: 2.9 G/DL (ref 3.5–5)
ALBUMIN/GLOB SERPL: 0.9 {RATIO}
ALP SERPL-CCNC: 101 U/L (ref 55–142)
ALT SERPL W P-5'-P-CCNC: 56 U/L (ref 13–56)
ANION GAP SERPL CALCULATED.3IONS-SCNC: 5 MMOL/L (ref 7–16)
AST SERPL W P-5'-P-CCNC: 52 U/L (ref 15–37)
BASOPHILS # BLD AUTO: 0.03 K/UL (ref 0–0.2)
BASOPHILS NFR BLD AUTO: 0.4 % (ref 0–1)
BILIRUB SERPL-MCNC: 0.2 MG/DL (ref ?–1.2)
BUN SERPL-MCNC: 25 MG/DL (ref 7–18)
BUN/CREAT SERPL: 37 (ref 6–20)
CALCIUM SERPL-MCNC: 8.1 MG/DL (ref 8.5–10.1)
CHLORIDE SERPL-SCNC: 105 MMOL/L (ref 98–107)
CO2 SERPL-SCNC: 40 MMOL/L (ref 21–32)
CREAT SERPL-MCNC: 0.68 MG/DL (ref 0.55–1.02)
DIFFERENTIAL METHOD BLD: ABNORMAL
EGFR (NO RACE VARIABLE) (RUSH/TITUS): 83 ML/MIN/1.73M²
EOSINOPHIL # BLD AUTO: 0.24 K/UL (ref 0–0.5)
EOSINOPHIL NFR BLD AUTO: 3.1 % (ref 1–4)
EOSINOPHIL NFR BLD MANUAL: 1 % (ref 1–4)
ERYTHROCYTE [DISTWIDTH] IN BLOOD BY AUTOMATED COUNT: 13.9 % (ref 11.5–14.5)
GLOBULIN SER-MCNC: 3.2 G/DL (ref 2–4)
GLUCOSE SERPL-MCNC: 149 MG/DL (ref 74–106)
HCT VFR BLD AUTO: 37 % (ref 38–47)
HGB BLD-MCNC: 11.2 G/DL (ref 12–16)
LYMPHOCYTES # BLD AUTO: 4.08 K/UL (ref 1–4.8)
LYMPHOCYTES NFR BLD AUTO: 52.8 % (ref 27–41)
LYMPHOCYTES NFR BLD MANUAL: 62 % (ref 27–41)
MACROCYTES BLD QL SMEAR: ABNORMAL
MCH RBC QN AUTO: 30.3 PG (ref 27–31)
MCHC RBC AUTO-ENTMCNC: 30.3 G/DL (ref 32–36)
MCV RBC AUTO: 100 FL (ref 80–96)
MONOCYTES # BLD AUTO: 0.4 K/UL (ref 0–0.8)
MONOCYTES NFR BLD AUTO: 5.2 % (ref 2–6)
MONOCYTES NFR BLD MANUAL: 1 % (ref 2–6)
MPC BLD CALC-MCNC: 10.6 FL (ref 9.4–12.4)
NEUTROPHILS # BLD AUTO: 2.98 K/UL (ref 1.8–7.7)
NEUTROPHILS NFR BLD AUTO: 38.5 % (ref 53–65)
NEUTS SEG NFR BLD MANUAL: 36 % (ref 50–62)
NRBC BLD MANUAL-RTO: ABNORMAL %
PLATELET # BLD AUTO: 132 K/UL (ref 150–400)
PLATELET MORPHOLOGY: ABNORMAL
POTASSIUM SERPL-SCNC: 3.7 MMOL/L (ref 3.5–5.1)
PROT SERPL-MCNC: 6.1 G/DL (ref 6.4–8.2)
RBC # BLD AUTO: 3.7 M/UL (ref 4.2–5.4)
SODIUM SERPL-SCNC: 146 MMOL/L (ref 136–145)
WBC # BLD AUTO: 7.73 K/UL (ref 4.5–11)

## 2022-11-03 PROCEDURE — 63600175 PHARM REV CODE 636 W HCPCS: Performed by: FAMILY MEDICINE

## 2022-11-03 PROCEDURE — 94660 CPAP INITIATION&MGMT: CPT

## 2022-11-03 PROCEDURE — 25000003 PHARM REV CODE 250: Performed by: FAMILY MEDICINE

## 2022-11-03 PROCEDURE — 27000941

## 2022-11-03 PROCEDURE — 97535 SELF CARE MNGMENT TRAINING: CPT

## 2022-11-03 PROCEDURE — 80053 COMPREHEN METABOLIC PANEL: CPT | Performed by: FAMILY MEDICINE

## 2022-11-03 PROCEDURE — 99900035 HC TECH TIME PER 15 MIN (STAT)

## 2022-11-03 PROCEDURE — 27000221 HC OXYGEN, UP TO 24 HOURS

## 2022-11-03 PROCEDURE — 94640 AIRWAY INHALATION TREATMENT: CPT

## 2022-11-03 PROCEDURE — 85025 COMPLETE CBC W/AUTO DIFF WBC: CPT | Performed by: FAMILY MEDICINE

## 2022-11-03 PROCEDURE — 11000004 HC SNF PRIVATE

## 2022-11-03 PROCEDURE — 94761 N-INVAS EAR/PLS OXIMETRY MLT: CPT

## 2022-11-03 PROCEDURE — 97110 THERAPEUTIC EXERCISES: CPT

## 2022-11-03 PROCEDURE — 36415 COLL VENOUS BLD VENIPUNCTURE: CPT | Performed by: FAMILY MEDICINE

## 2022-11-03 PROCEDURE — 25000242 PHARM REV CODE 250 ALT 637 W/ HCPCS: Performed by: PHYSICIAN ASSISTANT

## 2022-11-03 PROCEDURE — 25000003 PHARM REV CODE 250: Performed by: PHYSICIAN ASSISTANT

## 2022-11-03 RX ADMIN — IPRATROPIUM BROMIDE AND ALBUTEROL SULFATE 3 ML: 2.5; .5 SOLUTION RESPIRATORY (INHALATION) at 01:11

## 2022-11-03 RX ADMIN — ASPIRIN 81 MG: 81 TABLET, COATED ORAL at 09:11

## 2022-11-03 RX ADMIN — ENOXAPARIN SODIUM 80 MG: 100 INJECTION SUBCUTANEOUS at 04:11

## 2022-11-03 RX ADMIN — IPRATROPIUM BROMIDE AND ALBUTEROL SULFATE 3 ML: 2.5; .5 SOLUTION RESPIRATORY (INHALATION) at 08:11

## 2022-11-03 RX ADMIN — GABAPENTIN 100 MG: 100 CAPSULE ORAL at 08:11

## 2022-11-03 RX ADMIN — SENNOSIDES AND DOCUSATE SODIUM 1 TABLET: 8.6; 5 TABLET ORAL at 09:11

## 2022-11-03 RX ADMIN — TRAVOPROST OPHTHALMIC SOLUTION 1 DROP: 0.04 SOLUTION OPHTHALMIC at 08:11

## 2022-11-03 RX ADMIN — LEVOTHYROXINE SODIUM 50 MCG: 0.05 TABLET ORAL at 05:11

## 2022-11-03 RX ADMIN — SENNOSIDES AND DOCUSATE SODIUM 1 TABLET: 8.6; 5 TABLET ORAL at 08:11

## 2022-11-03 RX ADMIN — PRIMIDONE 100 MG: 50 TABLET ORAL at 09:11

## 2022-11-03 RX ADMIN — DOXAZOSIN 2 MG: 1 TABLET ORAL at 09:11

## 2022-11-03 RX ADMIN — MELOXICAM 7.5 MG: 7.5 TABLET ORAL at 09:11

## 2022-11-03 RX ADMIN — PANTOPRAZOLE SODIUM 40 MG: 40 TABLET, DELAYED RELEASE ORAL at 09:11

## 2022-11-03 RX ADMIN — MONTELUKAST 10 MG: 10 TABLET, FILM COATED ORAL at 08:11

## 2022-11-03 RX ADMIN — SERTRALINE HYDROCHLORIDE 25 MG: 25 TABLET ORAL at 09:11

## 2022-11-03 RX ADMIN — ATORVASTATIN CALCIUM 40 MG: 40 TABLET, FILM COATED ORAL at 08:11

## 2022-11-03 RX ADMIN — MELOXICAM 7.5 MG: 7.5 TABLET ORAL at 08:11

## 2022-11-03 RX ADMIN — CHOLECALCIFEROL TAB 125 MCG (5000 UNIT) 5000 UNITS: 125 TAB at 09:11

## 2022-11-03 RX ADMIN — DOXAZOSIN 2 MG: 1 TABLET ORAL at 08:11

## 2022-11-03 RX ADMIN — NITROFURANTOIN MONOHYDRATE/MACROCRYSTALS 100 MG: 75; 25 CAPSULE ORAL at 08:11

## 2022-11-03 RX ADMIN — ROPINIROLE HYDROCHLORIDE 0.5 MG: 0.25 TABLET, FILM COATED ORAL at 08:11

## 2022-11-03 RX ADMIN — PRIMIDONE 100 MG: 50 TABLET ORAL at 08:11

## 2022-11-03 NOTE — PLAN OF CARE
Pt. Has started working with Therapy and has been encouraged to sit up as much as possible. Pt. Daughter is staying with her most of the time. Pt. Is still requiring supplemental O2 and BiPap at night. Will cont. To follow pt.

## 2022-11-03 NOTE — PT/OT/SLP PROGRESS
Occupational Therapy   Treatment    Name: Pilar Reardon  MRN: [id number]  Admitting Diagnosis:  pulmonary embolism, weakness, and COPD       Recommendations:     Discharge Recommendations: home with home health  Discharge Equipment Recommendations:  walker, rolling  Barriers to discharge:       Assessment:     Pilar Reardon is a 89 y.o. female with a medical diagnosis of .  She presents with  pulmonary embolism, weakness, and COPD. Performance deficits affecting function are weakness, impaired endurance, impaired functional mobility, decreased safety awareness, decreased ROM, impaired balance. Pt was more alert and agreeable to tx on today. Pt participated well with skilled OT and agreed to participate in OT tx session. Pt tolerated skilled OT well despite being lethargic 2 days ago.     Rehab Prognosis:  Fair; patient would benefit from acute skilled OT services to address these deficits and reach maximum level of function.       Plan:     Patient to be seen 5 x/week to address the above listed problems via self-care/home management, therapeutic activities, therapeutic exercises, neuromuscular re-education  Plan of Care Expires: 12/02/22  Plan of Care Reviewed with: patient    Subjective     Pain/Comfort:  Pain Rating 1: 0/10    Objective:     Communicated with: Pt prior to session.  Patient found up in chair with oxygen, SCD upon OT entry to room.    General Precautions: Standard, fall   Orthopedic Precautions:    Braces:    Respiratory Status: Nasal cannula, flow 2 L/min     Occupational Performance:     Bed Mobility:    Pt presented sitting upright in chair up Ot's arrival    Functional Mobility/Transfers:  Patient completed Sit <> Stand Transfer with minimum assistance  with  rolling walker   Functional Mobility: Pt was t/f from the therapy gym in the w/c by OT    Activities of Daily Living:  T/f assessed Min A using the RW      [facility] 6 Click ADL: 16    Treatment & Education:  Therapeutic  exercise:    While sitting in w/c th pt completed bicep curls 2 sets 20 reps 4# DB; tricep extension: 2 sets of 20 reps with green theraband to increase BUE strength for ADLs and IADLs    While standing the pt tossed a large therapy ball back and forth with OT to improve gross motor coordination, core strength, UB strength and endurance for ADL performance.    Pt [id number] mins on arm ergometer to increase endurance, BUE strength and activity tolerance for ADL performance      Patient left up in chair with all lines intact and call button in reach    GOALS:   Multidisciplinary Problems       Occupational Therapy Goals          Problem: Occupational Therapy    Goal Priority Disciplines Outcome Interventions   Occupational Therapy Goal     OT, PT/OT Ongoing, Progressing    Description: Goals to be met by: 12/2/22     Patient will increase functional independence with ADLs by performing:    UE Dressing with Supervision.  LE Dressing with Supervision.  Grooming while standing with Modified [place] and Supervision.  Toileting from toilet with Modified [place] and Supervision for hygiene and clothing management.   Standing x5 minutes with Modified [place] and Supervision.  Step transfer with Modified [place] and Supervision  Toilet transfer to toilet with Modified [place] and Supervision.                         Time Tracking:     OT Date of Treatment:    OT Start Time:  11:15  OT Stop Time:  12:00  OT Total Time (min):      Billable Minutes:Self Care/Home Management 10  Therapeutic Exercise 13    OT/CAROL: OT          11/3/2022

## 2022-11-03 NOTE — PT/OT/SLP PROGRESS
Physical Therapy Treatment    Patient Name:  Pilar Reardon   MRN:  [id number]    Recommendations:     Discharge Recommendations:  home with home health   Discharge Equipment Recommendations: walker, rolling   Barriers to discharge: None    Assessment:     Pilar Reardon is a 89 y.o. female admitted with a medical diagnosis of Pulmonary embolus.  She presents with the following impairments/functional limitations:  weakness, impaired endurance, impaired functional mobility, gait instability, impaired balance.    Pt more alert today and participated in therapy well.    Rehab Prognosis: Fair; patient would benefit from acute skilled PT services to address these deficits and reach maximum level of function.    Recent Surgery: * No surgery found *      Plan:     During this hospitalization, patient to be seen 5 x/week to address the identified rehab impairments via gait training, therapeutic activities, therapeutic exercises, neuromuscular re-education and progress toward the following goals:    Plan of Care Expires:       Subjective     Chief Complaint: None  Patient/Family Comments/goals: return to PLOF  Pain/Comfort:  Pain Rating 1: 0/10      Objective:     Communicated with patient and family member prior to session.  Patient found up in chair with   upon PT entry to room.     General Precautions: Standard, fall   Orthopedic Precautions:N/A   Braces: N/A  Respiratory Status: Nasal cannula, flow 2 L/min     Functional Mobility:  Transfers:     Sit to Stand:  minimum assistance with rolling walker  Balance: Fair      AM-PAC 6 CLICK MOBILITY  Turning over in bed (including adjusting bedclothes, sheets and blankets)?: 3  Sitting down on and standing up from a chair with arms (e.g., wheelchair, bedside commode, etc.): 3  Moving from lying on back to sitting on the side of the bed?: 3  Moving to and from a bed to a chair (including a wheelchair)?: 3  Need to walk in hospital room?: 1  Climbing 3-5 steps with a  railing?: 1  Basic Mobility Total Score: 14       Treatment & Education:  Seated LAQS 2 x 15, marches 2 x 15, hip abd/add 2 x 15  Sit to stand x 2 using RW Shaggy   Standing heel raises and mini squats 2 x 10     Patient left up in chair with all lines intact and in therapy gym with OT .    GOALS:   Multidisciplinary Problems       Physical Therapy Goals          Problem: Physical Therapy    Goal Priority Disciplines Outcome Goal Variances Interventions   Physical Therapy Goal     PT, PT/OT Ongoing, Progressing     Description: Goals to be met by: 2 weeks     Patient will increase functional independence with mobility by performin. Supine to sit with Stand by Assistance  2. Sit to supine with Stand by Assistance  3. Sit to stand transfer with Contact Guard Assistance  4. Bed to chair transfer with Contact Guard Assistance using Rolling Walker  5. Gait  x 50 feet with Contact Guard Assistance using Rolling Walker.     Goals to be met by: 4 weeks     Patient will increase functional independence with mobility by performin. Supine to sit with Modified [place]  2. Sit to supine with Modified [place]  3. Sit to stand transfer with Modified [place]  4. Bed to chair transfer with Modified [place] using Rolling Walker  5. Gait  x 100 feet with Modified [place] using Rolling Walker.   6. Stand for 10 minutes with Supervision using Rolling Walker.                         Time Tracking:     PT Received On: 22  PT Start Time: 1040     PT Stop Time: 1100  PT Total Time (min): 20 min     Billable Minutes: Therapeutic Exercise 20    Treatment Type: Treatment  PT/PTA: PT     PTA Visit Number: 2022

## 2022-11-03 NOTE — PLAN OF CARE
Problem: Skin Injury Risk Increased  Goal: Skin Health and Integrity  Intervention: Optimize Skin Protection  Flowsheets (Taken 11/3/2022 0337)  Pressure Reduction Techniques: frequent weight shift encouraged  Skin Protection: incontinence pads utilized  Head of Bed (HOB) Positioning: HOB elevated  Intervention: Promote and Optimize Oral Intake  Flowsheets (Taken 11/3/2022 0337)  Oral Nutrition Promotion: rest periods promoted     Problem: Fall Injury Risk  Goal: Absence of Fall and Fall-Related Injury  Intervention: Identify and Manage Contributors  Flowsheets (Taken 11/3/2022 0337)  Self-Care Promotion: independence encouraged  Medication Review/Management: medications reviewed  Intervention: Promote Injury-Free Environment  Flowsheets (Taken 11/3/2022 0337)  Safety Promotion/Fall Prevention:   assistive device/personal item within reach   bed alarm set   nonskid shoes/socks when out of bed   side rails raised x 3   instructed to call staff for mobility

## 2022-11-04 PROCEDURE — 25000003 PHARM REV CODE 250: Performed by: PHYSICIAN ASSISTANT

## 2022-11-04 PROCEDURE — 25000242 PHARM REV CODE 250 ALT 637 W/ HCPCS: Performed by: PHYSICIAN ASSISTANT

## 2022-11-04 PROCEDURE — 11000004 HC SNF PRIVATE

## 2022-11-04 PROCEDURE — 27000941

## 2022-11-04 PROCEDURE — 63600175 PHARM REV CODE 636 W HCPCS: Performed by: FAMILY MEDICINE

## 2022-11-04 PROCEDURE — 97110 THERAPEUTIC EXERCISES: CPT

## 2022-11-04 PROCEDURE — 27000221 HC OXYGEN, UP TO 24 HOURS

## 2022-11-04 PROCEDURE — 97535 SELF CARE MNGMENT TRAINING: CPT

## 2022-11-04 PROCEDURE — 25000003 PHARM REV CODE 250: Performed by: FAMILY MEDICINE

## 2022-11-04 PROCEDURE — 97116 GAIT TRAINING THERAPY: CPT | Mod: CQ

## 2022-11-04 PROCEDURE — 94761 N-INVAS EAR/PLS OXIMETRY MLT: CPT

## 2022-11-04 PROCEDURE — 97110 THERAPEUTIC EXERCISES: CPT | Mod: CQ

## 2022-11-04 PROCEDURE — 63600175 PHARM REV CODE 636 W HCPCS: Performed by: PHYSICIAN ASSISTANT

## 2022-11-04 PROCEDURE — 94640 AIRWAY INHALATION TREATMENT: CPT

## 2022-11-04 PROCEDURE — 97530 THERAPEUTIC ACTIVITIES: CPT | Mod: CQ

## 2022-11-04 PROCEDURE — 99900035 HC TECH TIME PER 15 MIN (STAT)

## 2022-11-04 PROCEDURE — 94660 CPAP INITIATION&MGMT: CPT

## 2022-11-04 RX ADMIN — NITROFURANTOIN MONOHYDRATE/MACROCRYSTALS 100 MG: 75; 25 CAPSULE ORAL at 08:11

## 2022-11-04 RX ADMIN — TRAVOPROST OPHTHALMIC SOLUTION 1 DROP: 0.04 SOLUTION OPHTHALMIC at 08:11

## 2022-11-04 RX ADMIN — PANTOPRAZOLE SODIUM 40 MG: 40 TABLET, DELAYED RELEASE ORAL at 08:11

## 2022-11-04 RX ADMIN — IPRATROPIUM BROMIDE AND ALBUTEROL SULFATE 3 ML: 2.5; .5 SOLUTION RESPIRATORY (INHALATION) at 07:11

## 2022-11-04 RX ADMIN — DEXAMETHASONE 2 MG: 1 TABLET ORAL at 04:11

## 2022-11-04 RX ADMIN — MELOXICAM 7.5 MG: 7.5 TABLET ORAL at 08:11

## 2022-11-04 RX ADMIN — IPRATROPIUM BROMIDE AND ALBUTEROL SULFATE 3 ML: 2.5; .5 SOLUTION RESPIRATORY (INHALATION) at 12:11

## 2022-11-04 RX ADMIN — PRIMIDONE 100 MG: 50 TABLET ORAL at 08:11

## 2022-11-04 RX ADMIN — GABAPENTIN 100 MG: 100 CAPSULE ORAL at 08:11

## 2022-11-04 RX ADMIN — ASPIRIN 81 MG: 81 TABLET, COATED ORAL at 08:11

## 2022-11-04 RX ADMIN — ATORVASTATIN CALCIUM 40 MG: 40 TABLET, FILM COATED ORAL at 08:11

## 2022-11-04 RX ADMIN — ROPINIROLE HYDROCHLORIDE 0.5 MG: 0.25 TABLET, FILM COATED ORAL at 08:11

## 2022-11-04 RX ADMIN — SERTRALINE HYDROCHLORIDE 25 MG: 25 TABLET ORAL at 08:11

## 2022-11-04 RX ADMIN — DOXAZOSIN 2 MG: 1 TABLET ORAL at 08:11

## 2022-11-04 RX ADMIN — IPRATROPIUM BROMIDE AND ALBUTEROL SULFATE 3 ML: 2.5; .5 SOLUTION RESPIRATORY (INHALATION) at 01:11

## 2022-11-04 RX ADMIN — IPRATROPIUM BROMIDE AND ALBUTEROL SULFATE 3 ML: 2.5; .5 SOLUTION RESPIRATORY (INHALATION) at 06:11

## 2022-11-04 RX ADMIN — ENOXAPARIN SODIUM 80 MG: 100 INJECTION SUBCUTANEOUS at 04:11

## 2022-11-04 RX ADMIN — SENNOSIDES AND DOCUSATE SODIUM 1 TABLET: 8.6; 5 TABLET ORAL at 08:11

## 2022-11-04 RX ADMIN — MONTELUKAST 10 MG: 10 TABLET, FILM COATED ORAL at 08:11

## 2022-11-04 RX ADMIN — LEVOTHYROXINE SODIUM 50 MCG: 0.05 TABLET ORAL at 05:11

## 2022-11-04 RX ADMIN — CHOLECALCIFEROL TAB 125 MCG (5000 UNIT) 5000 UNITS: 125 TAB at 08:11

## 2022-11-04 NOTE — PLAN OF CARE
Problem: Adult Inpatient Plan of Care  Goal: Plan of Care Review  Flowsheets (Taken 11/4/2022 0321)  Plan of Care Reviewed With:   patient   daughter  Goal: Absence of Hospital-Acquired Illness or Injury  Intervention: Identify and Manage Fall Risk  Flowsheets (Taken 11/4/2022 0321)  Safety Promotion/Fall Prevention:   assistive device/personal item within reach   bed alarm set   nonskid shoes/socks when out of bed   side rails raised x 3   instructed to call staff for mobility   family to remain at bedside  Intervention: Prevent Skin Injury  Flowsheets (Taken 11/4/2022 0321)  Body Position: position changed independently  Skin Protection: incontinence pads utilized  Intervention: Prevent and Manage VTE (Venous Thromboembolism) Risk  Flowsheets (Taken 11/4/2022 0321)  Activity Management: Rolling - L1  VTE Prevention/Management: remove, assess skin, and reapply sequential compression device  Range of Motion: active ROM (range of motion) encouraged  Intervention: Prevent Infection  Flowsheets (Taken 11/4/2022 0321)  Infection Prevention: hand hygiene promoted     Problem: Fall Injury Risk  Goal: Absence of Fall and Fall-Related Injury  Intervention: Identify and Manage Contributors  Flowsheets (Taken 11/4/2022 0321)  Self-Care Promotion: independence encouraged  Medication Review/Management: medications reviewed  Intervention: Promote Injury-Free Environment  Flowsheets (Taken 11/4/2022 0321)  Safety Promotion/Fall Prevention:   assistive device/personal item within reach   bed alarm set   nonskid shoes/socks when out of bed   side rails raised x 3   instructed to call staff for mobility   family to remain at bedside

## 2022-11-04 NOTE — PT/OT/SLP PROGRESS
Physical Therapy Treatment    Patient Name:  Pilar Reardon   MRN:  [id number]    Recommendations:     Discharge Recommendations:  home with home health   Discharge Equipment Recommendations: walker, rolling   Barriers to discharge: None    Assessment:     Pilar Reardon is a 89 y.o. female admitted with a medical diagnosis of Pulmonary embolus.  She presents with the following impairments/functional limitations:  weakness, impaired endurance, impaired balance, decreased safety awareness, pain.    Pt tolerated Skilled PT well with tolerating all exercises and ambulation with PTA. Pt was more alert and demo good balance and requiring less assistance with functional mobility. Pt was requesting to go home today. Will follow up.     Rehab Prognosis: Fair; patient would benefit from acute skilled PT services to address these deficits and reach maximum level of function.    Recent Surgery: * No surgery found *      Plan:     During this hospitalization, patient to be seen 5 x/week to address the identified rehab impairments via gait training, therapeutic activities, therapeutic exercises and progress toward the following goals:    Plan of Care Expires:       Subjective     Chief Complaint: Left shoulder soreness  Patient/Family Comments/goals: return to PLOF  Pain/Comfort:  Pain Rating 1: 3/10  Location - Side 1: Left  Location - Orientation 1: upper  Location 1: shoulder      Objective:     Communicated with patient and family member prior to session.  Patient found up in chair with oxygen upon PT entry to room.     General Precautions: Standard, fall   Orthopedic Precautions:N/A   Braces:    Respiratory Status: Nasal cannula, flow 2 L/min     Functional Mobility:  Transfers:     Sit to Stand:  SBA with rolling walker  Balance: Fair  Gait: 100ft using RW O2 donned SBA vc on safety awareness       AM-PAC 6 CLICK MOBILITY          Treatment & Education:  Nustep x 8min  Seated LAQ 2 x 10  Seated Marches 2 x10  Ball  squeezes x 40    Patient left up in chair with all lines intact call button in reach and pt on the phone with family member.     GOALS:   Multidisciplinary Problems       Physical Therapy Goals          Problem: Physical Therapy    Goal Priority Disciplines Outcome Goal Variances Interventions   Physical Therapy Goal     PT, PT/OT Ongoing, Progressing     Description: Goals to be met by: 2 weeks     Patient will increase functional independence with mobility by performin. Supine to sit with Stand by Assistance  2. Sit to supine with Stand by Assistance  3. Sit to stand transfer with Contact Guard Assistance  4. Bed to chair transfer with Contact Guard Assistance using Rolling Walker  5. Gait  x 50 feet with Contact Guard Assistance using Rolling Walker.     Goals to be met by: 4 weeks     Patient will increase functional independence with mobility by performin. Supine to sit with Modified [place]  2. Sit to supine with Modified [place]  3. Sit to stand transfer with Modified [place]  4. Bed to chair transfer with Modified [place] using Rolling Walker  5. Gait  x 100 feet with Modified [place] using Rolling Walker.   6. Stand for 10 minutes with Supervision using Rolling Walker.                         Time Tracking:     PT Received On: 22  PT Start Time: 1017     PT Stop Time: 1055  PT Total Time (min): 38 min     Billable Minutes: Therapeutic Exercise 16, Gait Training 12, Therapeutic Activity 10  Treatment Type: Treatment  PT/PTA: PTA     PTA Visit Number: 1     2022

## 2022-11-04 NOTE — PT/OT/SLP PROGRESS
Occupational Therapy   Treatment    Name: Pilar Reardon  MRN: [id number]  Admitting Diagnosis:  pulmonary embolism, weakness, and COPD       Recommendations:     Discharge Recommendations: home with home health  Discharge Equipment Recommendations:  walker, rolling  Barriers to discharge:       Assessment:     Pilar Reardon is a 89 y.o. female with a medical diagnosis of .  She presents with  pulmonary embolism, weakness, and COPD. Performance deficits affecting function are weakness, impaired endurance, impaired functional mobility, decreased safety awareness, decreased ROM, impaired balance. Pt was more alert and agreeable to tx on today. Pt participated well with skilled OT and agreed to participate in OT tx session. Pt required cues to participate secondary to decreased motivation. Pt finally agreed to participate and had good performance.     Rehab Prognosis:  Fair; patient would benefit from acute skilled OT services to address these deficits and reach maximum level of function.       Plan:     Patient to be seen 5 x/week to address the above listed problems via self-care/home management, therapeutic activities, therapeutic exercises, neuromuscular re-education  Plan of Care Expires: 12/02/22  Plan of Care Reviewed with: patient    Subjective     Pain/Comfort:  Pain Rating 1: 0/10    Objective:     Communicated with: Pt prior to session.  Patient found up in chair with oxygen, SCD upon OT entry to room.    General Precautions: Standard, fall   Orthopedic Precautions:    Braces:    Respiratory Status: Nasal cannula, flow 2 L/min     Occupational Performance:     Bed Mobility:    Pt presented laying supine in bed and completed this t/f with SVN    Functional Mobility/Transfers:  Patient completed Sit <> Stand Transfer with minimum assistance  with  rolling walker   Functional Mobility: Pt was t/f from the therapy gym in the w/c by OT    Activities of Daily Living:  T/f assessed SVN-Mod I I using the RW  with sit to stand t/f and supine to sit t/f  UE/LE dressing with Mod I using the RW for balance and safety      [facility] 6 Click ADL: 16    Treatment & Education:  Therapeutic exercise:    While sitting in w/c th pt completed bicep curls 2 sets 20 reps 4# DB; tricep extension: 2 sets of 20 reps with green theraband to increase BUE strength for ADLs and IADLs    While standing the pt tossed a large therapy ball back and forth with OT to improve gross motor coordination, core strength, UB strength and endurance for ADL performance.    Pt [id number] mins on arm ergometer to increase endurance, BUE strength and activity tolerance for ADL performance      Patient left up in chair with all lines intact and call button in reach    GOALS:   Multidisciplinary Problems       Occupational Therapy Goals          Problem: Occupational Therapy    Goal Priority Disciplines Outcome Interventions   Occupational Therapy Goal     OT, PT/OT Ongoing, Progressing    Description: Goals to be met by: 12/2/22     Patient will increase functional independence with ADLs by performing:    UE Dressing with Supervision.  LE Dressing with Supervision.  Grooming while standing with Modified [place] and Supervision.  Toileting from toilet with Modified [place] and Supervision for hygiene and clothing management.   Standing x5 minutes with Modified [place] and Supervision.  Step transfer with Modified [place] and Supervision  Toilet transfer to toilet with Modified [place] and Supervision.                         Time Tracking:     OT Date of Treatment:    OT Start Time:  9:30  OT Stop Time: 10:00  OT Total Time (min):      Billable Minutes:Self Care/Home Management 15  Therapeutic Exercise 15    OT/CAROL: OT          11/4/2022

## 2022-11-04 NOTE — RESPIRATORY THERAPY
Patient wore BIPAP fairly well tonight x 5 hrs with coaxing from Respiratory therapist. Finally at 0433am..Wanted BIPAP off because it was making her throat too dry although through the night patient was given water and placed right back onto BIPAP. At this time, placed patient back onto NC 2, room air SATs = 80%. Took about 10 minutes for patient's Sat level to increase to 92% on NC 2.

## 2022-11-05 PROCEDURE — 25000003 PHARM REV CODE 250: Performed by: PHYSICIAN ASSISTANT

## 2022-11-05 PROCEDURE — 63600175 PHARM REV CODE 636 W HCPCS: Performed by: FAMILY MEDICINE

## 2022-11-05 PROCEDURE — 27000221 HC OXYGEN, UP TO 24 HOURS

## 2022-11-05 PROCEDURE — 99900035 HC TECH TIME PER 15 MIN (STAT)

## 2022-11-05 PROCEDURE — 94640 AIRWAY INHALATION TREATMENT: CPT

## 2022-11-05 PROCEDURE — 94761 N-INVAS EAR/PLS OXIMETRY MLT: CPT

## 2022-11-05 PROCEDURE — 25000242 PHARM REV CODE 250 ALT 637 W/ HCPCS: Performed by: PHYSICIAN ASSISTANT

## 2022-11-05 PROCEDURE — 27000987 HC MATTRESS, MATRIX LOW PROFILE

## 2022-11-05 PROCEDURE — 11000004 HC SNF PRIVATE

## 2022-11-05 PROCEDURE — 27000941

## 2022-11-05 PROCEDURE — 25000003 PHARM REV CODE 250: Performed by: FAMILY MEDICINE

## 2022-11-05 PROCEDURE — 94660 CPAP INITIATION&MGMT: CPT

## 2022-11-05 RX ADMIN — IPRATROPIUM BROMIDE AND ALBUTEROL SULFATE 3 ML: 2.5; .5 SOLUTION RESPIRATORY (INHALATION) at 06:11

## 2022-11-05 RX ADMIN — PRIMIDONE 100 MG: 50 TABLET ORAL at 08:11

## 2022-11-05 RX ADMIN — SERTRALINE HYDROCHLORIDE 25 MG: 25 TABLET ORAL at 08:11

## 2022-11-05 RX ADMIN — DOXAZOSIN 2 MG: 1 TABLET ORAL at 08:11

## 2022-11-05 RX ADMIN — IPRATROPIUM BROMIDE AND ALBUTEROL SULFATE 3 ML: 2.5; .5 SOLUTION RESPIRATORY (INHALATION) at 01:11

## 2022-11-05 RX ADMIN — ENOXAPARIN SODIUM 80 MG: 100 INJECTION SUBCUTANEOUS at 04:11

## 2022-11-05 RX ADMIN — MELOXICAM 7.5 MG: 7.5 TABLET ORAL at 08:11

## 2022-11-05 RX ADMIN — LEVOTHYROXINE SODIUM 50 MCG: 0.05 TABLET ORAL at 06:11

## 2022-11-05 RX ADMIN — ATORVASTATIN CALCIUM 40 MG: 40 TABLET, FILM COATED ORAL at 08:11

## 2022-11-05 RX ADMIN — MONTELUKAST 10 MG: 10 TABLET, FILM COATED ORAL at 08:11

## 2022-11-05 RX ADMIN — ASPIRIN 81 MG: 81 TABLET, COATED ORAL at 08:11

## 2022-11-05 RX ADMIN — CHOLECALCIFEROL TAB 125 MCG (5000 UNIT) 5000 UNITS: 125 TAB at 08:11

## 2022-11-05 RX ADMIN — ROPINIROLE HYDROCHLORIDE 0.5 MG: 0.25 TABLET, FILM COATED ORAL at 08:11

## 2022-11-05 RX ADMIN — NITROFURANTOIN MONOHYDRATE/MACROCRYSTALS 100 MG: 75; 25 CAPSULE ORAL at 08:11

## 2022-11-05 RX ADMIN — SENNOSIDES AND DOCUSATE SODIUM 1 TABLET: 8.6; 5 TABLET ORAL at 08:11

## 2022-11-05 RX ADMIN — TRAVOPROST OPHTHALMIC SOLUTION 1 DROP: 0.04 SOLUTION OPHTHALMIC at 08:11

## 2022-11-05 RX ADMIN — GABAPENTIN 100 MG: 100 CAPSULE ORAL at 08:11

## 2022-11-05 RX ADMIN — PANTOPRAZOLE SODIUM 40 MG: 40 TABLET, DELAYED RELEASE ORAL at 08:11

## 2022-11-06 PROCEDURE — 11000004 HC SNF PRIVATE

## 2022-11-06 PROCEDURE — 25000003 PHARM REV CODE 250: Performed by: FAMILY MEDICINE

## 2022-11-06 PROCEDURE — 27000941

## 2022-11-06 PROCEDURE — 99900035 HC TECH TIME PER 15 MIN (STAT)

## 2022-11-06 PROCEDURE — 25000242 PHARM REV CODE 250 ALT 637 W/ HCPCS: Performed by: PHYSICIAN ASSISTANT

## 2022-11-06 PROCEDURE — 94761 N-INVAS EAR/PLS OXIMETRY MLT: CPT

## 2022-11-06 PROCEDURE — 94660 CPAP INITIATION&MGMT: CPT

## 2022-11-06 PROCEDURE — 27000221 HC OXYGEN, UP TO 24 HOURS

## 2022-11-06 PROCEDURE — 25000003 PHARM REV CODE 250: Performed by: PHYSICIAN ASSISTANT

## 2022-11-06 PROCEDURE — 63600175 PHARM REV CODE 636 W HCPCS: Performed by: FAMILY MEDICINE

## 2022-11-06 PROCEDURE — 94640 AIRWAY INHALATION TREATMENT: CPT

## 2022-11-06 RX ADMIN — IPRATROPIUM BROMIDE AND ALBUTEROL SULFATE 3 ML: 2.5; .5 SOLUTION RESPIRATORY (INHALATION) at 01:11

## 2022-11-06 RX ADMIN — PRIMIDONE 100 MG: 50 TABLET ORAL at 09:11

## 2022-11-06 RX ADMIN — ROPINIROLE HYDROCHLORIDE 0.5 MG: 0.25 TABLET, FILM COATED ORAL at 08:11

## 2022-11-06 RX ADMIN — IPRATROPIUM BROMIDE AND ALBUTEROL SULFATE 3 ML: 2.5; .5 SOLUTION RESPIRATORY (INHALATION) at 06:11

## 2022-11-06 RX ADMIN — MELOXICAM 7.5 MG: 7.5 TABLET ORAL at 09:11

## 2022-11-06 RX ADMIN — SENNOSIDES AND DOCUSATE SODIUM 1 TABLET: 8.6; 5 TABLET ORAL at 09:11

## 2022-11-06 RX ADMIN — IPRATROPIUM BROMIDE AND ALBUTEROL SULFATE 3 ML: 2.5; .5 SOLUTION RESPIRATORY (INHALATION) at 07:11

## 2022-11-06 RX ADMIN — ASPIRIN 81 MG: 81 TABLET, COATED ORAL at 09:11

## 2022-11-06 RX ADMIN — CHOLECALCIFEROL TAB 125 MCG (5000 UNIT) 5000 UNITS: 125 TAB at 09:11

## 2022-11-06 RX ADMIN — TRAVOPROST OPHTHALMIC SOLUTION 1 DROP: 0.04 SOLUTION OPHTHALMIC at 08:11

## 2022-11-06 RX ADMIN — LEVOTHYROXINE SODIUM 50 MCG: 0.05 TABLET ORAL at 06:11

## 2022-11-06 RX ADMIN — MONTELUKAST 10 MG: 10 TABLET, FILM COATED ORAL at 08:11

## 2022-11-06 RX ADMIN — SENNOSIDES AND DOCUSATE SODIUM 1 TABLET: 8.6; 5 TABLET ORAL at 08:11

## 2022-11-06 RX ADMIN — ENOXAPARIN SODIUM 80 MG: 100 INJECTION SUBCUTANEOUS at 04:11

## 2022-11-06 RX ADMIN — DOXAZOSIN 2 MG: 1 TABLET ORAL at 08:11

## 2022-11-06 RX ADMIN — GABAPENTIN 100 MG: 100 CAPSULE ORAL at 08:11

## 2022-11-06 RX ADMIN — PRIMIDONE 100 MG: 50 TABLET ORAL at 08:11

## 2022-11-06 RX ADMIN — NITROFURANTOIN MONOHYDRATE/MACROCRYSTALS 100 MG: 75; 25 CAPSULE ORAL at 08:11

## 2022-11-06 RX ADMIN — MELOXICAM 7.5 MG: 7.5 TABLET ORAL at 08:11

## 2022-11-06 RX ADMIN — ATORVASTATIN CALCIUM 40 MG: 40 TABLET, FILM COATED ORAL at 08:11

## 2022-11-06 RX ADMIN — SERTRALINE HYDROCHLORIDE 25 MG: 25 TABLET ORAL at 09:11

## 2022-11-06 RX ADMIN — PANTOPRAZOLE SODIUM 40 MG: 40 TABLET, DELAYED RELEASE ORAL at 09:11

## 2022-11-06 RX ADMIN — DOXAZOSIN 2 MG: 1 TABLET ORAL at 09:11

## 2022-11-07 VITALS
DIASTOLIC BLOOD PRESSURE: 82 MMHG | SYSTOLIC BLOOD PRESSURE: 158 MMHG | HEIGHT: 62 IN | WEIGHT: 143.31 LBS | OXYGEN SATURATION: 98 % | RESPIRATION RATE: 20 BRPM | TEMPERATURE: 98 F | HEART RATE: 76 BPM | BODY MASS INDEX: 26.37 KG/M2

## 2022-11-07 LAB
ALBUMIN SERPL BCP-MCNC: 2.8 G/DL (ref 3.5–5)
ALBUMIN/GLOB SERPL: 1 {RATIO}
ALP SERPL-CCNC: 95 U/L (ref 55–142)
ALT SERPL W P-5'-P-CCNC: 45 U/L (ref 13–56)
ANION GAP SERPL CALCULATED.3IONS-SCNC: 3 MMOL/L (ref 7–16)
AST SERPL W P-5'-P-CCNC: 29 U/L (ref 15–37)
BASOPHILS # BLD AUTO: 0.02 K/UL (ref 0–0.2)
BASOPHILS NFR BLD AUTO: 0.2 % (ref 0–1)
BILIRUB SERPL-MCNC: 0.2 MG/DL (ref ?–1.2)
BUN SERPL-MCNC: 13 MG/DL (ref 7–18)
BUN/CREAT SERPL: 25 (ref 6–20)
CALCIUM SERPL-MCNC: 8.7 MG/DL (ref 8.5–10.1)
CHLORIDE SERPL-SCNC: 105 MMOL/L (ref 98–107)
CO2 SERPL-SCNC: 42 MMOL/L (ref 21–32)
CREAT SERPL-MCNC: 0.53 MG/DL (ref 0.55–1.02)
DIFFERENTIAL METHOD BLD: ABNORMAL
EGFR (NO RACE VARIABLE) (RUSH/TITUS): 88 ML/MIN/1.73M²
EOSINOPHIL # BLD AUTO: 0.2 K/UL (ref 0–0.5)
EOSINOPHIL NFR BLD AUTO: 2.4 % (ref 1–4)
EOSINOPHIL NFR BLD MANUAL: 3 % (ref 1–4)
ERYTHROCYTE [DISTWIDTH] IN BLOOD BY AUTOMATED COUNT: 14.1 % (ref 11.5–14.5)
GLOBULIN SER-MCNC: 2.9 G/DL (ref 2–4)
GLUCOSE SERPL-MCNC: 101 MG/DL (ref 74–106)
HCT VFR BLD AUTO: 35.9 % (ref 38–47)
HGB BLD-MCNC: 10.7 G/DL (ref 12–16)
LYMPHOCYTES # BLD AUTO: 4.73 K/UL (ref 1–4.8)
LYMPHOCYTES NFR BLD AUTO: 56.4 % (ref 27–41)
LYMPHOCYTES NFR BLD MANUAL: 49 % (ref 27–41)
MACROCYTES BLD QL SMEAR: ABNORMAL
MCH RBC QN AUTO: 30.1 PG (ref 27–31)
MCHC RBC AUTO-ENTMCNC: 29.8 G/DL (ref 32–36)
MCV RBC AUTO: 100.8 FL (ref 80–96)
MONOCYTES # BLD AUTO: 0.32 K/UL (ref 0–0.8)
MONOCYTES NFR BLD AUTO: 3.8 % (ref 2–6)
MONOCYTES NFR BLD MANUAL: 4 % (ref 2–6)
MPC BLD CALC-MCNC: 11.2 FL (ref 9.4–12.4)
NEUTROPHILS # BLD AUTO: 3.12 K/UL (ref 1.8–7.7)
NEUTROPHILS NFR BLD AUTO: 37.2 % (ref 53–65)
NEUTS BAND NFR BLD MANUAL: 1 % (ref 1–5)
NEUTS SEG NFR BLD MANUAL: 43 % (ref 50–62)
NRBC BLD MANUAL-RTO: ABNORMAL %
PLATELET # BLD AUTO: 123 K/UL (ref 150–400)
PLATELET MORPHOLOGY: ABNORMAL
POTASSIUM SERPL-SCNC: 3.7 MMOL/L (ref 3.5–5.1)
PROT SERPL-MCNC: 5.7 G/DL (ref 6.4–8.2)
RBC # BLD AUTO: 3.56 M/UL (ref 4.2–5.4)
SODIUM SERPL-SCNC: 146 MMOL/L (ref 136–145)
WBC # BLD AUTO: 8.39 K/UL (ref 4.5–11)

## 2022-11-07 PROCEDURE — 63600175 PHARM REV CODE 636 W HCPCS: Performed by: FAMILY MEDICINE

## 2022-11-07 PROCEDURE — 25000003 PHARM REV CODE 250: Performed by: FAMILY MEDICINE

## 2022-11-07 PROCEDURE — 94640 AIRWAY INHALATION TREATMENT: CPT

## 2022-11-07 PROCEDURE — 27000221 HC OXYGEN, UP TO 24 HOURS

## 2022-11-07 PROCEDURE — 94660 CPAP INITIATION&MGMT: CPT

## 2022-11-07 PROCEDURE — 85025 COMPLETE CBC W/AUTO DIFF WBC: CPT | Performed by: FAMILY MEDICINE

## 2022-11-07 PROCEDURE — 25000242 PHARM REV CODE 250 ALT 637 W/ HCPCS: Performed by: PHYSICIAN ASSISTANT

## 2022-11-07 PROCEDURE — 36415 COLL VENOUS BLD VENIPUNCTURE: CPT | Performed by: FAMILY MEDICINE

## 2022-11-07 PROCEDURE — 97116 GAIT TRAINING THERAPY: CPT | Mod: CQ

## 2022-11-07 PROCEDURE — 27000987 HC MATTRESS, MATRIX LOW PROFILE

## 2022-11-07 PROCEDURE — 94761 N-INVAS EAR/PLS OXIMETRY MLT: CPT

## 2022-11-07 PROCEDURE — 25000003 PHARM REV CODE 250: Performed by: PHYSICIAN ASSISTANT

## 2022-11-07 PROCEDURE — 27000941

## 2022-11-07 PROCEDURE — 99316 PR NURSING FAC DISCHRGE DAY,MORE 30 MIN: ICD-10-PCS | Mod: ,,, | Performed by: FAMILY MEDICINE

## 2022-11-07 PROCEDURE — 99900035 HC TECH TIME PER 15 MIN (STAT)

## 2022-11-07 PROCEDURE — 99316 NF DSCHRG MGMT 30 MIN+: CPT | Mod: ,,, | Performed by: FAMILY MEDICINE

## 2022-11-07 PROCEDURE — 25500020 PHARM REV CODE 255: Performed by: FAMILY MEDICINE

## 2022-11-07 PROCEDURE — 80053 COMPREHEN METABOLIC PANEL: CPT | Performed by: FAMILY MEDICINE

## 2022-11-07 RX ORDER — METHYLPREDNISOLONE SOD SUCC 125 MG
250 VIAL (EA) INJECTION ONCE
Status: COMPLETED | OUTPATIENT
Start: 2022-11-07 | End: 2022-11-07

## 2022-11-07 RX ADMIN — IPRATROPIUM BROMIDE AND ALBUTEROL SULFATE 3 ML: 2.5; .5 SOLUTION RESPIRATORY (INHALATION) at 01:11

## 2022-11-07 RX ADMIN — LEVOTHYROXINE SODIUM 50 MCG: 0.05 TABLET ORAL at 06:11

## 2022-11-07 RX ADMIN — IPRATROPIUM BROMIDE AND ALBUTEROL SULFATE 3 ML: 2.5; .5 SOLUTION RESPIRATORY (INHALATION) at 08:11

## 2022-11-07 RX ADMIN — SENNOSIDES AND DOCUSATE SODIUM 1 TABLET: 8.6; 5 TABLET ORAL at 08:11

## 2022-11-07 RX ADMIN — IOPAMIDOL 100 ML: 755 INJECTION, SOLUTION INTRAVENOUS at 09:11

## 2022-11-07 RX ADMIN — CHOLECALCIFEROL TAB 125 MCG (5000 UNIT) 5000 UNITS: 125 TAB at 08:11

## 2022-11-07 RX ADMIN — SERTRALINE HYDROCHLORIDE 25 MG: 25 TABLET ORAL at 08:11

## 2022-11-07 RX ADMIN — ASPIRIN 81 MG: 81 TABLET, COATED ORAL at 08:11

## 2022-11-07 RX ADMIN — MELOXICAM 7.5 MG: 7.5 TABLET ORAL at 08:11

## 2022-11-07 RX ADMIN — PANTOPRAZOLE SODIUM 40 MG: 40 TABLET, DELAYED RELEASE ORAL at 08:11

## 2022-11-07 RX ADMIN — PRIMIDONE 100 MG: 50 TABLET ORAL at 08:11

## 2022-11-07 RX ADMIN — METHYLPREDNISOLONE SODIUM SUCCINATE 250 MG: 125 INJECTION, POWDER, FOR SOLUTION INTRAMUSCULAR; INTRAVENOUS at 08:11

## 2022-11-07 RX ADMIN — DOXAZOSIN 2 MG: 1 TABLET ORAL at 08:11

## 2022-11-07 NOTE — CONSULTS
"  [facility]  Adult Nutrition  Consult Note    SUMMARY     Recommendations    Recommendation/Intervention: 1. Mech soft diet with chopped meats and gravy 2. Ensure Enlive one carton po BID  Goals: Meet EEN >75%  Nutrition Goal Status: new    Assessment and Plan    No new Assessment & Plan notes have been filed under this hospital service since the last note was generated.  Service: Nutrition       Malnutrition Assessment  Malnutrition Type:  (Based on assessment this pt is not malnourished.)                                    Reason for Assessment    Reason For Assessment: consult (swing bed pt)  Diagnosis:  (PE, weakness, change in MS)  Relevant Medical History: COPD, Frequent UTI, GERD, HLD, HTN, Leukemia,adv age  Interdisciplinary Rounds: attended  General Information Comments: RDN visited pt this a.m.  She c/o some difficulty swallowing. She stated that she recently had eso dilitation.  Family has been bringing some food from home at times.  Meal intake ~45% noted.  No sign WL X 1 year per old EMR. She agreed to try Mech soft diet with chopped meats and gravy and Ensure Enlive.    Nutrition Risk Screen    Nutrition Risk Screen: dysphagia or difficulty swallowing    Nutrition/Diet History    Patient Reported Diet/Restrictions/Preferences: general  Spiritual, Cultural Beliefs, Confucianism Practices, Values that Affect Care: no  Food Allergies: NKFA  Factors Affecting Nutritional Intake: chewing difficulties/inability to chew food, difficulty/impaired swallowing, decreased appetite    Anthropometrics    Temp: 98.3 °F (36.8 °C)  Height Method: Stated  Height: 5' 2" (157.5 cm)  Height (inches): 62 in  Weight Method: Bed Scale  Weight: 65 kg (143 lb 4.8 oz)  Weight (lb): 143.3 lb  Ideal Body Weight (IBW), Female: 110 lb  % Ideal Body Weight, Female (lb): 130.27 %  BMI (Calculated): 26.2  BMI Grade: 25 - 29.9 - overweight       Lab/Procedures/Meds    Pertinent Labs Reviewed: " reviewed  Pertinent Labs Comments: Na 146, Alb 2.8, Hgb 10.,7, Hct 35.9  Pertinent Medications Reviewed: reviewed  Pertinent Medications Comments: Synthroid, Steroids, macrobid, Lipitor, D3, B12, Protonix, Zoloft    Physical Findings/Assessment         Estimated/Assessed Needs    Weight Used For Calorie Calculations: 65.8 kg (145 lb)  Energy Calorie Requirements (kcal): 1644  Energy Need Method: Kcal/kg  Protein Requirements: 66-73  Weight Used For Protein Calculations: 65.8 kg (145 lb)     Estimated Fluid Requirement Method: RDA Method  RDA Method (mL): 1644         Nutrition Prescription Ordered    Current Diet Order: Regular    Evaluation of Received Nutrient/Fluid Intake    Energy Calories Required: not meeting needs  Protein Required: not meeting needs  Fluid Required: not meeting needs  Tolerance: not tolerating  % Intake of Estimated Energy Needs: 25 - 50 %  % Meal Intake: 25 - 50 %    Nutrition Risk    Level of Risk/Frequency of Follow-up: high       Monitor and Evaluation    Food and Nutrient Intake: food and beverage intake  Anthropometric Measurements: weight  Biochemical Data, Medical Tests and Procedures: electrolyte and renal panel  Nutrition-Focused Physical Findings: overall appearance       Nutrition Follow-Up    RD Follow-up?: Yes   Passed

## 2022-11-07 NOTE — PLAN OF CARE
Problem: Adult Inpatient Plan of Care  Goal: Plan of Care Review  Outcome: Adequate for Care Transition  Goal: Patient-Specific Goal (Individualized)  Outcome: Adequate for Care Transition  Goal: Absence of Hospital-Acquired Illness or Injury  Outcome: Adequate for Care Transition  Goal: Optimal Comfort and Wellbeing  Outcome: Adequate for Care Transition  Goal: Readiness for Transition of Care  Outcome: Adequate for Care Transition     Problem: Skin Injury Risk Increased  Goal: Skin Health and Integrity  Outcome: Adequate for Care Transition     Problem: Fall Injury Risk  Goal: Absence of Fall and Fall-Related Injury  Outcome: Adequate for Care Transition

## 2022-11-07 NOTE — DISCHARGE SUMMARY
[facility]  Hospital Medicine  Discharge Summary      Patient Name: Pilar Reardon  MRN: [id number]  MIRELLA: [id number]  Patient Class: IP- Swing  Admission Date: 10/31/2022  Hospital Length of Stay: 7 days  Discharge Date and Time:  11/07/2022 3:14 PM  Attending Physician: Anmol Erwin DO   Discharging Provider: Anmol Erwin DO  Primary Care Provider: Marilee Witt MD    Primary Care Team: Networked reference to record PCT         * No surgery found *      Hospital Course:   Patient hypoxia got better she was able to sit up and do more functional stuff for herself including going to the bathroom.  She went through PT and OT therapy which helped her a good bit.  A 2nd CT PE protocol was done to find that her pulmonary embolus had resolved.  She was willing and able to go home.  And wanted to go.  At this time discussed the case with her family the patient be going home stay home home health care she will be going home with her daughter.  And stay and lived with her for a while before living independently.  She will also be continued on Eliquis is 2.5 mg b.i.d. for prevention of DVTs and pulmonary emboli       Goals of Care Treatment Preferences:  Code Status: DNR      Consults:   Consults (From admission, onward)        Status Ordering Provider     Inpatient consult to Registered Dietitian/Nutritionist  Once        Provider:  (Not yet assigned)    Completed ANMOL ERWIN          No new Assessment & Plan notes have been filed under this hospital service since the last note was generated.  Service: Hospital Medicine    There are no hospital problems to display for this patient.      Discharged Condition: good    Disposition:     Follow Up:    Patient Instructions:   No discharge procedures on file.    Significant Diagnostic Studies: Labs:   BMP:   Recent Labs   Lab 11/07/22  0713      *   K 3.7      CO2 42*   BUN 13   CREATININE 0.53*    CALCIUM 8.7   , CMP   Recent Labs   Lab 11/07/22  0713   *   K 3.7      CO2 42*      BUN 13   CREATININE 0.53*   CALCIUM 8.7   PROT 5.7*   ALBUMIN 2.8*   BILITOT 0.2   ALKPHOS 95   AST 29   ALT 45   ANIONGAP 3*   , CBC   Recent Labs   Lab 11/07/22  0714   WBC 8.39   HGB 10.7*   HCT 35.9*   *   , INR No results found for: INR, PROTIME, Lipid Panel No results found for: CHOL, HDL, LDLCALC, TRIG, CHOLHDL, Troponin No results for input(s): TROPONINI in the last 168 hours. and A1C: No results for input(s): HGBA1C in the last 4320 hours.    Pending Diagnostic Studies:     None         Medications:  Reconciled Home Medications:      Medication List      START taking these medications    apixaban 2.5 mg Tab  Commonly known as: ELIQUIS  Take 1 tablet (2.5 mg total) by mouth 2 (two) times daily.        CONTINUE taking these medications    ANORO ELLIPTA 62.5-25 mcg/actuation Dsdv  Generic drug: umeclidinium-vilanteroL  Inhale 2 puffs into the lungs once daily.     aspirin 81 MG EC tablet  Commonly known as: ECOTRIN  Take 81 mg by mouth once daily.     cholecalciferol (vitamin D3) 125 mcg (5,000 unit) Tab  Take 5,000 Units by mouth once daily.     cloNIDine 0.1 MG tablet  Commonly known as: CATAPRES  Take 0.1 mg by mouth every 4 (four) hours as needed. For systolic blood pressure  >170     cyanocobalamin 1,000 mcg/mL injection  Inject 1,000 mcg into the muscle every 30 days. Next dose due this week     dexAMETHasone 2 MG tablet  Commonly known as: DECADRON  Take 2 mg by mouth every Mon, Wed, Fri.     diclofenac sodium 1 % Gel  Commonly known as: VOLTAREN     doxazosin 2 MG tablet  Commonly known as: CARDURA  Take 2 mg by mouth 2 (two) times a day.     gabapentin 100 MG capsule  Commonly known as: NEURONTIN  Take 100 mg by mouth every evening.     levothyroxine 50 MCG tablet  Commonly known as: SYNTHROID  Take 50 mcg by mouth before breakfast.     meloxicam 7.5 MG tablet  Commonly known as:  MOBIC  Take 7.5 mg by mouth 2 (two) times a day.     montelukast 10 mg tablet  Commonly known as: SINGULAIR  Take by mouth every evening.     nitrofurantoin (macrocrystal-monohydrate) 100 MG capsule  Commonly known as: MACROBID  Take 100 mg by mouth every evening.     omeprazole 40 MG capsule  Commonly known as: PRILOSEC  Take 40 mg by mouth every morning.     ondansetron 4 MG tablet  Commonly known as: ZOFRAN  Take 4 mg by mouth every 6 (six) hours as needed for Nausea.     phenazopyridine 200 MG tablet  Commonly known as: PYRIDIUM  Take 200 mg by mouth 3 (three) times daily as needed for Pain.     primidone 50 MG Tab  Commonly known as: MYSOLINE  Take 100 mg by mouth 2 (two) times a day.     rOPINIRole 0.5 MG tablet  Commonly known as: REQUIP  Take 0.5 mg by mouth every evening.     sertraline 25 MG tablet  Commonly known as: ZOLOFT  Take 25 mg by mouth once daily.     tiZANidine 4 MG tablet  Commonly known as: ZANAFLEX  Take 1 tablet (4 mg total) by mouth every 6 (six) hours as needed.     travoprost 0.004 % ophthalmic solution  Commonly known as: TRAVATAN Z  Place 1 drop into both eyes every evening.        ASK your doctor about these medications    valACYclovir 1000 MG tablet  Commonly known as: VALTREX  Take 1 tablet (1,000 mg total) by mouth 3 (three) times daily. for 10 days            Indwelling Lines/Drains at time of discharge:   Lines/Drains/Airways     None                 Time spent on the discharge of patient: 45 minutes  Consult stay home home Health Care to evaluate the patient       Anmol Reese DO  Department of Hospital Medicine  [facility]

## 2022-11-07 NOTE — HOSPITAL COURSE
Patient hypoxia got better she was able to sit up and do more functional stuff for herself including going to the bathroom.  She went through PT and OT therapy which helped her a good bit.  A 2nd CT PE protocol was done to find that her pulmonary embolus had resolved.  She was willing and able to go home.  And wanted to go.  At this time discussed the case with her family the patient be going home stay home home health care she will be going home with her daughter.  And stay and lived with her for a while before living independently.  She will also be continued on Eliquis is 2.5 mg b.i.d. for prevention of DVTs and pulmonary emboli

## 2022-11-07 NOTE — PT/OT/SLP PROGRESS
Physical Therapy Treatment    Patient Name:  Pilar Reardon   MRN:  [id number]    Recommendations:     Discharge Recommendations:  home with home health   Discharge Equipment Recommendations: walker, rolling   Barriers to discharge: None    Assessment:     Pilar Reardon is a 89 y.o. female admitted with a medical diagnosis of <principal problem not specified>.  She presents with the following impairments/functional limitations:  weakness .    Rehab Prognosis: Good; patient would benefit from acute skilled PT services to address these deficits and reach maximum level of function.    Recent Surgery: * No surgery found *      Received Plan of Care per Dhruv Simon, PT     Plan:     During this hospitalization, patient to be seen 5 x/week to address the identified rehab impairments via gait training, therapeutic activities, therapeutic exercises and progress toward the following goals:    Plan of Care Expires:       Subjective     Chief Complaint: wants to get better and go home  Patient/Family Comments/goals: agrees to PT Tx  Pain/Comfort:  Pain Rating 1: 0/10      Objective:     Communicated with patient prior to session.  Patient found HOB elevated with oxygen upon PT entry to room.     General Precautions: Standard, fall   Orthopedic Precautions:N/A   Braces:    Respiratory Status: Nasal cannula, flow 2 L/min     Functional Mobility:  Bed Mobility:     Supine to Sit: modified independence  Transfers:     Sit to Stand:  modified independence with rolling walker  Gait: 200ft with rolling walker, 2L O2, CGA/SVN; took 1-2 standing rest breaks for breathing due to short of breath       AM-PAC 6 CLICK MOBILITY  Turning over in bed (including adjusting bedclothes, sheets and blankets)?: 4  Sitting down on and standing up from a chair with arms (e.g., wheelchair, bedside commode, etc.): 3  Moving from lying on back to sitting on the side of the bed?: 4  Moving to and from a bed to a chair (including a wheelchair)?:  3  Need to walk in hospital room?: 3  Climbing 3-5 steps with a railing?: 2  Basic Mobility Total Score: 19       Treatment & Education:  Bed mobility, sit to stand transfers, gait training as noted above  Pt tired and declined any other exercises or treatment after walking    Patient left up in chair per request with all lines intact and call button in reach..    GOALS:   Multidisciplinary Problems       Physical Therapy Goals          Problem: Physical Therapy    Goal Priority Disciplines Outcome Goal Variances Interventions   Physical Therapy Goal     PT, PT/OT Ongoing, Progressing     Description: Goals to be met by: 2 weeks     Patient will increase functional independence with mobility by performin. Supine to sit with Stand by Assistance  2. Sit to supine with Stand by Assistance  3. Sit to stand transfer with Contact Guard Assistance  4. Bed to chair transfer with Contact Guard Assistance using Rolling Walker  5. Gait  x 50 feet with Contact Guard Assistance using Rolling Walker.     Goals to be met by: 4 weeks     Patient will increase functional independence with mobility by performin. Supine to sit with Modified [place]  2. Sit to supine with Modified [place]  3. Sit to stand transfer with Modified [place]  4. Bed to chair transfer with Modified [place] using Rolling Walker  5. Gait  x 100 feet with Modified [place] using Rolling Walker.   6. Stand for 10 minutes with Supervision using Rolling Walker.                         Time Tracking:     PT Received On: 22  PT Start Time: 1115     PT Stop Time: 1135  PT Total Time (min): 20 min     Billable Minutes: Gait Training 20    Treatment Type: Treatment  PT/PTA: PTA     PTA Visit Number: 2     2022

## 2022-11-07 NOTE — PLAN OF CARE
Pt. Has improved since last week and has been able to work with Therapy. Pt. Remains on O2 at 2 L/min and Bipap at night. Pt. May be ready to d/c home by end of week. Plans  or home hospice care. Will cont. To discuss this with family. Follow.

## 2022-11-07 NOTE — NURSING
Pt d/c'd home via POV. Pt was transported to POV via w/c. Pt was transported on oxygen at 2l/nc from home oxygen tank. Pt discharge instructions were reviewed with pt and daughter. Both v/u. Written prescription for eliquis was given to daughter. Personal belongings were returned to pt at time of d/c. Pt stable at time of discharge.

## 2022-11-08 NOTE — PLAN OF CARE
[facility]  Discharge Final Note    Primary Care Provider: Marilee Witt MD    Expected Discharge Date: 11/7/2022    Final Discharge Note (most recent)       Final Note - 11/08/22 0806          Final Note    Assessment Type Final Discharge Note     Anticipated Discharge Disposition [facility]    What phone number can be called within the next 1-3 days to see how you are doing after discharge? [phone]     Hospital Resources/Appts/Education Provided Provided patient/caregiver with written discharge plan information;Appointments scheduled and added to AVS        Post-Acute Status    Post-Acute Authorization Home Health     Home Health Status Referrals Sent     Coverage [employer]     Patient choice form signed by patient/caregiver List with quality metrics by geographic area provided;List from CMS Compare;List from System Post-Acute Care     Discharge Delays None known at this time                 Pt. Improved yesterday and requested to d/c home. Pt. Has home O2 and no other DME was needed. Pt. Referred to [facility] per family request. Appt. Was scheduled with Dr. Witt. No other d/c needs were identified.    Important Message from Medicare             Contact Info       Marilee Witt MD   Specialty: Family Medicine   Relationship: PCP - General    [address]  [facility] [place]  [place] [address]   Phone: [phone]       Next Steps: Follow up on 11/21/2022    Instructions: at 3:00pm for hospital follow up appointment.

## 2022-12-01 ENCOUNTER — HOSPITAL ENCOUNTER (INPATIENT)
Facility: HOSPITAL | Age: 87
LOS: 7 days | Discharge: HOME HEALTH | DRG: 555 | End: 2022-12-08
Attending: FAMILY MEDICINE | Admitting: FAMILY MEDICINE
Payer: MEDICARE

## 2022-12-01 DIAGNOSIS — J98.8 PSEUDOMONAS RESPIRATORY INFECTION: Primary | ICD-10-CM

## 2022-12-01 DIAGNOSIS — B96.5 PSEUDOMONAS RESPIRATORY INFECTION: Primary | ICD-10-CM

## 2022-12-01 DIAGNOSIS — R53.1 GENERALIZED WEAKNESS: ICD-10-CM

## 2022-12-01 DIAGNOSIS — I25.10 CORONARY ARTERY DISEASE, UNSPECIFIED VESSEL OR LESION TYPE, UNSPECIFIED WHETHER ANGINA PRESENT, UNSPECIFIED WHETHER NATIVE OR TRANSPLANTED HEART: ICD-10-CM

## 2022-12-01 PROBLEM — J44.9 COPD (CHRONIC OBSTRUCTIVE PULMONARY DISEASE): Status: ACTIVE | Noted: 2022-12-01

## 2022-12-01 PROBLEM — J96.22 ACUTE ON CHRONIC RESPIRATORY FAILURE WITH HYPERCAPNIA: Status: ACTIVE | Noted: 2022-12-01

## 2022-12-01 PROBLEM — R13.10 DYSPHAGIA: Status: ACTIVE | Noted: 2022-12-01

## 2022-12-01 PROCEDURE — 11000004 HC SNF PRIVATE

## 2022-12-01 PROCEDURE — 99499 UNLISTED E&M SERVICE: CPT | Mod: ,,, | Performed by: FAMILY MEDICINE

## 2022-12-01 PROCEDURE — 25000003 PHARM REV CODE 250: Performed by: FAMILY MEDICINE

## 2022-12-01 PROCEDURE — 63600175 PHARM REV CODE 636 W HCPCS: Performed by: NURSE PRACTITIONER

## 2022-12-01 PROCEDURE — 94640 AIRWAY INHALATION TREATMENT: CPT

## 2022-12-01 PROCEDURE — 27000221 HC OXYGEN, UP TO 24 HOURS

## 2022-12-01 PROCEDURE — 94761 N-INVAS EAR/PLS OXIMETRY MLT: CPT

## 2022-12-01 PROCEDURE — 27000981 HC MATTRESS, ACCUCAIR DAILY RENTAL

## 2022-12-01 PROCEDURE — 99499 NO LOS: ICD-10-PCS | Mod: ,,, | Performed by: FAMILY MEDICINE

## 2022-12-01 PROCEDURE — 25000242 PHARM REV CODE 250 ALT 637 W/ HCPCS: Performed by: NURSE PRACTITIONER

## 2022-12-01 PROCEDURE — 25000003 PHARM REV CODE 250: Performed by: NURSE PRACTITIONER

## 2022-12-01 PROCEDURE — 63600175 PHARM REV CODE 636 W HCPCS: Performed by: FAMILY MEDICINE

## 2022-12-01 PROCEDURE — 99900035 HC TECH TIME PER 15 MIN (STAT)

## 2022-12-01 PROCEDURE — 27000944

## 2022-12-01 RX ORDER — CLONIDINE HYDROCHLORIDE 0.1 MG/1
0.1 TABLET ORAL 2 TIMES DAILY
Status: DISCONTINUED | OUTPATIENT
Start: 2022-12-01 | End: 2022-12-08 | Stop reason: HOSPADM

## 2022-12-01 RX ORDER — MONTELUKAST SODIUM 10 MG/1
10 TABLET ORAL NIGHTLY
Status: DISCONTINUED | OUTPATIENT
Start: 2022-12-01 | End: 2022-12-08 | Stop reason: HOSPADM

## 2022-12-01 RX ORDER — IPRATROPIUM BROMIDE AND ALBUTEROL SULFATE 2.5; .5 MG/3ML; MG/3ML
3 SOLUTION RESPIRATORY (INHALATION) EVERY 6 HOURS
Status: DISCONTINUED | OUTPATIENT
Start: 2022-12-01 | End: 2022-12-02

## 2022-12-01 RX ORDER — ACETAZOLAMIDE 250 MG/1
250 TABLET ORAL 2 TIMES DAILY
COMMUNITY

## 2022-12-01 RX ORDER — ROSUVASTATIN CALCIUM 10 MG/1
10 TABLET, COATED ORAL NIGHTLY
COMMUNITY

## 2022-12-01 RX ORDER — BENZONATATE 100 MG/1
100 CAPSULE ORAL 3 TIMES DAILY PRN
Status: DISCONTINUED | OUTPATIENT
Start: 2022-12-01 | End: 2022-12-08 | Stop reason: HOSPADM

## 2022-12-01 RX ORDER — FLUCONAZOLE 100 MG/1
100 TABLET ORAL DAILY
Status: ON HOLD | COMMUNITY
End: 2022-12-08 | Stop reason: HOSPADM

## 2022-12-01 RX ORDER — BENZONATATE 100 MG/1
100 CAPSULE ORAL 3 TIMES DAILY PRN
COMMUNITY

## 2022-12-01 RX ORDER — LATANOPROST 50 UG/ML
1 SOLUTION/ DROPS OPHTHALMIC NIGHTLY
Status: DISCONTINUED | OUTPATIENT
Start: 2022-12-01 | End: 2022-12-08 | Stop reason: HOSPADM

## 2022-12-01 RX ORDER — PANTOPRAZOLE SODIUM 40 MG/1
40 TABLET, DELAYED RELEASE ORAL DAILY
Status: DISCONTINUED | OUTPATIENT
Start: 2022-12-02 | End: 2022-12-08 | Stop reason: HOSPADM

## 2022-12-01 RX ORDER — PHENOL 1.4 G/100ML
SPRAY ORAL
Status: ON HOLD | COMMUNITY
End: 2022-12-08 | Stop reason: HOSPADM

## 2022-12-01 RX ORDER — FLUCONAZOLE 100 MG/1
100 TABLET ORAL DAILY
Status: DISCONTINUED | OUTPATIENT
Start: 2022-12-02 | End: 2022-12-07 | Stop reason: ALTCHOICE

## 2022-12-01 RX ORDER — ROPINIROLE 0.25 MG/1
0.5 TABLET, FILM COATED ORAL NIGHTLY
Status: DISCONTINUED | OUTPATIENT
Start: 2022-12-01 | End: 2022-12-08 | Stop reason: HOSPADM

## 2022-12-01 RX ORDER — PROPRANOLOL HYDROCHLORIDE 10 MG/1
10 TABLET ORAL 3 TIMES DAILY
Status: DISCONTINUED | OUTPATIENT
Start: 2022-12-01 | End: 2022-12-08 | Stop reason: HOSPADM

## 2022-12-01 RX ORDER — MAG HYDROX/ALUMINUM HYD/SIMETH 200-200-20
30 SUSPENSION, ORAL (FINAL DOSE FORM) ORAL EVERY 6 HOURS PRN
Status: DISCONTINUED | OUTPATIENT
Start: 2022-12-01 | End: 2022-12-08 | Stop reason: HOSPADM

## 2022-12-01 RX ORDER — ATORVASTATIN CALCIUM 40 MG/1
40 TABLET, FILM COATED ORAL DAILY
Status: DISCONTINUED | OUTPATIENT
Start: 2022-12-02 | End: 2022-12-08 | Stop reason: HOSPADM

## 2022-12-01 RX ORDER — PANTOPRAZOLE SODIUM 40 MG/1
40 TABLET, DELAYED RELEASE ORAL DAILY
Status: ON HOLD | COMMUNITY
End: 2022-12-08 | Stop reason: HOSPADM

## 2022-12-01 RX ORDER — LOSARTAN POTASSIUM 100 MG/1
50 TABLET ORAL 2 TIMES DAILY
COMMUNITY

## 2022-12-01 RX ORDER — SERTRALINE HYDROCHLORIDE 25 MG/1
25 TABLET, FILM COATED ORAL DAILY
Status: DISCONTINUED | OUTPATIENT
Start: 2022-12-02 | End: 2022-12-08 | Stop reason: HOSPADM

## 2022-12-01 RX ORDER — CYANOCOBALAMIN 1000 UG/ML
1000 INJECTION, SOLUTION INTRAMUSCULAR; SUBCUTANEOUS
Status: DISCONTINUED | OUTPATIENT
Start: 2022-12-02 | End: 2022-12-08 | Stop reason: HOSPADM

## 2022-12-01 RX ORDER — MEROPENEM 500 MG/1
500 INJECTION, POWDER, FOR SOLUTION INTRAVENOUS
Status: DISCONTINUED | OUTPATIENT
Start: 2022-12-01 | End: 2022-12-01

## 2022-12-01 RX ORDER — THEOPHYLLINE ANHYDROUS 80 MG/15ML
150 SOLUTION ORAL DAILY
Status: DISCONTINUED | OUTPATIENT
Start: 2022-12-02 | End: 2022-12-02

## 2022-12-01 RX ORDER — NIFEDIPINE 30 MG/1
30 TABLET, FILM COATED, EXTENDED RELEASE ORAL NIGHTLY
COMMUNITY

## 2022-12-01 RX ORDER — ISOSORBIDE MONONITRATE 30 MG/1
30 TABLET, EXTENDED RELEASE ORAL DAILY
COMMUNITY

## 2022-12-01 RX ORDER — AMLODIPINE BESYLATE 5 MG/1
5 TABLET ORAL DAILY
Status: DISCONTINUED | OUTPATIENT
Start: 2022-12-02 | End: 2022-12-08 | Stop reason: HOSPADM

## 2022-12-01 RX ORDER — ADHESIVE BANDAGE
30 BANDAGE TOPICAL DAILY PRN
Status: DISCONTINUED | OUTPATIENT
Start: 2022-12-01 | End: 2022-12-08 | Stop reason: HOSPADM

## 2022-12-01 RX ORDER — PROPRANOLOL HYDROCHLORIDE 10 MG/1
10 TABLET ORAL 3 TIMES DAILY
COMMUNITY

## 2022-12-01 RX ORDER — NIFEDIPINE 30 MG/1
30 TABLET, EXTENDED RELEASE ORAL DAILY
Status: DISCONTINUED | OUTPATIENT
Start: 2022-12-02 | End: 2022-12-08 | Stop reason: HOSPADM

## 2022-12-01 RX ORDER — LEVOTHYROXINE SODIUM 50 UG/1
50 TABLET ORAL
Status: DISCONTINUED | OUTPATIENT
Start: 2022-12-02 | End: 2022-12-08 | Stop reason: HOSPADM

## 2022-12-01 RX ORDER — ACETAMINOPHEN 325 MG/1
650 TABLET ORAL EVERY 4 HOURS PRN
Status: DISCONTINUED | OUTPATIENT
Start: 2022-12-01 | End: 2022-12-08 | Stop reason: HOSPADM

## 2022-12-01 RX ORDER — DEXAMETHASONE 1 MG/1
2 TABLET ORAL 2 TIMES DAILY
Status: DISCONTINUED | OUTPATIENT
Start: 2022-12-01 | End: 2022-12-08 | Stop reason: HOSPADM

## 2022-12-01 RX ORDER — GABAPENTIN 100 MG/1
100 CAPSULE ORAL NIGHTLY
Status: DISCONTINUED | OUTPATIENT
Start: 2022-12-01 | End: 2022-12-08 | Stop reason: HOSPADM

## 2022-12-01 RX ORDER — LACTULOSE 10 G/15ML
20 SOLUTION ORAL EVERY 6 HOURS PRN
Status: DISCONTINUED | OUTPATIENT
Start: 2022-12-01 | End: 2022-12-08 | Stop reason: HOSPADM

## 2022-12-01 RX ORDER — TALC
6 POWDER (GRAM) TOPICAL NIGHTLY PRN
Status: DISCONTINUED | OUTPATIENT
Start: 2022-12-01 | End: 2022-12-08 | Stop reason: HOSPADM

## 2022-12-01 RX ORDER — ACETAMINOPHEN 325 MG/1
650 TABLET ORAL EVERY 6 HOURS PRN
Status: ON HOLD | COMMUNITY
End: 2022-12-08 | Stop reason: HOSPADM

## 2022-12-01 RX ORDER — ACETAZOLAMIDE 250 MG/1
250 TABLET ORAL 2 TIMES DAILY
Status: DISCONTINUED | OUTPATIENT
Start: 2022-12-01 | End: 2022-12-08 | Stop reason: HOSPADM

## 2022-12-01 RX ORDER — MEROPENEM 500 MG/1
500 INJECTION, POWDER, FOR SOLUTION INTRAVENOUS
Status: ON HOLD | COMMUNITY
End: 2022-12-08 | Stop reason: HOSPADM

## 2022-12-01 RX ORDER — ASPIRIN 81 MG/1
81 TABLET ORAL DAILY
Status: DISCONTINUED | OUTPATIENT
Start: 2022-12-02 | End: 2022-12-08 | Stop reason: HOSPADM

## 2022-12-01 RX ORDER — MUPIROCIN 20 MG/G
OINTMENT TOPICAL 2 TIMES DAILY
Status: DISPENSED | OUTPATIENT
Start: 2022-12-01 | End: 2022-12-06

## 2022-12-01 RX ORDER — AMLODIPINE BESYLATE 5 MG/1
5 TABLET ORAL DAILY
COMMUNITY

## 2022-12-01 RX ORDER — SENNOSIDES 8.6 MG/1
8.6 TABLET ORAL DAILY PRN
Status: DISCONTINUED | OUTPATIENT
Start: 2022-12-01 | End: 2022-12-08 | Stop reason: HOSPADM

## 2022-12-01 RX ORDER — LOSARTAN POTASSIUM 50 MG/1
100 TABLET ORAL DAILY
Status: DISCONTINUED | OUTPATIENT
Start: 2022-12-02 | End: 2022-12-08 | Stop reason: HOSPADM

## 2022-12-01 RX ORDER — ISOSORBIDE MONONITRATE 30 MG/1
30 TABLET, EXTENDED RELEASE ORAL DAILY
Status: DISCONTINUED | OUTPATIENT
Start: 2022-12-02 | End: 2022-12-08 | Stop reason: HOSPADM

## 2022-12-01 RX ADMIN — DEXAMETHASONE 2 MG: 1 TABLET ORAL at 08:12

## 2022-12-01 RX ADMIN — LATANOPROST 1 DROP: 50 SOLUTION OPHTHALMIC at 08:12

## 2022-12-01 RX ADMIN — MUPIROCIN: 20 OINTMENT TOPICAL at 08:12

## 2022-12-01 RX ADMIN — MONTELUKAST SODIUM 10 MG: 10 TABLET, COATED ORAL at 08:12

## 2022-12-01 RX ADMIN — GABAPENTIN 100 MG: 100 CAPSULE ORAL at 08:12

## 2022-12-01 RX ADMIN — APIXABAN 2.5 MG: 2.5 TABLET, FILM COATED ORAL at 08:12

## 2022-12-01 RX ADMIN — IPRATROPIUM BROMIDE AND ALBUTEROL SULFATE 3 ML: 2.5; .5 SOLUTION RESPIRATORY (INHALATION) at 08:12

## 2022-12-01 RX ADMIN — ACETAZOLAMIDE 250 MG: 250 TABLET ORAL at 08:12

## 2022-12-01 RX ADMIN — CLONIDINE HYDROCHLORIDE 0.1 MG: 0.1 TABLET ORAL at 08:12

## 2022-12-01 RX ADMIN — PROPRANOLOL HYDROCHLORIDE 10 MG: 10 TABLET ORAL at 08:12

## 2022-12-01 RX ADMIN — MEROPENEM 500 MG: 500 INJECTION, POWDER, FOR SOLUTION INTRAVENOUS at 08:12

## 2022-12-01 RX ADMIN — ROPINIROLE HYDROCHLORIDE 0.5 MG: 0.25 TABLET, FILM COATED ORAL at 08:12

## 2022-12-01 RX ADMIN — Medication 6 MG: at 08:12

## 2022-12-02 LAB
ANION GAP SERPL CALCULATED.3IONS-SCNC: 8 MMOL/L (ref 7–16)
BASOPHILS # BLD AUTO: 0.02 K/UL (ref 0–0.2)
BASOPHILS NFR BLD AUTO: 0.1 % (ref 0–1)
BUN SERPL-MCNC: 26 MG/DL (ref 7–18)
BUN/CREAT SERPL: 32 (ref 6–20)
CALCIUM SERPL-MCNC: 8.6 MG/DL (ref 8.5–10.1)
CHLORIDE SERPL-SCNC: 113 MMOL/L (ref 98–107)
CO2 SERPL-SCNC: 31 MMOL/L (ref 21–32)
CREAT SERPL-MCNC: 0.81 MG/DL (ref 0.55–1.02)
DIFFERENTIAL METHOD BLD: ABNORMAL
EGFR (NO RACE VARIABLE) (RUSH/TITUS): 69 ML/MIN/1.73M²
EOSINOPHIL # BLD AUTO: 0.01 K/UL (ref 0–0.5)
EOSINOPHIL NFR BLD AUTO: 0.1 % (ref 1–4)
ERYTHROCYTE [DISTWIDTH] IN BLOOD BY AUTOMATED COUNT: 14.9 % (ref 11.5–14.5)
GLUCOSE SERPL-MCNC: 147 MG/DL (ref 74–106)
HCT VFR BLD AUTO: 34.9 % (ref 38–47)
HGB BLD-MCNC: 10.6 G/DL (ref 12–16)
LYMPHOCYTES # BLD AUTO: 10.16 K/UL (ref 1–4.8)
LYMPHOCYTES NFR BLD AUTO: 64.9 % (ref 27–41)
LYMPHOCYTES NFR BLD MANUAL: 59 % (ref 27–41)
MCH RBC QN AUTO: 30.2 PG (ref 27–31)
MCHC RBC AUTO-ENTMCNC: 30.4 G/DL (ref 32–36)
MCV RBC AUTO: 99.4 FL (ref 80–96)
MONOCYTES # BLD AUTO: 0.56 K/UL (ref 0–0.8)
MONOCYTES NFR BLD AUTO: 3.6 % (ref 2–6)
MONOCYTES NFR BLD MANUAL: 3 % (ref 2–6)
MPC BLD CALC-MCNC: 11.1 FL (ref 9.4–12.4)
NEUTROPHILS # BLD AUTO: 4.91 K/UL (ref 1.8–7.7)
NEUTROPHILS NFR BLD AUTO: 31.3 % (ref 53–65)
NEUTS SEG NFR BLD MANUAL: 38 % (ref 50–62)
NRBC BLD MANUAL-RTO: ABNORMAL %
PLATELET # BLD AUTO: 139 K/UL (ref 150–400)
PLATELET MORPHOLOGY: ABNORMAL
POTASSIUM SERPL-SCNC: 4.8 MMOL/L (ref 3.5–5.1)
RBC # BLD AUTO: 3.51 M/UL (ref 4.2–5.4)
RBC MORPH BLD: NORMAL
SODIUM SERPL-SCNC: 147 MMOL/L (ref 136–145)
WBC # BLD AUTO: 15.66 K/UL (ref 4.5–11)

## 2022-12-02 PROCEDURE — 63600175 PHARM REV CODE 636 W HCPCS: Performed by: NURSE PRACTITIONER

## 2022-12-02 PROCEDURE — 25000242 PHARM REV CODE 250 ALT 637 W/ HCPCS: Performed by: NURSE PRACTITIONER

## 2022-12-02 PROCEDURE — 85025 COMPLETE CBC W/AUTO DIFF WBC: CPT | Performed by: NURSE PRACTITIONER

## 2022-12-02 PROCEDURE — 63700000 PHARM REV CODE 250 ALT 637 W/O HCPCS: Performed by: NURSE PRACTITIONER

## 2022-12-02 PROCEDURE — 25000003 PHARM REV CODE 250: Performed by: NURSE PRACTITIONER

## 2022-12-02 PROCEDURE — 80048 BASIC METABOLIC PNL TOTAL CA: CPT | Performed by: NURSE PRACTITIONER

## 2022-12-02 PROCEDURE — 27000944

## 2022-12-02 PROCEDURE — 97161 PT EVAL LOW COMPLEX 20 MIN: CPT

## 2022-12-02 PROCEDURE — 94761 N-INVAS EAR/PLS OXIMETRY MLT: CPT

## 2022-12-02 PROCEDURE — 27000221 HC OXYGEN, UP TO 24 HOURS

## 2022-12-02 PROCEDURE — 11000004 HC SNF PRIVATE

## 2022-12-02 PROCEDURE — 36415 COLL VENOUS BLD VENIPUNCTURE: CPT | Performed by: NURSE PRACTITIONER

## 2022-12-02 PROCEDURE — 25000003 PHARM REV CODE 250: Performed by: FAMILY MEDICINE

## 2022-12-02 PROCEDURE — 27000981 HC MATTRESS, ACCUCAIR DAILY RENTAL

## 2022-12-02 PROCEDURE — 63600175 PHARM REV CODE 636 W HCPCS: Performed by: FAMILY MEDICINE

## 2022-12-02 PROCEDURE — 94640 AIRWAY INHALATION TREATMENT: CPT

## 2022-12-02 PROCEDURE — 25000242 PHARM REV CODE 250 ALT 637 W/ HCPCS: Performed by: FAMILY MEDICINE

## 2022-12-02 PROCEDURE — 97166 OT EVAL MOD COMPLEX 45 MIN: CPT

## 2022-12-02 RX ORDER — ALBUTEROL SULFATE 5 MG/ML
2.5 SOLUTION RESPIRATORY (INHALATION)
Status: DISCONTINUED | OUTPATIENT
Start: 2022-12-02 | End: 2022-12-02 | Stop reason: CLARIF

## 2022-12-02 RX ORDER — ALBUTEROL SULFATE 0.83 MG/ML
2.5 SOLUTION RESPIRATORY (INHALATION)
Status: DISCONTINUED | OUTPATIENT
Start: 2022-12-02 | End: 2022-12-06

## 2022-12-02 RX ORDER — BUDESONIDE 0.5 MG/2ML
0.5 INHALANT ORAL EVERY 12 HOURS
Status: DISCONTINUED | OUTPATIENT
Start: 2022-12-02 | End: 2022-12-02 | Stop reason: CLARIF

## 2022-12-02 RX ORDER — THEOPHYLLINE ANHYDROUS 80 MG/15ML
150 SOLUTION ORAL DAILY
Status: DISCONTINUED | OUTPATIENT
Start: 2022-12-03 | End: 2022-12-06

## 2022-12-02 RX ORDER — ALBUTEROL SULFATE 0.83 MG/ML
2.5 SOLUTION RESPIRATORY (INHALATION) EVERY 6 HOURS PRN
Status: DISCONTINUED | OUTPATIENT
Start: 2022-12-02 | End: 2022-12-08 | Stop reason: HOSPADM

## 2022-12-02 RX ORDER — BUDESONIDE 0.5 MG/2ML
0.5 INHALANT ORAL EVERY 12 HOURS
Status: DISCONTINUED | OUTPATIENT
Start: 2022-12-02 | End: 2022-12-08 | Stop reason: HOSPADM

## 2022-12-02 RX ADMIN — APIXABAN 2.5 MG: 2.5 TABLET, FILM COATED ORAL at 09:12

## 2022-12-02 RX ADMIN — MUPIROCIN: 20 OINTMENT TOPICAL at 09:12

## 2022-12-02 RX ADMIN — APIXABAN 2.5 MG: 2.5 TABLET, FILM COATED ORAL at 08:12

## 2022-12-02 RX ADMIN — IPRATROPIUM BROMIDE AND ALBUTEROL SULFATE 3 ML: 2.5; .5 SOLUTION RESPIRATORY (INHALATION) at 08:12

## 2022-12-02 RX ADMIN — PROPRANOLOL HYDROCHLORIDE 10 MG: 10 TABLET ORAL at 09:12

## 2022-12-02 RX ADMIN — ACETAZOLAMIDE 250 MG: 250 TABLET ORAL at 08:12

## 2022-12-02 RX ADMIN — ACETAZOLAMIDE 250 MG: 250 TABLET ORAL at 09:12

## 2022-12-02 RX ADMIN — MEROPENEM 500 MG: 500 INJECTION, POWDER, FOR SOLUTION INTRAVENOUS at 04:12

## 2022-12-02 RX ADMIN — LATANOPROST 1 DROP: 50 SOLUTION OPHTHALMIC at 08:12

## 2022-12-02 RX ADMIN — ASPIRIN 81 MG: 81 TABLET, COATED ORAL at 09:12

## 2022-12-02 RX ADMIN — PROPRANOLOL HYDROCHLORIDE 10 MG: 10 TABLET ORAL at 08:12

## 2022-12-02 RX ADMIN — FLUCONAZOLE 100 MG: 100 TABLET ORAL at 09:12

## 2022-12-02 RX ADMIN — PANTOPRAZOLE SODIUM 40 MG: 40 TABLET, DELAYED RELEASE ORAL at 09:12

## 2022-12-02 RX ADMIN — NIFEDIPINE 30 MG: 30 TABLET, FILM COATED, EXTENDED RELEASE ORAL at 09:12

## 2022-12-02 RX ADMIN — MUPIROCIN: 20 OINTMENT TOPICAL at 08:12

## 2022-12-02 RX ADMIN — BUDESONIDE 0.5 MG: 0.5 INHALANT ORAL at 07:12

## 2022-12-02 RX ADMIN — ALBUTEROL SULFATE 2.5 MG: 2.5 SOLUTION RESPIRATORY (INHALATION) at 07:12

## 2022-12-02 RX ADMIN — ALBUTEROL SULFATE 2.5 MG: 2.5 SOLUTION RESPIRATORY (INHALATION) at 01:12

## 2022-12-02 RX ADMIN — AMLODIPINE BESYLATE 5 MG: 5 TABLET ORAL at 09:12

## 2022-12-02 RX ADMIN — ISOSORBIDE MONONITRATE 30 MG: 30 TABLET, EXTENDED RELEASE ORAL at 09:12

## 2022-12-02 RX ADMIN — MONTELUKAST SODIUM 10 MG: 10 TABLET, COATED ORAL at 08:12

## 2022-12-02 RX ADMIN — SERTRALINE HYDROCHLORIDE 25 MG: 25 TABLET ORAL at 09:12

## 2022-12-02 RX ADMIN — DEXAMETHASONE 2 MG: 1 TABLET ORAL at 09:12

## 2022-12-02 RX ADMIN — IPRATROPIUM BROMIDE AND ALBUTEROL SULFATE 3 ML: 2.5; .5 SOLUTION RESPIRATORY (INHALATION) at 01:12

## 2022-12-02 RX ADMIN — DEXAMETHASONE 2 MG: 1 TABLET ORAL at 08:12

## 2022-12-02 RX ADMIN — MEROPENEM 500 MG: 500 INJECTION, POWDER, FOR SOLUTION INTRAVENOUS at 08:12

## 2022-12-02 RX ADMIN — MEROPENEM 500 MG: 500 INJECTION, POWDER, FOR SOLUTION INTRAVENOUS at 01:12

## 2022-12-02 RX ADMIN — LOSARTAN POTASSIUM 100 MG: 50 TABLET, FILM COATED ORAL at 09:12

## 2022-12-02 RX ADMIN — LEVOTHYROXINE SODIUM 50 MCG: 0.05 TABLET ORAL at 06:12

## 2022-12-02 RX ADMIN — ATORVASTATIN CALCIUM 40 MG: 40 TABLET, FILM COATED ORAL at 09:12

## 2022-12-02 RX ADMIN — GABAPENTIN 100 MG: 100 CAPSULE ORAL at 08:12

## 2022-12-02 RX ADMIN — CLONIDINE HYDROCHLORIDE 0.1 MG: 0.1 TABLET ORAL at 08:12

## 2022-12-02 RX ADMIN — ROPINIROLE HYDROCHLORIDE 0.5 MG: 0.25 TABLET, FILM COATED ORAL at 08:12

## 2022-12-02 RX ADMIN — CLONIDINE HYDROCHLORIDE 0.1 MG: 0.1 TABLET ORAL at 09:12

## 2022-12-02 NOTE — HPI
Patient is a 89-year-old female who comes in with dysphagia she was admitted to the hospital with weakness and recent pulmonary embolus confusion.  She is a history of coronary artery disease, COPD, hypothyroidism, hypertension, and generalized weakness.  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  She is been put on a recommended mechanical soft diet.  With thickened liquids.  Patient is on IV Joslyn and Diflucan.  Because of the Pseudomonas and yeast infection in the bronchial washings that were found during a bronchoscope at [facility].  Patient is able to sit up and care for herself he.  She is on a BiPAP mask at night.  She had labs on 11/23 that showed white blood cell count 13323.  H and H 11/38.  BUN is 30 creatinine 1.1, potassium 3.9, sodium 142

## 2022-12-02 NOTE — PT/OT/SLP EVAL
Physical Therapy Evaluation    Patient Name:  Pilar Reardon   MRN:  [id number]    Recommendations:     Discharge Recommendations:  home with home health   Discharge Equipment Recommendations: none   Barriers to discharge: None    Assessment:     Pilar Reardon is a 89 y.o. female admitted with a medical diagnosis of Generalized weakness, debility, r/t acute hypercapnic respiratory failure, pseudomonas and yeast in bronchial washings.  She presents with the following impairments/functional limitations:  weakness, impaired functional mobility, gait instability, impaired balance .    Rehab Prognosis: Good; patient would benefit from acute skilled PT services to address these deficits and reach maximum level of function.    Recent Surgery: * No surgery found *      Plan:     During this hospitalization, patient to be seen 5 x/week to address the identified rehab impairments via gait training, therapeutic activities, therapeutic exercises, neuromuscular re-education and progress toward the following goals:    Plan of Care Expires:       Subjective     Chief Complaint: none  Patient/Family Comments/goals: to return home PLOF  Pain/Comfort:  Pain Rating 1: 0/10    Patients cultural, spiritual, Denominational conflicts given the current situation: no    Living Environment:  Lives with son.  Prior to admission, patients level of function was independent.  Equipment used at home: cane, straight, rollator.  DME owned (not currently used): none.  Upon discharge, patient will have assistance from family, home health.    Objective:     Communicated with patient prior to session.  Patient found sitting edge of bed     upon PT entry to room.    General Precautions: Standard, fall   Orthopedic Precautions:N/A   Braces: N/A  Respiratory Status: Room air    Exams:  Cognitive Exam:  Patient is oriented to Person, Place, and Situation  Gross Motor Coordination:  WFL  RUE Strength: WFL  LUE Strength: WFL  RLE ROM: WFL  RLE Strength:  3+  LLE ROM: WFL  LLE Strength: 3+    Functional Mobility:  Bed Mobility:     Rolling Left:  modified independence  Rolling Right: modified independence  Supine to Sit: stand by assistance  Sit to Supine: stand by assistance  Transfers:     Sit to Stand:  contact guard assistance with rolling walker  Gait: 60 FEET with RW reciprocal steps  Balance: Fair      AM-PAC 6 CLICK MOBILITY  Total Score:18         Patient left sitting edge of bed with call button in reach.    GOALS:   Multidisciplinary Problems       Physical Therapy Goals          Problem: Physical Therapy    Goal Priority Disciplines Outcome Goal Variances Interventions   Physical Therapy Goal     PT, PT/OT Ongoing, Progressing     Description: Goals to be met by: 2 weeks     Patient will increase functional independence with mobility by performin. Supine to sit with Modified [place]  2. Sit to supine with Modified [place]  3. Sit to stand transfer with Stand-by Assistance  4. Bed to chair transfer with Stand-by Assistance using Rolling Walker  5. Gait  x 200 feet with Stand-by Assistance using Rolling Walker.       Goals to be met by: 4 weeks     Patient will increase functional independence with mobility by performin. Sit to stand transfer with Modified [place]  2. Bed to chair transfer with Modified [place] using Rolling Walker  3. Gait  x 400 feet with Modified [place] using Rolling Walker.                        History:     Past Medical History:   Diagnosis Date    Actinic keratoses     Arthritis     COPD (chronic obstructive pulmonary disease)     Frequent UTI     GERD (gastroesophageal reflux disease)     High cholesterol     HTN (hypertension)        Past Surgical History:   Procedure Laterality Date    APPENDECTOMY      HYSTERECTOMY      pessary         Time Tracking:     PT Received On: 22  PT Start Time: 815     PT Stop Time: 830  PT Total Time (min): 15 min     Billable Minutes: Evaluation  15      12/02/2022

## 2022-12-02 NOTE — PLAN OF CARE
Problem: Adult Inpatient Plan of Care  Goal: Plan of Care Review  Outcome: Ongoing, Progressing  Goal: Patient-Specific Goal (Individualized)  Outcome: Ongoing, Progressing  Goal: Absence of Hospital-Acquired Illness or Injury  Outcome: Ongoing, Progressing  Goal: Optimal Comfort and Wellbeing  Outcome: Ongoing, Progressing  Goal: Readiness for Transition of Care  Outcome: Ongoing, Progressing     Problem: Impaired Wound Healing  Goal: Optimal Wound Healing  Outcome: Ongoing, Progressing     Problem: Fall Injury Risk  Goal: Absence of Fall and Fall-Related Injury  Outcome: Ongoing, Progressing     Problem: Infection  Goal: Absence of Infection Signs and Symptoms  Outcome: Ongoing, Progressing

## 2022-12-02 NOTE — PLAN OF CARE
Problem: Occupational Therapy  Goal: Occupational Therapy Goal  Description: Goals to be met by: 1/2/22     Patient will increase functional independence with ADLs by performing:    UE Dressing with Modified [place].  LE Dressing with Modified [place].  Grooming while standing with Modified [place].  Toileting from toilet with Modified [place] for hygiene and clothing management.   Standing x5-10 minutes with Modified [place] and Supervision during thera ex and ADL  Stand pivot transfers with Modified [place].  Step transfer with Modified [place]  Toilet transfer to toilet with Modified [place].    Outcome: Ongoing, Progressing

## 2022-12-02 NOTE — SUBJECTIVE & OBJECTIVE
Past Medical History:   Diagnosis Date    Actinic keratoses     Arthritis     COPD (chronic obstructive pulmonary disease)     Frequent UTI     GERD (gastroesophageal reflux disease)     High cholesterol     HTN (hypertension)        Past Surgical History:   Procedure Laterality Date    APPENDECTOMY      HYSTERECTOMY      pessary         Review of patient's allergies indicates:   Allergen Reactions    Bactrim [sulfamethoxazole-trimethoprim]     Ceftin [cefuroxime axetil]     Iodinated contrast media Other (See Comments)       No current facility-administered medications on file prior to encounter.     Current Outpatient Medications on File Prior to Encounter   Medication Sig    acetaminophen (TYLENOL) 325 MG tablet Take 650 mg by mouth every 6 (six) hours as needed for Pain or Temperature greater than.    acetaZOLAMIDE (DIAMOX) 250 MG tablet Take 250 mg by mouth 2 (two) times daily.    amLODIPine (NORVASC) 5 MG tablet Take 5 mg by mouth once daily.    apixaban (ELIQUIS) 2.5 mg Tab Take 1 tablet (2.5 mg total) by mouth 2 (two) times daily.    aspirin (ECOTRIN) 81 MG EC tablet Take 81 mg by mouth once daily.    benzonatate (TESSALON) 100 MG capsule Take 100 mg by mouth 3 (three) times daily as needed for Cough.    cloNIDine (CATAPRES) 0.1 MG tablet Take 0.1 mg by mouth 2 (two) times daily. For systolic blood pressure  >170    cyanocobalamin 1,000 mcg/mL injection Inject 1,000 mcg into the muscle every 30 days. Next dose due this week    dexAMETHasone (DECADRON) 2 MG tablet Take 2 mg by mouth 2 (two) times daily.    fluconazole (DIFLUCAN) 100 MG tablet Take 100 mg by mouth once daily.    gabapentin (NEURONTIN) 100 MG capsule Take 100 mg by mouth every evening.    isosorbide mononitrate (IMDUR) 30 MG 24 hr tablet Take 30 mg by mouth once daily.    lactulose (CEPHULAC) 20 gram Pack Take 20 g by mouth every 6 (six) hours as needed.    levothyroxine (SYNTHROID) 50 MCG tablet Take 50 mcg by mouth before breakfast.     losartan (COZAAR) 100 MG tablet Take 100 mg by mouth once daily.    meropenem (MERREM) 500 mg injection 500 mg. Continue  til 12/06/2022    montelukast (SINGULAIR) 10 mg tablet Take by mouth every evening.    NIFEdipine (ADALAT CC) 30 MG TbSR Take 30 mg by mouth nightly.    omeprazole (PRILOSEC) 40 MG capsule Take 40 mg by mouth every morning.    pantoprazole (PROTONIX) 40 MG tablet Take 40 mg by mouth once daily.    phenol-phenolate sodium (SORE THROAT) SprA by Mucous Membrane route every 2 (two) hours as needed.    propranoloL (INDERAL) 10 MG tablet Take 10 mg by mouth 3 (three) times daily.    rifAXImin (XIFAXAN) 200 mg Tab Take 550 mg by mouth 2 (two) times daily.    rOPINIRole (REQUIP) 0.5 MG tablet Take 0.5 mg by mouth every evening.    rosuvastatin (CRESTOR) 10 MG tablet Take 10 mg by mouth every evening.    sertraline (ZOLOFT) 25 MG tablet Take 25 mg by mouth once daily.    theophylline (THEODUR) 300 mg 24 hr capsule Take 150 mg by mouth once daily.    tiotropium Br/olodaterol HCl (STIOLTO RESPIMAT INHL) Inhale 2 puffs into the lungs once daily.    travoprost (TRAVATAN Z) 0.004 % ophthalmic solution Place 1 drop into both eyes every evening.    umeclidinium-vilanteroL (ANORO ELLIPTA) 62.5-25 mcg/actuation DsDv Inhale 2 puffs into the lungs once daily.    valACYclovir (VALTREX) 1000 MG tablet Take 1 tablet (1,000 mg total) by mouth 3 (three) times daily. for 10 days     Family History       Problem Relation (Age of Onset)    Diabetes Daughter    Hypertension Daughter    Melanoma Daughter          Tobacco Use    Smoking status: Never    Smokeless tobacco: Never   Substance and Sexual Activity    Alcohol use: Never    Drug use: Never    Sexual activity: Not Currently     Review of Systems   Constitutional: Negative.    HENT: Negative.     Eyes: Negative.    Respiratory: Negative.     Cardiovascular: Negative.    Gastrointestinal: Negative.    Endocrine: Negative.    Genitourinary: Negative.    Musculoskeletal:  Negative.         Generalized muscle weakness with an IV in her right arm   Skin: Negative.    Allergic/Immunologic: Negative.    Neurological: Negative.    Hematological: Negative.    Psychiatric/Behavioral: Negative.     All other systems reviewed and are negative.  Objective:     Vital Signs (Most Recent):  Temp: 98.1 °F (36.7 °C) (12/02/22 0726)  Pulse: (!) 53 (12/02/22 1500)  Resp: 18 (12/02/22 1327)  BP: (!) 122/55 (12/02/22 1500)  SpO2: 98 % (12/02/22 1327)   Vital Signs (24h Range):  Temp:  [97.4 °F (36.3 °C)-98.1 °F (36.7 °C)] 98.1 °F (36.7 °C)  Pulse:  [50-93] 53  Resp:  [17-20] 18  SpO2:  [94 %-99 %] 98 %  BP: (122-143)/(55-84) 122/55     Weight: 64 kg (141 lb 1.5 oz)  Body mass index is 24.99 kg/m².    Physical Exam  Vitals and nursing note reviewed.   Constitutional:       Appearance: Normal appearance. She is normal weight.   HENT:      Head: Normocephalic and atraumatic.      Right Ear: Tympanic membrane, ear canal and external ear normal.      Left Ear: Tympanic membrane and external ear normal.      Nose: Nose normal.      Mouth/Throat:      Mouth: Mucous membranes are moist.   Eyes:      Extraocular Movements: Extraocular movements intact.      Conjunctiva/sclera: Conjunctivae normal.      Pupils: Pupils are equal, round, and reactive to light.   Cardiovascular:      Rate and Rhythm: Normal rate.      Pulses: Normal pulses.      Heart sounds: Normal heart sounds.   Pulmonary:      Effort: Pulmonary effort is normal.      Breath sounds: Normal breath sounds.   Abdominal:      General: Abdomen is flat. Bowel sounds are normal.      Palpations: Abdomen is soft.   Musculoskeletal:         General: Normal range of motion.      Cervical back: Normal range of motion and neck supple.   Skin:     General: Skin is warm and dry.      Capillary Refill: Capillary refill takes less than 2 seconds.   Neurological:      General: No focal deficit present.      Mental Status: She is alert and oriented to person,  place, and time. Mental status is at baseline.   Psychiatric:         Mood and Affect: Mood normal.         Behavior: Behavior normal.         Thought Content: Thought content normal.         Judgment: Judgment normal.         CRANIAL NERVES     CN III, IV, VI   Pupils are equal, round, and reactive to light.     Significant Labs: All pertinent labs within the past 24 hours have been reviewed.    Significant Imaging: I have reviewed all pertinent imaging results/findings within the past 24 hours.

## 2022-12-02 NOTE — PT/OT/SLP EVAL
Occupational Therapy   Evaluation    Name: Pilar Reardon  MRN: [id number]  Admitting Diagnosis:  Generalized weakness  Recent Surgery: * No surgery found *      Recommendations:     Discharge Recommendations: home with home health  Discharge Equipment Recommendations:  none  Barriers to discharge:       Assessment:     Pilar Reardon is a 89 y.o. female with a medical diagnosis of Generalized weakness.  She presents with decreased balance, safety, strength, and endurance. Performance deficits affecting function: weakness, impaired endurance, impaired functional mobility, gait instability, impaired balance, decreased safety awareness, decreased coordination.  Pt reports living at home with her son with no steps to enter. Pt uses a cane mostly at baseline but has a RW and rollator. Pt currently has limited strength and endurance but is able to complete t/f and ambulation with CGA. Pt noted to have some slight balance deficits but this should with resolved with therapy.     Rehab Prognosis: Good and Fair; patient would benefit from acute skilled OT services to address these deficits and reach maximum level of function.       Plan:     Patient to be seen 5 x/week to address the above listed problems via self-care/home management, therapeutic exercises, therapeutic activities, neuromuscular re-education  Plan of Care Expires:    Plan of Care Reviewed with: patient    Subjective     Chief Complaint: decreased endurance and strength  Patient/Family Comments/goals: increase mobility and ADLs to Mod I    Occupational Profile:  Living Environment: Pt lives at home with son   Previous level of function: Mod I  Equipment Used at Home:  walker, rolling  Assistance upon Discharge: TBD    Pain/Comfort:  Pain Rating 1: 0/10    Patients cultural, spiritual, Hoahaoism conflicts given the current situation: no    Objective:     Communicated with: Pt prior to session.  Patient found sitting edge of bed with   upon OT entry to  room.    General Precautions: Standard, fall   Orthopedic Precautions:    Braces:    Respiratory Status: Nasal cannula, flow 2 L/min    Occupational Performance:    Bed Mobility:    Pt presented sitting upright in w/c upon Ot's arrival.    Functional Mobility/Transfers:  Patient completed Sit <> Stand Transfer with CGA-supervision  with  rolling walker   Patient completed Toilet Transfer Step Transfer technique with supervision and contact guard assistance with  rolling walker  Functional Mobility: Pt completed functional mobility to the restroom using the RW for balance and safety    Activities of Daily Living:  Lower Body Dressing: stand by assistance with socks  Toileting: contact guard assistance for balance and safety    Cognitive/Visual Perceptual:  Cognitive/Psychosocial Skills:     -       Oriented to: Person, Place, Time, and Situation   -       Safety awareness/insight to disability: Fair     Physical Exam:  Balance:    -       Fair using the RW due to crossing midline with BLEs  Upper Extremity Range of Motion:     -       Right Upper Extremity: WFL  -       Left Upper Extremity: WFL  Upper Extremity Strength:    -       Right Upper Extremity: 2+/5  -       Left Upper Extremity: 2+/5    AMPAC 6 Click ADL:  AMPAC Total Score: 20    Treatment & Education:  OT eval    Patient left sitting edge of bed with all lines intact and call button in reach    GOALS:   Multidisciplinary Problems       Occupational Therapy Goals          Problem: Occupational Therapy    Goal Priority Disciplines Outcome Interventions   Occupational Therapy Goal     OT, PT/OT Ongoing, Progressing    Description: Goals to be met by: 1/2/22     Patient will increase functional independence with ADLs by performing:    UE Dressing with Modified [place].  LE Dressing with Modified [place].  Grooming while standing with Modified [place].  Toileting from toilet with Modified [place] for hygiene and clothing management.    Standing x5-10 minutes with Modified [place] and Supervision during thera ex and ADL  Stand pivot transfers with Modified [place].  Step transfer with Modified [place]  Toilet transfer to toilet with Modified [place].                         History:     Past Medical History:   Diagnosis Date    Actinic keratoses     Arthritis     COPD (chronic obstructive pulmonary disease)     Frequent UTI     GERD (gastroesophageal reflux disease)     High cholesterol     HTN (hypertension)          Past Surgical History:   Procedure Laterality Date    APPENDECTOMY      HYSTERECTOMY      pessary         Time Tracking:     OT Date of Treatment:    OT Start Time:  8:15  OT Stop Time:  8:35  OT Total Time (min):      Billable Minutes:20    12/2/2022

## 2022-12-02 NOTE — PLAN OF CARE
Problem: Physical Therapy  Goal: Physical Therapy Goal  Description: Goals to be met by: 2 weeks     Patient will increase functional independence with mobility by performin. Supine to sit with Modified [place]  2. Sit to supine with Modified [place]  3. Sit to stand transfer with Stand-by Assistance  4. Bed to chair transfer with Stand-by Assistance using Rolling Walker  5. Gait  x 200 feet with Stand-by Assistance using Rolling Walker.       Goals to be met by: 4 weeks     Patient will increase functional independence with mobility by performin. Sit to stand transfer with Modified [place]  2. Bed to chair transfer with Modified [place] using Rolling Walker  3. Gait  x 400 feet with Modified [place] using Rolling Walker.   Outcome: Ongoing, Progressing

## 2022-12-03 PROCEDURE — 11000004 HC SNF PRIVATE

## 2022-12-03 PROCEDURE — 94761 N-INVAS EAR/PLS OXIMETRY MLT: CPT

## 2022-12-03 PROCEDURE — 63600175 PHARM REV CODE 636 W HCPCS: Performed by: NURSE PRACTITIONER

## 2022-12-03 PROCEDURE — 27000221 HC OXYGEN, UP TO 24 HOURS

## 2022-12-03 PROCEDURE — 27000941

## 2022-12-03 PROCEDURE — 63600175 PHARM REV CODE 636 W HCPCS: Performed by: FAMILY MEDICINE

## 2022-12-03 PROCEDURE — 63700000 PHARM REV CODE 250 ALT 637 W/O HCPCS: Performed by: NURSE PRACTITIONER

## 2022-12-03 PROCEDURE — 94640 AIRWAY INHALATION TREATMENT: CPT

## 2022-12-03 PROCEDURE — 25000003 PHARM REV CODE 250: Performed by: FAMILY MEDICINE

## 2022-12-03 PROCEDURE — 25000003 PHARM REV CODE 250: Performed by: NURSE PRACTITIONER

## 2022-12-03 PROCEDURE — 25000242 PHARM REV CODE 250 ALT 637 W/ HCPCS: Performed by: FAMILY MEDICINE

## 2022-12-03 PROCEDURE — 27000987 HC MATTRESS, MATRIX LOW PROFILE

## 2022-12-03 RX ADMIN — ALBUTEROL SULFATE 2.5 MG: 2.5 SOLUTION RESPIRATORY (INHALATION) at 07:12

## 2022-12-03 RX ADMIN — APIXABAN 2.5 MG: 2.5 TABLET, FILM COATED ORAL at 08:12

## 2022-12-03 RX ADMIN — MEROPENEM 500 MG: 500 INJECTION, POWDER, FOR SOLUTION INTRAVENOUS at 08:12

## 2022-12-03 RX ADMIN — GABAPENTIN 100 MG: 100 CAPSULE ORAL at 08:12

## 2022-12-03 RX ADMIN — MONTELUKAST SODIUM 10 MG: 10 TABLET, COATED ORAL at 08:12

## 2022-12-03 RX ADMIN — LEVOTHYROXINE SODIUM 50 MCG: 0.05 TABLET ORAL at 05:12

## 2022-12-03 RX ADMIN — PANTOPRAZOLE SODIUM 40 MG: 40 TABLET, DELAYED RELEASE ORAL at 09:12

## 2022-12-03 RX ADMIN — PROPRANOLOL HYDROCHLORIDE 10 MG: 10 TABLET ORAL at 08:12

## 2022-12-03 RX ADMIN — LATANOPROST 1 DROP: 50 SOLUTION OPHTHALMIC at 08:12

## 2022-12-03 RX ADMIN — SERTRALINE HYDROCHLORIDE 25 MG: 25 TABLET ORAL at 09:12

## 2022-12-03 RX ADMIN — APIXABAN 2.5 MG: 2.5 TABLET, FILM COATED ORAL at 09:12

## 2022-12-03 RX ADMIN — MEROPENEM 500 MG: 500 INJECTION, POWDER, FOR SOLUTION INTRAVENOUS at 11:12

## 2022-12-03 RX ADMIN — MUPIROCIN: 20 OINTMENT TOPICAL at 08:12

## 2022-12-03 RX ADMIN — BUDESONIDE 0.5 MG: 0.5 INHALANT ORAL at 07:12

## 2022-12-03 RX ADMIN — ATORVASTATIN CALCIUM 40 MG: 40 TABLET, FILM COATED ORAL at 09:12

## 2022-12-03 RX ADMIN — DEXAMETHASONE 2 MG: 1 TABLET ORAL at 08:12

## 2022-12-03 RX ADMIN — DEXAMETHASONE 2 MG: 1 TABLET ORAL at 09:12

## 2022-12-03 RX ADMIN — MEROPENEM 500 MG: 500 INJECTION, POWDER, FOR SOLUTION INTRAVENOUS at 04:12

## 2022-12-03 RX ADMIN — ACETAZOLAMIDE 250 MG: 250 TABLET ORAL at 09:12

## 2022-12-03 RX ADMIN — ROPINIROLE HYDROCHLORIDE 0.5 MG: 0.25 TABLET, FILM COATED ORAL at 08:12

## 2022-12-03 RX ADMIN — MUPIROCIN: 20 OINTMENT TOPICAL at 11:12

## 2022-12-03 RX ADMIN — ISOSORBIDE MONONITRATE 30 MG: 30 TABLET, EXTENDED RELEASE ORAL at 09:12

## 2022-12-03 RX ADMIN — ALBUTEROL SULFATE 2.5 MG: 2.5 SOLUTION RESPIRATORY (INHALATION) at 01:12

## 2022-12-03 RX ADMIN — ASPIRIN 81 MG: 81 TABLET, COATED ORAL at 09:12

## 2022-12-03 RX ADMIN — AMLODIPINE BESYLATE 5 MG: 5 TABLET ORAL at 09:12

## 2022-12-03 RX ADMIN — NIFEDIPINE 30 MG: 30 TABLET, FILM COATED, EXTENDED RELEASE ORAL at 09:12

## 2022-12-03 RX ADMIN — CLONIDINE HYDROCHLORIDE 0.1 MG: 0.1 TABLET ORAL at 08:12

## 2022-12-03 RX ADMIN — FLUCONAZOLE 100 MG: 100 TABLET ORAL at 09:12

## 2022-12-03 RX ADMIN — ACETAZOLAMIDE 250 MG: 250 TABLET ORAL at 08:12

## 2022-12-03 NOTE — PLAN OF CARE
Problem: Adult Inpatient Plan of Care  Goal: Plan of Care Review  Outcome: Ongoing, Progressing  Goal: Patient-Specific Goal (Individualized)  Outcome: Ongoing, Progressing  Goal: Absence of Hospital-Acquired Illness or Injury  Outcome: Ongoing, Progressing  Goal: Optimal Comfort and Wellbeing  Outcome: Ongoing, Progressing  Goal: Readiness for Transition of Care  Outcome: Ongoing, Progressing     Problem: Impaired Wound Healing  Goal: Optimal Wound Healing  Outcome: Ongoing, Progressing     Problem: Fall Injury Risk  Goal: Absence of Fall and Fall-Related Injury  Outcome: Ongoing, Progressing     Problem: Infection  Goal: Absence of Infection Signs and Symptoms  Outcome: Ongoing, Progressing      Subjective:      Patient ID: Regino Lawrence is a 73 y.o. female.    Chief Complaint: No chief complaint on file.    Referred by: No ref. provider found     HPI    Regino Lawrence is a 73 y.o. female PMHx of HTN, HLD, DM II, CKD, and chronic pain who presents with complaint of right hip and low back pain. She had great pain relief from left hip joint and left GTB injection on 9/12/19. She states about a week ago she developed this pain in her right low back and right hip. The pain radiates down her posterior legs to her calves described as shooting. She states it is the same exact pain she has had previously in her low back that was relieved with the lumbar RHIANNA's. No inciting events. The pain is described as achy. No N/T down her legs. The pain is exacerbated by sitting and walking.  The pain is not improved by anything this last week. Denies fevers, weight loss, new weakness, saddle anesthesia, and bowel or bladder incontinence. She states she has taken gabapentin 400 mg TID as Norco 5-325 mg q8 with relief of her hip pain.          Interventional Pain History  10/8/18 - left L5 transforaminal epidural steroid injection - good relief of back pain no relief of left lower extremity symptoms  12/6/18 - bilateral L5 transforaminal epidural steroid injection - good relief of back pain no relief of left lower extremity symptoms  3/21/2019 left hip injection  9/12/19- Left GTB and left hip joint injection- 100% pain relief      Imaging:  MRI lumbar 9/2018  The distal cord/conus demonstrates normal size and signal.  No evidence of osteomyelitis, marrow replacement process, or acute fracture.  No paraspinal masses.     At L1-2, there is asymmetric disc bulging to the right, resulting in mild right-sided neural foraminal narrowing.  No spinal canal stenosis.     L1-2 is unremarkable.     L2-3 demonstrates mild disc bulging slightly asymmetric to the left resulting in mild left-sided neural foraminal narrowing.  No spinal  canal stenosis.     L4-5 demonstrates minimal anterolisthesis anterolisthesis.  There is mild facet arthropathy and disc bulging.  This results in mild left-sided neural foraminal narrowing.  No spinal canal stenosis.     At L5-S1, there is a moderate sized left lateral recess/foraminal disc extrusion effacing the left neural foramen, likely impinging upon the exiting left L5 nerve root.  There is left-sided degenerative disc disease, noting disc height loss and sub endplate marrow edema.  There is moderate bilateral facet arthropathy.  No spinal canal stenosis.     Impression                       Moderate size left foraminal disc extrusion at L5-S1, likely impinging upon the exiting left L5 nerve root.     Additional lumbar spondylosis, resulting in mild neural foraminal narrowing at L1-2, L2-3, and L4-5, as above.  No spinal canal stenosis.     X-ray hip 1/2019  No evidence for acute fracture, dislocation or destructive process.  Marginal osteophytes noted bilaterally, slightly more pronounced on the left.  Joint spaces appear maintained.  Degenerative changes of the lumbar spine are evident.  SI joints and sacrum demonstrate no acute finding.  Moderate amount of stool is seen throughout the colon.  Surrounding soft tissues appear unremarkable.     Ct cervical 7/2018  There is postsurgical change of posterior spinal fusion and laminectomies from C3 to C7 with bilateral lateral mass screws, stabilization rods, and bone material.  No surrounding lucency to suggest loosening.  No hardware fracture or other evidence of hardware failure.     There is straightening of the normal cervical lordosis.  The vertebral body heights appear relatively well-maintained.  There is no evidence of fracture or osseous lytic or blastic process.  There is intervertebral disc space height loss, most significant at C5-C6 and C6-C7. Focal degenerative changes are described below.     C2 -- C3: Posterior disc osteophyte complex and facet  arthropathy without significant spinal canal stenosis or neuroforaminal narrowing.     C3 -- C4: Postsurgical change with posterior disc osteophyte complex without significant spinal canal stenosis or neuroforaminal narrowing.     C4 -- C5: Postsurgical change with posterior disc osteophyte complex, mild uncovertebral spurring, and facet arthropathy resulting in mild left neuroforaminal narrowing.  No significant spinal canal stenosis.     C5 -- C6: Postsurgical change with posterior disc osteophyte complex, facet arthropathy, and uncovertebral spurring resulting in mild right and severe left neuroforaminal narrowing.  No significant spinal canal stenosis.     C6 -- C7: Postsurgical change with posterior disc osteophyte complex, uncovertebral spurring, and facet arthropathy resulting in mild bilateral neuroforaminal narrowing.  No significant spinal canal stenosis.     C7 -- T1: No significant disc abnormality, spinal canal stenosis, or neuroforaminal narrowing.     The spinal canal otherwise appears unremarkable, noting evaluation at postsurgical levels is somewhat limited due to metallic artifact.  No intradural abnormalities are identified.  Evaluation of the surrounding soft tissues reveals unchanged appearance of a small osseous body within the left posterior neck.  A 0.9 x 0.7 cm soft tissue opacity within the posterior left parotid gland.  This focus has slightly enlarged since CT 03/23/2016.  There is biapical pulmonary scarring.     Impression                       Postsurgical change of C3-C7 laminectomy with posterior instrumented spinal fusion, unchanged.     Cervical spondylosis, most significant at C5-C6 with severe left and mild right neural foraminal narrowing at this level.  Additional multilevel neural foraminal narrowing as detailed above.  No significant spinal canal stenosis at any level.     0.9 cm left parotid soft tissue opacity, slightly enlarged since CT 03/23/2016 and possibly correlating  with a prominent intraparotid lymph node or adenoma.  Further evaluation is recommended with dedicated parotid ultrasound.         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, without long-term current use of insulin     Depression     Diabetes mellitus, type 2     Eye injury as a child     k-abrasion  od    Hyperlipidemia     Hypertension     Hypothyroidism     Immune deficiency disorder     Immune disorder     Myalgia and myositis 2012    Osteoporosis     Polyneuropathy     Renal manifestation of secondary diabetes mellitus     Sarcoidosis     Ulcer     no cancer    Urinary incontinence        Past Surgical History:   Procedure Laterality Date    CARPAL TUNNEL RELEASE      Rt wrist    CATARACT EXTRACTION W/  INTRAOCULAR LENS IMPLANT Right 2015    Dr. Azevedo    CATARACT EXTRACTION W/  INTRAOCULAR LENS IMPLANT Left 2015    Dr. Azevedo     SECTION      CHOLECYSTECTOMY      INJECTION OF JOINT Left 3/21/2019    Procedure: Injection, Joint  fLUOROSCOPIC jOINT iNJECTION (hIP iNJECTION) LEFT ROCH BURSA AS WELL LEFT TROCHANTERIC BURSA;  Surgeon: Alfonso Richards MD;  Location: [facility];  Service: Pain Management;  Laterality: Left;  NEEDS CONSENT, DIABETIC    INJECTION OF JOINT Left 2019    Procedure: Injection, Joint FLUOROSCOPIC JOINT INJECTION (HIP INJECTION) LEFT HIP;  Surgeon: Alfonso Richards MD;  Location: [facility];  Service: Pain Management;  Laterality: Left;  NEEDS CONSENT    INJECTION OF JOINT Left 2019    Procedure: INJECTION, JOINT;  Surgeon: Alfonso Richards MD;  Location: [facility];  Service: Pain Management;  Laterality: Left;  Left Hip  and Left GTB Injections    TUBAL LIGATION         Review of patient's allergies indicates:   Allergen Reactions    Azathioprine Shortness Of Breath and Other (See Comments)     Fatigue       Current Outpatient Medications   Medication Sig Dispense Refill    ACCU-CHEK FASTCLIX LANCING DEV Kit USE AS DIRECTED. 1 each 0    ACCU-CHEK SMARTVIEW TEST STRIP Strp TEST  ONCE  A   strip 11    ALCOHOL ANTISEPTIC PADS (ALCOHOL PREP PADS TOP)       atorvastatin (LIPITOR) 20 MG tablet Take 1 tablet (20 mg total) by mouth once daily. 90 tablet 3    blood-glucose meter kit Use as instructed 1 each 0    carvedilol (COREG) 25 MG tablet Take 1 tablet (25 mg total) by mouth 2 (two) times daily. 180 tablet 3    cloNIDine (CATAPRES) 0.3 MG tablet TAKE 1 TABLET BY MOUTH THREE TIMES DAILY 270 tablet 1    epoetin german (PROCRIT INJ) Inject 1,000 Units as directed.      fenofibrate 160 MG Tab Take 1 tablet (160 mg total) by mouth once daily. 90 tablet 1    folic acid (FOLVITE) 1 MG tablet Take 1 tablet (1,000 mcg total) by mouth once daily. 90 tablet 0    gabapentin (NEURONTIN) 400 MG capsule Take 1 capsule (400 mg total) by mouth 3 (three) times daily. 90 capsule 1    HYDROcodone-acetaminophen (NORCO) 5-325 mg per tablet Take 1 tablet by mouth every 6 (six) hours as needed. 90 tablet 0    levothyroxine (SYNTHROID) 50 MCG tablet TAKE 1 TABLET BY MOUTH ONCE DAILY BEFORE BREAKFAST 90 tablet 1    metFORMIN (GLUCOPHAGE) 1000 MG tablet TAKE 1 TABLET BY MOUTH TWICE DAILY WITH MEALS 180 tablet 1    methotrexate 2.5 MG Tab TAKE 6 TABLETS BY MOUTH EVERY 7 DAYS 72 tablet 0    NIFEdipine (PROCARDIA-XL) 30 MG (OSM) 24 hr tablet Take 1 tablet (30 mg total) by mouth once daily. 90 tablet 1    predniSONE (DELTASONE) 5 MG tablet Take 1 tablet (5 mg total) by mouth once daily. 90 tablet 1    tiZANidine (ZANAFLEX) 2 MG tablet Take 2 tablets (4 mg total) by mouth every 8 (eight) hours as needed. 270 tablet 0    calcium carbonate  (OS-MALCOLM) 600 mg calcium (1,500 mg) Tab Take 1 tablet by mouth 2 (two) times daily.       doxycycline (VIBRAMYCIN) 100 MG Cap Take 1 capsule (100 mg total) by mouth every 12 (twelve) hours. (Patient not taking: Reported on 10/1/2019) 14 capsule 0     No current facility-administered medications for this visit.        Family History   Problem Relation Age of Onset    Hypertension Mother     Cataracts Mother     No Known Problems Father     Hypertension Maternal Grandmother     Glaucoma Sister     Arthritis Sister     No Known Problems Brother     No Known Problems Maternal Aunt     No Known Problems Maternal Uncle     No Known Problems Paternal Aunt     No Known Problems Paternal Uncle     No Known Problems Maternal Grandfather     No Known Problems Paternal Grandmother     No Known Problems Paternal Grandfather     Kidney failure Sister     Hepatitis Sister     Cancer Sister         bone cancer     Immunodeficiency Sister     Diabetes Son     Hypertension Son     Lupus Neg Hx     Rheum arthritis Neg Hx     Amblyopia Neg Hx     Blindness Neg Hx     Macular degeneration Neg Hx     Retinal detachment Neg Hx     Strabismus Neg Hx     Stroke Neg Hx     Thyroid disease Neg Hx     Endometrial cancer Neg Hx     Vaginal cancer Neg Hx     Cervical cancer Neg Hx        Social History     Socioeconomic History    Marital status:      Spouse name: Not on file    Number of children: Not on file    Years of education: Not on file    Highest education level: Not on file   Occupational History    Not on file   Social Needs    Financial resource strain: Not on file    Food insecurity:     Worry: Not on file     Inability: Not on file    Transportation needs:     Medical: Not on file     Non-medical: Not on file   Tobacco Use    Smoking status: Never Smoker    Smokeless tobacco: Never Used   Substance and Sexual Activity    Alcohol use: No    Drug use: No    Sexual activity: Not  "Currently     Partners: Male   Lifestyle    Physical activity:     Days per week: Not on file     Minutes per session: Not on file    Stress: Not on file   Relationships    Social connections:     Talks on phone: Not on file     Gets together: Not on file     Attends Faith service: Not on file     Active member of club or organization: Not on file     Attends meetings of clubs or organizations: Not on file     Relationship status: Not on file   Other Topics Concern    Not on file   Social History Narrative    Not on file           REVIEW OF SYSTEMS:     GENERAL:  No weight loss, malaise or fevers.  HEENT:  Negative for frequent or significant headaches.  NECK:  Negative for lumps, goiter, pain and significant neck swelling.  RESPIRATORY:  Negative for cough, wheezing. + SOB- has appointment for cardiology and pulmonary  CARDIOVASCULAR:  Negative for chest pain, leg swelling or palpitations.  GI:  Negative for abdominal discomfort, blood in stools or black stools or change in bowel habits.  MUSCULOSKELETAL:  See HPI.  SKIN:  Negative for lesions, rash, and itching.  PSYCH:  + sleep disturbance due to pain. Negative for mood disorder and recent psychosocial stressors.  HEMATOLOGY/LYMPHOLOGY:  Negative for prolonged bleeding, bruising easily or swollen nodes.  NEURO:   No history of headaches, syncope, paralysis, seizures or tremors.  All other reviewed and negative other than HPI.        Objective:   /73   Pulse 86   Temp 97.3 °F (36.3 °C)   Resp 18   Ht 5' 3" (1.6 m)   Wt 55.8 kg (123 lb)   LMP  (LMP Unknown)   SpO2 100%   BMI 21.79 kg/m²   Pain Disability Index Review:  Last 3 PDI Scores 10/1/2019 8/13/2019   Pain Disability Index (PDI) 50 64     Normocephalic.  Atraumatic.  Affect appropriate.  Breathing unlabored.  Extra ocular muscles intact.    Lumbar Spine Exam:  INSPECTION: skin intact. No kyphosis, lordosis or scoliosis noted  PALPATION:    Lumbar paraspinals: + right TTP   SIJ: "     Right: = TTP    Left: no TTP  ROM:    LUMBAR FLEXION:  Elicits pain   LUMBAR EXTENSION: elicits pain   FACET LOADING:  + bilaterally   HIP EXAM: No pain with internal and external rotation of the hip joint.   MOTOR:    RLE: 5 / 5    LLE: 5 / 5   SENSORY:  Intact to light touch in bilateral lower extremities   DEEP TENDON REFLEXES:   PATELLAR:unable to elicit and symmetrical.    ACHILLES: unable to elicit and symmetrical.   PULSES: 2+ distal Bilateral lower extremities  BABINSKI:  down going toes bilat   ANKLE CLONUS:   Absent.     STRAIGHT LEG RAISE: + right side; + left side  FABERE'S TEST:   + right side for back pain; neg left side  MILGRAM'S TEST: +      GAIT AND CURT:  antalgic  STEP LENGTH:  preserved        Ortho/SPM Exam      Assessment:       Encounter Diagnoses   Name Primary?    Lumbar radiculopathy Yes    DDD (degenerative disc disease), lumbar     Chronic pain syndrome          Plan:   We discussed with the patient the assessment and recommendations. The following is the plan we agreed on:    1. Will schedule for bilateral L5 TFESI for her lumbar radicular pain. She has had excellent pain relief from these injections in the past.    2. RTC in 2 weeks after procedure.     Patient was initially seen and examined by [facility] Fellow, Dr. Ji Beyer. Thank you for allowing me to participate in the care of this patient.           Diagnoses and all orders for this visit:    Lumbar radiculopathy    DDD (degenerative disc disease), lumbar    Chronic pain syndrome       I have personally taken the history and examined this patient and agree with the fellow's note as stated above.

## 2022-12-03 NOTE — H&P
[facility] - [facility] Surgical Unit  Hospital Medicine  History & Physical    Patient Name: Pilar Reardon  MRN: [id number]  Patient Class: IP- Swing  Admission Date: 12/1/2022  Attending Physician: Anmol Reese DO   Primary Care Provider: Marilee Witt MD         Patient information was obtained from ER records.     Subjective:     Principal Problem:Generalized weakness    Chief Complaint: No chief complaint on file.       HPI: Patient is a 89-year-old female who comes in with dysphagia she was admitted to the hospital with weakness and recent pulmonary embolus confusion.  She is a history of coronary artery disease, COPD, hypothyroidism, hypertension, and generalized weakness.  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  She is been put on a recommended mechanical soft diet.  With thickened liquids.  Patient is on IV Joslyn and Diflucan.  Because of the Pseudomonas and yeast infection in the bronchial washings that were found during a bronchoscope at [facility].  Patient is able to sit up and care for herself he.  She is on a BiPAP mask at night.  She had labs on 11/23 that showed white blood cell count [id number].  H and H 11/38.  BUN is 30 creatinine 1.1, potassium 3.9, sodium 142      Past Medical History:   Diagnosis Date    Actinic keratoses     Arthritis     COPD (chronic obstructive pulmonary disease)     Frequent UTI     GERD (gastroesophageal reflux disease)     High cholesterol     HTN (hypertension)        Past Surgical History:   Procedure Laterality Date    APPENDECTOMY      HYSTERECTOMY      pessary         Review of patient's allergies indicates:   Allergen Reactions    Bactrim [sulfamethoxazole-trimethoprim]     Ceftin [cefuroxime axetil]     Iodinated contrast media Other (See Comments)       No current facility-administered medications on file prior to encounter.     Current Outpatient  Medications on File Prior to Encounter   Medication Sig    acetaminophen (TYLENOL) 325 MG tablet Take 650 mg by mouth every 6 (six) hours as needed for Pain or Temperature greater than.    acetaZOLAMIDE (DIAMOX) 250 MG tablet Take 250 mg by mouth 2 (two) times daily.    amLODIPine (NORVASC) 5 MG tablet Take 5 mg by mouth once daily.    apixaban (ELIQUIS) 2.5 mg Tab Take 1 tablet (2.5 mg total) by mouth 2 (two) times daily.    aspirin (ECOTRIN) 81 MG EC tablet Take 81 mg by mouth once daily.    benzonatate (TESSALON) 100 MG capsule Take 100 mg by mouth 3 (three) times daily as needed for Cough.    cloNIDine (CATAPRES) 0.1 MG tablet Take 0.1 mg by mouth 2 (two) times daily. For systolic blood pressure  >170    cyanocobalamin 1,000 mcg/mL injection Inject 1,000 mcg into the muscle every 30 days. Next dose due this week    dexAMETHasone (DECADRON) 2 MG tablet Take 2 mg by mouth 2 (two) times daily.    fluconazole (DIFLUCAN) 100 MG tablet Take 100 mg by mouth once daily.    gabapentin (NEURONTIN) 100 MG capsule Take 100 mg by mouth every evening.    isosorbide mononitrate (IMDUR) 30 MG 24 hr tablet Take 30 mg by mouth once daily.    lactulose (CEPHULAC) 20 gram Pack Take 20 g by mouth every 6 (six) hours as needed.    levothyroxine (SYNTHROID) 50 MCG tablet Take 50 mcg by mouth before breakfast.    losartan (COZAAR) 100 MG tablet Take 100 mg by mouth once daily.    meropenem (MERREM) 500 mg injection 500 mg. Continue  til 12/06/2022    montelukast (SINGULAIR) 10 mg tablet Take by mouth every evening.    NIFEdipine (ADALAT CC) 30 MG TbSR Take 30 mg by mouth nightly.    omeprazole (PRILOSEC) 40 MG capsule Take 40 mg by mouth every morning.    pantoprazole (PROTONIX) 40 MG tablet Take 40 mg by mouth once daily.    phenol-phenolate sodium (SORE THROAT) SprA by Mucous Membrane route every 2 (two) hours as needed.    propranoloL (INDERAL) 10 MG tablet Take 10 mg by mouth 3 (three) times daily.     rifAXImin (XIFAXAN) 200 mg Tab Take 550 mg by mouth 2 (two) times daily.    rOPINIRole (REQUIP) 0.5 MG tablet Take 0.5 mg by mouth every evening.    rosuvastatin (CRESTOR) 10 MG tablet Take 10 mg by mouth every evening.    sertraline (ZOLOFT) 25 MG tablet Take 25 mg by mouth once daily.    theophylline (THEODUR) 300 mg 24 hr capsule Take 150 mg by mouth once daily.    tiotropium Br/olodaterol HCl (STIOLTO RESPIMAT INHL) Inhale 2 puffs into the lungs once daily.    travoprost (TRAVATAN Z) 0.004 % ophthalmic solution Place 1 drop into both eyes every evening.    umeclidinium-vilanteroL (ANORO ELLIPTA) 62.5-25 mcg/actuation DsDv Inhale 2 puffs into the lungs once daily.    valACYclovir (VALTREX) 1000 MG tablet Take 1 tablet (1,000 mg total) by mouth 3 (three) times daily. for 10 days     Family History       Problem Relation (Age of Onset)    Diabetes Daughter    Hypertension Daughter    Melanoma Daughter          Tobacco Use    Smoking status: Never    Smokeless tobacco: Never   Substance and Sexual Activity    Alcohol use: Never    Drug use: Never    Sexual activity: Not Currently     Review of Systems   Constitutional: Negative.    HENT: Negative.     Eyes: Negative.    Respiratory: Negative.     Cardiovascular: Negative.    Gastrointestinal: Negative.    Endocrine: Negative.    Genitourinary: Negative.    Musculoskeletal: Negative.         Generalized muscle weakness with an IV in her right arm   Skin: Negative.    Allergic/Immunologic: Negative.    Neurological: Negative.    Hematological: Negative.    Psychiatric/Behavioral: Negative.     All other systems reviewed and are negative.  Objective:     Vital Signs (Most Recent):  Temp: 98.1 °F (36.7 °C) (12/02/22 0726)  Pulse: (!) 53 (12/02/22 1500)  Resp: 18 (12/02/22 1327)  BP: (!) 122/55 (12/02/22 1500)  SpO2: 98 % (12/02/22 1327)   Vital Signs (24h Range):  Temp:  [97.4 °F (36.3 °C)-98.1 °F (36.7 °C)] 98.1 °F (36.7 °C)  Pulse:  [50-93] 53  Resp:   [17-20] 18  SpO2:  [94 %-99 %] 98 %  BP: (122-143)/(55-84) 122/55     Weight: 64 kg (141 lb 1.5 oz)  Body mass index is 24.99 kg/m².    Physical Exam  Vitals and nursing note reviewed.   Constitutional:       Appearance: Normal appearance. She is normal weight.   HENT:      Head: Normocephalic and atraumatic.      Right Ear: Tympanic membrane, ear canal and external ear normal.      Left Ear: Tympanic membrane and external ear normal.      Nose: Nose normal.      Mouth/Throat:      Mouth: Mucous membranes are moist.   Eyes:      Extraocular Movements: Extraocular movements intact.      Conjunctiva/sclera: Conjunctivae normal.      Pupils: Pupils are equal, round, and reactive to light.   Cardiovascular:      Rate and Rhythm: Normal rate.      Pulses: Normal pulses.      Heart sounds: Normal heart sounds.   Pulmonary:      Effort: Pulmonary effort is normal.      Breath sounds: Normal breath sounds.   Abdominal:      General: Abdomen is flat. Bowel sounds are normal.      Palpations: Abdomen is soft.   Musculoskeletal:         General: Normal range of motion.      Cervical back: Normal range of motion and neck supple.   Skin:     General: Skin is warm and dry.      Capillary Refill: Capillary refill takes less than 2 seconds.   Neurological:      General: No focal deficit present.      Mental Status: She is alert and oriented to person, place, and time. Mental status is at baseline.   Psychiatric:         Mood and Affect: Mood normal.         Behavior: Behavior normal.         Thought Content: Thought content normal.         Judgment: Judgment normal.         CRANIAL NERVES     CN III, IV, VI   Pupils are equal, round, and reactive to light.     Significant Labs: All pertinent labs within the past 24 hours have been reviewed.    Significant Imaging: I have reviewed all pertinent imaging results/findings within the past 24 hours.    Assessment/Plan:     No notes have been filed under this hospital service.  Service:  Hospital Medicine    VTE Risk Mitigation (From admission, onward)         Ordered     apixaban tablet 2.5 mg  2 times daily         12/01/22 1900     IP VTE HIGH RISK PATIENT  Once         12/01/22 1900     Place sequential compression device  Until discontinued         12/01/22 1900                   Anmol Reese DO  Department of Hospital Medicine   [facility]

## 2022-12-03 NOTE — NURSING
Family member at bedside at this time questioned B12 injection after talking with Dianne (pt daughter) earlier today. Dianne stated that pt had B12 injection before being d/c from [facility] last time. Dianne stated a few weeks ago.   Held B12 injection at this time. Called Dianne twice and left voicemail. Another family member at bedside came and said Dianne didn't feel good and she wasn't sure about admin. B12 at this time.

## 2022-12-04 PROCEDURE — 27000941

## 2022-12-04 PROCEDURE — 94760 N-INVAS EAR/PLS OXIMETRY 1: CPT

## 2022-12-04 PROCEDURE — 99900035 HC TECH TIME PER 15 MIN (STAT)

## 2022-12-04 PROCEDURE — 25000003 PHARM REV CODE 250: Performed by: NURSE PRACTITIONER

## 2022-12-04 PROCEDURE — 25000003 PHARM REV CODE 250: Performed by: FAMILY MEDICINE

## 2022-12-04 PROCEDURE — 25000242 PHARM REV CODE 250 ALT 637 W/ HCPCS: Performed by: FAMILY MEDICINE

## 2022-12-04 PROCEDURE — 11000004 HC SNF PRIVATE

## 2022-12-04 PROCEDURE — 63600175 PHARM REV CODE 636 W HCPCS: Performed by: FAMILY MEDICINE

## 2022-12-04 PROCEDURE — 63600175 PHARM REV CODE 636 W HCPCS: Performed by: NURSE PRACTITIONER

## 2022-12-04 PROCEDURE — 94761 N-INVAS EAR/PLS OXIMETRY MLT: CPT

## 2022-12-04 PROCEDURE — 63700000 PHARM REV CODE 250 ALT 637 W/O HCPCS: Performed by: NURSE PRACTITIONER

## 2022-12-04 PROCEDURE — 27000982 HC MATTRESS, MATRIX LAL RENTAL

## 2022-12-04 PROCEDURE — 27000221 HC OXYGEN, UP TO 24 HOURS

## 2022-12-04 PROCEDURE — 94640 AIRWAY INHALATION TREATMENT: CPT

## 2022-12-04 RX ORDER — GUAIFENESIN 600 MG/1
600 TABLET, EXTENDED RELEASE ORAL 2 TIMES DAILY
Status: DISCONTINUED | OUTPATIENT
Start: 2022-12-04 | End: 2022-12-08 | Stop reason: HOSPADM

## 2022-12-04 RX ADMIN — ATORVASTATIN CALCIUM 40 MG: 40 TABLET, FILM COATED ORAL at 09:12

## 2022-12-04 RX ADMIN — MEROPENEM 500 MG: 500 INJECTION, POWDER, FOR SOLUTION INTRAVENOUS at 11:12

## 2022-12-04 RX ADMIN — LATANOPROST 1 DROP: 50 SOLUTION OPHTHALMIC at 08:12

## 2022-12-04 RX ADMIN — DEXAMETHASONE 2 MG: 1 TABLET ORAL at 09:12

## 2022-12-04 RX ADMIN — BUDESONIDE 0.5 MG: 0.5 INHALANT ORAL at 07:12

## 2022-12-04 RX ADMIN — GABAPENTIN 100 MG: 100 CAPSULE ORAL at 08:12

## 2022-12-04 RX ADMIN — AMLODIPINE BESYLATE 5 MG: 5 TABLET ORAL at 09:12

## 2022-12-04 RX ADMIN — MUPIROCIN: 20 OINTMENT TOPICAL at 08:12

## 2022-12-04 RX ADMIN — LEVOTHYROXINE SODIUM 50 MCG: 0.05 TABLET ORAL at 06:12

## 2022-12-04 RX ADMIN — ASPIRIN 81 MG: 81 TABLET, COATED ORAL at 09:12

## 2022-12-04 RX ADMIN — RIFAXIMIN 550 MG: 550 TABLET ORAL at 09:12

## 2022-12-04 RX ADMIN — PANTOPRAZOLE SODIUM 40 MG: 40 TABLET, DELAYED RELEASE ORAL at 09:12

## 2022-12-04 RX ADMIN — APIXABAN 2.5 MG: 2.5 TABLET, FILM COATED ORAL at 08:12

## 2022-12-04 RX ADMIN — LOSARTAN POTASSIUM 100 MG: 50 TABLET, FILM COATED ORAL at 09:12

## 2022-12-04 RX ADMIN — MONTELUKAST SODIUM 10 MG: 10 TABLET, COATED ORAL at 08:12

## 2022-12-04 RX ADMIN — RIFAXIMIN 550 MG: 550 TABLET ORAL at 08:12

## 2022-12-04 RX ADMIN — ACETAZOLAMIDE 250 MG: 250 TABLET ORAL at 08:12

## 2022-12-04 RX ADMIN — MUPIROCIN: 20 OINTMENT TOPICAL at 09:12

## 2022-12-04 RX ADMIN — CLONIDINE HYDROCHLORIDE 0.1 MG: 0.1 TABLET ORAL at 09:12

## 2022-12-04 RX ADMIN — ROPINIROLE HYDROCHLORIDE 0.5 MG: 0.25 TABLET, FILM COATED ORAL at 08:12

## 2022-12-04 RX ADMIN — ISOSORBIDE MONONITRATE 30 MG: 30 TABLET, EXTENDED RELEASE ORAL at 09:12

## 2022-12-04 RX ADMIN — PROPRANOLOL HYDROCHLORIDE 10 MG: 10 TABLET ORAL at 04:12

## 2022-12-04 RX ADMIN — ALBUTEROL SULFATE 2.5 MG: 2.5 SOLUTION RESPIRATORY (INHALATION) at 07:12

## 2022-12-04 RX ADMIN — MEROPENEM 500 MG: 500 INJECTION, POWDER, FOR SOLUTION INTRAVENOUS at 08:12

## 2022-12-04 RX ADMIN — PROPRANOLOL HYDROCHLORIDE 10 MG: 10 TABLET ORAL at 09:12

## 2022-12-04 RX ADMIN — ALBUTEROL SULFATE 2.5 MG: 2.5 SOLUTION RESPIRATORY (INHALATION) at 01:12

## 2022-12-04 RX ADMIN — APIXABAN 2.5 MG: 2.5 TABLET, FILM COATED ORAL at 09:12

## 2022-12-04 RX ADMIN — NIFEDIPINE 30 MG: 30 TABLET, FILM COATED, EXTENDED RELEASE ORAL at 09:12

## 2022-12-04 RX ADMIN — GUAIFENESIN 600 MG: 600 TABLET, EXTENDED RELEASE ORAL at 05:12

## 2022-12-04 RX ADMIN — ACETAZOLAMIDE 250 MG: 250 TABLET ORAL at 09:12

## 2022-12-04 RX ADMIN — FLUCONAZOLE 100 MG: 100 TABLET ORAL at 09:12

## 2022-12-04 RX ADMIN — SERTRALINE HYDROCHLORIDE 25 MG: 25 TABLET ORAL at 09:12

## 2022-12-04 RX ADMIN — MEROPENEM 500 MG: 500 INJECTION, POWDER, FOR SOLUTION INTRAVENOUS at 04:12

## 2022-12-04 RX ADMIN — DEXAMETHASONE 2 MG: 1 TABLET ORAL at 08:12

## 2022-12-04 NOTE — NURSING
Notified Dr. Reese of family request for Mucinex. Stated he would put something in. No other orders received at this time.

## 2022-12-05 PROCEDURE — 97535 SELF CARE MNGMENT TRAINING: CPT

## 2022-12-05 PROCEDURE — 94761 N-INVAS EAR/PLS OXIMETRY MLT: CPT

## 2022-12-05 PROCEDURE — 63600175 PHARM REV CODE 636 W HCPCS: Performed by: FAMILY MEDICINE

## 2022-12-05 PROCEDURE — 25000242 PHARM REV CODE 250 ALT 637 W/ HCPCS: Performed by: FAMILY MEDICINE

## 2022-12-05 PROCEDURE — 99900035 HC TECH TIME PER 15 MIN (STAT)

## 2022-12-05 PROCEDURE — 25000003 PHARM REV CODE 250: Performed by: FAMILY MEDICINE

## 2022-12-05 PROCEDURE — 63600175 PHARM REV CODE 636 W HCPCS: Performed by: NURSE PRACTITIONER

## 2022-12-05 PROCEDURE — 63700000 PHARM REV CODE 250 ALT 637 W/O HCPCS: Performed by: NURSE PRACTITIONER

## 2022-12-05 PROCEDURE — 94640 AIRWAY INHALATION TREATMENT: CPT

## 2022-12-05 PROCEDURE — 97116 GAIT TRAINING THERAPY: CPT | Mod: CQ

## 2022-12-05 PROCEDURE — 11000004 HC SNF PRIVATE

## 2022-12-05 PROCEDURE — 97110 THERAPEUTIC EXERCISES: CPT | Mod: CQ

## 2022-12-05 PROCEDURE — 27000221 HC OXYGEN, UP TO 24 HOURS

## 2022-12-05 PROCEDURE — 25000003 PHARM REV CODE 250: Performed by: NURSE PRACTITIONER

## 2022-12-05 PROCEDURE — 97110 THERAPEUTIC EXERCISES: CPT

## 2022-12-05 RX ADMIN — GABAPENTIN 100 MG: 100 CAPSULE ORAL at 08:12

## 2022-12-05 RX ADMIN — ALBUTEROL SULFATE 2.5 MG: 2.5 SOLUTION RESPIRATORY (INHALATION) at 02:12

## 2022-12-05 RX ADMIN — ACETAZOLAMIDE 250 MG: 250 TABLET ORAL at 09:12

## 2022-12-05 RX ADMIN — ISOSORBIDE MONONITRATE 30 MG: 30 TABLET, EXTENDED RELEASE ORAL at 09:12

## 2022-12-05 RX ADMIN — LOSARTAN POTASSIUM 100 MG: 50 TABLET, FILM COATED ORAL at 09:12

## 2022-12-05 RX ADMIN — RIFAXIMIN 550 MG: 550 TABLET ORAL at 08:12

## 2022-12-05 RX ADMIN — FLUCONAZOLE 100 MG: 100 TABLET ORAL at 09:12

## 2022-12-05 RX ADMIN — MEROPENEM 500 MG: 500 INJECTION, POWDER, FOR SOLUTION INTRAVENOUS at 04:12

## 2022-12-05 RX ADMIN — ALBUTEROL SULFATE 2.5 MG: 2.5 SOLUTION RESPIRATORY (INHALATION) at 06:12

## 2022-12-05 RX ADMIN — ALBUTEROL SULFATE 2.5 MG: 2.5 SOLUTION RESPIRATORY (INHALATION) at 07:12

## 2022-12-05 RX ADMIN — CLONIDINE HYDROCHLORIDE 0.1 MG: 0.1 TABLET ORAL at 08:12

## 2022-12-05 RX ADMIN — MEROPENEM 500 MG: 500 INJECTION, POWDER, FOR SOLUTION INTRAVENOUS at 12:12

## 2022-12-05 RX ADMIN — GUAIFENESIN 600 MG: 600 TABLET, EXTENDED RELEASE ORAL at 08:12

## 2022-12-05 RX ADMIN — ATORVASTATIN CALCIUM 40 MG: 40 TABLET, FILM COATED ORAL at 09:12

## 2022-12-05 RX ADMIN — MEROPENEM 500 MG: 500 INJECTION, POWDER, FOR SOLUTION INTRAVENOUS at 08:12

## 2022-12-05 RX ADMIN — ACETAZOLAMIDE 250 MG: 250 TABLET ORAL at 08:12

## 2022-12-05 RX ADMIN — MUPIROCIN: 20 OINTMENT TOPICAL at 08:12

## 2022-12-05 RX ADMIN — DEXAMETHASONE 2 MG: 1 TABLET ORAL at 09:12

## 2022-12-05 RX ADMIN — BUDESONIDE 0.5 MG: 0.5 INHALANT ORAL at 08:12

## 2022-12-05 RX ADMIN — ROPINIROLE HYDROCHLORIDE 0.5 MG: 0.25 TABLET, FILM COATED ORAL at 08:12

## 2022-12-05 RX ADMIN — SERTRALINE HYDROCHLORIDE 25 MG: 25 TABLET ORAL at 09:12

## 2022-12-05 RX ADMIN — BUDESONIDE 0.5 MG: 0.5 INHALANT ORAL at 07:12

## 2022-12-05 RX ADMIN — ASPIRIN 81 MG: 81 TABLET, COATED ORAL at 09:12

## 2022-12-05 RX ADMIN — RIFAXIMIN 550 MG: 550 TABLET ORAL at 09:12

## 2022-12-05 RX ADMIN — LEVOTHYROXINE SODIUM 50 MCG: 0.05 TABLET ORAL at 05:12

## 2022-12-05 RX ADMIN — NIFEDIPINE 30 MG: 30 TABLET, FILM COATED, EXTENDED RELEASE ORAL at 09:12

## 2022-12-05 RX ADMIN — PANTOPRAZOLE SODIUM 40 MG: 40 TABLET, DELAYED RELEASE ORAL at 09:12

## 2022-12-05 RX ADMIN — APIXABAN 2.5 MG: 2.5 TABLET, FILM COATED ORAL at 08:12

## 2022-12-05 RX ADMIN — LATANOPROST 1 DROP: 50 SOLUTION OPHTHALMIC at 08:12

## 2022-12-05 RX ADMIN — MONTELUKAST SODIUM 10 MG: 10 TABLET, COATED ORAL at 08:12

## 2022-12-05 RX ADMIN — APIXABAN 2.5 MG: 2.5 TABLET, FILM COATED ORAL at 09:12

## 2022-12-05 RX ADMIN — PROPRANOLOL HYDROCHLORIDE 10 MG: 10 TABLET ORAL at 08:12

## 2022-12-05 RX ADMIN — DEXAMETHASONE 2 MG: 1 TABLET ORAL at 08:12

## 2022-12-05 RX ADMIN — AMLODIPINE BESYLATE 5 MG: 5 TABLET ORAL at 09:12

## 2022-12-05 RX ADMIN — GUAIFENESIN 600 MG: 600 TABLET, EXTENDED RELEASE ORAL at 09:12

## 2022-12-05 RX ADMIN — PROPRANOLOL HYDROCHLORIDE 10 MG: 10 TABLET ORAL at 04:12

## 2022-12-05 RX ADMIN — ALBUTEROL SULFATE 2.5 MG: 2.5 SOLUTION RESPIRATORY (INHALATION) at 08:12

## 2022-12-05 NOTE — CONSULTS
"  [facility]  Adult Nutrition  Consult Note    SUMMARY     Recommendations    Recommendation/Intervention: 1. Ensure Enlive one carton po BID (vanilla or strawberry) 2. Honor prefs  Goals: Meet EEN >75%  Nutrition Goal Status: new    Assessment and Plan    No new Assessment & Plan notes have been filed under this hospital service since the last note was generated.  Service: Nutrition       Malnutrition Assessment  Malnutrition Type: chronic illness          Weight Loss (Malnutrition): 7.5% in 3 months  Energy Intake (Malnutrition): less than 75% for greater than or equal to 1 month                         Reason for Assessment    Reason For Assessment: consult (swing bed patient)  Diagnosis:  (COPD, weakness, dysphagia)  Relevant Medical History: CAD, COPD, hypothyroidism, HTN, GERD, Freq UTI, HLD  Interdisciplinary Rounds: attended  General Information Comments: Pt with eso stx with recent EGD.  She had a MBS and [facility] sfot with nectar liquids was recommended.  Thickened liquids were not ordered her and she has done fine so far on thin liquids.  She has lost ~15# in the past 2-3 months.  Meal intake ~70%.    Nutrition Risk Screen    Nutrition Risk Screen: dysphagia or difficulty swallowing    Nutrition/Diet History    Patient Reported Diet/Restrictions/Preferences: general  Spiritual, Cultural Beliefs, Mormon Practices, Values that Affect Care: no  Food Allergies: NKFA  Factors Affecting Nutritional Intake: chewing difficulties/inability to chew food, decreased appetite, difficulty/impaired swallowing    Anthropometrics    Temp: 98.2 °F (36.8 °C)  Height: 5' 3" (160 cm)  Height (inches): 63 in  Weight Method: Standard Scale  Weight: 62.1 kg (137 lb)  Weight (lb): 137 lb  Ideal Body Weight (IBW), Female: 115 lb  % Ideal Body Weight, Female (lb): 119.13 %  BMI (Calculated): 24.3  Usual Body Weight (UBW), k kg  % Usual Body Weight: 91.58  % Weight Change From Usual Weight: " -8.61 %       Lab/Procedures/Meds    Pertinent Labs Reviewed: reviewed  Pertinent Labs Comments: Na 147, Cl 113, BUN 26, Alb 2.8, PAB 15  Pertinent Medications Reviewed: reviewed  Pertinent Medications Comments: Lipitor, B12, Synthroid, Merrem, Zoloft    Physical Findings/Assessment         Estimated/Assessed Needs    Weight Used For Calorie Calculations: 62.1 kg (137 lb)  Energy Calorie Requirements (kcal): 2075-9013  Energy Need Method: Kcal/kg  Protein Requirements: 62-75  Weight Used For Protein Calculations: 62 kg (136 lb 11 oz)     Estimated Fluid Requirement Method: other (see comments) (1864)  RDA Method (mL): 1553         Nutrition Prescription Ordered    Current Diet Order: Mechanical soft    Evaluation of Received Nutrient/Fluid Intake    Energy Calories Required: not meeting needs  Protein Required: not meeting needs  Fluid Required: not meeting needs  Tolerance: tolerating  % Intake of Estimated Energy Needs: 50 - 75 %  % Meal Intake: 50 - 75 %    Nutrition Risk    Level of Risk/Frequency of Follow-up: high       Monitor and Evaluation    Food and Nutrient Intake: food and beverage intake  Anthropometric Measurements: weight  Biochemical Data, Medical Tests and Procedures: electrolyte and renal panel  Nutrition-Focused Physical Findings: overall appearance       Nutrition Follow-Up    RD Follow-up?: Yes

## 2022-12-05 NOTE — PT/OT/SLP PROGRESS
Physical Therapy Treatment    Patient Name:  Pilar Reardon   MRN:  [id number]    Recommendations:     Discharge Recommendations: assisted living facility, home with home health  Discharge Equipment Recommendations: none  Barriers to discharge: Decreased caregiver support    Assessment:     Pilar Reardon is a 89 y.o. female admitted with a medical diagnosis of Generalized weakness.  She presents with the following impairments/functional limitations: weakness, impaired endurance, gait instability, impaired balance, decreased safety awareness, decreased lower extremity function, impaired cardiopulmonary response to activity.    Pt tolerated Skilled PT well with prolong seated rest breaks due to SOB with 2L O2 donned at all times. Pt ambulated 115 ft slow steady gait with c/o SOB. Pt's overall endurance needs improvement due to recent Droplet precautions causing pt difficulty with exertion during functional activity.     Rehab Prognosis: Fair; patient would benefit from acute skilled PT services to address these deficits and reach maximum level of function.    Recent Surgery: * No surgery found *      Plan:     During this hospitalization, patient to be seen 5 x/week to address the identified rehab impairments via gait training, therapeutic exercises and progress toward the following goals:    Plan of Care Expires:       Subjective     Chief Complaint: Pt reports she is SOB at times.  Patient/Family Comments/goals: return to PLOF/home if able  Pain/Comfort:  Pain Rating 1: 0/10  Location - Side 1: Bilateral  Location - Orientation 1: lower  Location 1: leg      Objective:     Communicated with patient prior to session.  Patient found up in chair with oxygen upon PT entry to room.     General Precautions: Standard, droplet  Orthopedic Precautions: N/A  Braces: N/A  Respiratory Status: Nasal cannula, flow 2 L/min     Functional Mobility:  Bed Mobility:     Sit to Supine: modified independence  Transfers:     Sit to  Stand:  contact guard assistance with rolling walker  Bed to Chair: contact guard assistance with  rolling walker  using  Stand Pivot and Step Transfer  Gait: 115ft using RW steady gait pattern  Balance: fair      AM-PAC 6 CLICK MOBILITY  Turning over in bed (including adjusting bedclothes, sheets and blankets)?: 4  Sitting down on and standing up from a chair with arms (e.g., wheelchair, bedside commode, etc.): 4  Moving from lying on back to sitting on the side of the bed?: 4  Moving to and from a bed to a chair (including a wheelchair)?: 3  Need to walk in hospital room?: 3  Climbing 3-5 steps with a railing?: 1  Basic Mobility Total Score: 19       Treatment & Education:  Nustep x 6 min  Ambulation from gym to room 115 ft using RW CGA  Sit to supine-Jim     Patient left supine with all lines intact, call button in reach, and family member present..    GOALS:   Multidisciplinary Problems       Physical Therapy Goals          Problem: Physical Therapy    Goal Priority Disciplines Outcome Goal Variances Interventions   Physical Therapy Goal     PT, PT/OT Ongoing, Progressing     Description: Goals to be met by: 2 weeks     Patient will increase functional independence with mobility by performin. Supine to sit with Modified [place]  2. Sit to supine with Modified [place]  3. Sit to stand transfer with Stand-by Assistance  4. Bed to chair transfer with Stand-by Assistance using Rolling Walker  5. Gait  x 200 feet with Stand-by Assistance using Rolling Walker.       Goals to be met by: 4 weeks     Patient will increase functional independence with mobility by performin. Sit to stand transfer with Modified [place]  2. Bed to chair transfer with Modified [place] using Rolling Walker  3. Gait  x 400 feet with Modified [place] using Rolling Walker.                        Time Tracking:     PT Received On: 22  PT Start Time: 1548     PT Stop Time: 1620  PT Total Time (min): 32  min     Billable Minutes: Gait Training 15 and Therapeutic Exercise 15    Treatment Type: Treatment  PT/PTA: PTA     PTA Visit Number: 1     12/05/2022

## 2022-12-05 NOTE — PLAN OF CARE
[facility]  Initial Discharge Assessment       Primary Care Provider: Mrailee Witt MD    Admission Diagnosis: Generalized weakness [R53.1]  Acute on chronic respiratory failure with hypercapnia [J96.22]  Pseudomonas respiratory infection [J98.8, B96.5]  Dysphagia [R13.10]  CAD (coronary artery disease) [I25.10]  Acute metabolic encephalopathy [G93.41]  Anemia [D64.9]  Debility [R53.81]  COPD (chronic obstructive pulmonary disease) [J44.9]  HTN (hypertension) [I10]    Admission Date: 12/1/2022  Expected Discharge Date:     Discharge Barriers Identified: None    Payor: HU[employer] MANAGED MEDICARE / Plan: HU[employer] MEDICARE PPO / Product Type: Medicare Advantage /     Extended Emergency Contact Information  Primary Emergency Contact: IDA MORATAYA  Mobile Phone: [phone]  Relation: Daughter   needed? No    Discharge Plan A: Home with family, Home Health  Discharge Plan B: Skilled Nursing Facility      [facility] [place], [place] - [address] [address]  [address] [address]  [facility] [address]  Phone: [phone] Fax: [phone]      Initial Assessment (most recent)       Adult Discharge Assessment - 12/05/22 1418          Discharge Assessment    Assessment Type Discharge Planning Assessment     Confirmed/corrected address, phone number and insurance Yes     Confirmed Demographics Correct on Facesheet     Source of Information patient;family;health record     If unable to respond/provide information was family/caregiver contacted? Yes     Contact Name/Number lukasz Monzon [phone]     Communicated JERRY with patient/caregiver Date not available/Unable to determine     Reason For Admission Weakness, acute resp. failure, Pseudomonas/yeast in bronchial washings, dysphagia, COPD, metabolic encephalopathy     Lives With child(quiana), adult     Facility Arrived From: [facility]     Do you expect to return to your current living situation? Yes     Do you  have help at home or someone to help you manage your care at home? Yes     Who are your caregiver(s) and their phone number(s)? Dianne Arteaga, daughter     Prior to hospitilization cognitive status: Not Oriented to Time     Current cognitive status: Not Oriented to Time     Walking or Climbing Stairs Difficulty ambulation difficulty, requires equipment;stair climbing difficulty, requires equipment;transferring difficulty, requires equipment     Mobility Management RW     Dressing/Bathing Difficulty bathing difficulty, assistance 1 person;dressing difficulty, assistance 1 person     Home Accessibility wheelchair accessible     Home Layout Able to live on 1st floor     Equipment Currently Used at Home walker, rolling;oxygen     Readmission within 30 days? No     Patient currently being followed by outpatient case management? No     Do you currently have service(s) that help you manage your care at home? Yes     Name and Contact number of agency [facility]     Is the pt/caregiver preference to resume services with current agency Yes     Do you take prescription medications? Yes     Do you have prescription coverage? Yes     Coverage [employer]     Do you have any problems affording any of your prescribed medications? No     Is the patient taking medications as prescribed? yes     Who is going to help you get home at discharge? Dianne Arteaga, daughter     How do you get to doctors appointments? family or friend will provide     Are you on dialysis? No     Do you take coumadin? No     Discharge Plan A Home with family;Home Health     Discharge Plan B Skilled Nursing Facility     DME Needed Upon Discharge  none     Discharge Plan discussed with: Patient;Adult children     Discharge Barriers Identified None        Physical Activity    On average, how many days per week do you engage in moderate to strenuous exercise (like a brisk walk)? 0 days     On average, how many minutes do you engage in exercise at this level? 0  min        Financial Resource Strain    How hard is it for you to pay for the very basics like food, housing, medical care, and heating? Somewhat hard        Housing Stability    In the last 12 months, was there a time when you were not able to pay the mortgage or rent on time? No     In the last 12 months, how many places have you lived? 1     In the last 12 months, was there a time when you did not have a steady place to sleep or slept in a shelter (including now)? No        Transportation Needs    In the past 12 months, has lack of transportation kept you from medical appointments or from getting medications? No     In the past 12 months, has lack of transportation kept you from meetings, work, or from getting things needed for daily living? No        Food Insecurity    Within the past 12 months, you worried that your food would run out before you got the money to buy more. Sometimes true     Within the past 12 months, the food you bought just didn't last and you didn't have money to get more. Sometimes true        Stress    Do you feel stress - tense, restless, nervous, or anxious, or unable to sleep at night because your mind is troubled all the time - these days? Only a little        Social Connections    In a typical week, how many times do you talk on the phone with family, friends, or neighbors? More than three times a week     How often do you get together with friends or relatives? More than three times a week     How often do you attend Adventist or Rastafari services? More than 4 times per year     Do you belong to any clubs or organizations such as Adventist groups, unions, fraternal or athletic groups, or school groups? No     How often do you attend meetings of the clubs or organizations you belong to? Never     Are you , , , , never , or living with a partner?         Alcohol Use    Q1: How often do you have a drink containing alcohol? Never     Q2: How many  drinks containing alcohol do you have on a typical day when you are drinking? Patient does not drink     Q3: How often do you have six or more drinks on one occasion? Never        Relationship/Environment    Name(s) of Who Lives With Patient Sebastian Reardon, son                   Pt. Admitted to swing bed services for continued rehabilitation and supportive care following inpatient stay with diagnosis of Acute resp. Failure, Pseudomonas and yeast in bronchial washings, dysphagia, acute metabolic encephalopathy, COPD and generalized weakness. Pt. Lives with children. Has a RW and home O2 set up. Pt. Is current with [facility] and plans to resume services with them upon d/c from swing bed. Will continue to follow pt. And assist as needed throughout stay.

## 2022-12-05 NOTE — PLAN OF CARE
Problem: Adult Inpatient Plan of Care  Goal: Plan of Care Review  12/4/2022 2325 by Candida Herring LPN  Outcome: Ongoing, Progressing  12/4/2022 2316 by Candida Herring LPN  Outcome: Ongoing, Progressing  Goal: Patient-Specific Goal (Individualized)  12/4/2022 2325 by Candida Herring LPN  Outcome: Ongoing, Progressing  12/4/2022 2316 by Candida Herring LPN  Outcome: Ongoing, Progressing  Goal: Absence of Hospital-Acquired Illness or Injury  12/4/2022 2325 by Candida Herring LPN  Outcome: Ongoing, Progressing  12/4/2022 2316 by Candida Herring LPN  Outcome: Ongoing, Progressing  Goal: Optimal Comfort and Wellbeing  12/4/2022 2325 by Candida Herring LPN  Outcome: Ongoing, Progressing  12/4/2022 2316 by Candida Herring LPN  Outcome: Ongoing, Progressing  Goal: Readiness for Transition of Care  12/4/2022 2325 by Candida Herring LPN  Outcome: Ongoing, Progressing  12/4/2022 2316 by Candida Herring LPN  Outcome: Ongoing, Progressing     Problem: Impaired Wound Healing  Goal: Optimal Wound Healing  Outcome: Ongoing, Progressing     Problem: Fall Injury Risk  Goal: Absence of Fall and Fall-Related Injury  Outcome: Ongoing, Progressing     Problem: Infection  Goal: Absence of Infection Signs and Symptoms  Outcome: Ongoing, Progressing

## 2022-12-05 NOTE — NURSING
Found patient with shortness of breath and oxygen sat of 77%, up on side of bed, O2 on, sat up to 88, chest sounds left chest only slight wheezing, right chest tight with expiratory and expiratory wheezing, resp theraphy, Brooke to room

## 2022-12-05 NOTE — PT/OT/SLP PROGRESS
Occupational Therapy   Treatment    Name: Pilar Reardon  MRN: [id number]  Admitting Diagnosis:  Generalized weakness       Recommendations:     Discharge Recommendations: home with home health  Discharge Equipment Recommendations:  none  Barriers to discharge:       Assessment:     Pilar Reardon is a 89 y.o. female with a medical diagnosis of Generalized weakness.  She presents with decreased endurance and strength which limits her balance, safety, strength and mobility. Performance deficits affecting function are weakness, impaired endurance, impaired sensation, impaired self care skills, impaired functional mobility, gait instability, impaired balance, decreased safety awareness.Pt presented sitting upright on the edge of bed upon Ot's arrival. Pt's daughter and RN Shamika was present. Pt completed functional ambulation to therapy room using the RW for balance, and safety. Pt's RN trailed with the w/c. Pt participated well in therapy with PTA and RN present. Pt was provided with rest breaks due to decreased endurance.    Rehab Prognosis:  Fair; patient would benefit from acute skilled OT services to address these deficits and reach maximum level of function.       Plan:     Patient to be seen 5 x/week to address the above listed problems via self-care/home management, therapeutic exercises, therapeutic activities  Plan of Care Expires:    Plan of Care Reviewed with: patient    Subjective     Pain/Comfort:  Pain Rating 1: 0/10    Objective:     Communicated with: Pt prior to session.  Patient found supine    upon OT entry to room.    General Precautions: Standard, fall    Orthopedic Precautions:   Braces:    Respiratory Status: Nasal cannula, flow 2 L/min     Occupational Performance:     Bed Mobility:    Pt presented sitting on EOB    Functional Mobility/Transfers:  Patient completed Sit <> Stand Transfer with modified independence  with  rolling walker  (2 sets of 10 reps)  Functional Mobility: Pt completed  functional mobility down the hallway using the RW for balance.     Activities of Daily Living:  Sit to stand t/f with Mod I      AMPAC 6 Click ADL: 19    Treatment & Education:  While sitting in the w/c the pt complete bicep curls 2 sets of 20 reps with 5# Dbs and tricep extension 1 sets of 20 reps to increase BUE strength for ADLs and IADLs    Patient left up in chair with  PTA present    GOALS:   Multidisciplinary Problems       Occupational Therapy Goals          Problem: Occupational Therapy    Goal Priority Disciplines Outcome Interventions   Occupational Therapy Goal     OT, PT/OT Ongoing, Progressing    Description: Goals to be met by: 1/2/22     Patient will increase functional independence with ADLs by performing:    UE Dressing with Modified [place].  LE Dressing with Modified [place].  Grooming while standing with Modified [place].  Toileting from toilet with Modified [place] for hygiene and clothing management.   Standing x5-10 minutes with Modified [place] and Supervision during thera ex and ADL  Stand pivot transfers with Modified [place].  Step transfer with Modified [place]  Toilet transfer to toilet with Modified [place].                         Time Tracking:     OT Date of Treatment:    OT Start Time:  3:05  OT Stop Time:  3:35  OT Total Time (min):      Billable Minutes:Self Care/Home Management 15  Therapeutic Exercise 15    OT/CAROL: OT          12/5/2022

## 2022-12-05 NOTE — NURSING
After treatment, lung sounds have improved, remains coarse on the right but less wheezing and O2 sat is 94%

## 2022-12-06 LAB
ANISOCYTOSIS BLD QL SMEAR: ABNORMAL
BASOPHILS # BLD AUTO: 0.02 K/UL (ref 0–0.2)
BASOPHILS NFR BLD AUTO: 0.1 % (ref 0–1)
DIFFERENTIAL METHOD BLD: ABNORMAL
EOSINOPHIL # BLD AUTO: 0.02 K/UL (ref 0–0.5)
EOSINOPHIL NFR BLD AUTO: 0.1 % (ref 1–4)
ERYTHROCYTE [DISTWIDTH] IN BLOOD BY AUTOMATED COUNT: 15.2 % (ref 11.5–14.5)
HCT VFR BLD AUTO: 36.4 % (ref 38–47)
HGB BLD-MCNC: 11.6 G/DL (ref 12–16)
HYPOCHROMIA BLD QL SMEAR: ABNORMAL
LYMPHOCYTES # BLD AUTO: 10.69 K/UL (ref 1–4.8)
LYMPHOCYTES NFR BLD AUTO: 73.2 % (ref 27–41)
LYMPHOCYTES NFR BLD MANUAL: 84 % (ref 27–41)
MACROCYTES BLD QL SMEAR: ABNORMAL
MCH RBC QN AUTO: 31.3 PG (ref 27–31)
MCHC RBC AUTO-ENTMCNC: 31.9 G/DL (ref 32–36)
MCV RBC AUTO: 98.1 FL (ref 80–96)
MONOCYTES # BLD AUTO: 0.76 K/UL (ref 0–0.8)
MONOCYTES NFR BLD AUTO: 5.2 % (ref 2–6)
MPC BLD CALC-MCNC: 11.2 FL (ref 9.4–12.4)
NEUTROPHILS # BLD AUTO: 3.12 K/UL (ref 1.8–7.7)
NEUTROPHILS NFR BLD AUTO: 21.4 % (ref 53–65)
NEUTS SEG NFR BLD MANUAL: 16 % (ref 50–62)
NRBC BLD MANUAL-RTO: ABNORMAL %
NT-PROBNP SERPL-MCNC: 3320 PG/ML (ref 1–450)
PLATELET # BLD AUTO: 121 K/UL (ref 150–400)
PLATELET MORPHOLOGY: ABNORMAL
RBC # BLD AUTO: 3.71 M/UL (ref 4.2–5.4)
WBC # BLD AUTO: 14.61 K/UL (ref 4.5–11)

## 2022-12-06 PROCEDURE — 85025 COMPLETE CBC W/AUTO DIFF WBC: CPT | Performed by: FAMILY MEDICINE

## 2022-12-06 PROCEDURE — 27000944

## 2022-12-06 PROCEDURE — 97530 THERAPEUTIC ACTIVITIES: CPT | Mod: CQ

## 2022-12-06 PROCEDURE — 11000004 HC SNF PRIVATE

## 2022-12-06 PROCEDURE — 63600175 PHARM REV CODE 636 W HCPCS: Performed by: NURSE PRACTITIONER

## 2022-12-06 PROCEDURE — 99900035 HC TECH TIME PER 15 MIN (STAT)

## 2022-12-06 PROCEDURE — 83880 ASSAY OF NATRIURETIC PEPTIDE: CPT | Performed by: FAMILY MEDICINE

## 2022-12-06 PROCEDURE — 97110 THERAPEUTIC EXERCISES: CPT | Mod: CQ

## 2022-12-06 PROCEDURE — 36415 COLL VENOUS BLD VENIPUNCTURE: CPT | Performed by: FAMILY MEDICINE

## 2022-12-06 PROCEDURE — 94640 AIRWAY INHALATION TREATMENT: CPT

## 2022-12-06 PROCEDURE — 27000221 HC OXYGEN, UP TO 24 HOURS

## 2022-12-06 PROCEDURE — 63600175 PHARM REV CODE 636 W HCPCS: Performed by: FAMILY MEDICINE

## 2022-12-06 PROCEDURE — 25000003 PHARM REV CODE 250: Performed by: FAMILY MEDICINE

## 2022-12-06 PROCEDURE — 97116 GAIT TRAINING THERAPY: CPT | Mod: CQ

## 2022-12-06 PROCEDURE — 25000242 PHARM REV CODE 250 ALT 637 W/ HCPCS: Performed by: FAMILY MEDICINE

## 2022-12-06 PROCEDURE — 63700000 PHARM REV CODE 250 ALT 637 W/O HCPCS: Performed by: NURSE PRACTITIONER

## 2022-12-06 PROCEDURE — 97110 THERAPEUTIC EXERCISES: CPT

## 2022-12-06 PROCEDURE — 27000981 HC MATTRESS, ACCUCAIR DAILY RENTAL

## 2022-12-06 PROCEDURE — 25000003 PHARM REV CODE 250: Performed by: NURSE PRACTITIONER

## 2022-12-06 PROCEDURE — 94761 N-INVAS EAR/PLS OXIMETRY MLT: CPT

## 2022-12-06 RX ORDER — FUROSEMIDE 10 MG/ML
20 INJECTION INTRAMUSCULAR; INTRAVENOUS ONCE
Status: COMPLETED | OUTPATIENT
Start: 2022-12-06 | End: 2022-12-06

## 2022-12-06 RX ORDER — FUROSEMIDE 20 MG/1
20 TABLET ORAL DAILY
Status: DISCONTINUED | OUTPATIENT
Start: 2022-12-07 | End: 2022-12-08 | Stop reason: HOSPADM

## 2022-12-06 RX ORDER — IPRATROPIUM BROMIDE AND ALBUTEROL SULFATE 2.5; .5 MG/3ML; MG/3ML
3 SOLUTION RESPIRATORY (INHALATION) EVERY 6 HOURS
Status: DISCONTINUED | OUTPATIENT
Start: 2022-12-06 | End: 2022-12-07

## 2022-12-06 RX ADMIN — FLUCONAZOLE 100 MG: 100 TABLET ORAL at 09:12

## 2022-12-06 RX ADMIN — LATANOPROST 1 DROP: 50 SOLUTION OPHTHALMIC at 08:12

## 2022-12-06 RX ADMIN — GUAIFENESIN 600 MG: 600 TABLET, EXTENDED RELEASE ORAL at 08:12

## 2022-12-06 RX ADMIN — MEROPENEM 500 MG: 500 INJECTION, POWDER, FOR SOLUTION INTRAVENOUS at 12:12

## 2022-12-06 RX ADMIN — RIFAXIMIN 550 MG: 550 TABLET ORAL at 08:12

## 2022-12-06 RX ADMIN — PROPRANOLOL HYDROCHLORIDE 10 MG: 10 TABLET ORAL at 08:12

## 2022-12-06 RX ADMIN — ASPIRIN 81 MG: 81 TABLET, COATED ORAL at 09:12

## 2022-12-06 RX ADMIN — BUDESONIDE 0.5 MG: 0.5 INHALANT ORAL at 07:12

## 2022-12-06 RX ADMIN — LOSARTAN POTASSIUM 100 MG: 50 TABLET, FILM COATED ORAL at 09:12

## 2022-12-06 RX ADMIN — ISOSORBIDE MONONITRATE 30 MG: 30 TABLET, EXTENDED RELEASE ORAL at 09:12

## 2022-12-06 RX ADMIN — DEXAMETHASONE 2 MG: 1 TABLET ORAL at 09:12

## 2022-12-06 RX ADMIN — MONTELUKAST SODIUM 10 MG: 10 TABLET, COATED ORAL at 08:12

## 2022-12-06 RX ADMIN — FUROSEMIDE 20 MG: 10 INJECTION INTRAMUSCULAR; INTRAVENOUS at 04:12

## 2022-12-06 RX ADMIN — ROPINIROLE HYDROCHLORIDE 0.5 MG: 0.25 TABLET, FILM COATED ORAL at 08:12

## 2022-12-06 RX ADMIN — MEROPENEM 500 MG: 500 INJECTION, POWDER, FOR SOLUTION INTRAVENOUS at 03:12

## 2022-12-06 RX ADMIN — ACETAZOLAMIDE 250 MG: 250 TABLET ORAL at 08:12

## 2022-12-06 RX ADMIN — NIFEDIPINE 30 MG: 30 TABLET, FILM COATED, EXTENDED RELEASE ORAL at 09:12

## 2022-12-06 RX ADMIN — IPRATROPIUM BROMIDE AND ALBUTEROL SULFATE 3 ML: 2.5; .5 SOLUTION RESPIRATORY (INHALATION) at 07:12

## 2022-12-06 RX ADMIN — RIFAXIMIN 550 MG: 550 TABLET ORAL at 09:12

## 2022-12-06 RX ADMIN — AMLODIPINE BESYLATE 5 MG: 5 TABLET ORAL at 09:12

## 2022-12-06 RX ADMIN — APIXABAN 2.5 MG: 2.5 TABLET, FILM COATED ORAL at 09:12

## 2022-12-06 RX ADMIN — DEXAMETHASONE 2 MG: 1 TABLET ORAL at 08:12

## 2022-12-06 RX ADMIN — GUAIFENESIN 600 MG: 600 TABLET, EXTENDED RELEASE ORAL at 09:12

## 2022-12-06 RX ADMIN — PROPRANOLOL HYDROCHLORIDE 10 MG: 10 TABLET ORAL at 09:12

## 2022-12-06 RX ADMIN — ALBUTEROL SULFATE 2.5 MG: 2.5 SOLUTION RESPIRATORY (INHALATION) at 07:12

## 2022-12-06 RX ADMIN — Medication 6 MG: at 08:12

## 2022-12-06 RX ADMIN — ALBUTEROL SULFATE 2.5 MG: 2.5 SOLUTION RESPIRATORY (INHALATION) at 01:12

## 2022-12-06 RX ADMIN — ACETAZOLAMIDE 250 MG: 250 TABLET ORAL at 09:12

## 2022-12-06 RX ADMIN — SERTRALINE HYDROCHLORIDE 25 MG: 25 TABLET ORAL at 09:12

## 2022-12-06 RX ADMIN — LEVOTHYROXINE SODIUM 50 MCG: 0.05 TABLET ORAL at 05:12

## 2022-12-06 RX ADMIN — PANTOPRAZOLE SODIUM 40 MG: 40 TABLET, DELAYED RELEASE ORAL at 09:12

## 2022-12-06 RX ADMIN — MUPIROCIN: 20 OINTMENT TOPICAL at 09:12

## 2022-12-06 RX ADMIN — APIXABAN 2.5 MG: 2.5 TABLET, FILM COATED ORAL at 08:12

## 2022-12-06 RX ADMIN — ATORVASTATIN CALCIUM 40 MG: 40 TABLET, FILM COATED ORAL at 09:12

## 2022-12-06 RX ADMIN — PROPRANOLOL HYDROCHLORIDE 10 MG: 10 TABLET ORAL at 03:12

## 2022-12-06 RX ADMIN — CLONIDINE HYDROCHLORIDE 0.1 MG: 0.1 TABLET ORAL at 09:12

## 2022-12-06 NOTE — PT/OT/SLP PROGRESS
Physical Therapy Treatment    Patient Name:  Pilar Reardon   MRN:  [id number]    Recommendations:     Discharge Recommendations: home with home health, assisted living facility  Discharge Equipment Recommendations: none  Barriers to discharge: Decreased caregiver support    Assessment:     Pilar Reardon is a 89 y.o. female admitted with a medical diagnosis of Generalized weakness.  She presents with the following impairments/functional limitations: weakness, impaired endurance, gait instability, decreased safety awareness.    Pt tolerated today's session well tolerating increase in reps and sets with all seated exercises. Improved ADLS in room requiring less assistance and slow steady gait pattern observed during gait training.     Rehab Prognosis: Fair; patient would benefit from acute skilled PT services to address these deficits and reach maximum level of function.    Recent Surgery: * No surgery found *      Plan:     During this hospitalization, patient to be seen 5 x/week to address the identified rehab impairments via gait training, therapeutic activities, therapeutic exercises and progress toward the following goals:    Plan of Care Expires:       Subjective     Chief Complaint: Pt reports she feels better today.   Patient/Family Comments/goals: return to PLOF, improve endurance   Pain/Comfort:  Pain Rating 1: 0/10      Objective:     Communicated with patient prior to session.  Patient found up in chair with oxygen upon PT entry to room.     General Precautions: Standard, droplet  Orthopedic Precautions: N/A  Braces: N/A  Respiratory Status: Nasal cannula, flow 2 L/min     Functional Mobility:  Bed Mobility:     Sit to Supine: modified independence  Transfers:     Sit to Stand: Supervision with rolling walker  Bed to Chair: SBA with  rolling walker  using  Stand Pivot and Step Transfer  Gait: 100 ft using RW steady gait pattern  Balance: fair      AM-PAC 6 CLICK MOBILITY  Turning over in bed  (including adjusting bedclothes, sheets and blankets)?: 4  Sitting down on and standing up from a chair with arms (e.g., wheelchair, bedside commode, etc.): 4  Moving from lying on back to sitting on the side of the bed?: 4  Moving to and from a bed to a chair (including a wheelchair)?: 4  Need to walk in hospital room?: 4  Climbing 3-5 steps with a railing?: 2  Basic Mobility Total Score: 22       Treatment & Education:  Nustep x 8 min   Seated LAQS 2 x 20 2#, seated marches 2# 2 x 20, Seated HS curls gr tband 1 x 20, seated hip abd gr tband 2 x 20, seated ball squeezes x 40  Sit to stand x 5 from W/C to RW Supervision  Reclining chair to W/C in room Supervision  Ambulation from room to therapy gym x 100 ft using RW SBA O2 donned at 2L    Patient left seated in W/C with all lines intact with OT present.     GOALS:   Multidisciplinary Problems       Physical Therapy Goals          Problem: Physical Therapy    Goal Priority Disciplines Outcome Goal Variances Interventions   Physical Therapy Goal     PT, PT/OT Ongoing, Progressing     Description: Goals to be met by: 2 weeks     Patient will increase functional independence with mobility by performin. Supine to sit with Modified [place]  2. Sit to supine with Modified [place]  3. Sit to stand transfer with Stand-by Assistance  4. Bed to chair transfer with Stand-by Assistance using Rolling Walker  5. Gait  x 200 feet with Stand-by Assistance using Rolling Walker.       Goals to be met by: 4 weeks     Patient will increase functional independence with mobility by performin. Sit to stand transfer with Modified [place]  2. Bed to chair transfer with Modified [place] using Rolling Walker  3. Gait  x 400 feet with Modified [place] using Rolling Walker.                        Time Tracking:     PT Received On: 22  PT Start Time: 1354     PT Stop Time: 1436  PT Total Time (min): 42 min     Billable Minutes: Gait Training   Therapeutic Exercise 20 , and Therapeutic Activity 10    Treatment Type: Treatment  PT/PTA: PTA     PTA Visit Number: 2     12/06/2022

## 2022-12-06 NOTE — PLAN OF CARE
[facility] - Medical Surgical Unit - Swing Bed   Interdisciplinary Team Meeting    Patient: Pilar Reardon   Today's Date: 12/6/2022   Estimated D/C Date: 12/24/22        Physician:Anmol Reese D.O. Nurse Practitioner:    Ph[facility] Director: LICHA Latif   : Leatha Landrum RN Physical/Occupational Therapy: Eileen Thurman OT   Speech Therapy: [facility]    Nursing:  Latrice Reece RN  Respiratory:  Trey , RT Dietary:  Theeitra Wilberto, Dietary  Other:      Nurse  New Symptoms/Problems:   Last BM: 12/5/22 Urine: incontinent Diarrhea: No   Constipated: No Bowel: incontinent Uribe: No   Isolation: Yes D/C Date:  Date:    O2 Device: Nasal Cannula O2 Flow: 2L  SpO2: 97 %   Nutrition:  Mech. Soft, Ensure Enlive  Speech/Swallowing: No current speech or swallowing issues  Aspiration Precautions: No  Cognition: Memory issues    Physical Therapy  Physical Therapy/Gait: ambulated 100 ft with RW and SBA ELOS: Plan to DC  12/24/22   Transfers: Standby Assistance Range of Motion/Restrictions:      Occupational Therapy  Eating/Grooming: Modified Independent Toileting: Supervision or Set-up Assistance   Bathing: Supervision or Set-up Assistance Dressing (Upper Body): Modified Independent   Dressing (Lower Body): Modified Independent        Tx Plan/Recommendations reviewed with family and/or patient on (date) 12/5/22.  Additional family Conference/Training:   D/C Plan/Recommendations: Home with HH  JERRY: 12/24/22    Pharmacy  Medication Changes (see MD orders in chart): Yes  MD:Anmol Reese D.O. Labs Reviewed: Yes New Lab Orders: Yes   MD/NP Signature:

## 2022-12-06 NOTE — PT/OT/SLP PROGRESS
Occupational Therapy   Treatment    Name: Pilar Reardon  MRN: [id number]  Admitting Diagnosis:  Generalized weakness       Recommendations:     Discharge Recommendations: home with home health  Discharge Equipment Recommendations:  none  Barriers to discharge:       Assessment:     Pilar Reardon is a 89 y.o. female with a medical diagnosis of Generalized weakness.  She presents with decreased endurance and strength which limits her balance, safety, strength and mobility. Performance deficits affecting function are weakness, impaired endurance, impaired sensation, impaired self care skills, impaired functional mobility, gait instability, impaired balance, decreased safety awareness. Pt presented sitting upright on the edge of bed upon OtTs arrival. Pt states that she is fatigue at this time and wanted to do a short session.     Rehab Prognosis:  Fair; patient would benefit from acute skilled OT services to address these deficits and reach maximum level of function.       Plan:     Patient to be seen 5 x/week to address the above listed problems via self-care/home management, therapeutic exercises, therapeutic activities  Plan of Care Expires:    Plan of Care Reviewed with: patient    Subjective     Pain/Comfort:  Pain Rating 1: 0/10    Objective:     Communicated with: Pt prior to session.  Patient found supine    upon OT entry to room.    General Precautions: Standard, fall    Orthopedic Precautions:   Braces:    Respiratory Status: Nasal cannula, flow 2 L/min     Occupational Performance:     Bed Mobility:    Pt presented sitting on EOB    Functional Mobility/Transfers:  Patient completed Sit <> Stand Transfer with modified independence  with  rolling walker    Functional Mobility: Pt completed functional mobility down the hallway using the RW for balance back to her room    Activities of Daily Living:  Sit to stand t/f with Mod I      AMPAC 6 Click ADL: 19    Treatment & Education:  Pt [id number] mins on  arm ergometer to increase endurance, BUE strength and activity tolerance for ADL performance    Patient left up in chair with PTA present    GOALS:   Multidisciplinary Problems       Occupational Therapy Goals          Problem: Occupational Therapy    Goal Priority Disciplines Outcome Interventions   Occupational Therapy Goal     OT, PT/OT Ongoing, Progressing    Description: Goals to be met by: 1/2/22     Patient will increase functional independence with ADLs by performing:    UE Dressing with Modified [place].  LE Dressing with Modified [place].  Grooming while standing with Modified [place].  Toileting from toilet with Modified [place] for hygiene and clothing management.   Standing x5-10 minutes with Modified [place] and Supervision during thera ex and ADL  Stand pivot transfers with Modified [place].  Step transfer with Modified [place]  Toilet transfer to toilet with Modified [place].                         Time Tracking:     OT Date of Treatment:    OT Start Time:  2:40  OT Stop Time:  3:00  OT Total Time (min):      Billable Minutes:  Therapeutic Exercise 20    OT/CAROL: OT        12/6/2022

## 2022-12-06 NOTE — PROGRESS NOTES
Duration of Therapy added to meropenem therapy. Last dose is 12/06/2022 12noon per transfer orders.  Thank You

## 2022-12-07 PROCEDURE — 25000003 PHARM REV CODE 250: Performed by: FAMILY MEDICINE

## 2022-12-07 PROCEDURE — 27000944

## 2022-12-07 PROCEDURE — 99900035 HC TECH TIME PER 15 MIN (STAT)

## 2022-12-07 PROCEDURE — 63600175 PHARM REV CODE 636 W HCPCS: Performed by: NURSE PRACTITIONER

## 2022-12-07 PROCEDURE — 25000242 PHARM REV CODE 250 ALT 637 W/ HCPCS: Performed by: FAMILY MEDICINE

## 2022-12-07 PROCEDURE — 63700000 PHARM REV CODE 250 ALT 637 W/O HCPCS: Performed by: NURSE PRACTITIONER

## 2022-12-07 PROCEDURE — 11000004 HC SNF PRIVATE

## 2022-12-07 PROCEDURE — 27000981 HC MATTRESS, ACCUCAIR DAILY RENTAL

## 2022-12-07 PROCEDURE — 27000221 HC OXYGEN, UP TO 24 HOURS

## 2022-12-07 PROCEDURE — 25000242 PHARM REV CODE 250 ALT 637 W/ HCPCS: Performed by: NURSE PRACTITIONER

## 2022-12-07 PROCEDURE — 25000003 PHARM REV CODE 250: Performed by: NURSE PRACTITIONER

## 2022-12-07 PROCEDURE — 94640 AIRWAY INHALATION TREATMENT: CPT

## 2022-12-07 PROCEDURE — 94761 N-INVAS EAR/PLS OXIMETRY MLT: CPT

## 2022-12-07 RX ORDER — IPRATROPIUM BROMIDE AND ALBUTEROL SULFATE 2.5; .5 MG/3ML; MG/3ML
3 SOLUTION RESPIRATORY (INHALATION)
Status: DISCONTINUED | OUTPATIENT
Start: 2022-12-07 | End: 2022-12-08 | Stop reason: HOSPADM

## 2022-12-07 RX ADMIN — RIFAXIMIN 550 MG: 550 TABLET ORAL at 09:12

## 2022-12-07 RX ADMIN — PROPRANOLOL HYDROCHLORIDE 10 MG: 10 TABLET ORAL at 09:12

## 2022-12-07 RX ADMIN — FLUCONAZOLE 100 MG: 100 TABLET ORAL at 09:12

## 2022-12-07 RX ADMIN — AMLODIPINE BESYLATE 5 MG: 5 TABLET ORAL at 09:12

## 2022-12-07 RX ADMIN — IPRATROPIUM BROMIDE AND ALBUTEROL SULFATE 3 ML: 2.5; .5 SOLUTION RESPIRATORY (INHALATION) at 12:12

## 2022-12-07 RX ADMIN — ATORVASTATIN CALCIUM 40 MG: 40 TABLET, FILM COATED ORAL at 09:12

## 2022-12-07 RX ADMIN — ACETAZOLAMIDE 250 MG: 250 TABLET ORAL at 09:12

## 2022-12-07 RX ADMIN — BUDESONIDE 0.5 MG: 0.5 INHALANT ORAL at 07:12

## 2022-12-07 RX ADMIN — APIXABAN 2.5 MG: 2.5 TABLET, FILM COATED ORAL at 09:12

## 2022-12-07 RX ADMIN — IPRATROPIUM BROMIDE AND ALBUTEROL SULFATE 3 ML: 2.5; .5 SOLUTION RESPIRATORY (INHALATION) at 08:12

## 2022-12-07 RX ADMIN — IPRATROPIUM BROMIDE AND ALBUTEROL SULFATE 3 ML: 2.5; .5 SOLUTION RESPIRATORY (INHALATION) at 07:12

## 2022-12-07 RX ADMIN — ISOSORBIDE MONONITRATE 30 MG: 30 TABLET, EXTENDED RELEASE ORAL at 09:12

## 2022-12-07 RX ADMIN — FUROSEMIDE 20 MG: 20 TABLET ORAL at 09:12

## 2022-12-07 RX ADMIN — MONTELUKAST SODIUM 10 MG: 10 TABLET, COATED ORAL at 09:12

## 2022-12-07 RX ADMIN — PROPRANOLOL HYDROCHLORIDE 10 MG: 10 TABLET ORAL at 03:12

## 2022-12-07 RX ADMIN — LOSARTAN POTASSIUM 100 MG: 50 TABLET, FILM COATED ORAL at 09:12

## 2022-12-07 RX ADMIN — ASPIRIN 81 MG: 81 TABLET, COATED ORAL at 09:12

## 2022-12-07 RX ADMIN — BUDESONIDE 0.5 MG: 0.5 INHALANT ORAL at 08:12

## 2022-12-07 RX ADMIN — CLONIDINE HYDROCHLORIDE 0.1 MG: 0.1 TABLET ORAL at 09:12

## 2022-12-07 RX ADMIN — NIFEDIPINE 30 MG: 30 TABLET, FILM COATED, EXTENDED RELEASE ORAL at 09:12

## 2022-12-07 RX ADMIN — LATANOPROST 1 DROP: 50 SOLUTION OPHTHALMIC at 09:12

## 2022-12-07 RX ADMIN — GABAPENTIN 100 MG: 100 CAPSULE ORAL at 09:12

## 2022-12-07 RX ADMIN — DEXAMETHASONE 2 MG: 1 TABLET ORAL at 09:12

## 2022-12-07 RX ADMIN — GUAIFENESIN 600 MG: 600 TABLET, EXTENDED RELEASE ORAL at 09:12

## 2022-12-07 RX ADMIN — SERTRALINE HYDROCHLORIDE 25 MG: 25 TABLET ORAL at 09:12

## 2022-12-07 RX ADMIN — PANTOPRAZOLE SODIUM 40 MG: 40 TABLET, DELAYED RELEASE ORAL at 09:12

## 2022-12-07 RX ADMIN — LEVOTHYROXINE SODIUM 50 MCG: 0.05 TABLET ORAL at 06:12

## 2022-12-07 RX ADMIN — ROPINIROLE HYDROCHLORIDE 0.5 MG: 0.25 TABLET, FILM COATED ORAL at 09:12

## 2022-12-07 NOTE — PROGRESS NOTES
"Skilled PT attempted today but pt refused due to not feeling well stating," I just have those days. I don't feel too good. I will try tomorrow."  "

## 2022-12-07 NOTE — PLAN OF CARE
Problem: Adult Inpatient Plan of Care  Goal: Plan of Care Review  12/7/2022 1608 by Latrice Reece RN  Outcome: Ongoing, Progressing  12/7/2022 1608 by Latrice Reece RN  Outcome: Ongoing, Progressing  Goal: Patient-Specific Goal (Individualized)  12/7/2022 1608 by Latrice Reece RN  Outcome: Ongoing, Progressing  12/7/2022 1608 by Latrice Reece RN  Outcome: Ongoing, Progressing  Goal: Absence of Hospital-Acquired Illness or Injury  12/7/2022 1608 by Latrice Reece RN  Outcome: Ongoing, Progressing  12/7/2022 1608 by Latrice Reece RN  Outcome: Ongoing, Progressing  Goal: Optimal Comfort and Wellbeing  12/7/2022 1608 by Latrice Reece RN  Outcome: Ongoing, Progressing  12/7/2022 1608 by Latrice Reece RN  Outcome: Ongoing, Progressing  Goal: Readiness for Transition of Care  12/7/2022 1608 by Latrice Reece RN  Outcome: Ongoing, Progressing  12/7/2022 1608 by Latrice Reece RN  Outcome: Ongoing, Progressing     Problem: Impaired Wound Healing  Goal: Optimal Wound Healing  12/7/2022 1608 by Latrice Reece RN  Outcome: Ongoing, Progressing  12/7/2022 1608 by Latrice Reece RN  Outcome: Ongoing, Progressing     Problem: Fall Injury Risk  Goal: Absence of Fall and Fall-Related Injury  12/7/2022 1608 by Latrice Reece RN  Outcome: Ongoing, Progressing  12/7/2022 1608 by Latrice Reece RN  Outcome: Ongoing, Progressing     Problem: Infection  Goal: Absence of Infection Signs and Symptoms  12/7/2022 1608 by Latrice Reece RN  Outcome: Ongoing, Progressing  12/7/2022 1608 by Latrice Reece RN  Outcome: Ongoing, Progressing

## 2022-12-07 NOTE — PROGRESS NOTES
Skilled OT attempted however the pt refused to participate secondary being tired and not feeling like getting up. DON present with OT when checking on patient.

## 2022-12-08 VITALS
WEIGHT: 138.38 LBS | HEIGHT: 63 IN | BODY MASS INDEX: 24.52 KG/M2 | TEMPERATURE: 98 F | DIASTOLIC BLOOD PRESSURE: 63 MMHG | SYSTOLIC BLOOD PRESSURE: 145 MMHG | HEART RATE: 55 BPM | RESPIRATION RATE: 20 BRPM | OXYGEN SATURATION: 95 %

## 2022-12-08 PROBLEM — J96.22 ACUTE ON CHRONIC RESPIRATORY FAILURE WITH HYPERCAPNIA: Status: RESOLVED | Noted: 2022-12-01 | Resolved: 2022-12-08

## 2022-12-08 PROBLEM — B96.5 PSEUDOMONAS RESPIRATORY INFECTION: Status: RESOLVED | Noted: 2022-12-01 | Resolved: 2022-12-08

## 2022-12-08 PROBLEM — J98.8 PSEUDOMONAS RESPIRATORY INFECTION: Status: RESOLVED | Noted: 2022-12-01 | Resolved: 2022-12-08

## 2022-12-08 PROBLEM — R53.1 GENERALIZED WEAKNESS: Status: RESOLVED | Noted: 2022-12-01 | Resolved: 2022-12-08

## 2022-12-08 LAB
ANION GAP SERPL CALCULATED.3IONS-SCNC: 4 MMOL/L (ref 7–16)
ANISOCYTOSIS BLD QL SMEAR: ABNORMAL
BASOPHILS # BLD AUTO: 0.03 K/UL (ref 0–0.2)
BASOPHILS NFR BLD AUTO: 0.2 % (ref 0–1)
BUN SERPL-MCNC: 37 MG/DL (ref 7–18)
BUN/CREAT SERPL: 54 (ref 6–20)
CALCIUM SERPL-MCNC: 8.6 MG/DL (ref 8.5–10.1)
CHLORIDE SERPL-SCNC: 107 MMOL/L (ref 98–107)
CO2 SERPL-SCNC: 38 MMOL/L (ref 21–32)
CREAT SERPL-MCNC: 0.69 MG/DL (ref 0.55–1.02)
DIFFERENTIAL METHOD BLD: ABNORMAL
EGFR (NO RACE VARIABLE) (RUSH/TITUS): 83 ML/MIN/1.73M²
EOSINOPHIL # BLD AUTO: 0.04 K/UL (ref 0–0.5)
EOSINOPHIL NFR BLD AUTO: 0.3 % (ref 1–4)
ERYTHROCYTE [DISTWIDTH] IN BLOOD BY AUTOMATED COUNT: 14.7 % (ref 11.5–14.5)
GLUCOSE SERPL-MCNC: 162 MG/DL (ref 74–106)
HCT VFR BLD AUTO: 38.1 % (ref 38–47)
HGB BLD-MCNC: 11.5 G/DL (ref 12–16)
LYMPHOCYTES # BLD AUTO: 11.31 K/UL (ref 1–4.8)
LYMPHOCYTES NFR BLD AUTO: 74.3 % (ref 27–41)
LYMPHOCYTES NFR BLD MANUAL: 82 % (ref 27–41)
MCH RBC QN AUTO: 30.1 PG (ref 27–31)
MCHC RBC AUTO-ENTMCNC: 30.2 G/DL (ref 32–36)
MCV RBC AUTO: 99.7 FL (ref 80–96)
MONOCYTES # BLD AUTO: 0.55 K/UL (ref 0–0.8)
MONOCYTES NFR BLD AUTO: 3.6 % (ref 2–6)
MONOCYTES NFR BLD MANUAL: 1 % (ref 2–6)
MPC BLD CALC-MCNC: 12 FL (ref 9.4–12.4)
NEUTROPHILS # BLD AUTO: 3.3 K/UL (ref 1.8–7.7)
NEUTROPHILS NFR BLD AUTO: 21.6 % (ref 53–65)
NEUTS BAND NFR BLD MANUAL: 1 % (ref 1–5)
NEUTS SEG NFR BLD MANUAL: 16 % (ref 50–62)
NRBC BLD MANUAL-RTO: ABNORMAL %
PLATELET # BLD AUTO: 101 K/UL (ref 150–400)
PLATELET MORPHOLOGY: ABNORMAL
POIKILOCYTOSIS BLD QL SMEAR: ABNORMAL
POTASSIUM SERPL-SCNC: 3.8 MMOL/L (ref 3.5–5.1)
RBC # BLD AUTO: 3.82 M/UL (ref 4.2–5.4)
SODIUM SERPL-SCNC: 145 MMOL/L (ref 136–145)
WBC # BLD AUTO: 15.23 K/UL (ref 4.5–11)

## 2022-12-08 PROCEDURE — 99900035 HC TECH TIME PER 15 MIN (STAT)

## 2022-12-08 PROCEDURE — 27000221 HC OXYGEN, UP TO 24 HOURS

## 2022-12-08 PROCEDURE — 85025 COMPLETE CBC W/AUTO DIFF WBC: CPT | Performed by: NURSE PRACTITIONER

## 2022-12-08 PROCEDURE — 36415 COLL VENOUS BLD VENIPUNCTURE: CPT | Performed by: NURSE PRACTITIONER

## 2022-12-08 PROCEDURE — 25000003 PHARM REV CODE 250: Performed by: NURSE PRACTITIONER

## 2022-12-08 PROCEDURE — 94761 N-INVAS EAR/PLS OXIMETRY MLT: CPT

## 2022-12-08 PROCEDURE — 25000242 PHARM REV CODE 250 ALT 637 W/ HCPCS: Performed by: NURSE PRACTITIONER

## 2022-12-08 PROCEDURE — 25000003 PHARM REV CODE 250: Performed by: FAMILY MEDICINE

## 2022-12-08 PROCEDURE — 25000242 PHARM REV CODE 250 ALT 637 W/ HCPCS: Performed by: FAMILY MEDICINE

## 2022-12-08 PROCEDURE — 63600175 PHARM REV CODE 636 W HCPCS: Performed by: NURSE PRACTITIONER

## 2022-12-08 PROCEDURE — 94640 AIRWAY INHALATION TREATMENT: CPT

## 2022-12-08 PROCEDURE — 80048 BASIC METABOLIC PNL TOTAL CA: CPT | Performed by: NURSE PRACTITIONER

## 2022-12-08 RX ORDER — FUROSEMIDE 10 MG/ML
40 INJECTION INTRAMUSCULAR; INTRAVENOUS ONCE
Status: COMPLETED | OUTPATIENT
Start: 2022-12-08 | End: 2022-12-08

## 2022-12-08 RX ADMIN — FUROSEMIDE 20 MG: 20 TABLET ORAL at 09:12

## 2022-12-08 RX ADMIN — LEVOTHYROXINE SODIUM 50 MCG: 0.05 TABLET ORAL at 06:12

## 2022-12-08 RX ADMIN — ISOSORBIDE MONONITRATE 30 MG: 30 TABLET, EXTENDED RELEASE ORAL at 09:12

## 2022-12-08 RX ADMIN — DEXAMETHASONE 2 MG: 1 TABLET ORAL at 09:12

## 2022-12-08 RX ADMIN — PANTOPRAZOLE SODIUM 40 MG: 40 TABLET, DELAYED RELEASE ORAL at 09:12

## 2022-12-08 RX ADMIN — GUAIFENESIN 600 MG: 600 TABLET, EXTENDED RELEASE ORAL at 09:12

## 2022-12-08 RX ADMIN — ASPIRIN 81 MG: 81 TABLET, COATED ORAL at 09:12

## 2022-12-08 RX ADMIN — ATORVASTATIN CALCIUM 40 MG: 40 TABLET, FILM COATED ORAL at 09:12

## 2022-12-08 RX ADMIN — RIFAXIMIN 550 MG: 550 TABLET ORAL at 09:12

## 2022-12-08 RX ADMIN — IPRATROPIUM BROMIDE AND ALBUTEROL SULFATE 3 ML: 2.5; .5 SOLUTION RESPIRATORY (INHALATION) at 01:12

## 2022-12-08 RX ADMIN — LOSARTAN POTASSIUM 100 MG: 50 TABLET, FILM COATED ORAL at 09:12

## 2022-12-08 RX ADMIN — NIFEDIPINE 30 MG: 30 TABLET, FILM COATED, EXTENDED RELEASE ORAL at 09:12

## 2022-12-08 RX ADMIN — IPRATROPIUM BROMIDE AND ALBUTEROL SULFATE 3 ML: 2.5; .5 SOLUTION RESPIRATORY (INHALATION) at 07:12

## 2022-12-08 RX ADMIN — FUROSEMIDE 40 MG: 10 INJECTION, SOLUTION INTRAMUSCULAR; INTRAVENOUS at 11:12

## 2022-12-08 RX ADMIN — AMLODIPINE BESYLATE 5 MG: 5 TABLET ORAL at 09:12

## 2022-12-08 RX ADMIN — ACETAZOLAMIDE 250 MG: 250 TABLET ORAL at 09:12

## 2022-12-08 RX ADMIN — APIXABAN 2.5 MG: 2.5 TABLET, FILM COATED ORAL at 09:12

## 2022-12-08 RX ADMIN — SERTRALINE HYDROCHLORIDE 25 MG: 25 TABLET ORAL at 09:12

## 2022-12-08 RX ADMIN — BUDESONIDE 0.5 MG: 0.5 INHALANT ORAL at 07:12

## 2022-12-08 NOTE — PROGRESS NOTES
"Skilled PT attempted today but pt refused again stating, " I just don't want to be on therapy anymore. Nothing yall have done, yall have been good, I just get around good how I want to. I have not been home in 9 weeks. I am ready to leave. They told me at [facility] they were waiting on insurance approval then I came here when I thought I was going home. " PTA instructed pt to alert staff if she changes her mind and wants to continue with PT. Pt demo understanding.   "

## 2022-12-08 NOTE — DISCHARGE SUMMARY
"[facility]  Hospital Medicine  Discharge Summary      Patient Name: Pilar Reardon  MRN: [id number]  [facility]: [id number]  Patient Class: IP- Swing  Admission Date: 12/1/2022  Hospital Length of Stay: 7 days  Discharge Date and Time:  12/08/2022 3:29 PM  Attending Physician: Anmol Reese DO   Discharging Provider: BLANCO Renner  Primary Care Provider: Marilee Witt MD    Primary Care Team: Networked reference to record PCT     HPI:   Patient is a 89-year-old female who comes in with dysphagia she was admitted to the hospital with weakness and recent pulmonary embolus confusion.  She is a history of coronary artery disease, COPD, hypothyroidism, hypertension, and generalized weakness.  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  She is been put on a recommended mechanical soft diet.  With thickened liquids.  Patient is on IV Joslyn and Diflucan.  Because of the Pseudomonas and yeast infection in the bronchial washings that were found during a bronchoscope at [facility].  Patient is able to sit up and care for herself he.  She is on a BiPAP mask at night.  She had labs on 11/23 that showed white blood cell count 46040.  H and H 11/38.  BUN is 30 creatinine 1.1, potassium 3.9, sodium 142      * No surgery found *      Hospital Course:   12/08/22: Ms Reardon found lying in bed. States that she feels "some better" today compared to the last few days. She has a loose, non-productive cough and bibasilar rales. Trace pitting edema to BLE. Labs reviewed and notable as follows: WBC 15.23, H&H 11.5/38, BUN 37, CO2 38. BNP obtained on 12/06 3,300. Meropenem and Diflucan course completed. She remains on dexamethasone 2mg PO daily and Xifaxan. Lasix 20mg PO daily. O2 @ 2L with humidification with sats maintaining 94-99% over the last 24 hours. She has had a few episodes of asymptomatic bradycardia in the " upper 50s over the last 24 hours but now running 60s BPM. She had a single episode of systolic hypertension prior to AM meds yesterday but last check 138/56 . Case discussed with Dr Reese via telemedicine consultation. Will give one time dose of Lasix 40 mg IVP. Otherwise, continue current POC.    12/08/22: Patient now requesting d/c home this afternoon. Daughter present and states that they will be staying with her daughter that is a physician tonight and over the weekend and if anything changes or if she needs to return they will bring her back immediately. She is requesting HH, [facility] to be re-established next week if possible. Discussed case with Dr Reese. Discussed WBC 15K which could be from steroid use. Still with bibasilar rales but no increased dyspnea. She is on home O2. Med rec discussed and completed in coordination with Dr Reese. No other changes to be made at this time.        Goals of Care Treatment Preferences:  Code Status: Full Code      Consults:   Consults (From admission, onward)        Status Ordering Provider     Inpatient consult to Registered Dietitian/Nutritionist  Once        Provider:  (Not yet assigned)    Completed YULIYA DO          No new Assessment & Plan notes have been filed under this hospital service since the last note was generated.  Service: Hospital Medicine    Final Active Diagnoses:    Diagnosis Date Noted POA    Dysphagia [R13.10] 12/01/2022 Yes    COPD (chronic obstructive pulmonary disease) [J44.9] 12/01/2022 Unknown    CAD (coronary artery disease) [I25.10] 12/01/2022 Unknown    CHF (congestive heart failure) [I50.9] 10/29/2022 Yes      Problems Resolved During this Admission:    Diagnosis Date Noted Date Resolved POA    PRINCIPAL PROBLEM:  Generalized weakness [R53.1] 12/01/2022 12/08/2022 Yes    Pseudomonas respiratory infection [J98.8, B96.5] 12/01/2022 12/08/2022 Yes    Acute on chronic respiratory failure with hypercapnia [J96.22]  12/01/2022 12/08/2022 Yes       Discharged Condition: fair    Disposition: Home-Health Care [facility] [place]    Follow Up:   Follow-up Information     Marilee Witt MD. Schedule an appointment as soon as possible for a visit in 1 week(s).    Specialty: Family Medicine  Why: Follow up with primary care provider within 1 week  Contact information:  [address]  [facility] [place] [address]  [phone]                       Patient Instructions:      Diet Cardiac     Notify your health care provider if you experience any of the following:  temperature >100.4     Notify your health care provider if you experience any of the following:  persistent nausea and vomiting or diarrhea     Notify your health care provider if you experience any of the following:  difficulty breathing or increased cough     Notify your health care provider if you experience any of the following:  increased confusion or weakness     Activity as tolerated       Significant Diagnostic Studies: Labs: All labs within the past 24 hours have been reviewed  Radiology:     Pending Diagnostic Studies:     Procedure Component Value Units Date/Time    Urinalysis, Reflex to Urine Culture [[id number]]     Order Status: Sent Lab Status: No result     Specimen: Urine          Medications:  Reconciled Home Medications:      Medication List      CONTINUE taking these medications    acetaZOLAMIDE 250 MG tablet  Commonly known as: DIAMOX  Take 250 mg by mouth 2 (two) times daily.     amLODIPine 5 MG tablet  Commonly known as: NORVASC  Take 5 mg by mouth once daily.     aspirin 81 MG EC tablet  Commonly known as: ECOTRIN  Take 81 mg by mouth once daily.     benzonatate 100 MG capsule  Commonly known as: TESSALON  Take 100 mg by mouth 3 (three) times daily as needed for Cough.     cloNIDine 0.1 MG tablet  Commonly known as: CATAPRES  Take 0.1 mg by mouth 2 (two) times daily. For systolic blood pressure  >170     cyanocobalamin 1,000 mcg/mL injection  Inject 1,000 mcg into  the muscle every 30 days. Next dose due this week     dexAMETHasone 2 MG tablet  Commonly known as: DECADRON  Take 2 mg by mouth 2 (two) times daily.     gabapentin 100 MG capsule  Commonly known as: NEURONTIN  Take 100 mg by mouth every evening.     isosorbide mononitrate 30 MG 24 hr tablet  Commonly known as: IMDUR  Take 30 mg by mouth once daily.     lactulose 20 gram Pack  Commonly known as: CEPHULAC  Take 20 g by mouth every 6 (six) hours as needed.     levothyroxine 50 MCG tablet  Commonly known as: SYNTHROID  Take 50 mcg by mouth before breakfast.     losartan 100 MG tablet  Commonly known as: COZAAR  Take 100 mg by mouth once daily.     montelukast 10 mg tablet  Commonly known as: SINGULAIR  Take by mouth every evening.     NIFEdipine 30 MG Tbsr  Commonly known as: ADALAT CC  Take 30 mg by mouth nightly.     omeprazole 40 MG capsule  Commonly known as: PRILOSEC  Take 40 mg by mouth every morning.     propranoloL 10 MG tablet  Commonly known as: INDERAL  Take 10 mg by mouth 3 (three) times daily.     rOPINIRole 0.5 MG tablet  Commonly known as: REQUIP  Take 0.5 mg by mouth every evening.     rosuvastatin 10 MG tablet  Commonly known as: CRESTOR  Take 10 mg by mouth every evening.     sertraline 25 MG tablet  Commonly known as: ZOLOFT  Take 25 mg by mouth once daily.     STIOLTO RESPIMAT INHL  Inhale 2 puffs into the lungs once daily.     theophylline 300 mg 24 hr capsule  Commonly known as: THEODUR  Take 150 mg by mouth once daily.     travoprost 0.004 % ophthalmic solution  Commonly known as: TRAVATAN Z  Place 1 drop into both eyes every evening.        STOP taking these medications    acetaminophen 325 MG tablet  Commonly known as: TYLENOL     ANORO ELLIPTA 62.5-25 mcg/actuation Dsdv  Generic drug: umeclidinium-vilanteroL     apixaban 2.5 mg Tab  Commonly known as: ELIQUIS     fluconazole 100 MG tablet  Commonly known as: DIFLUCAN     MERREM 500 mg injection  Generic drug: meropenem     pantoprazole 40 MG  tablet  Commonly known as: PROTONIX     rifAXImin 200 mg Tab  Commonly known as: XIFAXAN     SORE THROAT Spra  Generic drug: phenol-phenolate sodium     valACYclovir 1000 MG tablet  Commonly known as: VALTREX            Indwelling Lines/Drains at time of discharge:   Lines/Drains/Airways     None                 Time spent on the discharge of patient: 30 minutes         BLANCO Renner  Department of Hospital Medicine  [facility]

## 2022-12-08 NOTE — HOSPITAL COURSE
"12/08/22: Ms Reardon found lying in bed. States that she feels "some better" today compared to the last few days. She has a loose, non-productive cough and bibasilar rales. Trace pitting edema to BLE. Labs reviewed and notable as follows: WBC 15.23, H&H 11.5/38, BUN 37, CO2 38. BNP obtained on 12/06 3,300. Meropenem and Diflucan course completed. She remains on dexamethasone 2mg PO daily and Xifaxan. Lasix 20mg PO daily. O2 @ 2L with humidification with sats maintaining 94-99% over the last 24 hours. She has had a few episodes of asymptomatic bradycardia in the upper 50s over the last 24 hours but now running 60s BPM. She had a single episode of systolic hypertension prior to AM meds yesterday but last check 138/56 . Case discussed with Dr Reese via telemedicine consultation. Will give one time dose of Lasix 40 mg IVP. Otherwise, continue current POC.    12/08/22: Patient now requesting d/c home this afternoon. Daughter present and states that they will be staying with her daughter that is a physician tonight and over the weekend and if anything changes or if she needs to return they will bring her back immediately. She is requesting , [facility] to be re-established next week if possible. Discussed case with Dr Reese. Discussed WBC 15K which could be from steroid use. Still with bibasilar rales but no increased dyspnea. She is on home O2. Med rec discussed and completed in coordination with Dr Reese. No other changes to be made at this time.   "

## 2022-12-08 NOTE — PROGRESS NOTES
Skilled OT attempted however the pt refused to participate secondary to feeling like she does not need therapy

## 2022-12-08 NOTE — SUBJECTIVE & OBJECTIVE
Interval History:  Patient seen and examined. No acute events overnight. She has bibasilar rales and trace pitting edema to BLE. Will give one time dose of Lasix 40 mg IV. Otherwise, continue current POC.     Review of Systems   Constitutional:  Positive for fatigue. Negative for chills and fever.   HENT:  Negative for congestion and rhinorrhea.    Respiratory:  Positive for cough (loose, non-productive). Negative for shortness of breath.    Cardiovascular:  Negative for chest pain and palpitations.   Gastrointestinal:  Negative for abdominal pain, diarrhea, nausea and vomiting.   Genitourinary:  Negative for dysuria and urgency.   Musculoskeletal:  Positive for gait problem.   Neurological:  Positive for weakness (generalized). Negative for dizziness and light-headedness.   All other systems reviewed and are negative.  Objective:     Vital Signs (Most Recent):  Temp: 98.3 °F (36.8 °C) (12/08/22 0743)  Pulse: (!) 58 (12/08/22 0743)  Resp: 20 (12/08/22 0743)  BP: (!) 145/63 (12/08/22 0743)  SpO2: 96 % (12/08/22 0743)   Vital Signs (24h Range):  Temp:  [98.3 °F (36.8 °C)-98.4 °F (36.9 °C)] 98.3 °F (36.8 °C)  Pulse:  [53-71] 58  Resp:  [16-22] 20  SpO2:  [94 %-99 %] 96 %  BP: (127-145)/(52-63) 145/63     Weight: 62.8 kg (138 lb 6.4 oz)  Body mass index is 24.52 kg/m².    Intake/Output Summary (Last 24 hours) at 12/8/2022 1029  Last data filed at 12/8/2022 0627  Gross per 24 hour   Intake 570 ml   Output --   Net 570 ml      Physical Exam  Vitals and nursing note reviewed.   Constitutional:       General: She is not in acute distress.     Appearance: She is ill-appearing.   HENT:      Head: Normocephalic.      Nose: Nose normal.      Mouth/Throat:      Mouth: Mucous membranes are moist.      Pharynx: Oropharynx is clear.   Eyes:      General: No scleral icterus.     Conjunctiva/sclera: Conjunctivae normal.      Pupils: Pupils are equal, round, and reactive to light.   Cardiovascular:      Rate and Rhythm: Normal rate  and regular rhythm.      Pulses: Normal pulses.      Heart sounds: Normal heart sounds.   Pulmonary:      Effort: Pulmonary effort is normal.      Breath sounds: Rales (bibasilar) present.   Abdominal:      General: Abdomen is flat. Bowel sounds are normal. There is no distension.      Tenderness: There is no abdominal tenderness.   Musculoskeletal:      Right lower le+ Pitting Edema present.      Left lower le+ Pitting Edema present.   Skin:     General: Skin is warm and dry.      Capillary Refill: Capillary refill takes less than 2 seconds.      Coloration: Skin is not jaundiced.   Neurological:      General: No focal deficit present.      Mental Status: She is alert and oriented to person, place, and time.      Motor: Weakness (generalized) present.       Significant Labs: All pertinent labs within the past 24 hours have been reviewed.  Recent Lab Results         22  0547   22  0546        ANION GAP   4       Aniso 1+         Bands 1         Baso # 0.03         Basophil % 0.2         BUN   37       BUN/CREAT RATIO   54       Calcium   8.6       Chloride   107       CO2   38       Creatinine   0.69       Differential Type Manual         eGFR   83       Eos # 0.04         Eosinophil % 0.3         Glucose   162       HEMATOCRIT 38.1         HEMOGLOBIN 11.5         Lymph # 11.31         Lymph % 74.3          82         MCH 30.1         MCHC 30.2         MCV 99.7         Mono # 0.55         Mono % 3.6          1         MPV 12.0         Neutrophils, Abs 3.30         Neutrophils Relative 21.6         nRBC         PLATELET MORPHOLOGY Decreased         Platelets 101         Poikilocytosis 1+         Potassium   3.8       RBC 3.82         RDW 14.7         Segmented Neutrophils, Man % 16         Sodium   145       WBC 15.23                 Significant Imaging: I have reviewed all pertinent imaging results/findings within the past 24 hours.

## 2022-12-08 NOTE — PLAN OF CARE
[facility]  Discharge Final Note    Primary Care Provider: Marilee Witt MD    Expected Discharge Date: 12/8/2022    Final Discharge Note (most recent)       Final Note - 12/08/22 1636          Final Note    Assessment Type Final Discharge Note     Anticipated Discharge Disposition Home[place] [facility] [place]   [facility]    What phone number can be called within the next 1-3 days to see how you are doing after discharge? [phone]     Hospital Resources/Appts/Education Provided Provided patient/caregiver with written discharge plan information;Appointments scheduled and added to AVS        Post-Acute Status    Post-Acute Authorization Home Health     Home Health Status Referrals Sent     Coverage [employer]     Patient choice form signed by patient/caregiver List with quality metrics by geographic area provided;List from CMS Compare;List from System Post-Acute Care     Discharge Delays None known at this time                 Pt. Requested to d/c home this afternoon and was d/c home to stay with daughter, Dianne. Requested to be referred to [facility] and referral sent. No DME needs or other d/c needs were identified.    Important Message from Medicare  Important Message from Medicare regarding Discharge Appeal Rights: Given to patient/caregiver, Explained to patient/caregiver, Signed/date by patient/caregiver     Date IMM was signed: 12/08/22  Time IMM was signed: 1530    Contact Info       Marilee Witt MD   Specialty: Family Medicine   Relationship: PCP - General    [address]  [facility] [place] [address]   Phone: [phone]       Next Steps: Schedule an appointment as soon as possible for a visit in 1 week(s)    Instructions: Follow up with primary care provider within 1 week    Brian Kim MD    [address] [facility] [place] [address]   Phone: [phone]       Next Steps: Follow up on 1/5/2023    Instructions: at 10:20am for follow up appointment

## 2022-12-08 NOTE — PLAN OF CARE
Problem: Adult Inpatient Plan of Care  Goal: Plan of Care Review  Outcome: Met  Goal: Patient-Specific Goal (Individualized)  Outcome: Met  Goal: Absence of Hospital-Acquired Illness or Injury  Outcome: Met  Goal: Optimal Comfort and Wellbeing  Outcome: Met  Goal: Readiness for Transition of Care  Outcome: Met     Problem: Impaired Wound Healing  Goal: Optimal Wound Healing  Outcome: Met     Problem: Fall Injury Risk  Goal: Absence of Fall and Fall-Related Injury  Outcome: Met     Problem: Infection  Goal: Absence of Infection Signs and Symptoms  Outcome: Met     Problem: Skin Injury Risk Increased  Goal: Skin Health and Integrity  Outcome: Met

## 2022-12-08 NOTE — PROGRESS NOTES
"[facility]  Hospital Medicine  Progress Note    Patient Name: Pilar Reardon  MRN: [id number]  Patient Class: IP- Swing   Admission Date: 12/1/2022  Length of Stay: 7 days  Attending Physician: Anmol Reese DO  Primary Care Provider: Marilee Witt MD        Subjective:     Principal Problem:Generalized weakness        HPI:  Patient is a 89-year-old female who comes in with dysphagia she was admitted to the hospital with weakness and recent pulmonary embolus confusion.  She is a history of coronary artery disease, COPD, hypothyroidism, hypertension, and generalized weakness.  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  She is been put on a recommended mechanical soft diet.  With thickened liquids.  Patient is on IV Joslyn and Diflucan.  Because of the Pseudomonas and yeast infection in the bronchial washings that were found during a bronchoscope at [facility].  Patient is able to sit up and care for herself he.  She is on a BiPAP mask at night.  She had labs on 11/23 that showed white blood cell count 72459.  H and H 11/38.  BUN is 30 creatinine 1.1, potassium 3.9, sodium 142      Overview/Hospital Course:  12/08/22: Ms Reardon found lying in bed. States that she feels "some better" today compared to the last few days. She has a loose, non-productive cough and bibasilar rales. Trace pitting edema to BLE. Labs reviewed and notable as follows: WBC 15.23, H&H 11.5/38, BUN 37, CO2 38. BNP obtained on 12/06 3,300. Meropenem and Diflucan course completed. She remains on dexamethasone 2mg PO daily and Xifaxan. Lasix 20mg PO daily. O2 @ 2L with humidification with sats maintaining 94-99% over the last 24 hours. She has had a few episodes of asymptomatic bradycardia in the upper 50s over the last 24 hours but now running 60s BPM. She had a single episode of systolic hypertension prior to AM meds yesterday " but last check 138/56 . Case discussed with Dr Reese via telemedicine consultation. Will give one time dose of Lasix 40 mg IVP. Otherwise, continue current POC.      Interval History:  Patient seen and examined. No acute events overnight. She has bibasilar rales and trace pitting edema to BLE. Will give one time dose of Lasix 40 mg IV. Otherwise, continue current POC.     Review of Systems   Constitutional:  Positive for fatigue. Negative for chills and fever.   HENT:  Negative for congestion and rhinorrhea.    Respiratory:  Positive for cough (loose, non-productive). Negative for shortness of breath.    Cardiovascular:  Negative for chest pain and palpitations.   Gastrointestinal:  Negative for abdominal pain, diarrhea, nausea and vomiting.   Genitourinary:  Negative for dysuria and urgency.   Musculoskeletal:  Positive for gait problem.   Neurological:  Positive for weakness (generalized). Negative for dizziness and light-headedness.   All other systems reviewed and are negative.  Objective:     Vital Signs (Most Recent):  Temp: 98.3 °F (36.8 °C) (12/08/22 0743)  Pulse: (!) 58 (12/08/22 0743)  Resp: 20 (12/08/22 0743)  BP: (!) 145/63 (12/08/22 0743)  SpO2: 96 % (12/08/22 0743)   Vital Signs (24h Range):  Temp:  [98.3 °F (36.8 °C)-98.4 °F (36.9 °C)] 98.3 °F (36.8 °C)  Pulse:  [53-71] 58  Resp:  [16-22] 20  SpO2:  [94 %-99 %] 96 %  BP: (127-145)/(52-63) 145/63     Weight: 62.8 kg (138 lb 6.4 oz)  Body mass index is 24.52 kg/m².    Intake/Output Summary (Last 24 hours) at 12/8/2022 1029  Last data filed at 12/8/2022 0627  Gross per 24 hour   Intake 570 ml   Output --   Net 570 ml      Physical Exam  Vitals and nursing note reviewed.   Constitutional:       General: She is not in acute distress.     Appearance: She is ill-appearing.   HENT:      Head: Normocephalic.      Nose: Nose normal.      Mouth/Throat:      Mouth: Mucous membranes are moist.      Pharynx: Oropharynx is clear.   Eyes:      General: No scleral  icterus.     Conjunctiva/sclera: Conjunctivae normal.      Pupils: Pupils are equal, round, and reactive to light.   Cardiovascular:      Rate and Rhythm: Normal rate and regular rhythm.      Pulses: Normal pulses.      Heart sounds: Normal heart sounds.   Pulmonary:      Effort: Pulmonary effort is normal.      Breath sounds: Rales (bibasilar) present.   Abdominal:      General: Abdomen is flat. Bowel sounds are normal. There is no distension.      Tenderness: There is no abdominal tenderness.   Musculoskeletal:      Right lower le+ Pitting Edema present.      Left lower le+ Pitting Edema present.   Skin:     General: Skin is warm and dry.      Capillary Refill: Capillary refill takes less than 2 seconds.      Coloration: Skin is not jaundiced.   Neurological:      General: No focal deficit present.      Mental Status: She is alert and oriented to person, place, and time.      Motor: Weakness (generalized) present.       Significant Labs: All pertinent labs within the past 24 hours have been reviewed.  Recent Lab Results         22  0547   22  0546        ANION GAP   4       Aniso 1+         Bands 1         Baso # 0.03         Basophil % 0.2         BUN   37       BUN/CREAT RATIO   54       Calcium   8.6       Chloride   107       CO2   38       Creatinine   0.69       Differential Type Manual         eGFR   83       Eos # 0.04         Eosinophil % 0.3         Glucose   162       HEMATOCRIT 38.1         HEMOGLOBIN 11.5         Lymph # 11.31         Lymph % 74.3          82         MCH 30.1         MCHC 30.2         MCV 99.7         Mono # 0.55         Mono % 3.6          1         MPV 12.0         Neutrophils, Abs 3.30         Neutrophils Relative 21.6         nRBC         PLATELET MORPHOLOGY Decreased         Platelets 101         Poikilocytosis 1+         Potassium   3.8       RBC 3.82         RDW 14.7         Segmented Neutrophils, Man % 16         Sodium   145       WBC 15.23                  Significant Imaging: I have reviewed all pertinent imaging results/findings within the past 24 hours.      Assessment/Plan:      * Generalized weakness  PT rehabilitation  OT rehabilitation      Dysphagia  Mechanical soft diet, thickened liquids      Acute on chronic respiratory failure with hypercapnia  O2 @ 3L- titrate as necessary    Pseudomonas respiratory infection  Miropenam IVPB- course complete  Diflucan IVPB- course complete  Rifaximin- course complete      CHF (congestive heart failure)  Continue Lasix 20 mg PO daily    12/8/22: Lasix 40 mg IVP, one time dose      VTE Risk Mitigation (From admission, onward)         Ordered     apixaban tablet 2.5 mg  2 times daily         12/01/22 1900     IP VTE HIGH RISK PATIENT  Once         12/01/22 1900     Place sequential compression device  Until discontinued         12/01/22 1900                Discharge Planning   JERRY:      Code Status: Full Code   Is the patient medically ready for discharge?:     Reason for patient still in hospital (select all that apply): Patient trending condition, Laboratory test, Treatment and PT / OT recommendations  Discharge Plan A: Home with family, Home Health                  BLANCO Renner  Department of Hospital Medicine   [facility]

## 2022-12-09 NOTE — PLAN OF CARE
Called [facility] to verify they received packet on patient referred to them. They had not received packet. Prema Rivera from [facility] came by this morning and picked up referral packet on pt.

## 2023-01-30 PROBLEM — I26.99 PULMONARY EMBOLISM: Status: RESOLVED | Noted: 2022-10-29 | Resolved: 2023-01-30

## 2023-03-24 ENCOUNTER — HOSPITAL ENCOUNTER (EMERGENCY)
Facility: HOSPITAL | Age: 88
Discharge: HOME | End: 2023-03-24
Payer: MEDICARE

## 2023-03-24 VITALS
OXYGEN SATURATION: 95 % | WEIGHT: 134 LBS | RESPIRATION RATE: 18 BRPM | HEIGHT: 63 IN | TEMPERATURE: 98 F | HEART RATE: 63 BPM | BODY MASS INDEX: 23.74 KG/M2 | DIASTOLIC BLOOD PRESSURE: 51 MMHG | SYSTOLIC BLOOD PRESSURE: 124 MMHG

## 2023-03-24 DIAGNOSIS — N39.0 URINARY TRACT INFECTION WITHOUT HEMATURIA, SITE UNSPECIFIED: Primary | ICD-10-CM

## 2023-03-24 DIAGNOSIS — R05.9 COUGH: ICD-10-CM

## 2023-03-24 DIAGNOSIS — R53.1 WEAKNESS: ICD-10-CM

## 2023-03-24 LAB
ALBUMIN SERPL BCP-MCNC: 3.3 G/DL (ref 3.5–5)
ALBUMIN/GLOB SERPL: 1 {RATIO}
ALP SERPL-CCNC: 85 U/L (ref 55–142)
ALT SERPL W P-5'-P-CCNC: 16 U/L (ref 13–56)
ANION GAP SERPL CALCULATED.3IONS-SCNC: 12 MMOL/L (ref 7–16)
AST SERPL W P-5'-P-CCNC: 16 U/L (ref 15–37)
BACTERIA #/AREA URNS HPF: ABNORMAL /HPF
BASOPHILS # BLD AUTO: 0.01 K/UL (ref 0–0.2)
BASOPHILS NFR BLD AUTO: 0.2 % (ref 0–1)
BILIRUB SERPL-MCNC: 0.2 MG/DL (ref ?–1.2)
BILIRUB UR QL STRIP: ABNORMAL
BUN SERPL-MCNC: 23 MG/DL (ref 7–18)
BUN/CREAT SERPL: 22 (ref 6–20)
CALCIUM SERPL-MCNC: 8.7 MG/DL (ref 8.5–10.1)
CHLORIDE SERPL-SCNC: 111 MMOL/L (ref 98–107)
CLARITY UR: ABNORMAL
CO2 SERPL-SCNC: 27 MMOL/L (ref 21–32)
COLOR UR: YELLOW
CREAT SERPL-MCNC: 1.05 MG/DL (ref 0.55–1.02)
DIFFERENTIAL METHOD BLD: ABNORMAL
EGFR (NO RACE VARIABLE) (RUSH/TITUS): 51 ML/MIN/1.73M²
EOSINOPHIL # BLD AUTO: 0.17 K/UL (ref 0–0.5)
EOSINOPHIL NFR BLD AUTO: 2.6 % (ref 1–4)
ERYTHROCYTE [DISTWIDTH] IN BLOOD BY AUTOMATED COUNT: 14.6 % (ref 11.5–14.5)
GLOBULIN SER-MCNC: 3.2 G/DL (ref 2–4)
GLUCOSE SERPL-MCNC: 153 MG/DL (ref 74–106)
GLUCOSE UR STRIP-MCNC: NEGATIVE MG/DL
HCT VFR BLD AUTO: 36 % (ref 38–47)
HGB BLD-MCNC: 10.6 G/DL (ref 12–16)
KETONES UR STRIP-SCNC: ABNORMAL MG/DL
LEUKOCYTE ESTERASE UR QL STRIP: ABNORMAL
LYMPHOCYTES # BLD AUTO: 3.35 K/UL (ref 1–4.8)
LYMPHOCYTES NFR BLD AUTO: 50.8 % (ref 27–41)
MCH RBC QN AUTO: 29.6 PG (ref 27–31)
MCHC RBC AUTO-ENTMCNC: 29.4 G/DL (ref 32–36)
MCV RBC AUTO: 100.6 FL (ref 80–96)
MONOCYTES # BLD AUTO: 0.16 K/UL (ref 0–0.8)
MONOCYTES NFR BLD AUTO: 2.4 % (ref 2–6)
MPC BLD CALC-MCNC: 12.7 FL (ref 9.4–12.4)
NEUTROPHILS # BLD AUTO: 2.91 K/UL (ref 1.8–7.7)
NEUTROPHILS NFR BLD AUTO: 44 % (ref 53–65)
NITRITE UR QL STRIP: NEGATIVE
PH UR STRIP: 5.5 PH UNITS
PLATELET # BLD AUTO: 105 K/UL (ref 150–400)
POTASSIUM SERPL-SCNC: 4 MMOL/L (ref 3.5–5.1)
PROT SERPL-MCNC: 6.5 G/DL (ref 6.4–8.2)
PROT UR QL STRIP: ABNORMAL
RBC # BLD AUTO: 3.58 M/UL (ref 4.2–5.4)
RBC # UR STRIP: ABNORMAL /UL
RBC #/AREA URNS HPF: ABNORMAL /HPF
SODIUM SERPL-SCNC: 146 MMOL/L (ref 136–145)
SP GR UR STRIP: 1.02
SQUAMOUS #/AREA URNS LPF: ABNORMAL /LPF
UROBILINOGEN UR STRIP-ACNC: 0.2 MG/DL
WBC # BLD AUTO: 6.6 K/UL (ref 4.5–11)
WBC #/AREA URNS HPF: ABNORMAL /HPF

## 2023-03-24 PROCEDURE — 99284 PR EMERGENCY DEPT VISIT,LEVEL IV: ICD-10-PCS | Mod: EDII,,, | Performed by: PHYSICIAN ASSISTANT

## 2023-03-24 PROCEDURE — 85025 COMPLETE CBC W/AUTO DIFF WBC: CPT | Performed by: PHYSICIAN ASSISTANT

## 2023-03-24 PROCEDURE — 63600175 PHARM REV CODE 636 W HCPCS: Performed by: PHYSICIAN ASSISTANT

## 2023-03-24 PROCEDURE — 96375 TX/PRO/DX INJ NEW DRUG ADDON: CPT

## 2023-03-24 PROCEDURE — 81001 URINALYSIS AUTO W/SCOPE: CPT | Performed by: PHYSICIAN ASSISTANT

## 2023-03-24 PROCEDURE — 99284 EMERGENCY DEPT VISIT MOD MDM: CPT | Mod: 25

## 2023-03-24 PROCEDURE — 99284 EMERGENCY DEPT VISIT MOD MDM: CPT | Mod: EDII,,, | Performed by: PHYSICIAN ASSISTANT

## 2023-03-24 PROCEDURE — 96365 THER/PROPH/DIAG IV INF INIT: CPT

## 2023-03-24 PROCEDURE — 99284 EMERGENCY DEPT VISIT MOD MDM: CPT | Mod: GF

## 2023-03-24 PROCEDURE — 80053 COMPREHEN METABOLIC PANEL: CPT | Performed by: PHYSICIAN ASSISTANT

## 2023-03-24 PROCEDURE — 63600175 PHARM REV CODE 636 W HCPCS

## 2023-03-24 RX ORDER — LEVOFLOXACIN 5 MG/ML
500 INJECTION, SOLUTION INTRAVENOUS
Status: DISCONTINUED | OUTPATIENT
Start: 2023-03-24 | End: 2023-03-24 | Stop reason: HOSPADM

## 2023-03-24 RX ORDER — LEVOFLOXACIN 500 MG/1
500 TABLET, FILM COATED ORAL DAILY
Qty: 7 TABLET | Refills: 0 | Status: SHIPPED | OUTPATIENT
Start: 2023-03-24 | End: 2023-03-31

## 2023-03-24 RX ORDER — LEVOFLOXACIN 5 MG/ML
INJECTION, SOLUTION INTRAVENOUS
Status: COMPLETED
Start: 2023-03-24 | End: 2023-03-24

## 2023-03-24 RX ORDER — METHYLPREDNISOLONE SOD SUCC 125 MG
125 VIAL (EA) INJECTION
Status: COMPLETED | OUTPATIENT
Start: 2023-03-24 | End: 2023-03-24

## 2023-03-24 RX ADMIN — METHYLPREDNISOLONE SODIUM SUCCINATE 125 MG: 125 INJECTION, POWDER, FOR SOLUTION INTRAMUSCULAR; INTRAVENOUS at 03:03

## 2023-03-24 RX ADMIN — LEVOFLOXACIN 500 MG: 5 INJECTION, SOLUTION INTRAVENOUS at 03:03

## 2023-03-24 NOTE — ED PROVIDER NOTES
Encounter Date: 3/24/2023       History     Chief Complaint   Patient presents with    Fatigue    chronic pain     Patient is a 89-year-old female with history to significant hospital stays in October and December of last year.  The 1st hospital stay was for a pulmonary embolus, the 2nd was because she developed Pseudomonas pneumonia.    She states she has not felt good for the last several months, she is week, and recently was diagnosed with a bladder infection.    She is taking antibiotics for that, but are sure what antibiotic  In addition to pneumonia, and pulmonary embolus, patient has a past medical history significant for actinic keratosis, hypertension, hyperlipidemia, COPD, GERD, and frequent UTIs.  Surgeries are positive for hysterectomy, and appendectomy.    She denies any tobacco alcohol or drug use.  She lives at home with her brother.    Review of patient's allergies indicates:   Allergen Reactions    Bactrim [sulfamethoxazole-trimethoprim]     Ceftin [cefuroxime axetil]     Iodinated contrast media Other (See Comments)     Past Medical History:   Diagnosis Date    Actinic keratoses     Arthritis     COPD (chronic obstructive pulmonary disease)     Frequent UTI     GERD (gastroesophageal reflux disease)     High cholesterol     HTN (hypertension)      Past Surgical History:   Procedure Laterality Date    APPENDECTOMY      HYSTERECTOMY      pessary       Family History   Problem Relation Age of Onset    Hypertension Daughter     Diabetes Daughter     Melanoma Daughter      Social History     Tobacco Use    Smoking status: Never    Smokeless tobacco: Never   Substance Use Topics    Alcohol use: Never    Drug use: Never     Review of Systems   Genitourinary:  Positive for dysuria, frequency and urgency.   All other systems reviewed and are negative.    Physical Exam     Initial Vitals [03/24/23 1416]   BP Pulse Resp Temp SpO2   (!) 124/51 63 18 98.3 °F (36.8 °C) 95 %      MAP       --         Physical  Exam    Nursing note and vitals reviewed.  Constitutional: She appears well-nourished. No distress.   HENT:   Head: Atraumatic.   Eyes: EOM are normal.   Neck: Neck supple.   Cardiovascular:  Normal rate, regular rhythm and normal heart sounds.           Pulmonary/Chest: Breath sounds normal.   Abdominal: Abdomen is soft. Bowel sounds are normal.   Musculoskeletal:         General: No edema.      Cervical back: Neck supple.     Neurological: She is alert and oriented to person, place, and time.   Skin: Skin is dry.   Psychiatric: She has a normal mood and affect.       Medical Screening Exam   See Full Note    ED Course   Procedures  Labs Reviewed   URINALYSIS, REFLEX TO URINE CULTURE - Abnormal; Notable for the following components:       Result Value    Leukocytes, UA Small (*)     Protein, UA Trace (*)     Bilirubin, UA Small (*)     Blood, UA Trace-Lysed (*)     All other components within normal limits   COMPREHENSIVE METABOLIC PANEL - Abnormal; Notable for the following components:    Sodium 146 (*)     Chloride 111 (*)     Glucose 153 (*)     BUN 23 (*)     Creatinine 1.05 (*)     BUN/Creatinine Ratio 22 (*)     Albumin 3.3 (*)     eGFR 51 (*)     All other components within normal limits   CBC WITH DIFFERENTIAL - Abnormal; Notable for the following components:    RBC 3.58 (*)     Hemoglobin 10.6 (*)     Hematocrit 36.0 (*)     .6 (*)     MCHC 29.4 (*)     RDW 14.6 (*)     Platelet Count 105 (*)     MPV 12.7 (*)     Neutrophils % 44.0 (*)     Lymphocytes % 50.8 (*)     All other components within normal limits   URINALYSIS, MICROSCOPIC - Abnormal; Notable for the following components:    WBC, UA 5-10 (*)     RBC, UA 3-5 (*)     Bacteria, UA Few (*)     Squamous Epithelial Cells, UA Few (*)     All other components within normal limits   CBC W/ AUTO DIFFERENTIAL    Narrative:     The following orders were created for panel order CBC auto differential.  Procedure                               Abnormality          Status                     ---------                               -----------         ------                     CBC with Differential[[id number]]        Abnormal            Final result                 Please view results for these tests on the individual orders.          Imaging Results              X-Ray Chest AP Portable (Final result)  Result time 03/24/23 14:53:57      Final result by Tom Graff MD (03/24/23 14:53:57)                   Impression:      Similar appearance of the chest.      Electronically signed by: Tom Graff  Date:    03/24/2023  Time:    14:53               Narrative:    EXAMINATION:  XR CHEST AP PORTABLE    CLINICAL HISTORY:  Cough, unspecified    TECHNIQUE:  Single frontal view of the chest was performed.    COMPARISON:  12/01/2022    FINDINGS:  The cardiomegaly and interstitial densities are similar to the prior studies.  There is no new infiltrate in the lungs.                                       Medications   methylPREDNISolone sodium succinate injection 125 mg (125 mg Intravenous Given 3/24/23 1529)   levoFLOXacin (LEVAQUIN) 500 mg/100 mL IVPB (0 mg  Stopped 3/24/23 1631)     Medical Decision Making:   Initial Assessment:   Patient is a 89-year-old female with history to significant hospital stays in October and December of last year.  The 1st hospital stay was for a pulmonary embolus, the 2nd was because she developed Pseudomonas pneumonia.    She states she has not felt good for the last several months, she is week, and recently was diagnosed with a bladder infection.    She is taking antibiotics for that, but are sure what antibiotic  In addition to pneumonia, and pulmonary embolus, patient has a past medical history significant for actinic keratosis, hypertension, hyperlipidemia, COPD, GERD, and frequent UTIs.  Surgeries are positive for hysterectomy, and appendectomy.    She denies any tobacco alcohol or drug use.  She lives at home with her brother.  Differential  Diagnosis:   UTI, pyelonephritis, pneumonia, anemia.    Generalized weakness.  ED Management:  I have ordered a CBC, CMP, chest x-ray, and a UA.    Patient is positive for urinary tract infection.      Dr. Thomas discussed case with me, and instructed to give Levaquin 500 mg IV, and Solu-Medrol 125 mg IV.    Patient will be discharged Levaquin 500 mg p.o. x7 days.    She is taking physical therapy at home, and wants to continue taking therapy there                 Clinical Impression:   Final diagnoses:  [R05.9] Cough  [R53.1] Weakness  [N39.0] Urinary tract infection without hematuria, site unspecified (Primary)        ED Disposition Condition    Discharge Stable          ED Prescriptions       Medication Sig Dispense Start Date End Date Auth. Provider    levoFLOXacin (LEVAQUIN) 500 MG tablet Take 1 tablet (500 mg total) by mouth once daily. for 7 days 7 tablet 3/24/2023 3/31/2023 BOB Escalante          Follow-up Information    None          BOB Escalante  03/24/23 1440       BOB Escalante  03/24/23 1527       BOB Escalante  03/30/23 5313

## 2023-05-22 ENCOUNTER — HOSPITAL ENCOUNTER (EMERGENCY)
Facility: HOSPITAL | Age: 88
Discharge: HOME | End: 2023-05-22
Attending: EMERGENCY MEDICINE
Payer: MEDICARE

## 2023-05-22 VITALS
DIASTOLIC BLOOD PRESSURE: 80 MMHG | BODY MASS INDEX: 24.66 KG/M2 | WEIGHT: 134 LBS | HEIGHT: 62 IN | HEART RATE: 65 BPM | RESPIRATION RATE: 20 BRPM | SYSTOLIC BLOOD PRESSURE: 160 MMHG | OXYGEN SATURATION: 93 % | TEMPERATURE: 98 F

## 2023-05-22 DIAGNOSIS — J40 BRONCHITIS: ICD-10-CM

## 2023-05-22 DIAGNOSIS — N30.00 ACUTE CYSTITIS WITHOUT HEMATURIA: Primary | ICD-10-CM

## 2023-05-22 DIAGNOSIS — R10.32 LEFT LOWER QUADRANT ABDOMINAL PAIN: ICD-10-CM

## 2023-05-22 DIAGNOSIS — M62.81 MUSCLE WEAKNESS: ICD-10-CM

## 2023-05-22 LAB
25(OH)D3 SERPL-MCNC: 61.4 NG/ML
ALBUMIN SERPL BCP-MCNC: 3.2 G/DL (ref 3.5–5)
ALBUMIN/GLOB SERPL: 1 {RATIO}
ALP SERPL-CCNC: 82 U/L (ref 55–142)
ALT SERPL W P-5'-P-CCNC: 14 U/L (ref 13–56)
ANION GAP SERPL CALCULATED.3IONS-SCNC: 5 MMOL/L (ref 7–16)
AST SERPL W P-5'-P-CCNC: 16 U/L (ref 15–37)
BACTERIA #/AREA URNS HPF: ABNORMAL /HPF
BASOPHILS # BLD AUTO: 0.03 K/UL (ref 0–0.2)
BASOPHILS NFR BLD AUTO: 0.3 % (ref 0–1)
BILIRUB SERPL-MCNC: 0.3 MG/DL (ref ?–1.2)
BILIRUB UR QL STRIP: NEGATIVE
BUN SERPL-MCNC: 18 MG/DL (ref 7–18)
BUN/CREAT SERPL: 23 (ref 6–20)
CALCIUM SERPL-MCNC: 8.6 MG/DL (ref 8.5–10.1)
CHLORIDE SERPL-SCNC: 104 MMOL/L (ref 98–107)
CLARITY UR: CLEAR
CO2 SERPL-SCNC: 27 MMOL/L (ref 21–32)
COLOR UR: YELLOW
CREAT SERPL-MCNC: 0.8 MG/DL (ref 0.55–1.02)
D DIMER PPP FEU-MCNC: 1.11 ΜG/ML (ref 0–0.47)
DIFFERENTIAL METHOD BLD: ABNORMAL
EGFR (NO RACE VARIABLE) (RUSH/TITUS): 70 ML/MIN/1.73M2
EOSINOPHIL # BLD AUTO: 0.1 K/UL (ref 0–0.5)
EOSINOPHIL NFR BLD AUTO: 0.9 % (ref 1–4)
EOSINOPHIL NFR BLD MANUAL: 2 % (ref 1–4)
ERYTHROCYTE [DISTWIDTH] IN BLOOD BY AUTOMATED COUNT: 14.4 % (ref 11.5–14.5)
GLOBULIN SER-MCNC: 3.3 G/DL (ref 2–4)
GLUCOSE SERPL-MCNC: 122 MG/DL (ref 74–106)
GLUCOSE UR STRIP-MCNC: NEGATIVE MG/DL
HCT VFR BLD AUTO: 37.2 % (ref 38–47)
HGB BLD-MCNC: 11.9 G/DL (ref 12–16)
KETONES UR STRIP-SCNC: NEGATIVE MG/DL
LEUKOCYTE ESTERASE UR QL STRIP: ABNORMAL
LYMPHOCYTES # BLD AUTO: 7.08 K/UL (ref 1–4.8)
LYMPHOCYTES NFR BLD AUTO: 61.7 % (ref 27–41)
LYMPHOCYTES NFR BLD MANUAL: 57 % (ref 27–41)
MAGNESIUM SERPL-MCNC: 2 MG/DL (ref 1.7–2.3)
MCH RBC QN AUTO: 31.1 PG (ref 27–31)
MCHC RBC AUTO-ENTMCNC: 32 G/DL (ref 32–36)
MCV RBC AUTO: 97.1 FL (ref 80–96)
MONOCYTES # BLD AUTO: 0.41 K/UL (ref 0–0.8)
MONOCYTES NFR BLD AUTO: 3.6 % (ref 2–6)
MONOCYTES NFR BLD MANUAL: 3 % (ref 2–6)
MPC BLD CALC-MCNC: 10.4 FL (ref 9.4–12.4)
NEUTROPHILS # BLD AUTO: 3.85 K/UL (ref 1.8–7.7)
NEUTROPHILS NFR BLD AUTO: 33.5 % (ref 53–65)
NEUTS SEG NFR BLD MANUAL: 38 % (ref 50–62)
NITRITE UR QL STRIP: NEGATIVE
NRBC BLD MANUAL-RTO: ABNORMAL %
NT-PROBNP SERPL-MCNC: 813 PG/ML (ref 1–450)
PH UR STRIP: 7 PH UNITS
PLATELET # BLD AUTO: 150 K/UL (ref 150–400)
PLATELET MORPHOLOGY: NORMAL
POTASSIUM SERPL-SCNC: 3.8 MMOL/L (ref 3.5–5.1)
PROT SERPL-MCNC: 6.5 G/DL (ref 6.4–8.2)
PROT UR QL STRIP: ABNORMAL
RBC # BLD AUTO: 3.83 M/UL (ref 4.2–5.4)
RBC # UR STRIP: NEGATIVE /UL
RBC MORPH BLD: NORMAL
SODIUM SERPL-SCNC: 132 MMOL/L (ref 136–145)
SP GR UR STRIP: 1.02
SQUAMOUS #/AREA URNS LPF: ABNORMAL /LPF
THEOPHYLLINE BLD-MCNC: <1 ΜG/ML (ref 5–20)
TROPONIN I SERPL HS-MCNC: 27.4 PG/ML
TSH SERPL DL<=0.005 MIU/L-ACNC: 1.78 UIU/ML (ref 0.36–3.74)
UROBILINOGEN UR STRIP-ACNC: 0.2 MG/DL
WBC # BLD AUTO: 11.47 K/UL (ref 4.5–11)
WBC #/AREA URNS HPF: ABNORMAL /HPF

## 2023-05-22 PROCEDURE — 84443 ASSAY THYROID STIM HORMONE: CPT | Performed by: EMERGENCY MEDICINE

## 2023-05-22 PROCEDURE — 85379 FIBRIN DEGRADATION QUANT: CPT | Performed by: EMERGENCY MEDICINE

## 2023-05-22 PROCEDURE — 82306 VITAMIN D 25 HYDROXY: CPT | Performed by: EMERGENCY MEDICINE

## 2023-05-22 PROCEDURE — 84484 ASSAY OF TROPONIN QUANT: CPT | Performed by: EMERGENCY MEDICINE

## 2023-05-22 PROCEDURE — 87086 URINE CULTURE/COLONY COUNT: CPT | Performed by: EMERGENCY MEDICINE

## 2023-05-22 PROCEDURE — 80053 COMPREHEN METABOLIC PANEL: CPT | Performed by: EMERGENCY MEDICINE

## 2023-05-22 PROCEDURE — 93005 ELECTROCARDIOGRAM TRACING: CPT

## 2023-05-22 PROCEDURE — 80198 ASSAY OF THEOPHYLLINE: CPT | Performed by: EMERGENCY MEDICINE

## 2023-05-22 PROCEDURE — 85025 COMPLETE CBC W/AUTO DIFF WBC: CPT | Performed by: EMERGENCY MEDICINE

## 2023-05-22 PROCEDURE — 83880 ASSAY OF NATRIURETIC PEPTIDE: CPT | Performed by: EMERGENCY MEDICINE

## 2023-05-22 PROCEDURE — 83735 ASSAY OF MAGNESIUM: CPT | Performed by: EMERGENCY MEDICINE

## 2023-05-22 PROCEDURE — 99285 EMERGENCY DEPT VISIT HI MDM: CPT | Mod: 25

## 2023-05-22 PROCEDURE — 99285 EMERGENCY DEPT VISIT HI MDM: CPT | Performed by: EMERGENCY MEDICINE

## 2023-05-22 PROCEDURE — 25000003 PHARM REV CODE 250: Performed by: EMERGENCY MEDICINE

## 2023-05-22 PROCEDURE — 27000221 HC OXYGEN, UP TO 24 HOURS

## 2023-05-22 PROCEDURE — 93010 ELECTROCARDIOGRAM REPORT: CPT | Performed by: FAMILY MEDICINE

## 2023-05-22 PROCEDURE — 81001 URINALYSIS AUTO W/SCOPE: CPT | Performed by: EMERGENCY MEDICINE

## 2023-05-22 RX ORDER — CHOLECALCIFEROL (VITAMIN D3) 25 MCG
5000 TABLET ORAL DAILY
COMMUNITY

## 2023-05-22 RX ORDER — LEVOFLOXACIN 500 MG/1
500 TABLET, FILM COATED ORAL DAILY
Qty: 5 TABLET | Refills: 0 | Status: SHIPPED | OUTPATIENT
Start: 2023-05-22 | End: 2023-05-27

## 2023-05-22 RX ORDER — NITROFURANTOIN (MACROCRYSTALS) 100 MG/1
25 CAPSULE ORAL DAILY
Status: ON HOLD | COMMUNITY
End: 2024-03-27 | Stop reason: HOSPADM

## 2023-05-22 RX ORDER — ONDANSETRON 4 MG/1
4 TABLET, FILM COATED ORAL EVERY 8 HOURS PRN
Qty: 15 TABLET | Refills: 0 | Status: SHIPPED | OUTPATIENT
Start: 2023-05-22 | End: 2023-05-27

## 2023-05-22 RX ORDER — LEVOFLOXACIN 250 MG/1
500 TABLET ORAL
Status: COMPLETED | OUTPATIENT
Start: 2023-05-22 | End: 2023-05-22

## 2023-05-22 RX ADMIN — LEVOFLOXACIN 500 MG: 250 TABLET, FILM COATED ORAL at 03:05

## 2023-05-22 NOTE — DISCHARGE INSTRUCTIONS
INCREASE REST AND FLUIDS  MEDICATION AS DIRECTED  CLEAR LIQUIDS AS DIRECTED THEN BLAND DIET  CALL DR ANALILIA BENTLEY WITH CONDITION REPORT IN 1 DAY  REVIEW MEDICATION LIST WITH DR BENTLEY

## 2023-05-22 NOTE — ED PROVIDER NOTES
"Encounter Date: 5/22/2023       History     Chief Complaint   Patient presents with    Nausea     States sometimes, telling family it hurts her stomach really bad, [employer], in past week    Weakness    Loss of Consciousness     But this has been happening for "several months now"     PT IS A 89 YR OLD  WITH MULTIPLE COMPLAINTS INCLUDING NAUSEA, LLQ PAIN , MALAISE AND FATIGUE CHRONICALLY  FOR SEVERAL MONTHS AND WORSE TODAY. PT HAS CHRONIC DYSPNEA AT REST AND IS ON HOME O2. PT STATES ABDOMINAL PAIN IS DESCRIBED AS "JUST HURTS" AND INCREASED WITH PALPATION AND DECREASED WITH REMAINING STATIONARY.  PT DENIES FEVER/CHILLS, PALPITATIONS, SYNCOPE OR VOMITING. PT WAS EVALUATED PER PCP RECENTLY WITHOUT CHANGES TO MEDICATIONS; PT WAS EVALUATED PER CARDIOLOGY WITH ADDITION OF 2 MEDICATIONS.    Past Medical History    Diagnosis Date Comments  Actinic keratoses [L57.0]    HTN (hypertension) [I10]    High cholesterol [E78.00]    Frequent UTI [N39.0]    COPD (chronic obstructive pulmonary disease) [J44.9]    GERD (gastroesophageal reflux disease) [K21.9]    Arthritis [M19.          Review of patient's allergies indicates:   Allergen Reactions    Bactrim [sulfamethoxazole-trimethoprim]     Ceftin [cefuroxime axetil]     Iodinated contrast media Other (See Comments)     Past Medical History:   Diagnosis Date    Actinic keratoses     Arthritis     COPD (chronic obstructive pulmonary disease)     Frequent UTI     GERD (gastroesophageal reflux disease)     High cholesterol     HTN (hypertension)      Past Surgical History:   Procedure Laterality Date    APPENDECTOMY      heart stent      HYSTERECTOMY      pessary       Family History   Problem Relation Age of Onset    Hypertension Daughter     Diabetes Daughter     Melanoma Daughter      Social History     Tobacco Use    Smoking status: Never    Smokeless tobacco: Never   Substance Use Topics    Alcohol use: Never    Drug use: Never     Review of Systems   Constitutional:  Positive " for activity change and fatigue.   Eyes: Negative.    Respiratory:  Positive for shortness of breath (CHRONIC).    Cardiovascular:  Positive for leg swelling (FEET MINIMAL). Negative for chest pain.   Gastrointestinal:  Positive for abdominal pain (LLQ AND LMQ).   Endocrine: Negative.    Genitourinary: Negative.    Musculoskeletal:  Positive for arthralgias.   Skin: Negative.    Allergic/Immunologic: Positive for immunocompromised state.   Neurological:  Positive for weakness.   Psychiatric/Behavioral: Negative.       Physical Exam     Initial Vitals [05/22/23 1210]   BP Pulse Resp Temp SpO2   (!) 154/80 (!) 58 20 98.3 °F (36.8 °C) (!) 92 %      MAP       --         Physical Exam    Nursing note and vitals reviewed.  Constitutional: She appears well-developed and well-nourished. She appears distressed.   HENT:   Head: Normocephalic and atraumatic.   Right Ear: External ear normal.   Left Ear: External ear normal.   Nose: Nose normal.   Mouth/Throat: Oropharynx is clear and moist.   Eyes: Pupils are equal, round, and reactive to light. Right eye exhibits no discharge. Left eye exhibits no discharge.   Neck:   Normal range of motion.  Cardiovascular:  Normal rate and regular rhythm.           Murmur (2/6 ADIN) heard.  Pulmonary/Chest: Breath sounds normal. No respiratory distress. She has no wheezes. She has no rhonchi. She has no rales. She exhibits no tenderness.   Abdominal: Abdomen is soft. Bowel sounds are normal. She exhibits no distension. There is abdominal tenderness (LMQ/LLQ MODERATE).   Musculoskeletal:         General: Edema (MINIMAL OF FEET) present. No tenderness. Normal range of motion.      Cervical back: Normal range of motion.     Neurological: She is alert and oriented to person, place, and time. She has normal strength. She displays normal reflexes. No cranial nerve deficit or sensory deficit. GCS score is 15. GCS eye subscore is 4. GCS verbal subscore is 5. GCS motor subscore is 6.   Skin: Skin is  dry. Capillary refill takes less than 2 seconds. No rash noted.   Psychiatric: She has a normal mood and affect.       Medical Screening Exam   See Full Note    ED Course   Procedures  Labs Reviewed   COMPREHENSIVE METABOLIC PANEL - Abnormal; Notable for the following components:       Result Value    Sodium 132 (*)     Anion Gap 5 (*)     Glucose 122 (*)     BUN/Creatinine Ratio 23 (*)     Albumin 3.2 (*)     All other components within normal limits   NT-PRO NATRIURETIC PEPTIDE - Abnormal; Notable for the following components:    ProBNP 813 (*)     All other components within normal limits   CBC WITH DIFFERENTIAL - Abnormal; Notable for the following components:    WBC 11.47 (*)     RBC 3.83 (*)     Hemoglobin 11.9 (*)     Hematocrit 37.2 (*)     MCV 97.1 (*)     MCH 31.1 (*)     Neutrophils % 33.5 (*)     Lymphocytes % 61.7 (*)     Lymphocytes, Absolute 7.08 (*)     Eosinophils % 0.9 (*)     All other components within normal limits   D DIMER, QUANTITATIVE - Abnormal; Notable for the following components:    D-Dimer 1.11 (*)     All other components within normal limits   THEOPHYLLINE LEVEL - Abnormal; Notable for the following components:    Theophylline <1.0 (*)     All other components within normal limits   MANUAL DIFFERENTIAL - Abnormal; Notable for the following components:    Segmented Neutrophils, Man % 38 (*)     Lymphocytes, Man % 57 (*)     All other components within normal limits   URINALYSIS - Abnormal; Notable for the following components:    Leukocytes, UA Small (*)     Protein, UA Trace (*)     All other components within normal limits   URINALYSIS, MICROSCOPIC - Abnormal; Notable for the following components:    WBC, UA 11-15 (*)     Bacteria, UA Many (*)     Squamous Epithelial Cells, UA Few (*)     All other components within normal limits   TROPONIN I - Normal   TSH - Normal   MAGNESIUM - Normal   CULTURE, URINE   CBC W/ AUTO DIFFERENTIAL    Narrative:     The following orders were created  for panel order CBC Auto Differential.  Procedure                               Abnormality         Status                     ---------                               -----------         ------                     CBC with Differential[[id number]]        Abnormal            Final result               Manual Differential[[id number]]          Abnormal            Final result                 Please view results for these tests on the individual orders.   VITAMIN D        ECG Results              EKG 12-lead (In process)  Result time 05/22/23 18:04:46      In process by Interface, Lab In [facility] (05/22/23 18:04:46)                   Narrative:    Test Reason : M62.81,    Vent. Rate : 059 BPM     Atrial Rate : 059 BPM     P-R Int : 176 ms          QRS Dur : 082 ms      QT Int : 446 ms       P-R-T Axes : 059 -38 058 degrees     QTc Int : 441 ms    Sinus bradycardia  Left axis deviation  Minimal voltage criteria for LVH, may be normal variant  Nonspecific T wave abnormality  Abnormal ECG  When compared with ECG of 27-OCT-2022 09:39,  No significant change was found    Referred By: [email or web address]   SELF           Confirmed By:                                   Imaging Results              CT Abdomen Pelvis  Without Contrast (Final result)  Result time 05/22/23 14:05:59      Final result by Woo Murray DO (05/22/23 14:05:59)                   Impression:      Partially visualized patchy atelectasis/infiltrate within the right middle lobe suspicious for pneumonia. Recommend follow-up imaging to document resolution and exclude underlying neoplasm. Small pericardial effusion.    Bilateral peripelvic cysts are present. There are multiple subcentimeter nonobstructing bilateral renal calculi. No convincing ureteral calculus.  Prior hysterectomy.    Colonic diverticulosis. Similar right inguinal hernia containing a small amount of distal ileum. No evidence of gastrointestinal obstruction.    Other/detailed findings as above.    The CT  exam was performed using one or more of the following dose    reduction techniques- Automated exposure control, adjustment of the mA    and/or kV according to patient size, and/or use of iterative    reconstructed technique.    Point of Service: [facility]      Electronically signed by: Woo Murray  Date:    05/22/2023  Time:    14:05               Narrative:    EXAMINATION:  CT ABDOMEN PELVIS WITHOUT CONTRAST    CLINICAL HISTORY:  Flank pain, kidney stone suspected;    COMPARISON:  CT abdomen pelvis November 27, 2021    TECHNIQUE:  Multiple axial tomographic images of the abdomen and pelvis were obtained without the use of intravenous contrast.    FINDINGS:  Partially visualized patchy atelectasis/infiltrate within the right middle lobe suspicious for pneumonia.  Recommend follow-up imaging to document resolution and exclude underlying neoplasm.  Small pericardial effusion.    No worrisome focal hepatic abnormality demonstrated on submitted images.  Visualized gallbladder grossly unremarkable.  Pancreatic atrophic change.  Spleen grossly unremarkable.    Bilateral adrenal glands grossly unremarkable.  Bilateral peripelvic cysts are present.  There are multiple subcentimeter nonobstructing bilateral renal calculi.  No convincing ureteral calculus.  Prior hysterectomy.  Urinary bladder incompletely distended.    Colonic diverticulosis.  Similar right inguinal hernia containing a small amount of distal ileum.  No evidence of gastrointestinal obstruction.  Atherosclerotic calcification demonstrated.  Scattered skeletal degenerative change.                                       X-Ray Chest 1 View (Final result)  Result time 05/22/23 14:08:35      Final result by Tom Graff MD (05/22/23 14:08:35)                   Impression:      Chronic appearing basilar densities.  No new abnormality in the chest.      Electronically signed by: Tom Graff  Date:    05/22/2023  Time:    14:08               Narrative:     EXAMINATION:  XR CHEST 1 VIEW    CLINICAL HISTORY:  DYSPNEA;    TECHNIQUE:  Single frontal view of the chest was performed.    COMPARISON:  03/24/2023    FINDINGS:  Cardiomegaly is unchanged.  There remain a few basilar interstitial densities but improved from previous chest radiograph.  Upper lungs are clear.  No pneumothorax.                                    X-Rays:   Independently Interpreted Readings:   Chest X-Ray: No infiltrates.  No acute abnormalities. Updated   Order   05/22/23 1411  X-Ray Chest 1 View   Performed: 05/22/23 1358  Final         Impression:  Chronic appearing basilar densities. No new abnormality in the chest. Electronically signed by: Tom Graff   Abdomen:    Impression:     Partially visualized patchy atelectasis/infiltrate within the right middle lobe suspicious for pneumonia. Recommend follow-up imaging to document resolution and exclude underlying neoplasm. Small pericardial effusion.     Bilateral peripelvic cysts are present. There are multiple subcentimeter nonobstructing bilateral renal calculi. No convincing ureteral calculus.  Prior hysterectomy.     Colonic diverticulosis. Similar right inguinal hernia containing a small amount of distal ileum. No evidence of gastrointestinal obstruction.     Other/detailed findings as above.     The CT exam was performed using one or more of the following dose     reduction techniques- Automated exposure control, adjustment of the mA     and/or kV according to patient size, and/or use of iterative     reconstructed technique.     Point of Service: [facility]        Electronically signed by: Woo Murray  Date:                                            05/22/2023  Time:                                           14:05   Medications   levoFLOXacin tablet 500 mg (500 mg Oral Given 5/22/23 1525)     Medical Decision Making:   Initial Assessment:   Past Medical History    Diagnosis Date Comments  Actinic keratoses [L57.0]    HTN  (hypertension) [I10]    High cholesterol [E78.00]    Frequent UTI [N39.0]    COPD (chronic obstructive pulmonary disease) [J44.9]    GERD (gastroesophageal reflux disease) [K21.9]    Arthritis [M19.        Differential Diagnosis:   CHRONIC NAUSEA,   MED COMPLIANCE ISSUES, MEDICATION INTERACTIONS   ABNORMAL CT, LABS, EKG  DEHYDRATION,   Clinical Tests:   Lab Tests: Ordered and Reviewed       <> Summary of Lab: Viewed and Other Results - Labs    Updated   Order   05/23/23 0909  Urine culture   Collected: 05/22/23 1337  Preliminary result  Specimen: Urine, Clean Catch      Culture, Urine No Growth To Date P       05/22/23 1413  Urinalysis, Microscopic   Collected: 05/22/23 1337  Final result  Specimen: Urine, Clean Catch      WBC, UA 11-15 Abnormal  /hpf  Bacteria, UA Many Abnormal  /hpf  Squamous Epithelial Cells, UA Few Abnormal  /lpf       05/22/23 1357  Urinalysis   Collected: 05/22/23 1337  Final result  Specimen: Urine, Clean Catch      Color, UA Yellow  Clarity, UA Clear  pH, UA 7.0 pH Units  Leukocytes, UA Small Abnormal   Nitrites, UA Negative  Protein, UA Trace Abnormal   Glucose, UA Negative mg/dL  Ketones, UA Negative mg/dL  Urobilinogen, UA 0.2 mg/dL  Bilirubin, UA Negative  Blood, UA Negative  Specific Gravity, UA 1.025       05/22/23 2240  TSH   Collected: 05/22/23 1328  Final result  Specimen: Blood      TSH 1.780 uIU/mL       05/22/23 2231  Vitamin D   Collected: 05/22/23 1328  Final result  Specimen: Blood      Vitamin D 25-Hydroxy, Blood 61.4 ng/mL        05/22/23 1413  D-Dimer, Quantitative   Collected: 05/22/23 1328  Final result  Specimen: Blood      D-Dimer 1.11 High  µg/mL       05/22/23 1408  CBC Auto Differential   Collected: 05/22/23 1310  Final result  Specimen: Blood         05/22/23 1408  CBC with Differential   Collected: 05/22/23 1310  Final result  Specimen: Blood      WBC 11.47 High  K/uL  RBC 3.83 Low  M/uL  Hemoglobin 11.9 Low  g/dL  Hematocrit 37.2 Low  %  MCV 97.1  High  fL  MCH 31.1 High  pg  MCHC 32.0 g/dL  RDW 14.4 %  Platelet Count 150 K/uL  MPV 10.4 fL  Neutrophils % 33.5 Low  %  Lymphocytes % 61.7 High  %        Radiological Study: Ordered and Reviewed  Medical Tests: Ordered and Reviewed  ED Management:  EXAM  LABS AS ABOVE  XRAY AS ABOVE BELIEVE ATELECTASIS VS PNA  EKG NO ACUTE CHANGE  PT WILL BE TREATED FOR UTI WITH LEVAQUIN AND THIS WILL COVER ANY LOWER RESPIRATORY ISSUES AS WELL  PT DC IN STABLE CONDITION  INCREASE REST AND FLUIDS  MEDICATION AS DIRECTED  CLEAR LIQUIDS AS DIRECTED THEN BLAND DIET  CALL DR ANALILIA BENTLEY WITH CONDITION REPORT IN 1 DAY  REVIEW MEDICATION LIST WITH DR BENTLEY                       Clinical Impression:   Final diagnoses:  [M62.81] Muscle weakness  [N30.00] Acute cystitis without hematuria (Primary)  [J40] Bronchitis  [R10.32] Left lower quadrant abdominal pain        ED Disposition Condition    Discharge Stable          ED Prescriptions       Medication Sig Dispense Start Date End Date Auth. Provider    levoFLOXacin (LEVAQUIN) 500 MG tablet Take 1 tablet (500 mg total) by mouth once daily. for 5 days 5 tablet 5/22/2023 5/27/2023 Cassandra Skelton MD    ondansetron (ZOFRAN) 4 MG tablet Take 1 tablet (4 mg total) by mouth every 8 (eight) hours as needed for Nausea. 15 tablet 5/22/2023 5/27/2023 Cassandra Skelton MD          Follow-up Information       Follow up With Specialties Details Why Contact Info    Analilia Bentley MD Family Medicine In 1 week If symptoms worsen [facility] [address]  [facility] [place]  [place] [address]  [phone]               Cassandra Skelton MD  05/23/23 0173

## 2023-05-23 ENCOUNTER — PATIENT OUTREACH (OUTPATIENT)
Dept: EMERGENCY MEDICINE | Facility: HOSPITAL | Age: 88
End: 2023-05-23

## 2023-05-23 ENCOUNTER — PATIENT OUTREACH (OUTPATIENT)
Dept: EMERGENCY MEDICINE | Facility: HOSPITAL | Age: 88
End: 2023-05-23
Payer: MEDICARE

## 2023-05-24 LAB — UA COMPLETE W REFLEX CULTURE PNL UR: NORMAL

## 2023-08-06 ENCOUNTER — HOSPITAL ENCOUNTER (EMERGENCY)
Facility: HOSPITAL | Age: 88
Discharge: HOME | End: 2023-08-06
Attending: FAMILY MEDICINE
Payer: MEDICARE

## 2023-08-06 VITALS
HEART RATE: 68 BPM | HEIGHT: 62 IN | TEMPERATURE: 98 F | OXYGEN SATURATION: 97 % | DIASTOLIC BLOOD PRESSURE: 56 MMHG | WEIGHT: 129 LBS | BODY MASS INDEX: 23.74 KG/M2 | RESPIRATION RATE: 12 BRPM | SYSTOLIC BLOOD PRESSURE: 136 MMHG

## 2023-08-06 DIAGNOSIS — R41.82 ALTERED MENTAL STATUS: ICD-10-CM

## 2023-08-06 DIAGNOSIS — N39.0 URINARY TRACT INFECTION WITHOUT HEMATURIA, SITE UNSPECIFIED: Primary | ICD-10-CM

## 2023-08-06 LAB
ALBUMIN SERPL BCP-MCNC: 3 G/DL (ref 3.5–5)
ALBUMIN/GLOB SERPL: 0.9 {RATIO}
ALP SERPL-CCNC: 95 U/L (ref 55–142)
ALT SERPL W P-5'-P-CCNC: 15 U/L (ref 13–56)
ANION GAP SERPL CALCULATED.3IONS-SCNC: 7 MMOL/L (ref 7–16)
AST SERPL W P-5'-P-CCNC: 14 U/L (ref 15–37)
BACTERIA #/AREA URNS HPF: ABNORMAL /HPF
BASOPHILS # BLD AUTO: 0.01 K/UL (ref 0–0.2)
BASOPHILS NFR BLD AUTO: 0.1 % (ref 0–1)
BILIRUB SERPL-MCNC: 0.3 MG/DL (ref ?–1.2)
BILIRUB UR QL STRIP: NEGATIVE
BUN SERPL-MCNC: 21 MG/DL (ref 7–18)
BUN/CREAT SERPL: 28 (ref 6–20)
CALCIUM SERPL-MCNC: 8.9 MG/DL (ref 8.5–10.1)
CHLORIDE SERPL-SCNC: 110 MMOL/L (ref 98–107)
CLARITY UR: CLEAR
CO2 SERPL-SCNC: 34 MMOL/L (ref 21–32)
COLOR UR: YELLOW
CREAT SERPL-MCNC: 0.75 MG/DL (ref 0.55–1.02)
DIFFERENTIAL METHOD BLD: ABNORMAL
EGFR (NO RACE VARIABLE) (RUSH/TITUS): 76 ML/MIN/1.73M2
EOSINOPHIL # BLD AUTO: 0.14 K/UL (ref 0–0.5)
EOSINOPHIL NFR BLD AUTO: 1.4 % (ref 1–4)
EOSINOPHIL NFR BLD MANUAL: 2 % (ref 1–4)
ERYTHROCYTE [DISTWIDTH] IN BLOOD BY AUTOMATED COUNT: 14.9 % (ref 11.5–14.5)
FLUAV AG UPPER RESP QL IA.RAPID: NEGATIVE
FLUBV AG UPPER RESP QL IA.RAPID: NEGATIVE
GLOBULIN SER-MCNC: 3.2 G/DL (ref 2–4)
GLUCOSE SERPL-MCNC: 150 MG/DL (ref 74–106)
GLUCOSE UR STRIP-MCNC: NEGATIVE MG/DL
HCT VFR BLD AUTO: 37.5 % (ref 38–47)
HGB BLD-MCNC: 11.6 G/DL (ref 12–16)
KETONES UR STRIP-SCNC: NEGATIVE MG/DL
LEUKOCYTE ESTERASE UR QL STRIP: ABNORMAL
LYMPHOCYTES # BLD AUTO: 4.68 K/UL (ref 1–4.8)
LYMPHOCYTES NFR BLD AUTO: 45.2 % (ref 27–41)
LYMPHOCYTES NFR BLD MANUAL: 44 % (ref 27–41)
MCH RBC QN AUTO: 30.9 PG (ref 27–31)
MCHC RBC AUTO-ENTMCNC: 30.9 G/DL (ref 32–36)
MCV RBC AUTO: 99.7 FL (ref 80–96)
MONOCYTES # BLD AUTO: 0.62 K/UL (ref 0–0.8)
MONOCYTES NFR BLD AUTO: 6 % (ref 2–6)
MONOCYTES NFR BLD MANUAL: 4 % (ref 2–6)
MPC BLD CALC-MCNC: 10.8 FL (ref 9.4–12.4)
NEUTROPHILS # BLD AUTO: 4.9 K/UL (ref 1.8–7.7)
NEUTROPHILS NFR BLD AUTO: 47.3 % (ref 53–65)
NEUTS BAND NFR BLD MANUAL: 2 % (ref 1–5)
NEUTS SEG NFR BLD MANUAL: 48 % (ref 50–62)
NITRITE UR QL STRIP: NEGATIVE
NRBC BLD MANUAL-RTO: ABNORMAL %
PH UR STRIP: 6 PH UNITS
PLATELET # BLD AUTO: 166 K/UL (ref 150–400)
PLATELET MORPHOLOGY: NORMAL
POTASSIUM SERPL-SCNC: 3.9 MMOL/L (ref 3.5–5.1)
PROT SERPL-MCNC: 6.2 G/DL (ref 6.4–8.2)
PROT UR QL STRIP: 30
RBC # BLD AUTO: 3.76 M/UL (ref 4.2–5.4)
RBC # UR STRIP: ABNORMAL /UL
RBC MORPH BLD: NORMAL
SARS-COV+SARS-COV-2 AG RESP QL IA.RAPID: NEGATIVE
SODIUM SERPL-SCNC: 147 MMOL/L (ref 136–145)
SP GR UR STRIP: 1.01
SQUAMOUS #/AREA URNS LPF: ABNORMAL /LPF
TROPONIN I SERPL DL<=0.01 NG/ML-MCNC: 18.1 PG/ML
UROBILINOGEN UR STRIP-ACNC: 1 MG/DL
WBC # BLD AUTO: 10.35 K/UL (ref 4.5–11)
WBC #/AREA URNS HPF: ABNORMAL /HPF

## 2023-08-06 PROCEDURE — 94761 N-INVAS EAR/PLS OXIMETRY MLT: CPT

## 2023-08-06 PROCEDURE — 96365 THER/PROPH/DIAG IV INF INIT: CPT

## 2023-08-06 PROCEDURE — 93010 ELECTROCARDIOGRAM REPORT: CPT | Performed by: FAMILY MEDICINE

## 2023-08-06 PROCEDURE — 84484 ASSAY OF TROPONIN QUANT: CPT | Performed by: EMERGENCY MEDICINE

## 2023-08-06 PROCEDURE — 99285 EMERGENCY DEPT VISIT HI MDM: CPT | Mod: 25

## 2023-08-06 PROCEDURE — 81001 URINALYSIS AUTO W/SCOPE: CPT | Performed by: EMERGENCY MEDICINE

## 2023-08-06 PROCEDURE — 87428 SARSCOV & INF VIR A&B AG IA: CPT | Performed by: FAMILY MEDICINE

## 2023-08-06 PROCEDURE — 87086 URINE CULTURE/COLONY COUNT: CPT | Performed by: EMERGENCY MEDICINE

## 2023-08-06 PROCEDURE — 99284 EMERGENCY DEPT VISIT MOD MDM: CPT | Performed by: FAMILY MEDICINE

## 2023-08-06 PROCEDURE — 93005 ELECTROCARDIOGRAM TRACING: CPT

## 2023-08-06 PROCEDURE — 63600175 PHARM REV CODE 636 W HCPCS: Performed by: FAMILY MEDICINE

## 2023-08-06 PROCEDURE — 85025 COMPLETE CBC W/AUTO DIFF WBC: CPT | Performed by: EMERGENCY MEDICINE

## 2023-08-06 PROCEDURE — 80053 COMPREHEN METABOLIC PANEL: CPT | Performed by: EMERGENCY MEDICINE

## 2023-08-06 RX ORDER — CIPROFLOXACIN 2 MG/ML
INJECTION, SOLUTION INTRAVENOUS
Status: DISCONTINUED
Start: 2023-08-06 | End: 2023-08-07 | Stop reason: HOSPADM

## 2023-08-06 RX ORDER — CIPROFLOXACIN 500 MG/1
500 TABLET ORAL 2 TIMES DAILY
Qty: 20 TABLET | Refills: 0 | Status: SHIPPED | OUTPATIENT
Start: 2023-08-06 | End: 2023-08-16

## 2023-08-06 RX ORDER — SODIUM CHLORIDE, SODIUM LACTATE, POTASSIUM CHLORIDE, CALCIUM CHLORIDE 600; 310; 30; 20 MG/100ML; MG/100ML; MG/100ML; MG/100ML
INJECTION, SOLUTION INTRAVENOUS
Status: DISCONTINUED
Start: 2023-08-06 | End: 2023-08-07 | Stop reason: HOSPADM

## 2023-08-06 RX ORDER — MELOXICAM 7.5 MG/1
7.5 TABLET ORAL DAILY PRN
COMMUNITY

## 2023-08-06 RX ORDER — DOXAZOSIN 2 MG/1
2 TABLET ORAL EVERY 12 HOURS
COMMUNITY

## 2023-08-06 RX ORDER — CIPROFLOXACIN 2 MG/ML
400 INJECTION, SOLUTION INTRAVENOUS
Status: COMPLETED | OUTPATIENT
Start: 2023-08-06 | End: 2023-08-06

## 2023-08-06 RX ADMIN — SODIUM CHLORIDE, POTASSIUM CHLORIDE, SODIUM LACTATE AND CALCIUM CHLORIDE 500 ML: 600; 310; 30; 20 INJECTION, SOLUTION INTRAVENOUS at 08:08

## 2023-08-06 RX ADMIN — CIPROFLOXACIN 400 MG: 2 INJECTION, SOLUTION INTRAVENOUS at 08:08

## 2023-08-06 NOTE — ED PROVIDER NOTES
Encounter Date: 8/6/2023       History     Chief Complaint   Patient presents with    Fatigue     Started tuesday    Altered Mental Status     Started Tuesday, daughter states she has been talking to or seeing dead family members     Patient comes in with some altered mental status which started Tuesday.  States she is been talking to dead people.  No nausea vomiting diarrhea of COPD with oxygen saturations 94,,        Review of patient's allergies indicates:   Allergen Reactions    Bactrim [sulfamethoxazole-trimethoprim]     Ceftin [cefuroxime axetil]     Iodinated contrast media Other (See Comments)     Past Medical History:   Diagnosis Date    Actinic keratoses     Arthritis     CAD (coronary artery disease)     pt has and old heart stent    COPD (chronic obstructive pulmonary disease)     Frequent UTI     GERD (gastroesophageal reflux disease)     High cholesterol     HTN (hypertension)     Leukemia, unspecified     just dx within the past year     Past Surgical History:   Procedure Laterality Date    APPENDECTOMY      heart stent      HYSTERECTOMY      pessary       Family History   Problem Relation Age of Onset    Hypertension Daughter     Diabetes Daughter     Melanoma Daughter      Social History     Tobacco Use    Smoking status: Never    Smokeless tobacco: Never   Substance Use Topics    Alcohol use: Never    Drug use: Never     Review of Systems   Constitutional:  Positive for fatigue. Negative for fever.   HENT: Negative.  Negative for sore throat.    Eyes: Negative.    Cardiovascular: Negative.  Negative for chest pain.   Gastrointestinal: Negative.  Negative for nausea.   Endocrine: Negative.    Genitourinary: Negative.  Negative for dysuria.   Musculoskeletal: Negative.  Negative for back pain.   Skin: Negative.  Negative for rash.   Allergic/Immunologic: Negative.    Neurological:  Positive for weakness.   Hematological: Negative.  Does not bruise/bleed easily.   Psychiatric/Behavioral:  Positive for  confusion.    All other systems reviewed and are negative.      Physical Exam     Initial Vitals [08/06/23 1722]   BP Pulse Resp Temp SpO2   (!) 165/73 64 16 98.2 °F (36.8 °C) (!) 81 %      MAP       --         Physical Exam    Constitutional: She appears well-developed and well-nourished.   HENT:   Head: Normocephalic and atraumatic.   Right Ear: External ear normal.   Left Ear: External ear normal.   Nose: Nose normal.   Mouth/Throat: Oropharynx is clear and moist.   Eyes: Conjunctivae and EOM are normal. Pupils are equal, round, and reactive to light.   Neck: Neck supple.   Normal range of motion.  Cardiovascular:  Normal rate, regular rhythm, normal heart sounds and intact distal pulses.           Pulmonary/Chest: Breath sounds normal.   Abdominal: Abdomen is soft. Bowel sounds are normal.   Genitourinary:    Vagina and uterus normal.     Musculoskeletal:         General: Normal range of motion.      Cervical back: Normal range of motion and neck supple.     Neurological: She is alert and oriented to person, place, and time. She has normal strength and normal reflexes.   Skin: Skin is warm. Capillary refill takes less than 2 seconds.   Psychiatric: She has a normal mood and affect. Her behavior is normal. Judgment and thought content normal.         Medical Screening Exam   See Full Note    ED Course   Procedures  Labs Reviewed   COMPREHENSIVE METABOLIC PANEL - Abnormal; Notable for the following components:       Result Value    Sodium 147 (*)     Chloride 110 (*)     CO2 34 (*)     Glucose 150 (*)     BUN 21 (*)     BUN/Creatinine Ratio 28 (*)     Total Protein 6.2 (*)     Albumin 3.0 (*)     AST 14 (*)     All other components within normal limits   URINALYSIS, REFLEX TO URINE CULTURE - Abnormal; Notable for the following components:    Leukocytes, UA Small (*)     Protein, UA 30 (*)     Blood, UA Trace-Lysed (*)     All other components within normal limits   CBC WITH DIFFERENTIAL - Abnormal; Notable for the  following components:    RBC 3.76 (*)     Hemoglobin 11.6 (*)     Hematocrit 37.5 (*)     MCV 99.7 (*)     MCHC 30.9 (*)     RDW 14.9 (*)     Neutrophils % 47.3 (*)     Lymphocytes % 45.2 (*)     All other components within normal limits   MANUAL DIFFERENTIAL - Abnormal; Notable for the following components:    Segmented Neutrophils, Man % 48 (*)     Lymphocytes, Man % 44 (*)     All other components within normal limits   URINALYSIS, MICROSCOPIC - Abnormal; Notable for the following components:    WBC, UA 11-15 (*)     Bacteria, UA Many (*)     Squamous Epithelial Cells, UA Many (*)     All other components within normal limits   TROPONIN I - Normal   SARS-COV2 (COVID) W/ FLU ANTIGEN - Normal    Narrative:     Negative SARS-CoV results should not be used as the sole basis for treatment or patient management decisions; negative results should be considered in the context of a patient's recent exposures, history and the presene of clinical signs and symptoms consistent with COVID-19.  Negative results should be treated as presumptive and confirmed by molecular assay, if necessary for patient management.   CULTURE, URINE   CBC W/ AUTO DIFFERENTIAL    Narrative:     The following orders were created for panel order CBC auto differential.  Procedure                               Abnormality         Status                     ---------                               -----------         ------                     CBC with Differential[[id number]]        Abnormal            Final result               Manual Differential[[id number]]          Abnormal            Final result                 Please view results for these tests on the individual orders.          Imaging Results              X-Ray Chest AP Portable (Final result)  Result time 08/06/23 18:55:42      Final result by Chester Parson II, MD (08/06/23 18:55:42)                   Impression:      Findings suggest mild cardiac decompensation and / or  pneumonitis.      Electronically signed by: Chester Parson  Date:    08/06/2023  Time:    18:55               Narrative:    EXAMINATION:  XR CHEST AP PORTABLE    CLINICAL HISTORY:  Altered mental status, unspecified    COMPARISON:  22 May 2023    TECHNIQUE:  XR CHEST AP PORTABLE    FINDINGS:  The heart and mediastinum are stable in size and configuration.  The pulmonary vascularity is slightly increased with bilateral increased interstitial lung density.  No other lung infiltrates, effusions, pneumothorax or other abnormality is demonstrated.                                       CT Head Without Contrast (Final result)  Result time 08/06/23 18:54:40      Final result by Chester Parson II, MD (08/06/23 18:54:40)                   Impression:      No evidence of abnormality demonstrated.      Electronically signed by: Chester Parson  Date:    08/06/2023  Time:    18:54               Narrative:    EXAMINATION:  CT HEAD WITHOUT CONTRAST    CLINICAL HISTORY:  Mental status change, unknown cause;Altered mental status, nontraumatic (Ped 0-18y);    TECHNIQUE:  Axial CT imaging of the brain is performed without contrast with 3 mm increments.    CT dose reduction technique used - Dose Rite and tube current modulation.    COMPARISON:  25 October 2022    FINDINGS:  No evidence of hemorrhage, mass, mass effect, midline shift or acute infarct seen.  The brain parenchyma attenuation and differentiation appears within normal limits. The ventricles and cisterns are normal in caliber.  No cranial or skull base abnormality is identified.                                       Medications   ciprofloxacin (CIPRO)400mg/200ml D5W IVPB 400 mg (has no administration in time range)   lactated ringers bolus 500 mL (has no administration in time range)     Medical Decision Making:   Initial Assessment:   Patient comes in with confusion.  History of urinary tract infections.  No coughing no fever.  Differential Diagnosis:   Altered mental  status  urinary tract infection                         Clinical Impression:   Final diagnoses:  [R41.82] Altered mental status  [N39.0] Urinary tract infection without hematuria, site unspecified (Primary)        ED Disposition Condition    Discharge Stable          ED Prescriptions       Medication Sig Dispense Start Date End Date Auth. Provider    ciprofloxacin HCl (CIPRO) 500 MG tablet Take 1 tablet (500 mg total) by mouth 2 (two) times daily. for 10 days 20 tablet 8/6/2023 8/16/2023 Anmol Reese,           Follow-up Information    None          Anmol Reese,   08/06/23 2020

## 2023-08-09 LAB — UA COMPLETE W REFLEX CULTURE PNL UR: NORMAL

## 2023-09-28 ENCOUNTER — OFFICE VISIT (OUTPATIENT)
Dept: DERMATOLOGY | Facility: CLINIC | Age: 88
End: 2023-09-28
Payer: MEDICARE

## 2023-09-28 DIAGNOSIS — Z85.820 HISTORY OF MELANOMA: ICD-10-CM

## 2023-09-28 DIAGNOSIS — L82.1 SEBORRHEIC KERATOSES: ICD-10-CM

## 2023-09-28 DIAGNOSIS — L57.0 ACTINIC KERATOSES: ICD-10-CM

## 2023-09-28 DIAGNOSIS — L57.8 OTHER SKIN CHANGES DUE TO CHRONIC EXPOSURE TO NONIONIZING RADIATION: Primary | ICD-10-CM

## 2023-09-28 PROCEDURE — 1159F MED LIST DOCD IN RCRD: CPT | Mod: CPTII,,, | Performed by: DERMATOLOGY

## 2023-09-28 PROCEDURE — 1160F PR REVIEW ALL MEDS BY PRESCRIBER/CLIN PHARMACIST DOCUMENTED: ICD-10-PCS | Mod: CPTII,,, | Performed by: DERMATOLOGY

## 2023-09-28 PROCEDURE — 1159F PR MEDICATION LIST DOCUMENTED IN MEDICAL RECORD: ICD-10-PCS | Mod: CPTII,,, | Performed by: DERMATOLOGY

## 2023-09-28 PROCEDURE — 99213 OFFICE O/P EST LOW 20 MIN: CPT | Mod: 25,,, | Performed by: DERMATOLOGY

## 2023-09-28 PROCEDURE — 1160F RVW MEDS BY RX/DR IN RCRD: CPT | Mod: CPTII,,, | Performed by: DERMATOLOGY

## 2023-09-28 PROCEDURE — 17003 DESTRUCTION, PREMALIGNANT LESIONS; SECOND THROUGH 14 LESIONS: ICD-10-PCS | Mod: ,,, | Performed by: DERMATOLOGY

## 2023-09-28 PROCEDURE — 17003 DESTRUCT PREMALG LES 2-14: CPT | Mod: ,,, | Performed by: DERMATOLOGY

## 2023-09-28 PROCEDURE — 17000 PR DESTRUCTION(LASER SURGERY,CRYOSURGERY,CHEMOSURGERY),PREMALIGNANT LESIONS,FIRST LESION: ICD-10-PCS | Mod: ,,, | Performed by: DERMATOLOGY

## 2023-09-28 PROCEDURE — 17000 DESTRUCT PREMALG LESION: CPT | Mod: ,,, | Performed by: DERMATOLOGY

## 2023-09-28 PROCEDURE — 99213 PR OFFICE/OUTPT VISIT, EST, LEVL III, 20-29 MIN: ICD-10-PCS | Mod: 25,,, | Performed by: DERMATOLOGY

## 2023-09-28 NOTE — PROGRESS NOTES
[facility]   Kiara Silva MD    Patient Name: Pilar Reardon  Patient YOB: 1932   Date of Service: 9/28/23    CC: Full Skin Exam    HPI: Pilar Reardon is a 89 y.o. female presents today for a full skin exam.  Patient was last seen 09/29/2022 and dermatologic history includes Aks and family hx of melanoma. Patient is concerned today about a lesions located on the left cheek.  It has been present for 1 year(s). It has not been treated in the past.      Past Medical History:   Diagnosis Date    Actinic keratoses     Arthritis     CAD (coronary artery disease)     pt has and old heart stent    COPD (chronic obstructive pulmonary disease)     Frequent UTI     GERD (gastroesophageal reflux disease)     High cholesterol     HTN (hypertension)     Leukemia, unspecified     just dx within the past year     Past Surgical History:   Procedure Laterality Date    APPENDECTOMY      heart stent      HYSTERECTOMY      pessary       Review of patient's allergies indicates:   Allergen Reactions    Bactrim [sulfamethoxazole-trimethoprim]     Ceftin [cefuroxime axetil]     Iodinated contrast media Other (See Comments)       Current Outpatient Medications:     acetaZOLAMIDE (DIAMOX) 250 MG tablet, Take 250 mg by mouth 2 (two) times daily., Disp: , Rfl:     amLODIPine (NORVASC) 5 MG tablet, Take 5 mg by mouth once daily., Disp: , Rfl:     aspirin (ECOTRIN) 81 MG EC tablet, Take 81 mg by mouth once daily., Disp: , Rfl:     benzonatate (TESSALON) 100 MG capsule, Take 100 mg by mouth 3 (three) times daily as needed for Cough., Disp: , Rfl:     cloNIDine (CATAPRES) 0.1 MG tablet, Take 0.1 mg by mouth 2 (two) times daily as needed. For systolic blood pressure  >170, Disp: , Rfl:     cyanocobalamin 1,000 mcg/mL injection, Inject 1,000 mcg into the muscle every 14 (fourteen) days. Next dose due this week, Disp: , Rfl:     doxazosin (CARDURA) 2 MG tablet, Take 2 mg by mouth every 12 (twelve) hours., Disp: , Rfl:      isosorbide mononitrate (IMDUR) 30 MG 24 hr tablet, Take 30 mg by mouth once daily., Disp: , Rfl:     lactulose (CEPHULAC) 20 gram Pack, Take 20 g by mouth every 6 (six) hours as needed., Disp: , Rfl:     levothyroxine (SYNTHROID) 50 MCG tablet, Take 50 mcg by mouth before breakfast., Disp: , Rfl:     losartan (COZAAR) 100 MG tablet, Take 50 mg by mouth 2 (two) times daily., Disp: , Rfl:     meloxicam (MOBIC) 7.5 MG tablet, Take 7.5 mg by mouth daily as needed for Pain., Disp: , Rfl:     montelukast (SINGULAIR) 10 mg tablet, Take by mouth every evening., Disp: , Rfl:     NIFEdipine (ADALAT CC) 30 MG TbSR, Take 30 mg by mouth nightly., Disp: , Rfl:     nitrofurantoin (MACRODANTIN) 100 MG capsule, Take 100 mg by mouth once daily., Disp: , Rfl:     omeprazole (PRILOSEC) 40 MG capsule, Take 40 mg by mouth every morning., Disp: , Rfl:     propranoloL (INDERAL) 10 MG tablet, Take 10 mg by mouth 3 (three) times daily., Disp: , Rfl:     rOPINIRole (REQUIP) 0.5 MG tablet, Take 0.5 mg by mouth nightly as needed., Disp: , Rfl:     rosuvastatin (CRESTOR) 10 MG tablet, Take 10 mg by mouth every evening., Disp: , Rfl:     sertraline (ZOLOFT) 25 MG tablet, Take 25 mg by mouth once daily., Disp: , Rfl:     theophylline (THEODUR) 300 mg 24 hr capsule, Take 150 mg by mouth once daily., Disp: , Rfl:     tiotropium Br/olodaterol HCl (STIOLTO RESPIMAT INHL), Inhale 2 puffs into the lungs once daily., Disp: , Rfl:     travoprost (TRAVATAN Z) 0.004 % ophthalmic solution, Place 1 drop into both eyes every evening., Disp: , Rfl:     vitamin D (VITAMIN D3) 1000 units Tab, Take 5,000 Units by mouth once daily., Disp: , Rfl:     ROS: A focused review of systems was obtained and negative.     Exam: A full skin exam was performed including scalp, hair, face, neck, chest, back, abdomen, right arm, left arm, right hand, left hand, nails, right leg, and left leg.  All areas examined were normal expect as per below in assessment and  plan.  General Appearance of the patient is well developed and well nourished.  Orientation: alert and oriented x 3.  Mood and affect: pleasant.    Assessment:   The primary encounter diagnosis was Other skin changes due to chronic exposure to nonionizing radiation. Diagnoses of Actinic keratoses, Seborrheic keratoses, and History of melanoma were also pertinent to this visit.    Plan:      Other Skin Changes Due to Chronic Exposure of Nonionizing Radiation (L57.8)    Plan: Monitoring.     Plan: Sunscreen Recommendations.  I recommended a broad spectrum sunscreen with a SPF of 30 or higher. I explained that SPF 30 sunscreens block approximately 97 percent of the  sun's harmful rays. Sunscreens should be applied at least 15 minutes prior to expected sun exposure and then every 2 hours after that as long as  sun exposure continues. If swimming or exercising sunscreen should be reapplied every 45 minutes to an hour after getting wet or sweating. One  ounce, or the equivalent of a shot glass full of sunscreen, is adequate to protect the skin not covered by a bathing suit. I also recommended a lip  balm with a sunscreen as well. Sun protective clothing can be used in lieu of sunscreen but must be worn the entire time you are exposed to the  sun's rays.    Seborrheic Keratosis (L82.1)  - Stuck-on, warty, greasy brown papule with pseudo-horn cysts scattered on the trunk and extremities    Plan: Counseling.  I counseled the patient regarding the following:  Skin Care: Seborrheic Keratoses are benign. No treatment is necessary.  Expectations: Seborrheic Keratoses are benign warty growths. Patients get more of them as they age    Plan: Reassurance    History of malignant melanoma  - well healed scar with NER  Associated diagnosis: Medical surveillance following completed treatment    Plan: Counseling  I counseled the patient regarding the following:  Skin Care: Patients with a history of melanoma should wear broad spectrum  sunscreen and sun protective clothing.  Expectations: Scars from excisional sites of melanoma should be monitored for any recurrences. Monthly self-skin checks should be performed to monitor for any moles that change in size, shape or color, itch burn or bleed.  Contact Office if: Patient notices any new or changing moles, develops constitutional symptoms or develops new lesions within or around the previous melanoma scar.    Actinic Keratoses(L57.0)  - Erythematous patches and papules with hyperkeratotic scale distributed on the left wrist and face.    Plan: Counseling.  I counseled the patient regarding the following:  Skin Care: Sun protective clothing and broad spectrum sunscreen can prevent the formation of Actinic  Keratoses. AKs can resolve with cryotherapy, photodynamic therapy, imiquimod, topical 5-FU.  Expectations: Actinic Keratoses are precancerous proliferations that occur within sun damaged skin. If untreated,  a small subset of AKs can develop into Squamous Cell Carcinoma.  Contact Office if: If AKs fail to resolve despite treatment, or if you develop a side effect from therapy, such as  unbearable crusting, scabbing, redness and tenderness.    Plan: Liquid Nitrogen.  A total of 7 lesions were treated with liquid nitrogen for 2 freeze-thaw cycles lasting 5 seconds, located on the above locations.   The patient's consent was obtained including but not limited to risks of crusting, scabbing,  blistering, scarring, darker or lighter pigmentary change, recurrence, incomplete removal and infection.        Follow up in about 1 year (around 9/28/2024).    Kiara Silva MD

## 2024-03-25 ENCOUNTER — HOSPITAL ENCOUNTER (INPATIENT)
Facility: HOSPITAL | Age: 89
LOS: 1 days | Discharge: HOME HEALTH | DRG: 690 | End: 2024-03-27
Attending: FAMILY MEDICINE | Admitting: FAMILY MEDICINE
Payer: MEDICARE

## 2024-03-25 DIAGNOSIS — N39.0 URINARY TRACT INFECTION WITHOUT HEMATURIA, SITE UNSPECIFIED: Primary | ICD-10-CM

## 2024-03-25 DIAGNOSIS — N39.0 UTI (URINARY TRACT INFECTION): ICD-10-CM

## 2024-03-25 DIAGNOSIS — R53.1 WEAKNESS: ICD-10-CM

## 2024-03-25 LAB
ALBUMIN SERPL BCP-MCNC: 3.3 G/DL (ref 3.5–5)
ALBUMIN/GLOB SERPL: 1.1 {RATIO}
ALP SERPL-CCNC: 87 U/L (ref 55–142)
ALT SERPL W P-5'-P-CCNC: 26 U/L (ref 13–56)
ANION GAP SERPL CALCULATED.3IONS-SCNC: 8 MMOL/L (ref 7–16)
ANISOCYTOSIS BLD QL SMEAR: ABNORMAL
AST SERPL W P-5'-P-CCNC: 11 U/L (ref 15–37)
BACTERIA #/AREA URNS HPF: ABNORMAL /HPF
BASOPHILS # BLD AUTO: 0.02 K/UL (ref 0–0.2)
BASOPHILS NFR BLD AUTO: 0.2 % (ref 0–1)
BILIRUB SERPL-MCNC: 0.3 MG/DL (ref ?–1.2)
BILIRUB UR QL STRIP: NEGATIVE
BUN SERPL-MCNC: 22 MG/DL (ref 7–18)
BUN/CREAT SERPL: 25 (ref 6–20)
CALCIUM SERPL-MCNC: 8.7 MG/DL (ref 8.5–10.1)
CHLORIDE SERPL-SCNC: 109 MMOL/L (ref 98–107)
CLARITY UR: CLEAR
CO2 SERPL-SCNC: 34 MMOL/L (ref 21–32)
COLOR UR: YELLOW
CREAT SERPL-MCNC: 0.88 MG/DL (ref 0.55–1.02)
DIFFERENTIAL METHOD BLD: ABNORMAL
EGFR (NO RACE VARIABLE) (RUSH/TITUS): 62 ML/MIN/1.73M2
EOSINOPHIL # BLD AUTO: 0.19 K/UL (ref 0–0.5)
EOSINOPHIL NFR BLD AUTO: 1.9 % (ref 1–4)
ERYTHROCYTE [DISTWIDTH] IN BLOOD BY AUTOMATED COUNT: 14 % (ref 11.5–14.5)
GLOBULIN SER-MCNC: 3 G/DL (ref 2–4)
GLUCOSE SERPL-MCNC: 134 MG/DL (ref 74–106)
GLUCOSE UR STRIP-MCNC: NEGATIVE MG/DL
HCT VFR BLD AUTO: 39.9 % (ref 38–47)
HGB BLD-MCNC: 11.4 G/DL (ref 12–16)
HYPOCHROMIA BLD QL SMEAR: ABNORMAL
KETONES UR STRIP-SCNC: NEGATIVE MG/DL
LEUKOCYTE ESTERASE UR QL STRIP: ABNORMAL
LYMPHOCYTES # BLD AUTO: 4.86 K/UL (ref 1–4.8)
LYMPHOCYTES NFR BLD AUTO: 48.5 % (ref 27–41)
LYMPHOCYTES NFR BLD MANUAL: 49 % (ref 27–41)
MCH RBC QN AUTO: 29.2 PG (ref 27–31)
MCHC RBC AUTO-ENTMCNC: 28.6 G/DL (ref 32–36)
MCV RBC AUTO: 102.3 FL (ref 80–96)
MICROCYTES BLD QL SMEAR: ABNORMAL
MONOCYTES # BLD AUTO: 0.27 K/UL (ref 0–0.8)
MONOCYTES NFR BLD AUTO: 2.7 % (ref 2–6)
MONOCYTES NFR BLD MANUAL: 2 % (ref 2–6)
MPC BLD CALC-MCNC: 10.4 FL (ref 9.4–12.4)
NEUTROPHILS # BLD AUTO: 4.68 K/UL (ref 1.8–7.7)
NEUTROPHILS NFR BLD AUTO: 46.7 % (ref 53–65)
NEUTS SEG NFR BLD MANUAL: 49 % (ref 50–62)
NITRITE UR QL STRIP: NEGATIVE
NRBC BLD MANUAL-RTO: ABNORMAL %
NT-PROBNP SERPL-MCNC: 1153 PG/ML (ref 1–450)
PH UR STRIP: 7 PH UNITS
PLATELET # BLD AUTO: 165 K/UL (ref 150–400)
PLATELET MORPHOLOGY: NORMAL
POTASSIUM SERPL-SCNC: 3.9 MMOL/L (ref 3.5–5.1)
PROT SERPL-MCNC: 6.3 G/DL (ref 6.4–8.2)
PROT UR QL STRIP: NEGATIVE
RBC # BLD AUTO: 3.9 M/UL (ref 4.2–5.4)
RBC # UR STRIP: NEGATIVE /UL
RBC #/AREA URNS HPF: ABNORMAL /HPF
SODIUM SERPL-SCNC: 147 MMOL/L (ref 136–145)
SP GR UR STRIP: 1.02
SQUAMOUS #/AREA URNS LPF: ABNORMAL /LPF
TROPONIN I SERPL DL<=0.01 NG/ML-MCNC: 11.4 PG/ML
UROBILINOGEN UR STRIP-ACNC: 0.2 MG/DL
WBC # BLD AUTO: 10.02 K/UL (ref 4.5–11)
WBC #/AREA URNS HPF: ABNORMAL /HPF

## 2024-03-25 PROCEDURE — 99223 1ST HOSP IP/OBS HIGH 75: CPT | Mod: AI,,, | Performed by: FAMILY MEDICINE

## 2024-03-25 PROCEDURE — 27000221 HC OXYGEN, UP TO 24 HOURS

## 2024-03-25 PROCEDURE — 80053 COMPREHEN METABOLIC PANEL: CPT | Performed by: FAMILY MEDICINE

## 2024-03-25 PROCEDURE — 96365 THER/PROPH/DIAG IV INF INIT: CPT

## 2024-03-25 PROCEDURE — 84484 ASSAY OF TROPONIN QUANT: CPT | Performed by: FAMILY MEDICINE

## 2024-03-25 PROCEDURE — 93010 ELECTROCARDIOGRAM REPORT: CPT | Performed by: FAMILY MEDICINE

## 2024-03-25 PROCEDURE — 99285 EMERGENCY DEPT VISIT HI MDM: CPT

## 2024-03-25 PROCEDURE — 85025 COMPLETE CBC W/AUTO DIFF WBC: CPT | Performed by: FAMILY MEDICINE

## 2024-03-25 PROCEDURE — 96361 HYDRATE IV INFUSION ADD-ON: CPT

## 2024-03-25 PROCEDURE — 94761 N-INVAS EAR/PLS OXIMETRY MLT: CPT

## 2024-03-25 PROCEDURE — G0378 HOSPITAL OBSERVATION PER HR: HCPCS

## 2024-03-25 PROCEDURE — 87086 URINE CULTURE/COLONY COUNT: CPT | Performed by: FAMILY MEDICINE

## 2024-03-25 PROCEDURE — 81003 URINALYSIS AUTO W/O SCOPE: CPT | Performed by: FAMILY MEDICINE

## 2024-03-25 PROCEDURE — 99285 EMERGENCY DEPT VISIT HI MDM: CPT | Mod: 25

## 2024-03-25 PROCEDURE — 83880 ASSAY OF NATRIURETIC PEPTIDE: CPT | Performed by: FAMILY MEDICINE

## 2024-03-25 PROCEDURE — 25000003 PHARM REV CODE 250: Performed by: FAMILY MEDICINE

## 2024-03-25 PROCEDURE — 93005 ELECTROCARDIOGRAM TRACING: CPT

## 2024-03-25 PROCEDURE — 99900035 HC TECH TIME PER 15 MIN (STAT)

## 2024-03-25 PROCEDURE — 63600175 PHARM REV CODE 636 W HCPCS: Performed by: FAMILY MEDICINE

## 2024-03-25 RX ORDER — MONTELUKAST SODIUM 10 MG/1
10 TABLET ORAL NIGHTLY
Status: DISCONTINUED | OUTPATIENT
Start: 2024-03-25 | End: 2024-03-27 | Stop reason: HOSPADM

## 2024-03-25 RX ORDER — IPRATROPIUM BROMIDE AND ALBUTEROL SULFATE 2.5; .5 MG/3ML; MG/3ML
3 SOLUTION RESPIRATORY (INHALATION) EVERY 6 HOURS
Status: DISCONTINUED | OUTPATIENT
Start: 2024-03-26 | End: 2024-03-26

## 2024-03-25 RX ORDER — DORZOLAMIDE HYDROCHLORIDE AND TIMOLOL MALEATE 20; 5 MG/ML; MG/ML
1 SOLUTION/ DROPS OPHTHALMIC 2 TIMES DAILY
Status: DISCONTINUED | OUTPATIENT
Start: 2024-03-25 | End: 2024-03-27 | Stop reason: HOSPADM

## 2024-03-25 RX ORDER — FOLIC ACID 1 MG/1
1000 TABLET ORAL DAILY
Status: DISCONTINUED | OUTPATIENT
Start: 2024-03-26 | End: 2024-03-27 | Stop reason: HOSPADM

## 2024-03-25 RX ORDER — ACETAZOLAMIDE 250 MG/1
250 TABLET ORAL 2 TIMES DAILY
Status: DISCONTINUED | OUTPATIENT
Start: 2024-03-25 | End: 2024-03-25

## 2024-03-25 RX ORDER — BUDESONIDE 0.5 MG/2ML
0.5 INHALANT ORAL EVERY 12 HOURS
Status: DISCONTINUED | OUTPATIENT
Start: 2024-03-26 | End: 2024-03-27 | Stop reason: HOSPADM

## 2024-03-25 RX ORDER — MELOXICAM 7.5 MG/1
7.5 TABLET ORAL DAILY PRN
Status: DISCONTINUED | OUTPATIENT
Start: 2024-03-25 | End: 2024-03-27 | Stop reason: HOSPADM

## 2024-03-25 RX ORDER — BENZONATATE 100 MG/1
100 CAPSULE ORAL 3 TIMES DAILY PRN
Status: DISCONTINUED | OUTPATIENT
Start: 2024-03-25 | End: 2024-03-27 | Stop reason: HOSPADM

## 2024-03-25 RX ORDER — ASPIRIN 81 MG/1
81 TABLET ORAL DAILY
Status: DISCONTINUED | OUTPATIENT
Start: 2024-03-26 | End: 2024-03-27 | Stop reason: HOSPADM

## 2024-03-25 RX ORDER — TRAVOPROST OPHTHALMIC SOLUTION 0.04 MG/ML
1 SOLUTION OPHTHALMIC NIGHTLY
Status: DISCONTINUED | OUTPATIENT
Start: 2024-03-25 | End: 2024-03-27 | Stop reason: HOSPADM

## 2024-03-25 RX ORDER — ISOSORBIDE MONONITRATE 30 MG/1
30 TABLET, EXTENDED RELEASE ORAL DAILY
Status: DISCONTINUED | OUTPATIENT
Start: 2024-03-26 | End: 2024-03-27 | Stop reason: HOSPADM

## 2024-03-25 RX ORDER — DOXAZOSIN 1 MG/1
2 TABLET ORAL EVERY 12 HOURS
Status: DISCONTINUED | OUTPATIENT
Start: 2024-03-25 | End: 2024-03-25

## 2024-03-25 RX ORDER — PANTOPRAZOLE SODIUM 40 MG/1
40 TABLET, DELAYED RELEASE ORAL DAILY
Status: DISCONTINUED | OUTPATIENT
Start: 2024-03-26 | End: 2024-03-27 | Stop reason: HOSPADM

## 2024-03-25 RX ORDER — REVEFENACIN 175 UG/3ML
SOLUTION RESPIRATORY (INHALATION)
COMMUNITY

## 2024-03-25 RX ORDER — PRIMIDONE 50 MG/1
50 TABLET ORAL 2 TIMES DAILY
Status: DISCONTINUED | OUTPATIENT
Start: 2024-03-25 | End: 2024-03-27 | Stop reason: HOSPADM

## 2024-03-25 RX ORDER — CYANOCOBALAMIN 1000 UG/ML
1000 INJECTION, SOLUTION INTRAMUSCULAR; SUBCUTANEOUS
Status: DISCONTINUED | OUTPATIENT
Start: 2024-03-26 | End: 2024-03-27 | Stop reason: HOSPADM

## 2024-03-25 RX ORDER — ACETAMINOPHEN 500 MG
5000 TABLET ORAL DAILY
Status: DISCONTINUED | OUTPATIENT
Start: 2024-03-26 | End: 2024-03-27 | Stop reason: HOSPADM

## 2024-03-25 RX ORDER — PROPRANOLOL HYDROCHLORIDE 10 MG/1
10 TABLET ORAL 3 TIMES DAILY
Status: DISCONTINUED | OUTPATIENT
Start: 2024-03-25 | End: 2024-03-27 | Stop reason: HOSPADM

## 2024-03-25 RX ORDER — LACTULOSE 10 G/15ML
20 SOLUTION ORAL EVERY 6 HOURS PRN
Status: DISCONTINUED | OUTPATIENT
Start: 2024-03-25 | End: 2024-03-27 | Stop reason: HOSPADM

## 2024-03-25 RX ORDER — LEVOFLOXACIN 5 MG/ML
500 INJECTION, SOLUTION INTRAVENOUS
Status: DISCONTINUED | OUTPATIENT
Start: 2024-03-25 | End: 2024-03-27 | Stop reason: HOSPADM

## 2024-03-25 RX ORDER — PRIMIDONE 50 MG/1
TABLET ORAL 2 TIMES DAILY
COMMUNITY

## 2024-03-25 RX ORDER — ATORVASTATIN CALCIUM 10 MG/1
10 TABLET, FILM COATED ORAL DAILY
Status: DISCONTINUED | OUTPATIENT
Start: 2024-03-26 | End: 2024-03-27 | Stop reason: HOSPADM

## 2024-03-25 RX ORDER — LEVOTHYROXINE SODIUM 50 UG/1
50 TABLET ORAL
Status: DISCONTINUED | OUTPATIENT
Start: 2024-03-26 | End: 2024-03-27 | Stop reason: HOSPADM

## 2024-03-25 RX ORDER — LOSARTAN POTASSIUM 50 MG/1
50 TABLET ORAL 2 TIMES DAILY
Status: DISCONTINUED | OUTPATIENT
Start: 2024-03-25 | End: 2024-03-27 | Stop reason: HOSPADM

## 2024-03-25 RX ORDER — ROPINIROLE 0.25 MG/1
0.5 TABLET, FILM COATED ORAL NIGHTLY PRN
Status: DISCONTINUED | OUTPATIENT
Start: 2024-03-25 | End: 2024-03-27 | Stop reason: HOSPADM

## 2024-03-25 RX ORDER — SERTRALINE HYDROCHLORIDE 25 MG/1
25 TABLET, FILM COATED ORAL DAILY
Status: DISCONTINUED | OUTPATIENT
Start: 2024-03-26 | End: 2024-03-27 | Stop reason: HOSPADM

## 2024-03-25 RX ORDER — FOLIC ACID 0.8 MG
800 TABLET ORAL DAILY
COMMUNITY

## 2024-03-25 RX ORDER — AMLODIPINE BESYLATE 5 MG/1
5 TABLET ORAL DAILY
Status: DISCONTINUED | OUTPATIENT
Start: 2024-03-26 | End: 2024-03-27 | Stop reason: HOSPADM

## 2024-03-25 RX ORDER — CLONIDINE HYDROCHLORIDE 0.1 MG/1
0.1 TABLET ORAL 2 TIMES DAILY PRN
Status: DISCONTINUED | OUTPATIENT
Start: 2024-03-25 | End: 2024-03-27 | Stop reason: HOSPADM

## 2024-03-25 RX ORDER — NIFEDIPINE 30 MG/1
30 TABLET, EXTENDED RELEASE ORAL DAILY
Status: DISCONTINUED | OUTPATIENT
Start: 2024-03-26 | End: 2024-03-25

## 2024-03-25 RX ADMIN — SODIUM CHLORIDE 500 ML: 9 INJECTION, SOLUTION INTRAVENOUS at 02:03

## 2024-03-25 RX ADMIN — LEVOFLOXACIN 500 MG: 500 INJECTION, SOLUTION INTRAVENOUS at 05:03

## 2024-03-25 RX ADMIN — MONTELUKAST 10 MG: 10 TABLET, FILM COATED ORAL at 09:03

## 2024-03-25 RX ADMIN — PRIMIDONE 50 MG: 50 TABLET ORAL at 09:03

## 2024-03-25 RX ADMIN — LOSARTAN POTASSIUM 50 MG: 50 TABLET, FILM COATED ORAL at 09:03

## 2024-03-25 RX ADMIN — DORZOLAMIDE HYDROCHLORIDE AND TIMOLOL MALEATE 1 DROP: 20; 5 SOLUTION/ DROPS OPHTHALMIC at 10:03

## 2024-03-25 RX ADMIN — PROPRANOLOL HYDROCHLORIDE 10 MG: 10 TABLET ORAL at 09:03

## 2024-03-25 RX ADMIN — TRAVOPROST OPHTHALMIC SOLUTION 1 DROP: 0.04 SOLUTION OPHTHALMIC at 09:03

## 2024-03-25 NOTE — ED TRIAGE NOTES
Brought in by family with c/o weakness and generalized not feeling well x 2 weeks but worsened yesterday. +nausea + cough no appetite. Family states she saw dr oconnor last week and he was concerned about her losing wt. She states she just does not feel good and has almost passed out several times in past few weeks

## 2024-03-25 NOTE — NURSING
IV abx infusing per provider order. Family in room noted. Pt eating supper. Call bell within reach; bed lowest position and locked.

## 2024-03-25 NOTE — NURSING
Pt home meds in med room. Pt daughter stated already went over in ED. Told daughter to bring pt home meds.

## 2024-03-25 NOTE — NURSING
Skin assessment completed on admission from [facility] ED with ELOISE Quinones. Sacral pink but no skin breakdown noted. Teaching done on importance of turning. Pt stated she understood.     Pt on a low air loss mattress.

## 2024-03-25 NOTE — PLAN OF CARE
Problem: Adult Inpatient Plan of Care  Goal: Plan of Care Review  Outcome: Ongoing, Progressing  Goal: Patient-Specific Goal (Individualized)  Outcome: Ongoing, Progressing  Goal: Absence of Hospital-Acquired Illness or Injury  Outcome: Ongoing, Progressing  Goal: Optimal Comfort and Wellbeing  Outcome: Ongoing, Progressing  Goal: Readiness for Transition of Care  Outcome: Ongoing, Progressing     Problem: Fall Injury Risk  Goal: Absence of Fall and Fall-Related Injury  Outcome: Ongoing, Progressing     Problem: UTI (Urinary Tract Infection)  Goal: Improved Infection Symptoms  Outcome: Ongoing, Progressing     Problem: Electrolyte Imbalance  Goal: Electrolyte Balance  Outcome: Ongoing, Progressing

## 2024-03-25 NOTE — ED PROVIDER NOTES
Encounter Date: 3/25/2024       History     Chief Complaint   Patient presents with    Weakness    Loss of Consciousness    Shortness of Breath     Patient comes in with weakness --loss of consciousness --off and on--off and on altered mental status.  History of urinary tract infection and shortness for breath with COPD and CHF.  Hypertension        Review of patient's allergies indicates:   Allergen Reactions    Bactrim [sulfamethoxazole-trimethoprim]     Ceftin [cefuroxime axetil]     Iodinated contrast media Other (See Comments)     Past Medical History:   Diagnosis Date    Actinic keratoses     Arthritis     CAD (coronary artery disease)     pt has and old heart stent    COPD (chronic obstructive pulmonary disease)     Frequent UTI     GERD (gastroesophageal reflux disease)     High cholesterol     HTN (hypertension)     Leukemia, unspecified     just dx within the past year     Past Surgical History:   Procedure Laterality Date    APPENDECTOMY      heart stent      HYSTERECTOMY      pessary       Family History   Problem Relation Age of Onset    Hypertension Daughter     Diabetes Daughter     Melanoma Daughter      Social History     Tobacco Use    Smoking status: Never    Smokeless tobacco: Never   Substance Use Topics    Alcohol use: Never    Drug use: Never     Review of Systems   Constitutional: Negative.  Negative for fever.   HENT: Negative.  Negative for sore throat.    Eyes: Negative.    Respiratory:  Positive for shortness of breath.    Cardiovascular: Negative.  Negative for chest pain.   Gastrointestinal: Negative.  Negative for nausea.        Nausea with decreased p.o. intake   Endocrine: Negative.    Genitourinary: Negative.  Negative for dysuria.   Musculoskeletal: Negative.  Negative for back pain.   Skin: Negative.  Negative for rash.   Allergic/Immunologic: Negative.    Neurological:  Positive for weakness.   Hematological: Negative.  Does not bruise/bleed easily.   Psychiatric/Behavioral:  Negative.     All other systems reviewed and are negative.      Physical Exam     Initial Vitals [03/25/24 1421]   BP Pulse Resp Temp SpO2   134/66 (!) 57 20 97.5 °F (36.4 °C) (!) 83 %      MAP       --         Physical Exam    Constitutional: She appears well-developed and well-nourished.   HENT:   Head: Normocephalic and atraumatic.   Right Ear: External ear normal.   Left Ear: External ear normal.   Nose: Nose normal.   Mouth/Throat: Oropharynx is clear and moist.   Eyes: Conjunctivae and EOM are normal. Pupils are equal, round, and reactive to light.   Neck: Neck supple.   Normal range of motion.  Cardiovascular:  Normal rate, regular rhythm, normal heart sounds and intact distal pulses.           Pulmonary/Chest: Breath sounds normal.   Abdominal: Abdomen is soft. Bowel sounds are normal.   Genitourinary:    Vagina and uterus normal.     Musculoskeletal:         General: Normal range of motion.      Cervical back: Normal range of motion and neck supple.      Comments: General weakness     Neurological: She is alert and oriented to person, place, and time. She has normal strength and normal reflexes.   Skin: Skin is warm. Capillary refill takes less than 2 seconds.   Psychiatric: She has a normal mood and affect. Her behavior is normal. Judgment and thought content normal.         Medical Screening Exam   See Full Note    ED Course   Procedures  Labs Reviewed   COMPREHENSIVE METABOLIC PANEL - Abnormal; Notable for the following components:       Result Value    Sodium 147 (*)     Chloride 109 (*)     CO2 34 (*)     Glucose 134 (*)     BUN 22 (*)     BUN/Creatinine Ratio 25 (*)     Total Protein 6.3 (*)     Albumin 3.3 (*)     AST 11 (*)     All other components within normal limits   URINALYSIS - Abnormal; Notable for the following components:    Leukocytes, UA Small (*)     All other components within normal limits   NT-PRO NATRIURETIC PEPTIDE - Abnormal; Notable for the following components:    ProBNP 1,153  (*)     All other components within normal limits   CBC WITH DIFFERENTIAL - Abnormal; Notable for the following components:    RBC 3.90 (*)     Hemoglobin 11.4 (*)     .3 (*)     MCHC 28.6 (*)     Neutrophils % 46.7 (*)     Lymphocytes % 48.5 (*)     Lymphocytes, Absolute 4.86 (*)     All other components within normal limits   MANUAL DIFFERENTIAL - Abnormal; Notable for the following components:    Segmented Neutrophils, Man % 49 (*)     Lymphocytes, Man % 49 (*)     All other components within normal limits   URINALYSIS, MICROSCOPIC - Abnormal; Notable for the following components:    WBC, UA 11-15 (*)     Squamous Epithelial Cells, UA Few (*)     All other components within normal limits   TROPONIN I - Normal   CULTURE, URINE   CBC W/ AUTO DIFFERENTIAL    Narrative:     The following orders were created for panel order CBC Auto Differential.  Procedure                               Abnormality         Status                     ---------                               -----------         ------                     CBC with Differential[[id number]]        Abnormal            Final result               Manual Differential[[id number]]         Abnormal            Final result                 Please view results for these tests on the individual orders.          Imaging Results              X-Ray Chest AP Portable (Final result)  Result time 03/25/24 14:53:17      Final result by Woo Murray DO (03/25/24 14:53:17)                   Impression:      Suspect CHF.  Underlying infection not excluded.    Point of Service: [facility]      Electronically signed by: Woo Murray  Date:    03/25/2024  Time:    14:53               Narrative:    EXAMINATION:  XR CHEST AP PORTABLE    CLINICAL HISTORY:  weakness;    COMPARISON:  Chest x-ray August 6, 2023    TECHNIQUE:  Frontal view/views of the chest.    FINDINGS:  Continued cardiomegaly.  Right lower lung opacification noted and pneumonia is not excluded.   However this appears somewhat chronic and may reflect scarring.  There is also some scarring of the left lower lung.  Similar diffuse bilateral interstitial prominence.  Suspect small bilateral pleural fluid.  Constellation of findings suspicious for CHF. Underlying infection not excluded.  Visualized osseous and surrounding soft tissue structures appear grossly unchanged.                                       Medications   levoFLOXacin 500 mg/100 mL IVPB 500 mg (0 mg Intravenous Stopped 3/25/24 1853)   amLODIPine tablet 5 mg (has no administration in time range)   aspirin EC tablet 81 mg (has no administration in time range)   benzonatate capsule 100 mg (has no administration in time range)   cloNIDine tablet 0.1 mg (has no administration in time range)   cyanocobalamin injection 1,000 mcg (has no administration in time range)   folic acid tablet 1,000 mcg (has no administration in time range)   isosorbide mononitrate 24 hr tablet 30 mg (has no administration in time range)   lactulose 20 gram/30 mL solution Soln 20 g (has no administration in time range)   levothyroxine tablet 50 mcg (has no administration in time range)   losartan tablet 50 mg (50 mg Oral Given 3/25/24 2153)   meloxicam tablet 7.5 mg (has no administration in time range)   montelukast tablet 10 mg (10 mg Oral Given 3/25/24 2153)   pantoprazole EC tablet 40 mg (has no administration in time range)   primidone tablet 50 mg (50 mg Oral Given 3/25/24 2153)   propranoloL tablet 10 mg (10 mg Oral Given 3/25/24 2153)   rOPINIRole tablet 0.5 mg (has no administration in time range)   atorvastatin tablet 10 mg (has no administration in time range)   sertraline tablet 25 mg (has no administration in time range)   travoprost 0.004 % ophthalmic solution 1 drop (1 drop Both Eyes Given 3/25/24 2145)   cholecalciferol (vitamin D3) 125 mcg (5,000 unit) tablet 5,000 Units (has no administration in time range)   albuterol-ipratropium 2.5 mg-0.5 mg/3 mL nebulizer  solution 3 mL (has no administration in time range)   budesonide nebulizer solution 0.5 mg (has no administration in time range)   dorzolamide-timolol 2-0.5% ophthalmic solution 1 drop (1 drop Right Eye Given 3/25/24 5555)   sodium chloride 0.9% bolus 500 mL 500 mL (0 mLs Intravenous Stopped 3/25/24 1547)     Medical Decision Making  Amount and/or Complexity of Data Reviewed  Labs: ordered. Decision-making details documented in ED Course.  Radiology: ordered.    Risk  OTC drugs.  Prescription drug management.               ED Course as of 03/26/24 0022   Mon Mar 25, 2024   1550 NT-Pro Natriuretic Peptide(!) [WB]      ED Course User Index  [WB] Anmol Reese DO               Medical Decision Making:   Initial Assessment:   Patient comes in with generalized weakness decreased appetite over the last 12:48 p.m. some shortness breath--on and off mental status change with poor p.o. intake  Differential Diagnosis:   Will admit elevated proBnp   ED Management:  Admit for observation             Clinical Impression:   Final diagnoses:  [R53.1] Weakness  [N39.0] Urinary tract infection without hematuria, site unspecified (Primary)        ED Disposition Condition    Observation Stable                Anmol Reese DO  03/26/24 0022

## 2024-03-25 NOTE — Clinical Note
Diagnosis: UTI (urinary tract infection) [809246]   Future Attending Provider: SERENA ERWIN [[id number]]   Requested Bed Type: Standard [1]

## 2024-03-26 PROBLEM — R53.81 PHYSICAL DECONDITIONING: Status: ACTIVE | Noted: 2024-03-26

## 2024-03-26 PROBLEM — N30.00 ACUTE CYSTITIS WITHOUT HEMATURIA: Status: ACTIVE | Noted: 2024-03-26

## 2024-03-26 PROBLEM — R63.0 DECREASED APPETITE: Status: ACTIVE | Noted: 2024-03-26

## 2024-03-26 LAB
ANION GAP SERPL CALCULATED.3IONS-SCNC: 8 MMOL/L (ref 7–16)
ANISOCYTOSIS BLD QL SMEAR: ABNORMAL
BASOPHILS # BLD AUTO: 0.02 K/UL (ref 0–0.2)
BASOPHILS NFR BLD AUTO: 0.2 % (ref 0–1)
BUN SERPL-MCNC: 19 MG/DL (ref 7–18)
BUN/CREAT SERPL: 26 (ref 6–20)
CALCIUM SERPL-MCNC: 8.2 MG/DL (ref 8.5–10.1)
CHLORIDE SERPL-SCNC: 109 MMOL/L (ref 98–107)
CO2 SERPL-SCNC: 34 MMOL/L (ref 21–32)
CREAT SERPL-MCNC: 0.73 MG/DL (ref 0.55–1.02)
DIFFERENTIAL METHOD BLD: ABNORMAL
EGFR (NO RACE VARIABLE) (RUSH/TITUS): 78 ML/MIN/1.73M2
EOSINOPHIL # BLD AUTO: 0.26 K/UL (ref 0–0.5)
EOSINOPHIL NFR BLD AUTO: 2.9 % (ref 1–4)
ERYTHROCYTE [DISTWIDTH] IN BLOOD BY AUTOMATED COUNT: 14.1 % (ref 11.5–14.5)
GLUCOSE SERPL-MCNC: 101 MG/DL (ref 74–106)
HCT VFR BLD AUTO: 39.9 % (ref 38–47)
HGB BLD-MCNC: 11.4 G/DL (ref 12–16)
HYPOCHROMIA BLD QL SMEAR: ABNORMAL
LYMPHOCYTES # BLD AUTO: 4.47 K/UL (ref 1–4.8)
LYMPHOCYTES NFR BLD AUTO: 50.7 % (ref 27–41)
LYMPHOCYTES NFR BLD MANUAL: 54 % (ref 27–41)
MCH RBC QN AUTO: 29.3 PG (ref 27–31)
MCHC RBC AUTO-ENTMCNC: 28.6 G/DL (ref 32–36)
MCV RBC AUTO: 102.6 FL (ref 80–96)
MONOCYTES # BLD AUTO: 0.59 K/UL (ref 0–0.8)
MONOCYTES NFR BLD AUTO: 6.7 % (ref 2–6)
MONOCYTES NFR BLD MANUAL: 4 % (ref 2–6)
MPC BLD CALC-MCNC: 10.7 FL (ref 9.4–12.4)
NEUTROPHILS # BLD AUTO: 3.48 K/UL (ref 1.8–7.7)
NEUTROPHILS NFR BLD AUTO: 39.5 % (ref 53–65)
NEUTS SEG NFR BLD MANUAL: 42 % (ref 50–62)
NRBC BLD MANUAL-RTO: ABNORMAL %
PLATELET # BLD AUTO: 159 K/UL (ref 150–400)
PLATELET MORPHOLOGY: NORMAL
POTASSIUM SERPL-SCNC: 4.3 MMOL/L (ref 3.5–5.1)
RBC # BLD AUTO: 3.89 M/UL (ref 4.2–5.4)
SODIUM SERPL-SCNC: 147 MMOL/L (ref 136–145)
WBC # BLD AUTO: 8.82 K/UL (ref 4.5–11)

## 2024-03-26 PROCEDURE — 94761 N-INVAS EAR/PLS OXIMETRY MLT: CPT

## 2024-03-26 PROCEDURE — 99900035 HC TECH TIME PER 15 MIN (STAT)

## 2024-03-26 PROCEDURE — 94640 AIRWAY INHALATION TREATMENT: CPT

## 2024-03-26 PROCEDURE — 99232 SBSQ HOSP IP/OBS MODERATE 35: CPT | Mod: ,,, | Performed by: FAMILY MEDICINE

## 2024-03-26 PROCEDURE — 27000958

## 2024-03-26 PROCEDURE — 25000003 PHARM REV CODE 250: Performed by: NURSE PRACTITIONER

## 2024-03-26 PROCEDURE — 85025 COMPLETE CBC W/AUTO DIFF WBC: CPT | Performed by: NURSE PRACTITIONER

## 2024-03-26 PROCEDURE — 63600175 PHARM REV CODE 636 W HCPCS: Performed by: NURSE PRACTITIONER

## 2024-03-26 PROCEDURE — 25000242 PHARM REV CODE 250 ALT 637 W/ HCPCS: Performed by: PHYSICIAN ASSISTANT

## 2024-03-26 PROCEDURE — 25000242 PHARM REV CODE 250 ALT 637 W/ HCPCS: Performed by: FAMILY MEDICINE

## 2024-03-26 PROCEDURE — 63600175 PHARM REV CODE 636 W HCPCS: Performed by: FAMILY MEDICINE

## 2024-03-26 PROCEDURE — 11000001 HC ACUTE MED/SURG PRIVATE ROOM

## 2024-03-26 PROCEDURE — 27000982 HC MATTRESS, MATRIX LAL RENTAL

## 2024-03-26 PROCEDURE — 80048 BASIC METABOLIC PNL TOTAL CA: CPT | Performed by: NURSE PRACTITIONER

## 2024-03-26 PROCEDURE — G0378 HOSPITAL OBSERVATION PER HR: HCPCS

## 2024-03-26 PROCEDURE — 25000003 PHARM REV CODE 250: Performed by: FAMILY MEDICINE

## 2024-03-26 PROCEDURE — 27000221 HC OXYGEN, UP TO 24 HOURS

## 2024-03-26 RX ORDER — ACETAZOLAMIDE 250 MG/1
250 TABLET ORAL 2 TIMES DAILY
Status: DISCONTINUED | OUTPATIENT
Start: 2024-03-26 | End: 2024-03-27 | Stop reason: HOSPADM

## 2024-03-26 RX ORDER — FUROSEMIDE 10 MG/ML
40 INJECTION INTRAMUSCULAR; INTRAVENOUS DAILY
Status: DISCONTINUED | OUTPATIENT
Start: 2024-03-26 | End: 2024-03-27 | Stop reason: HOSPADM

## 2024-03-26 RX ORDER — POTASSIUM CHLORIDE 750 MG/1
10 TABLET, EXTENDED RELEASE ORAL DAILY
Status: DISCONTINUED | OUTPATIENT
Start: 2024-03-26 | End: 2024-03-27 | Stop reason: HOSPADM

## 2024-03-26 RX ORDER — IPRATROPIUM BROMIDE AND ALBUTEROL SULFATE 2.5; .5 MG/3ML; MG/3ML
3 SOLUTION RESPIRATORY (INHALATION) 4 TIMES DAILY
Status: DISCONTINUED | OUTPATIENT
Start: 2024-03-26 | End: 2024-03-27 | Stop reason: HOSPADM

## 2024-03-26 RX ADMIN — DORZOLAMIDE HYDROCHLORIDE AND TIMOLOL MALEATE 1 DROP: 20; 5 SOLUTION/ DROPS OPHTHALMIC at 08:03

## 2024-03-26 RX ADMIN — PRIMIDONE 50 MG: 50 TABLET ORAL at 09:03

## 2024-03-26 RX ADMIN — PROPRANOLOL HYDROCHLORIDE 10 MG: 10 TABLET ORAL at 08:03

## 2024-03-26 RX ADMIN — LOSARTAN POTASSIUM 50 MG: 50 TABLET, FILM COATED ORAL at 09:03

## 2024-03-26 RX ADMIN — LEVOFLOXACIN 500 MG: 500 INJECTION, SOLUTION INTRAVENOUS at 05:03

## 2024-03-26 RX ADMIN — ATORVASTATIN CALCIUM 10 MG: 10 TABLET, FILM COATED ORAL at 09:03

## 2024-03-26 RX ADMIN — FOLIC ACID 1000 MCG: 1 TABLET ORAL at 09:03

## 2024-03-26 RX ADMIN — BUDESONIDE INHALATION 0.5 MG: 0.5 SUSPENSION RESPIRATORY (INHALATION) at 08:03

## 2024-03-26 RX ADMIN — ACETAZOLAMIDE 250 MG: 250 TABLET ORAL at 01:03

## 2024-03-26 RX ADMIN — DORZOLAMIDE HYDROCHLORIDE AND TIMOLOL MALEATE 1 DROP: 20; 5 SOLUTION/ DROPS OPHTHALMIC at 10:03

## 2024-03-26 RX ADMIN — SERTRALINE HYDROCHLORIDE 25 MG: 25 TABLET ORAL at 09:03

## 2024-03-26 RX ADMIN — ACETAZOLAMIDE 250 MG: 250 TABLET ORAL at 08:03

## 2024-03-26 RX ADMIN — PROPRANOLOL HYDROCHLORIDE 10 MG: 10 TABLET ORAL at 09:03

## 2024-03-26 RX ADMIN — PANTOPRAZOLE SODIUM 40 MG: 40 TABLET, DELAYED RELEASE ORAL at 09:03

## 2024-03-26 RX ADMIN — BUDESONIDE INHALATION 0.5 MG: 0.5 SUSPENSION RESPIRATORY (INHALATION) at 07:03

## 2024-03-26 RX ADMIN — IPRATROPIUM BROMIDE AND ALBUTEROL SULFATE 3 ML: .5; 3 SOLUTION RESPIRATORY (INHALATION) at 07:03

## 2024-03-26 RX ADMIN — ISOSORBIDE MONONITRATE 30 MG: 30 TABLET, EXTENDED RELEASE ORAL at 09:03

## 2024-03-26 RX ADMIN — AMLODIPINE BESYLATE 5 MG: 5 TABLET ORAL at 09:03

## 2024-03-26 RX ADMIN — ASPIRIN 81 MG: 81 TABLET, COATED ORAL at 09:03

## 2024-03-26 RX ADMIN — LOSARTAN POTASSIUM 50 MG: 50 TABLET, FILM COATED ORAL at 08:03

## 2024-03-26 RX ADMIN — FUROSEMIDE 40 MG: 10 INJECTION, SOLUTION INTRAMUSCULAR; INTRAVENOUS at 01:03

## 2024-03-26 RX ADMIN — TRAVOPROST OPHTHALMIC SOLUTION 1 DROP: 0.04 SOLUTION OPHTHALMIC at 08:03

## 2024-03-26 RX ADMIN — LEVOTHYROXINE SODIUM 50 MCG: 0.05 TABLET ORAL at 05:03

## 2024-03-26 RX ADMIN — MONTELUKAST 10 MG: 10 TABLET, FILM COATED ORAL at 08:03

## 2024-03-26 RX ADMIN — POTASSIUM CHLORIDE 10 MEQ: 750 TABLET, EXTENDED RELEASE ORAL at 01:03

## 2024-03-26 RX ADMIN — PRIMIDONE 50 MG: 50 TABLET ORAL at 08:03

## 2024-03-26 RX ADMIN — CHOLECALCIFEROL TAB 125 MCG (5000 UNIT) 5000 UNITS: 125 TAB at 09:03

## 2024-03-26 NOTE — PROGRESS NOTES
This Patient Home Medication has been initiated per LIP order. Thank You  Acetazolamide 250 mg po bid.

## 2024-03-26 NOTE — PROGRESS NOTES
[facility] - Medical Surgical Unit  Hospital Medicine  Progress Note    Patient Name: Pilar Reardon  MRN: [id number]  Patient Class: OP- Observation   Admission Date: 3/25/2024  Length of Stay: 0 days  Attending Physician: Anmol Reese DO  Primary Care Provider: Marilee Witt MD        Subjective:     Principal Problem:Acute cystitis without hematuria        HPI:  Patient comes in with her daughter.  Her daughter states she has a decreased p.o. intake.  She also has had some off and on confusion today.  No fever but increased generalized weakness.  Not eating--patient with COPD on a multitude of medication in has CHF.    Overview/Hospital Course:  3/26/24: Ms Reardon found lying in bed in good spirits. Daughter at bedside. Discussed labs. Daughter reports that patient has been eating pretty good since admission. Notes that appetite is much better now than it was at home. Daughter brought her a subway sandwich last night and she ate half of it as well as chips. Patient reports continued dyspnea on exertion which she notes is not abnormal for her, but does seem to be a little worse over the last 1-2 weeks. She is on chronic O2 and has not required increase in flow thus far. She is being treated with IV Levaquin for UTI. Cx pending. Labs reviewed and notable as follows: BNP 1153. Na 147, 11-15 WBC and rare bacteria on UA. CXR shows suspected CHF with small thomas pleral effusions, diffuse thomas interstitial prominence. Case discussed with Dr Reese via telemedicine consultation. Started on Lasix 40mg IVP daily as well as potassium supplement with hx of hypokalemia. Will monitor overnight for diuresis and improvement of sx. Nutritional consult pending. Otherwise, continue current POC.    Interval History:  Patient seen and examined. No acute events overnight. Lasix IVP and Potassium supplement started. Otherwise, continue current POC.     Review of Systems   Constitutional:  Positive for  appetite change and fatigue. Negative for chills, diaphoresis and fever.   HENT:  Negative for congestion and rhinorrhea.    Respiratory:  Positive for cough (chronic, unchanged) and shortness of breath (chronic, unchanged).    Gastrointestinal:  Negative for abdominal pain, constipation, diarrhea, nausea and vomiting.   Genitourinary:  Negative for dysuria, frequency and urgency.   Musculoskeletal:  Positive for arthralgias and gait problem.   Skin:  Negative for wound.   Neurological:  Positive for weakness (generalized). Negative for dizziness, syncope, light-headedness and headaches.   Psychiatric/Behavioral:  Negative for confusion. The patient is not nervous/anxious.    All other systems reviewed and are negative.    Objective:     Vital Signs (Most Recent):  Temp: 98 °F (36.7 °C) (03/26/24 0711)  Pulse: 60 (03/26/24 0730)  Resp: 18 (03/26/24 0730)  BP: (!) 149/46 (03/26/24 0711)  SpO2: 96 % (03/26/24 0730) Vital Signs (24h Range):  Temp:  [97.5 °F (36.4 °C)-98 °F (36.7 °C)] 98 °F (36.7 °C)  Pulse:  [54-94] 60  Resp:  [16-20] 18  SpO2:  [80 %-99 %] 96 %  BP: (100-150)/(43-77) 149/46     Weight: 55.3 kg (121 lb 14.6 oz)  Body mass index is 22.3 kg/m².    Intake/Output Summary (Last 24 hours) at 3/26/2024 1303  Last data filed at 3/25/2024 1609  Gross per 24 hour   Intake --   Output 70 ml   Net -70 ml         Physical Exam  Vitals and nursing note reviewed.   Constitutional:       General: She is not in acute distress.     Appearance: She is ill-appearing (chronically).   HENT:      Head: Normocephalic and atraumatic.      Nose: Nose normal.      Mouth/Throat:      Mouth: Mucous membranes are moist.      Pharynx: Oropharynx is clear.   Eyes:      General: No scleral icterus.     Extraocular Movements: Extraocular movements intact.      Conjunctiva/sclera: Conjunctivae normal.      Pupils: Pupils are equal, round, and reactive to light.   Cardiovascular:      Rate and Rhythm: Normal rate and regular rhythm.       Pulses: Normal pulses.      Heart sounds: Normal heart sounds.   Pulmonary:      Effort: Pulmonary effort is normal.      Breath sounds: Rales (bibasilar) present.   Abdominal:      General: Abdomen is flat. Bowel sounds are normal. There is no distension.      Palpations: Abdomen is soft.      Tenderness: There is no abdominal tenderness. There is no right CVA tenderness or left CVA tenderness.   Musculoskeletal:         General: Normal range of motion.      Cervical back: Normal range of motion and neck supple. No rigidity.      Right lower leg: No edema.      Left lower leg: No edema.   Lymphadenopathy:      Cervical: No cervical adenopathy.   Skin:     General: Skin is warm and dry.      Capillary Refill: Capillary refill takes less than 2 seconds.   Neurological:      General: No focal deficit present.      Mental Status: She is alert and oriented to person, place, and time. Mental status is at baseline.      Motor: Weakness (generalized) present.      Gait: Gait abnormal.   Psychiatric:         Mood and Affect: Mood normal.         Behavior: Behavior normal.         Thought Content: Thought content normal.         Judgment: Judgment normal.             Significant Labs: All pertinent labs within the past 24 hours have been reviewed.  Recent Lab Results         03/26/24  0813   03/25/24  1609   03/25/24  1445        Albumin/Globulin Ratio     1.1       Albumin     3.3       ALP     87       ALT     26       Anion Gap 8     8       Aniso 1+     1+       Appearance, UA   Clear         AST     11       Bacteria, UA   Rare         Baso # 0.02     0.02       Basophil % 0.2     0.2       Bilirubin (UA)   Negative         BILIRUBIN TOTAL     0.3       BUN 19     22       BUN/CREAT RATIO 26     25       Calcium 8.2     8.7       Chloride 109     109       CO2 34     34       Color, UA   Yellow         Creatinine 0.73     0.88       Differential Method Manual     Manual       eGFR 78     62       Eos # 0.26     0.19        Eos % 2.9     1.9       Globulin, Total     3.0       Glucose 101     134       Glucose, UA   Negative         Hematocrit 39.9     39.9       Hemoglobin 11.4     11.4       Hypo 1+     Few       Ketones, UA   Negative         Leukocyte Esterase, UA   Small         Lymph # 4.47     4.86       Lymph % 50.7     48.5        54     49       MCH 29.3     29.2       MCHC 28.6     28.6       .6     102.3       Microcytosis     1+       Mono # 0.59     0.27       Mono % 6.7     2.7        4     2       MPV 10.7     10.4       Neutrophils, Abs 3.48     4.68       Neutrophils Relative 39.5     46.7       NITRITE UA   Negative         nRBC           NT-proBNP     1,153       Blood, UA   Negative         pH, UA   7.0         PLATELET MORPHOLOGY Normal     Normal       Platelet Count 159     165       Potassium 4.3     3.9       PROTEIN TOTAL     6.3       Protein, UA   Negative         RBC 3.89     3.90       RBC, UA   0-3         RDW 14.1     14.0       Segmented Neutrophils, Man % 42     49       Sodium 147     147       Specific Gravity, UA   1.020         Squam Epithel, UA   Few         Troponin I High Sensitivity     11.4       UROBILINOGEN UA   0.2         WBC, UA   11-15         WBC 8.82     10.02               Significant Imaging: I have reviewed all pertinent imaging results/findings within the past 24 hours.  CXR: I have reviewed all pertinent results/findings within the past 24 hours and my personal findings are:  suggestive of CHF exacerbation with small thomas pleural effusions, increase interstitial prominences    Assessment/Plan:      * Acute cystitis without hematuria  Levaquin 500 mg IVPB daily  Monitor response  I&O  Urine cx pending.    Acute exacerbation of CHF (congestive heart failure)  Patient is identified as having  heart failure that is Acute on chronic. CHF is currently uncontrolled due to Rales/crackles on pulmonary exam and Pulmonary edema/pleural effusion on CXR. Latest ECHO performed and  "demonstrates- No results found for this or any previous visit.  . Continue ACE/ARB and Furosemide and monitor clinical status closely. Monitor on telemetry. Patient is off CHF pathway.  Monitor strict Is&Os and daily weights. Cardiology has not been consulted. Continue to stress to patient importance of self efficacy and  on diet for CHF. Last BNP reviewed- and noted below No results for input(s): "BNP", "BNPTRIAGEBLO" in the last 168 hours.  Monitor I&O    Physical deconditioning  Treat acute phase and consider swingbed for strengthening    Decreased appetite  Dietary consult, appreciate recommendations      COPD (chronic obstructive pulmonary disease)  Patient's COPD is controlled currently.  Patient is currently on COPD Pathway. Continue scheduled inhalers Supplemental oxygen and monitor respiratory status closely.     HTN (hypertension)  Chronic, controlled. Latest blood pressure and vitals reviewed-     Temp:  [97.5 °F (36.4 °C)-98 °F (36.7 °C)]   Pulse:  [54-94]   Resp:  [16-20]   BP: (100-150)/(43-77)   SpO2:  [80 %-99 %] .   Home meds for hypertension were reviewed and noted below.   Hypertension Medications               amLODIPine (NORVASC) 5 MG tablet Take 5 mg by mouth once daily.    cloNIDine (CATAPRES) 0.1 MG tablet Take 0.1 mg by mouth 2 (two) times daily as needed. For systolic blood pressure  >170    doxazosin (CARDURA) 2 MG tablet Take 2 mg by mouth every 12 (twelve) hours.    isosorbide mononitrate (IMDUR) 30 MG 24 hr tablet Take 30 mg by mouth once daily.    losartan (COZAAR) 100 MG tablet Take 50 mg by mouth 2 (two) times daily.    NIFEdipine (ADALAT CC) 30 MG TbSR Take 30 mg by mouth nightly.    propranoloL (INDERAL) 10 MG tablet Take 10 mg by mouth 3 (three) times daily.            While in the hospital, will manage blood pressure as follows; Continue home antihypertensive regimen    Will utilize p.r.n. blood pressure medication only if patient's blood pressure greater than 180/110 and " she develops symptoms such as worsening chest pain or shortness of breath.      VTE Risk Mitigation (From admission, onward)      None            Discharge Planning   JERRY:      Code Status: DNR   Is the patient medically ready for discharge?:     Reason for patient still in hospital (select all that apply): Patient trending condition, Laboratory test, and Treatment  Discharge Plan A: Home with family                  BLANCO Renner  Department of Hospital Medicine   [facility] - [facility]

## 2024-03-26 NOTE — SUBJECTIVE & OBJECTIVE
Past Medical History:   Diagnosis Date    Actinic keratoses     Arthritis     CAD (coronary artery disease)     pt has and old heart stent    COPD (chronic obstructive pulmonary disease)     Frequent UTI     GERD (gastroesophageal reflux disease)     High cholesterol     HTN (hypertension)     Leukemia, unspecified     just dx within the past year       Past Surgical History:   Procedure Laterality Date    APPENDECTOMY      heart stent      HYSTERECTOMY      pessary         Review of patient's allergies indicates:   Allergen Reactions    Bactrim [sulfamethoxazole-trimethoprim]     Ceftin [cefuroxime axetil]     Iodinated contrast media Other (See Comments)       No current facility-administered medications on file prior to encounter.     Current Outpatient Medications on File Prior to Encounter   Medication Sig    amLODIPine (NORVASC) 5 MG tablet Take 5 mg by mouth once daily.    aspirin (ECOTRIN) 81 MG EC tablet Take 81 mg by mouth once daily.    cloNIDine (CATAPRES) 0.1 MG tablet Take 0.1 mg by mouth 2 (two) times daily as needed. For systolic blood pressure  >170    folic acid (FOLVITE) 800 MCG Tab Take 800 mcg by mouth once daily.    isosorbide mononitrate (IMDUR) 30 MG 24 hr tablet Take 30 mg by mouth once daily.    levothyroxine (SYNTHROID) 50 MCG tablet Take 50 mcg by mouth before breakfast.    losartan (COZAAR) 100 MG tablet Take 50 mg by mouth 2 (two) times daily.    nitrofurantoin (MACRODANTIN) 100 MG capsule Take 25 mg by mouth once daily.    omeprazole (PRILOSEC) 40 MG capsule Take 40 mg by mouth every morning.    primidone (MYSOLINE) 50 MG Tab Take by mouth 2 (two) times a day.    sertraline (ZOLOFT) 25 MG tablet Take 25 mg by mouth once daily.    travoprost (TRAVATAN Z) 0.004 % ophthalmic solution Place 1 drop into both eyes every evening.    vitamin D (VITAMIN D3) 1000 units Tab Take 5,000 Units by mouth once daily.    acetaZOLAMIDE (DIAMOX) 250 MG tablet Take 250 mg by mouth 2 (two) times daily.     benzonatate (TESSALON) 100 MG capsule Take 100 mg by mouth 3 (three) times daily as needed for Cough.    cyanocobalamin 1,000 mcg/mL injection Inject 1,000 mcg into the muscle every 14 (fourteen) days. Next dose due this week    doxazosin (CARDURA) 2 MG tablet Take 2 mg by mouth every 12 (twelve) hours.    lactulose (CEPHULAC) 20 gram Pack Take 20 g by mouth every 6 (six) hours as needed.    meloxicam (MOBIC) 7.5 MG tablet Take 7.5 mg by mouth daily as needed for Pain.    montelukast (SINGULAIR) 10 mg tablet Take by mouth every evening.    NIFEdipine (ADALAT CC) 30 MG TbSR Take 30 mg by mouth nightly.    propranoloL (INDERAL) 10 MG tablet Take 10 mg by mouth 3 (three) times daily.    revefenacin (YUPELRI) 175 mcg/3 mL Nebu Inhale into the lungs.    rOPINIRole (REQUIP) 0.5 MG tablet Take 0.5 mg by mouth nightly as needed.    rosuvastatin (CRESTOR) 10 MG tablet Take 10 mg by mouth every evening.    theophylline (THEODUR) 300 mg 24 hr capsule Take 150 mg by mouth once daily.    tiotropium Br/olodaterol HCl (STIOLTO RESPIMAT INHL) Inhale 2 puffs into the lungs once daily.     Family History       Problem Relation (Age of Onset)    Diabetes Daughter    Hypertension Daughter    Melanoma Daughter          Tobacco Use    Smoking status: Never    Smokeless tobacco: Never   Substance and Sexual Activity    Alcohol use: Never    Drug use: Never    Sexual activity: Not Currently     Review of Systems   Constitutional: Negative.    HENT: Negative.     Eyes: Negative.    Respiratory:  Positive for wheezing.         Patient with mild wheezing that is chronic   Cardiovascular: Negative.    Gastrointestinal: Negative.    Endocrine: Negative.    Genitourinary: Negative.    Musculoskeletal: Negative.    Skin: Negative.    Allergic/Immunologic: Negative.    Neurological: Negative.         Neurologically the patient seems not had a CVA or TIA.  In the past the patient has had a urinary tract infection which caused her to due to same  similar stuff   Hematological: Negative.    Psychiatric/Behavioral: Negative.     All other systems reviewed and are negative.    Objective:     Vital Signs (Most Recent):  Temp: 97.8 °F (36.6 °C) (03/25/24 1908)  Pulse: 73 (03/25/24 1908)  Resp: 18 (03/25/24 1908)  BP: 128/73 (03/25/24 1908)  SpO2: 96 % (03/25/24 1908) Vital Signs (24h Range):  Temp:  [97.5 °F (36.4 °C)-97.8 °F (36.6 °C)] 97.8 °F (36.6 °C)  Pulse:  [54-85] 73  Resp:  [16-20] 18  SpO2:  [83 %-99 %] 96 %  BP: (100-150)/(43-77) 128/73     Weight: 55.3 kg (122 lb)  Body mass index is 22.31 kg/m².     Physical Exam  Vitals and nursing note reviewed.   Constitutional:       Appearance: Normal appearance. She is normal weight.   HENT:      Head: Normocephalic and atraumatic.      Right Ear: Tympanic membrane, ear canal and external ear normal.      Left Ear: Tympanic membrane and external ear normal.      Nose: Nose normal.      Mouth/Throat:      Mouth: Mucous membranes are moist.   Eyes:      Extraocular Movements: Extraocular movements intact.      Conjunctiva/sclera: Conjunctivae normal.      Pupils: Pupils are equal, round, and reactive to light.   Cardiovascular:      Rate and Rhythm: Normal rate.      Pulses: Normal pulses.      Heart sounds: Normal heart sounds.   Pulmonary:      Effort: Pulmonary effort is normal.      Breath sounds: Normal breath sounds.   Abdominal:      General: Abdomen is flat. Bowel sounds are normal.      Palpations: Abdomen is soft.   Musculoskeletal:         General: Normal range of motion.      Cervical back: Normal range of motion and neck supple.      Comments: Patient is able to swallow and tolerate some NABS.  But is unable to get up and ambulate around secondary to weakness.   Skin:     General: Skin is warm and dry.      Capillary Refill: Capillary refill takes less than 2 seconds.   Neurological:      General: No focal deficit present.      Mental Status: She is alert and oriented to person, place, and time.  Mental status is at baseline.   Psychiatric:         Mood and Affect: Mood normal.         Behavior: Behavior normal.         Thought Content: Thought content normal.         Judgment: Judgment normal.              CRANIAL NERVES     CN III, IV, VI   Pupils are equal, round, and reactive to light.       Significant Labs: All pertinent labs within the past 24 hours have been reviewed.    Significant Imaging: I have reviewed all pertinent imaging results/findings within the past 24 hours.

## 2024-03-26 NOTE — PLAN OF CARE
Inpatient Upgrade Note    Pilar Reardon has warranted treatment spanning two or more midnights of hospital level care for the management of heart failure. She continues to require IV diuresis, daily labs, supplemental oxygen, further testing/imaging, monitoring of vital signs, and medication adjustments. Her condition is also complicated by the following comorbidities: Coronary Artery Disease, Hypertension, Chronic respiratory disease, and Heart failure.

## 2024-03-26 NOTE — NURSING
Admin. AM meds at this time. Pt tolerated. Daughter at bedside noted. Pt sitting on side of bed; NADN; bed lowest position and locked

## 2024-03-26 NOTE — HPI
Patient comes in with her daughter.  Her daughter states she has a decreased p.o. intake.  She also has had some off and on confusion today.  No fever but increased generalized weakness.  Not eating--patient with COPD on a multitude of medication in has CHF.

## 2024-03-26 NOTE — ASSESSMENT & PLAN NOTE
"Patient is identified as having  heart failure that is Acute on chronic. CHF is currently uncontrolled due to Rales/crackles on pulmonary exam and Pulmonary edema/pleural effusion on CXR. Latest ECHO performed and demonstrates- No results found for this or any previous visit.  . Continue ACE/ARB and Furosemide and monitor clinical status closely. Monitor on telemetry. Patient is off CHF pathway.  Monitor strict Is&Os and daily weights. Cardiology has not been consulted. Continue to stress to patient importance of self efficacy and  on diet for CHF. Last BNP reviewed- and noted below No results for input(s): "BNP", "BNPTRIAGEBLO" in the last 168 hours.  Monitor I&O  "

## 2024-03-26 NOTE — NURSING
Cleaned pt sacral with NS, applied cream, and border foam dressing according to order.  Labeled dressing preventative d/t no skin breakdown. Sacral pink. Reminded pt to turn. Teaching done. Pt stated she understood. Pt admitted yesterday with a pink sacral. Pt stated on admission from [facility] ED that sacral is sore.

## 2024-03-26 NOTE — PLAN OF CARE
[facility] - Medical Surgical Unit  Initial Discharge Assessment       Primary Care Provider: Marilee Witt MD    Admission Diagnosis: UTI (urinary tract infection) [N39.0]  Weakness [R53.1]  Urinary tract infection without hematuria, site unspecified [N39.0]    Admission Date: 3/25/2024  Expected Discharge Date:     Transition of Care Barriers: None    Payor: [employer] MANAGED MEDICARE / Plan: [employer] MEDICARE PPO / Product Type: Medicare Advantage /     Extended Emergency Contact Information  Primary Emergency Contact: IDA ARTEAGA  Mobile Phone: [phone]  Relation: Daughter   needed? No    Discharge Plan A: Home with family  Discharge Plan B: Home with family, Home Health      [facility] [place] - [address] [address]  [address]  [place] [address]  Phone: [phone] Fax: [phone]      Initial Assessment (most recent)       Adult Discharge Assessment - 03/26/24 1014          Discharge Assessment    Assessment Type Discharge Planning Assessment     Confirmed/corrected address, phone number and insurance Yes     Confirmed Demographics Correct on Facesheet     Source of Information patient;family;health record     If unable to respond/provide information was family/caregiver contacted? Yes     Contact Name/Number lukasz Monzon [phone]     Does patient/caregiver understand observation status Yes     Communicated JERRY with patient/caregiver Yes     Reason For Admission UTI, weakness, confusion     People in Home child(quiana), adult     Facility Arrived From: ED     Do you expect to return to your current living situation? Yes     Do you have help at home or someone to help you manage your care at home? Yes     Who are your caregiver(s) and their phone number(s)? Sebastian Reardon, son and Ida Arteaga, daughter     Prior to hospitilization cognitive status: Unable to Assess     Current cognitive status: Not Oriented to Time     Walking or Climbing  Stairs Difficulty yes     Walking or Climbing Stairs ambulation difficulty, requires equipment;stair climbing difficulty, requires equipment;transferring difficulty, requires equipment     Mobility Management cane or RW     Dressing/Bathing Difficulty no     Home Accessibility wheelchair accessible     Home Layout Able to live on 1st floor     Equipment Currently Used at Home walker, rolling;cane, straight;nebulizer;oxygen     Readmission within 30 days? No     Patient currently being followed by outpatient case management? No     Do you currently have service(s) that help you manage your care at home? No     Do you take prescription medications? Yes     Do you have prescription coverage? Yes     Coverage [employer]     Do you have any problems affording any of your prescribed medications? No     Is the patient taking medications as prescribed? yes     Who is going to help you get home at discharge? daughter, Dianne     How do you get to doctors appointments? family or friend will provide     Are you on dialysis? No     Do you take coumadin? No     Discharge Plan A Home with family     Discharge Plan B Home with family;Home Health     DME Needed Upon Discharge  none     Discharge Plan discussed with: Adult children;Patient     Transition of Care Barriers None        Physical Activity    On average, how many days per week do you engage in moderate to strenuous exercise (like a brisk walk)? 0 days     On average, how many minutes do you engage in exercise at this level? 0 min        Financial Resource Strain    How hard is it for you to pay for the very basics like food, housing, medical care, and heating? Not hard at all        Housing Stability    In the last 12 months, was there a time when you were not able to pay the mortgage or rent on time? No     In the last 12 months, how many places have you lived? 1     In the last 12 months, was there a time when you did not have a steady place to sleep or slept in a shelter  (including now)? No        Food Insecurity    Within the past 12 months, you worried that your food would run out before you got the money to buy more. Never true     Within the past 12 months, the food you bought just didn't last and you didn't have money to get more. Never true        Stress    Do you feel stress - tense, restless, nervous, or anxious, or unable to sleep at night because your mind is troubled all the time - these days? Only a little        Social Connections    In a typical week, how many times do you talk on the phone with family, friends, or neighbors? More than three times a week     How often do you get together with friends or relatives? More than three times a week     How often do you attend Jewish or Voodoo services? More than 4 times per year     Do you belong to any clubs or organizations such as Jewish groups, unions, fraternal or athletic groups, or school groups? No     How often do you attend meetings of the clubs or organizations you belong to? Never     Are you , , , , never , or living with a partner?         Alcohol Use    Q1: How often do you have a drink containing alcohol? Never     Q2: How many drinks containing alcohol do you have on a typical day when you are drinking? Patient does not drink     Q3: How often do you have six or more drinks on one occasion? Never        OTHER    Name(s) of People in Home norberto Saucedo                   Pt. Placed in Observation services for antibiotics and supportive care after coming in to ED with confusion, weakness and UTI. Pt. Lives with son. Daughter comes over to bring meals and assist pt. As needed during the day. Pt. Has home O2, nebulizer machine, RW, and cane at home. Pt. Has no HH services at present. Plans are to return home with family when medically stable to do so. Will follow for d/c needs.

## 2024-03-26 NOTE — HOSPITAL COURSE
3/26/24: Ms Reardon found lying in bed in good spirits. Daughter at bedside. Discussed labs. Daughter reports that patient has been eating pretty good since admission. Notes that appetite is much better now than it was at home. Daughter brought her a subway sandwich last night and she ate half of it as well as chips. Patient reports continued dyspnea on exertion which she notes is not abnormal for her, but does seem to be a little worse over the last 1-2 weeks. She is on chronic O2 and has not required increase in flow thus far. She is being treated with IV Levaquin for UTI. Cx pending. Labs reviewed and notable as follows: BNP 1153. Na 147, 11-15 WBC and rare bacteria on UA. CXR shows suspected CHF with small thomas pleral effusions, diffuse thomas interstitial prominence. Case discussed with Dr Reese via telemedicine consultation. Started on Lasix 40mg IVP daily as well as potassium supplement with hx of hypokalemia. Will monitor overnight for diuresis and improvement of sx. Nutritional consult pending. Otherwise, continue current POC.  3/27/24:  Discharge summary  --- Ms. Reardon in bed receiving her morning meds with daughter at bedside.  Reports that her diet is better and ate most of her breakfast.  Believes to be at baseline as far as her breathing is concerned.  Denies any urinary symptoms.  Had 1470 urine output for 24 hours.  Patient and daughter are ready to go home with home health nursing. Case discussed with Dr. Reese via telemedicine consultation.  Will continue four days of Levaquin p.o. at home.  Encouraged patient to maintain a healthy diet with increased fluids for adequate nutrition and to prevent a future UTI.  Follow-up is scheduled for patient's primary care physician Dr. Marilee Witt with patient aware and verbalized understanding.

## 2024-03-26 NOTE — H&P
[facility] - Medical Surgical Unit  Hospital Medicine  History & Physical    Patient Name: Pilar Reardon  MRN: [id number]  Patient Class: OP- Observation  Admission Date: 3/25/2024  Attending Physician: Anmol Reese DO   Primary Care Provider: Marilee Witt MD         Patient information was obtained from ER records.     Subjective:     Principal Problem:<principal problem not specified>    Chief Complaint:   Chief Complaint   Patient presents with    Weakness    Loss of Consciousness    Shortness of Breath        HPI: Patient comes in with her daughter.  Her daughter states she has a decreased p.o. intake.  She also has had some off and on confusion today.  No fever but increased generalized weakness.  Not eating--patient with COPD on a multitude of medication in has CHF.    Past Medical History:   Diagnosis Date    Actinic keratoses     Arthritis     CAD (coronary artery disease)     pt has and old heart stent    COPD (chronic obstructive pulmonary disease)     Frequent UTI     GERD (gastroesophageal reflux disease)     High cholesterol     HTN (hypertension)     Leukemia, unspecified     just dx within the past year       Past Surgical History:   Procedure Laterality Date    APPENDECTOMY      heart stent      HYSTERECTOMY      pessary         Review of patient's allergies indicates:   Allergen Reactions    Bactrim [sulfamethoxazole-trimethoprim]     Ceftin [cefuroxime axetil]     Iodinated contrast media Other (See Comments)       No current facility-administered medications on file prior to encounter.     Current Outpatient Medications on File Prior to Encounter   Medication Sig    amLODIPine (NORVASC) 5 MG tablet Take 5 mg by mouth once daily.    aspirin (ECOTRIN) 81 MG EC tablet Take 81 mg by mouth once daily.    cloNIDine (CATAPRES) 0.1 MG tablet Take 0.1 mg by mouth 2 (two) times daily as needed. For systolic blood pressure  >170    folic acid (FOLVITE) 800 MCG Tab Take 800 mcg  by mouth once daily.    isosorbide mononitrate (IMDUR) 30 MG 24 hr tablet Take 30 mg by mouth once daily.    levothyroxine (SYNTHROID) 50 MCG tablet Take 50 mcg by mouth before breakfast.    losartan (COZAAR) 100 MG tablet Take 50 mg by mouth 2 (two) times daily.    nitrofurantoin (MACRODANTIN) 100 MG capsule Take 25 mg by mouth once daily.    omeprazole (PRILOSEC) 40 MG capsule Take 40 mg by mouth every morning.    primidone (MYSOLINE) 50 MG Tab Take by mouth 2 (two) times a day.    sertraline (ZOLOFT) 25 MG tablet Take 25 mg by mouth once daily.    travoprost (TRAVATAN Z) 0.004 % ophthalmic solution Place 1 drop into both eyes every evening.    vitamin D (VITAMIN D3) 1000 units Tab Take 5,000 Units by mouth once daily.    acetaZOLAMIDE (DIAMOX) 250 MG tablet Take 250 mg by mouth 2 (two) times daily.    benzonatate (TESSALON) 100 MG capsule Take 100 mg by mouth 3 (three) times daily as needed for Cough.    cyanocobalamin 1,000 mcg/mL injection Inject 1,000 mcg into the muscle every 14 (fourteen) days. Next dose due this week    doxazosin (CARDURA) 2 MG tablet Take 2 mg by mouth every 12 (twelve) hours.    lactulose (CEPHULAC) 20 gram Pack Take 20 g by mouth every 6 (six) hours as needed.    meloxicam (MOBIC) 7.5 MG tablet Take 7.5 mg by mouth daily as needed for Pain.    montelukast (SINGULAIR) 10 mg tablet Take by mouth every evening.    NIFEdipine (ADALAT CC) 30 MG TbSR Take 30 mg by mouth nightly.    propranoloL (INDERAL) 10 MG tablet Take 10 mg by mouth 3 (three) times daily.    revefenacin (YUPELRI) 175 mcg/3 mL Nebu Inhale into the lungs.    rOPINIRole (REQUIP) 0.5 MG tablet Take 0.5 mg by mouth nightly as needed.    rosuvastatin (CRESTOR) 10 MG tablet Take 10 mg by mouth every evening.    theophylline (THEODUR) 300 mg 24 hr capsule Take 150 mg by mouth once daily.    tiotropium Br/olodaterol HCl (STIOLTO RESPIMAT INHL) Inhale 2 puffs into the lungs once daily.     Family History       Problem Relation (Age  of Onset)    Diabetes Daughter    Hypertension Daughter    Melanoma Daughter          Tobacco Use    Smoking status: Never    Smokeless tobacco: Never   Substance and Sexual Activity    Alcohol use: Never    Drug use: Never    Sexual activity: Not Currently     Review of Systems   Constitutional: Negative.    HENT: Negative.     Eyes: Negative.    Respiratory:  Positive for wheezing.         Patient with mild wheezing that is chronic   Cardiovascular: Negative.    Gastrointestinal: Negative.    Endocrine: Negative.    Genitourinary: Negative.    Musculoskeletal: Negative.    Skin: Negative.    Allergic/Immunologic: Negative.    Neurological: Negative.         Neurologically the patient seems not had a CVA or TIA.  In the past the patient has had a urinary tract infection which caused her to due to same similar stuff   Hematological: Negative.    Psychiatric/Behavioral: Negative.     All other systems reviewed and are negative.    Objective:     Vital Signs (Most Recent):  Temp: 97.8 °F (36.6 °C) (03/25/24 1908)  Pulse: 73 (03/25/24 1908)  Resp: 18 (03/25/24 1908)  BP: 128/73 (03/25/24 1908)  SpO2: 96 % (03/25/24 1908) Vital Signs (24h Range):  Temp:  [97.5 °F (36.4 °C)-97.8 °F (36.6 °C)] 97.8 °F (36.6 °C)  Pulse:  [54-85] 73  Resp:  [16-20] 18  SpO2:  [83 %-99 %] 96 %  BP: (100-150)/(43-77) 128/73     Weight: 55.3 kg (122 lb)  Body mass index is 22.31 kg/m².     Physical Exam  Vitals and nursing note reviewed.   Constitutional:       Appearance: Normal appearance. She is normal weight.   HENT:      Head: Normocephalic and atraumatic.      Right Ear: Tympanic membrane, ear canal and external ear normal.      Left Ear: Tympanic membrane and external ear normal.      Nose: Nose normal.      Mouth/Throat:      Mouth: Mucous membranes are moist.   Eyes:      Extraocular Movements: Extraocular movements intact.      Conjunctiva/sclera: Conjunctivae normal.      Pupils: Pupils are equal, round, and reactive to light.    Cardiovascular:      Rate and Rhythm: Normal rate.      Pulses: Normal pulses.      Heart sounds: Normal heart sounds.   Pulmonary:      Effort: Pulmonary effort is normal.      Breath sounds: Normal breath sounds.   Abdominal:      General: Abdomen is flat. Bowel sounds are normal.      Palpations: Abdomen is soft.   Musculoskeletal:         General: Normal range of motion.      Cervical back: Normal range of motion and neck supple.      Comments: Patient is able to swallow and tolerate some NABS.  But is unable to get up and ambulate around secondary to weakness.   Skin:     General: Skin is warm and dry.      Capillary Refill: Capillary refill takes less than 2 seconds.   Neurological:      General: No focal deficit present.      Mental Status: She is alert and oriented to person, place, and time. Mental status is at baseline.   Psychiatric:         Mood and Affect: Mood normal.         Behavior: Behavior normal.         Thought Content: Thought content normal.         Judgment: Judgment normal.              CRANIAL NERVES     CN III, IV, VI   Pupils are equal, round, and reactive to light.       Significant Labs: All pertinent labs within the past 24 hours have been reviewed.    Significant Imaging: I have reviewed all pertinent imaging results/findings within the past 24 hours.  Assessment/Plan:  1. Patient with generalized muscle weakness.  This progressed over the last 4-6 days.  Decreased p.o. intake.  History of urinary tract infection with similar symptoms.  Some confusion off and on.  Mild UTI.  Will admit to observation to give the patient antibiotics and monitor overall outcome.     No notes have been filed under this hospital service.  Service: Hospital Medicine    VTE Risk Mitigation (From admission, onward)      None               On 03/25/2024, patient should be placed in hospital observation services under my care.             Anmol Reese DO  Department of Hospital Medicine  [facility]  [facility] - [facility] Surgical Unit

## 2024-03-26 NOTE — ASSESSMENT & PLAN NOTE
Chronic, controlled. Latest blood pressure and vitals reviewed-     Temp:  [97.5 °F (36.4 °C)-98 °F (36.7 °C)]   Pulse:  [54-94]   Resp:  [16-20]   BP: (100-150)/(43-77)   SpO2:  [80 %-99 %] .   Home meds for hypertension were reviewed and noted below.   Hypertension Medications               amLODIPine (NORVASC) 5 MG tablet Take 5 mg by mouth once daily.    cloNIDine (CATAPRES) 0.1 MG tablet Take 0.1 mg by mouth 2 (two) times daily as needed. For systolic blood pressure  >170    doxazosin (CARDURA) 2 MG tablet Take 2 mg by mouth every 12 (twelve) hours.    isosorbide mononitrate (IMDUR) 30 MG 24 hr tablet Take 30 mg by mouth once daily.    losartan (COZAAR) 100 MG tablet Take 50 mg by mouth 2 (two) times daily.    NIFEdipine (ADALAT CC) 30 MG TbSR Take 30 mg by mouth nightly.    propranoloL (INDERAL) 10 MG tablet Take 10 mg by mouth 3 (three) times daily.            While in the hospital, will manage blood pressure as follows; Continue home antihypertensive regimen    Will utilize p.r.n. blood pressure medication only if patient's blood pressure greater than 180/110 and she develops symptoms such as worsening chest pain or shortness of breath.

## 2024-03-26 NOTE — SUBJECTIVE & OBJECTIVE
Interval History:  Patient seen and examined. No acute events overnight. Lasix IVP and Potassium supplement started. Otherwise, continue current POC.     Review of Systems   Constitutional:  Positive for appetite change and fatigue. Negative for chills, diaphoresis and fever.   HENT:  Negative for congestion and rhinorrhea.    Respiratory:  Positive for cough (chronic, unchanged) and shortness of breath (chronic, unchanged).    Gastrointestinal:  Negative for abdominal pain, constipation, diarrhea, nausea and vomiting.   Genitourinary:  Negative for dysuria, frequency and urgency.   Musculoskeletal:  Positive for arthralgias and gait problem.   Skin:  Negative for wound.   Neurological:  Positive for weakness (generalized). Negative for dizziness, syncope, light-headedness and headaches.   Psychiatric/Behavioral:  Negative for confusion. The patient is not nervous/anxious.    All other systems reviewed and are negative.    Objective:     Vital Signs (Most Recent):  Temp: 98 °F (36.7 °C) (03/26/24 0711)  Pulse: 60 (03/26/24 0730)  Resp: 18 (03/26/24 0730)  BP: (!) 149/46 (03/26/24 0711)  SpO2: 96 % (03/26/24 0730) Vital Signs (24h Range):  Temp:  [97.5 °F (36.4 °C)-98 °F (36.7 °C)] 98 °F (36.7 °C)  Pulse:  [54-94] 60  Resp:  [16-20] 18  SpO2:  [80 %-99 %] 96 %  BP: (100-150)/(43-77) 149/46     Weight: 55.3 kg (121 lb 14.6 oz)  Body mass index is 22.3 kg/m².    Intake/Output Summary (Last 24 hours) at 3/26/2024 1303  Last data filed at 3/25/2024 1609  Gross per 24 hour   Intake --   Output 70 ml   Net -70 ml         Physical Exam  Vitals and nursing note reviewed.   Constitutional:       General: She is not in acute distress.     Appearance: She is ill-appearing (chronically).   HENT:      Head: Normocephalic and atraumatic.      Nose: Nose normal.      Mouth/Throat:      Mouth: Mucous membranes are moist.      Pharynx: Oropharynx is clear.   Eyes:      General: No scleral icterus.     Extraocular Movements:  Extraocular movements intact.      Conjunctiva/sclera: Conjunctivae normal.      Pupils: Pupils are equal, round, and reactive to light.   Cardiovascular:      Rate and Rhythm: Normal rate and regular rhythm.      Pulses: Normal pulses.      Heart sounds: Normal heart sounds.   Pulmonary:      Effort: Pulmonary effort is normal.      Breath sounds: Rales (bibasilar) present.   Abdominal:      General: Abdomen is flat. Bowel sounds are normal. There is no distension.      Palpations: Abdomen is soft.      Tenderness: There is no abdominal tenderness. There is no right CVA tenderness or left CVA tenderness.   Musculoskeletal:         General: Normal range of motion.      Cervical back: Normal range of motion and neck supple. No rigidity.      Right lower leg: No edema.      Left lower leg: No edema.   Lymphadenopathy:      Cervical: No cervical adenopathy.   Skin:     General: Skin is warm and dry.      Capillary Refill: Capillary refill takes less than 2 seconds.   Neurological:      General: No focal deficit present.      Mental Status: She is alert and oriented to person, place, and time. Mental status is at baseline.      Motor: Weakness (generalized) present.      Gait: Gait abnormal.   Psychiatric:         Mood and Affect: Mood normal.         Behavior: Behavior normal.         Thought Content: Thought content normal.         Judgment: Judgment normal.             Significant Labs: All pertinent labs within the past 24 hours have been reviewed.  Recent Lab Results         03/26/24  0813   03/25/24  1609   03/25/24  1445        Albumin/Globulin Ratio     1.1       Albumin     3.3       ALP     87       ALT     26       Anion Gap 8     8       Aniso 1+     1+       Appearance, UA   Clear         AST     11       Bacteria, UA   Rare         Baso # 0.02     0.02       Basophil % 0.2     0.2       Bilirubin (UA)   Negative         BILIRUBIN TOTAL     0.3       BUN 19     22       BUN/CREAT RATIO 26     25        Calcium 8.2     8.7       Chloride 109     109       CO2 34     34       Color, UA   Yellow         Creatinine 0.73     0.88       Differential Method Manual     Manual       eGFR 78     62       Eos # 0.26     0.19       Eos % 2.9     1.9       Globulin, Total     3.0       Glucose 101     134       Glucose, UA   Negative         Hematocrit 39.9     39.9       Hemoglobin 11.4     11.4       Hypo 1+     Few       Ketones, UA   Negative         Leukocyte Esterase, UA   Small         Lymph # 4.47     4.86       Lymph % 50.7     48.5        54     49       MCH 29.3     29.2       MCHC 28.6     28.6       .6     102.3       Microcytosis     1+       Mono # 0.59     0.27       Mono % 6.7     2.7        4     2       MPV 10.7     10.4       Neutrophils, Abs 3.48     4.68       Neutrophils Relative 39.5     46.7       NITRITE UA   Negative         nRBC           NT-proBNP     1,153       Blood, UA   Negative         pH, UA   7.0         PLATELET MORPHOLOGY Normal     Normal       Platelet Count 159     165       Potassium 4.3     3.9       PROTEIN TOTAL     6.3       Protein, UA   Negative         RBC 3.89     3.90       RBC, UA   0-3         RDW 14.1     14.0       Segmented Neutrophils, Man % 42     49       Sodium 147     147       Specific Gravity, UA   1.020         Squam Epithel, UA   Few         Troponin I High Sensitivity     11.4       UROBILINOGEN UA   0.2         WBC, UA   11-15         WBC 8.82     10.02               Significant Imaging: I have reviewed all pertinent imaging results/findings within the past 24 hours.  CXR: I have reviewed all pertinent results/findings within the past 24 hours and my personal findings are:  suggestive of CHF exacerbation with small thomas pleural effusions, increase interstitial prominences

## 2024-03-26 NOTE — PLAN OF CARE
Problem: Adult Inpatient Plan of Care  Goal: Plan of Care Review  Outcome: Ongoing, Progressing  Goal: Patient-Specific Goal (Individualized)  Outcome: Ongoing, Progressing  Goal: Absence of Hospital-Acquired Illness or Injury  Outcome: Ongoing, Progressing  Goal: Optimal Comfort and Wellbeing  Outcome: Ongoing, Progressing  Goal: Readiness for Transition of Care  Outcome: Ongoing, Progressing     Problem: Fall Injury Risk  Goal: Absence of Fall and Fall-Related Injury  Outcome: Ongoing, Progressing     Problem: UTI (Urinary Tract Infection)  Goal: Improved Infection Symptoms  Outcome: Ongoing, Progressing     Problem: Electrolyte Imbalance  Goal: Electrolyte Balance  Outcome: Ongoing, Progressing     Problem: Skin Injury Risk Increased  Goal: Skin Health and Integrity  Outcome: Ongoing, Progressing     Problem: Impaired Wound Healing  Goal: Optimal Wound Healing  Outcome: Ongoing, Progressing

## 2024-03-27 VITALS
DIASTOLIC BLOOD PRESSURE: 71 MMHG | BODY MASS INDEX: 22.44 KG/M2 | WEIGHT: 121.94 LBS | HEART RATE: 70 BPM | HEIGHT: 62 IN | TEMPERATURE: 98 F | RESPIRATION RATE: 18 BRPM | OXYGEN SATURATION: 99 % | SYSTOLIC BLOOD PRESSURE: 161 MMHG

## 2024-03-27 PROBLEM — N30.00 ACUTE CYSTITIS WITHOUT HEMATURIA: Status: RESOLVED | Noted: 2024-03-26 | Resolved: 2024-03-27

## 2024-03-27 PROBLEM — I50.9 CHRONIC CONGESTIVE HEART FAILURE: Chronic | Status: RESOLVED | Noted: 2024-03-27 | Resolved: 2024-03-27

## 2024-03-27 PROBLEM — J44.9 COPD (CHRONIC OBSTRUCTIVE PULMONARY DISEASE): Status: RESOLVED | Noted: 2022-12-01 | Resolved: 2024-03-27

## 2024-03-27 PROBLEM — I50.9 CHRONIC CONGESTIVE HEART FAILURE: Chronic | Status: ACTIVE | Noted: 2024-03-27

## 2024-03-27 PROBLEM — I10 HTN (HYPERTENSION): Status: RESOLVED | Noted: 2021-11-27 | Resolved: 2024-03-27

## 2024-03-27 PROBLEM — R63.0 DECREASED APPETITE: Status: RESOLVED | Noted: 2024-03-26 | Resolved: 2024-03-27

## 2024-03-27 PROBLEM — R53.81 PHYSICAL DECONDITIONING: Status: RESOLVED | Noted: 2024-03-26 | Resolved: 2024-03-27

## 2024-03-27 PROBLEM — I25.10 CAD (CORONARY ARTERY DISEASE): Status: RESOLVED | Noted: 2022-12-01 | Resolved: 2024-03-27

## 2024-03-27 PROBLEM — I50.9 ACUTE EXACERBATION OF CHF (CONGESTIVE HEART FAILURE): Status: RESOLVED | Noted: 2022-10-29 | Resolved: 2024-03-27

## 2024-03-27 LAB
ANION GAP SERPL CALCULATED.3IONS-SCNC: 5 MMOL/L (ref 7–16)
BASOPHILS # BLD AUTO: 0.01 K/UL (ref 0–0.2)
BASOPHILS NFR BLD AUTO: 0.1 % (ref 0–1)
BUN SERPL-MCNC: 22 MG/DL (ref 7–18)
BUN/CREAT SERPL: 24 (ref 6–20)
CALCIUM SERPL-MCNC: 8.4 MG/DL (ref 8.5–10.1)
CHLORIDE SERPL-SCNC: 108 MMOL/L (ref 98–107)
CO2 SERPL-SCNC: 37 MMOL/L (ref 21–32)
CREAT SERPL-MCNC: 0.92 MG/DL (ref 0.55–1.02)
DIFFERENTIAL METHOD BLD: ABNORMAL
EGFR (NO RACE VARIABLE) (RUSH/TITUS): 59 ML/MIN/1.73M2
EOSINOPHIL # BLD AUTO: 0.24 K/UL (ref 0–0.5)
EOSINOPHIL NFR BLD AUTO: 2.5 % (ref 1–4)
EOSINOPHIL NFR BLD MANUAL: 1 % (ref 1–4)
ERYTHROCYTE [DISTWIDTH] IN BLOOD BY AUTOMATED COUNT: 14 % (ref 11.5–14.5)
GLUCOSE SERPL-MCNC: 107 MG/DL (ref 74–106)
HCT VFR BLD AUTO: 38.8 % (ref 38–47)
HGB BLD-MCNC: 11.1 G/DL (ref 12–16)
HYPOCHROMIA BLD QL SMEAR: ABNORMAL
LYMPHOCYTES # BLD AUTO: 5.5 K/UL (ref 1–4.8)
LYMPHOCYTES NFR BLD AUTO: 57.4 % (ref 27–41)
LYMPHOCYTES NFR BLD MANUAL: 55 % (ref 27–41)
MCH RBC QN AUTO: 29 PG (ref 27–31)
MCHC RBC AUTO-ENTMCNC: 28.6 G/DL (ref 32–36)
MCV RBC AUTO: 101.3 FL (ref 80–96)
MICROCYTES BLD QL SMEAR: ABNORMAL
MONOCYTES # BLD AUTO: 0.7 K/UL (ref 0–0.8)
MONOCYTES NFR BLD AUTO: 7.3 % (ref 2–6)
MONOCYTES NFR BLD MANUAL: 5 % (ref 2–6)
MPC BLD CALC-MCNC: 10.3 FL (ref 9.4–12.4)
NEUTROPHILS # BLD AUTO: 3.14 K/UL (ref 1.8–7.7)
NEUTROPHILS NFR BLD AUTO: 32.7 % (ref 53–65)
NEUTS SEG NFR BLD MANUAL: 39 % (ref 50–62)
NRBC BLD MANUAL-RTO: ABNORMAL %
PLATELET # BLD AUTO: 152 K/UL (ref 150–400)
PLATELET MORPHOLOGY: NORMAL
POTASSIUM SERPL-SCNC: 4.1 MMOL/L (ref 3.5–5.1)
RBC # BLD AUTO: 3.83 M/UL (ref 4.2–5.4)
SODIUM SERPL-SCNC: 146 MMOL/L (ref 136–145)
WBC # BLD AUTO: 9.59 K/UL (ref 4.5–11)

## 2024-03-27 PROCEDURE — 94761 N-INVAS EAR/PLS OXIMETRY MLT: CPT

## 2024-03-27 PROCEDURE — 27000958

## 2024-03-27 PROCEDURE — 27000221 HC OXYGEN, UP TO 24 HOURS

## 2024-03-27 PROCEDURE — 63600175 PHARM REV CODE 636 W HCPCS: Performed by: NURSE PRACTITIONER

## 2024-03-27 PROCEDURE — 99239 HOSP IP/OBS DSCHRG MGMT >30: CPT | Mod: ,,, | Performed by: FAMILY MEDICINE

## 2024-03-27 PROCEDURE — 1111F DSCHRG MED/CURRENT MED MERGE: CPT | Mod: CPTII,,, | Performed by: FAMILY MEDICINE

## 2024-03-27 PROCEDURE — 27000982 HC MATTRESS, MATRIX LAL RENTAL

## 2024-03-27 PROCEDURE — 85025 COMPLETE CBC W/AUTO DIFF WBC: CPT | Performed by: NURSE PRACTITIONER

## 2024-03-27 PROCEDURE — 25000242 PHARM REV CODE 250 ALT 637 W/ HCPCS: Performed by: FAMILY MEDICINE

## 2024-03-27 PROCEDURE — 25000003 PHARM REV CODE 250: Performed by: FAMILY MEDICINE

## 2024-03-27 PROCEDURE — 94640 AIRWAY INHALATION TREATMENT: CPT

## 2024-03-27 PROCEDURE — 25000242 PHARM REV CODE 250 ALT 637 W/ HCPCS: Performed by: PHYSICIAN ASSISTANT

## 2024-03-27 PROCEDURE — 25000003 PHARM REV CODE 250: Performed by: NURSE PRACTITIONER

## 2024-03-27 PROCEDURE — 80048 BASIC METABOLIC PNL TOTAL CA: CPT | Performed by: NURSE PRACTITIONER

## 2024-03-27 RX ORDER — LEVOFLOXACIN 500 MG/1
500 TABLET, FILM COATED ORAL DAILY
Qty: 4 TABLET | Refills: 0 | Status: SHIPPED | OUTPATIENT
Start: 2024-03-27 | End: 2024-03-31

## 2024-03-27 RX ADMIN — IPRATROPIUM BROMIDE AND ALBUTEROL SULFATE 3 ML: .5; 3 SOLUTION RESPIRATORY (INHALATION) at 07:03

## 2024-03-27 RX ADMIN — DORZOLAMIDE HYDROCHLORIDE AND TIMOLOL MALEATE 1 DROP: 20; 5 SOLUTION/ DROPS OPHTHALMIC at 09:03

## 2024-03-27 RX ADMIN — AMLODIPINE BESYLATE 5 MG: 5 TABLET ORAL at 09:03

## 2024-03-27 RX ADMIN — LEVOTHYROXINE SODIUM 50 MCG: 0.05 TABLET ORAL at 05:03

## 2024-03-27 RX ADMIN — ISOSORBIDE MONONITRATE 30 MG: 30 TABLET, EXTENDED RELEASE ORAL at 09:03

## 2024-03-27 RX ADMIN — ACETAZOLAMIDE 250 MG: 250 TABLET ORAL at 09:03

## 2024-03-27 RX ADMIN — POTASSIUM CHLORIDE 10 MEQ: 750 TABLET, EXTENDED RELEASE ORAL at 09:03

## 2024-03-27 RX ADMIN — FUROSEMIDE 40 MG: 10 INJECTION, SOLUTION INTRAMUSCULAR; INTRAVENOUS at 09:03

## 2024-03-27 RX ADMIN — LOSARTAN POTASSIUM 50 MG: 50 TABLET, FILM COATED ORAL at 09:03

## 2024-03-27 RX ADMIN — PROPRANOLOL HYDROCHLORIDE 10 MG: 10 TABLET ORAL at 09:03

## 2024-03-27 RX ADMIN — ASPIRIN 81 MG: 81 TABLET, COATED ORAL at 09:03

## 2024-03-27 RX ADMIN — FOLIC ACID 1000 MCG: 1 TABLET ORAL at 09:03

## 2024-03-27 RX ADMIN — ATORVASTATIN CALCIUM 10 MG: 10 TABLET, FILM COATED ORAL at 09:03

## 2024-03-27 RX ADMIN — BUDESONIDE INHALATION 0.5 MG: 0.5 SUSPENSION RESPIRATORY (INHALATION) at 07:03

## 2024-03-27 RX ADMIN — PANTOPRAZOLE SODIUM 40 MG: 40 TABLET, DELAYED RELEASE ORAL at 09:03

## 2024-03-27 RX ADMIN — PRIMIDONE 50 MG: 50 TABLET ORAL at 09:03

## 2024-03-27 RX ADMIN — CHOLECALCIFEROL TAB 125 MCG (5000 UNIT) 5000 UNITS: 125 TAB at 09:03

## 2024-03-27 RX ADMIN — SERTRALINE HYDROCHLORIDE 25 MG: 25 TABLET ORAL at 09:03

## 2024-03-27 NOTE — DISCHARGE INSTRUCTIONS
Please continue to maintain a healthy diet to ensure adequate nutrition. Increase your fluid intake to prevent a future urinary tract infection. Complete all of the prescription antibiotic Levaquin.  Your follow up with Dr. Witt has been scheduled for April 11th at 3pm.

## 2024-03-27 NOTE — DISCHARGE SUMMARY
[facility]  Hospital Medicine  Discharge Summary      Patient Name: Pilar Reardon  MRN: [id number]  MIRELLA: [id number]  Patient Class: IP- Inpatient  Admission Date: 3/25/2024  Hospital Length of Stay: 1 days  Discharge Date and Time:  03/27/2024 11:41 AM  Attending Physician: Cassandra Skelton MD   Discharging Provider: Anmol Reese DO  Primary Care Provider: Marilee Witt MD    Primary Care Team: Networked reference to record PCT     HPI:   Patient comes in with her daughter.  Her daughter states she has a decreased p.o. intake.  She also has had some off and on confusion today.  No fever but increased generalized weakness.  Not eating--patient with COPD on a multitude of medication in has CHF.    * No surgery found *      Hospital Course:   3/26/24: Ms Reardon found lying in bed in good spirits. Daughter at bedside. Discussed labs. Daughter reports that patient has been eating pretty good since admission. Notes that appetite is much better now than it was at home. Daughter brought her a subway sandwich last night and she ate half of it as well as chips. Patient reports continued dyspnea on exertion which she notes is not abnormal for her, but does seem to be a little worse over the last 1-2 weeks. She is on chronic O2 and has not required increase in flow thus far. She is being treated with IV Levaquin for UTI. Cx pending. Labs reviewed and notable as follows: BNP 1153. Na 147, 11-15 WBC and rare bacteria on UA. CXR shows suspected CHF with small thomas pleral effusions, diffuse thomas interstitial prominence. Case discussed with Dr Reese via telemedicine consultation. Started on Lasix 40mg IVP daily as well as potassium supplement with hx of hypokalemia. Will monitor overnight for diuresis and improvement of sx. Nutritional consult pending. Otherwise, continue current POC.  3/27/24:  Discharge summary  --- Ms. Reardon in bed receiving her morning meds with daughter  at bedside.  Reports that her diet is better and ate most of her breakfast.  Believes to be at baseline as far as her breathing is concerned.  Denies any urinary symptoms.  Had 1470 urine output for 24 hours.  Patient and daughter are ready to go home with home health nursing. Case discussed with Dr. Reese via telemedicine consultation.  Will continue four days of Levaquin p.o. at home.  Encouraged patient to maintain a healthy diet with increased fluids for adequate nutrition and to prevent a future UTI.  Follow-up is scheduled for patient's primary care physician Dr. Marilee Witt with patient aware and verbalized understanding.     Goals of Care Treatment Preferences:  Code Status: DNR      Consults:   Consults (From admission, onward)          Status Ordering Provider     Inpatient consult to Registered Dietitian/Nutritionist  Once        Provider:  (Not yet assigned)    YULIYA Almanzar            No new Assessment & Plan notes have been filed under this hospital service since the last note was generated.  Service: Hospital Medicine    Final Active Diagnoses:      Problems Resolved During this Admission:    Diagnosis Date Noted Date Resolved POA    PRINCIPAL PROBLEM:  Acute cystitis without hematuria [N30.00] 03/26/2024 03/27/2024 Yes    Chronic congestive heart failure [I50.9] 03/27/2024 03/27/2024 Yes     Chronic    Physical deconditioning [R53.81] 03/26/2024 03/27/2024 Yes    Decreased appetite [R63.0] 03/26/2024 03/27/2024 Yes    Acute exacerbation of CHF (congestive heart failure) [I50.9] 10/29/2022 03/27/2024 Yes    HTN (hypertension) [I10] 11/27/2021 03/27/2024 Yes       Discharged Condition: fair    Disposition: Home or Self Care    Follow Up:   Follow-up Information       Marilee Witt MD. Go on 4/11/2024.    Specialty: Family Medicine  Why: at 3:00 pm for hospital follow up appointment  Contact information:  [address]  [facility] [place] [address]  [phone]                            Patient Instructions:   No discharge procedures on file.    Significant Diagnostic Studies: Labs: All labs within the past 24 hours have been reviewed    Pending Diagnostic Studies:       None           Medications:  Reconciled Home Medications:      Medication List        START taking these medications      levoFLOXacin 500 MG tablet  Commonly known as: LEVAQUIN  Take 1 tablet (500 mg total) by mouth once daily. for 4 days            CONTINUE taking these medications      acetaZOLAMIDE 250 MG tablet  Commonly known as: DIAMOX  Take 250 mg by mouth 2 (two) times daily.     amLODIPine 5 MG tablet  Commonly known as: NORVASC  Take 5 mg by mouth once daily.     aspirin 81 MG EC tablet  Commonly known as: ECOTRIN  Take 81 mg by mouth once daily.     benzonatate 100 MG capsule  Commonly known as: TESSALON  Take 100 mg by mouth 3 (three) times daily as needed for Cough.     cloNIDine 0.1 MG tablet  Commonly known as: CATAPRES  Take 0.1 mg by mouth 2 (two) times daily as needed. For systolic blood pressure  >170     cyanocobalamin 1,000 mcg/mL injection  Inject 1,000 mcg into the muscle every 14 (fourteen) days. Next dose due this week     doxazosin 2 MG tablet  Commonly known as: CARDURA  Take 2 mg by mouth every 12 (twelve) hours.     folic acid 800 MCG Tab  Commonly known as: FOLVITE  Take 800 mcg by mouth once daily.     isosorbide mononitrate 30 MG 24 hr tablet  Commonly known as: IMDUR  Take 30 mg by mouth once daily.     lactulose 20 gram Pack  Commonly known as: CEPHULAC  Take 20 g by mouth every 6 (six) hours as needed.     levothyroxine 50 MCG tablet  Commonly known as: SYNTHROID  Take 50 mcg by mouth before breakfast.     losartan 100 MG tablet  Commonly known as: COZAAR  Take 50 mg by mouth 2 (two) times daily.     meloxicam 7.5 MG tablet  Commonly known as: MOBIC  Take 7.5 mg by mouth daily as needed for Pain.     montelukast 10 mg tablet  Commonly known as: SINGULAIR  Take by mouth every evening.      NIFEdipine 30 MG Tbsr  Commonly known as: ADALAT CC  Take 30 mg by mouth nightly.     omeprazole 40 MG capsule  Commonly known as: PRILOSEC  Take 40 mg by mouth every morning.     primidone 50 MG Tab  Commonly known as: MYSOLINE  Take by mouth 2 (two) times a day.     propranoloL 10 MG tablet  Commonly known as: INDERAL  Take 10 mg by mouth 3 (three) times daily.     rOPINIRole 0.5 MG tablet  Commonly known as: REQUIP  Take 0.5 mg by mouth nightly as needed.     rosuvastatin 10 MG tablet  Commonly known as: CRESTOR  Take 10 mg by mouth every evening.     sertraline 25 MG tablet  Commonly known as: ZOLOFT  Take 25 mg by mouth once daily.     STIOLTO RESPIMAT INHL  Inhale 2 puffs into the lungs once daily.     travoprost 0.004 % ophthalmic solution  Commonly known as: TRAVATAN Z  Place 1 drop into both eyes every evening.     vitamin D 1000 units Tab  Commonly known as: VITAMIN D3  Take 5,000 Units by mouth once daily.     YUPELRI 175 mcg/3 mL Nebu  Generic drug: revefenacin  Inhale into the lungs.            STOP taking these medications      nitrofurantoin 100 MG capsule  Commonly known as: MACRODANTIN     theophylline 300 mg 24 hr capsule  Commonly known as: THEODUR              Indwelling Lines/Drains at time of discharge:   Lines/Drains/Airways       None                   Time spent on the discharge of patient: 45 minutes         Anmol Reese DO  Department of Hospital Medicine  [facility]

## 2024-03-27 NOTE — PLAN OF CARE
[facility]  Discharge Final Note    Primary Care Provider: Marilee Witt MD    Expected Discharge Date: 3/27/2024    Final Discharge Note (most recent)       Final Note - 03/27/24 1004          Final Note    Assessment Type Final Discharge Note     Anticipated Discharge Disposition Home-Health Care Svc   [facility]    What phone number can be called within the next 1-3 days to see how you are doing after discharge? [phone]     Hospital Resources/Appts/Education Provided Provided patient/caregiver with written discharge plan information;Appointments scheduled and added to AVS        Post-Acute Status    Post-Acute Authorization Home Health     Home Health Status Referrals Sent     Coverage [facility]     Patient choice form signed by patient/caregiver List with quality metrics by geographic area provided;List from CMS Compare;List from System Post-Acute Care     Discharge Delays None known at this time                 Pt. Has improved and Will d/c home today and has been referred to [facility] per daughter's request. Pt. Has appt. Scheduled with Dr. Marilee Witt on 4/11/24 at 3:00 pm. Has all needed DME. No further d/c needs were identified.    Important Message from Medicare  Important Message from Medicare regarding Discharge Appeal Rights: Given to patient/caregiver, Explained to patient/caregiver, Signed/date by patient/caregiver     Date IMM was signed: 03/27/24  Time IMM was signed: 1003    Contact Info       Marilee Witt MD   Specialty: Family Medicine   Relationship: PCP - General    [address]  [facility] [place]  [place] [address]   Phone: [phone]       Next Steps: Go on 4/11/2024    Instructions: at 3:00 pm for hospital follow up appointment

## 2024-03-27 NOTE — CONSULTS
Consult received.  EMR reviewed. Intake has improved since admit. Will F/U to further assess upon return to facility.

## 2024-03-27 NOTE — NURSING
20g INT to left AC removed. Pt tolerated well. Pt discharged to home with daughter with discharged paper and home meds given.

## 2024-03-27 NOTE — PROGRESS NOTES
[facility]  Hospital Medicine  Progress Note    Patient Name: Pilar Reardon  MRN: [id number]  Patient Class: IP- Inpatient   Admission Date: 3/25/2024  Length of Stay: 1 days  Attending Physician: Cassandra Skelton MD  Primary Care Provider: Marilee Witt MD        Subjective:     Principal Problem:Acute cystitis without hematuria        HPI:  Patient comes in with her daughter.  Her daughter states she has a decreased p.o. intake.  She also has had some off and on confusion today.  No fever but increased generalized weakness.  Not eating--patient with COPD on a multitude of medication in has CHF.    Overview/Hospital Course:  3/26/24: Ms Reardon found lying in bed in good spirits. Daughter at bedside. Discussed labs. Daughter reports that patient has been eating pretty good since admission. Notes that appetite is much better now than it was at home. Daughter brought her a subway sandwich last night and she ate half of it as well as chips. Patient reports continued dyspnea on exertion which she notes is not abnormal for her, but does seem to be a little worse over the last 1-2 weeks. She is on chronic O2 and has not required increase in flow thus far. She is being treated with IV Levaquin for UTI. Cx pending. Labs reviewed and notable as follows: BNP 1153. Na 147, 11-15 WBC and rare bacteria on UA. CXR shows suspected CHF with small thomas pleral effusions, diffuse thomas interstitial prominence. Case discussed with Dr Reese via telemedicine consultation. Started on Lasix 40mg IVP daily as well as potassium supplement with hx of hypokalemia. Will monitor overnight for diuresis and improvement of sx. Nutritional consult pending. Otherwise, continue current POC.  3/27/24: Ms. Reardon in bed receiving her morning meds with daughter at bedside.  Reports that her diet is better and ate most of her breakfast.  Believes to be at baseline as far as her breathing is concerned.   Denies any urinary symptoms.  Had 1470 urine output for 24 hours.  Patient and daughter are ready to go home with home health nursing. Case discussed with Dr. Reese via telemedicine consultation.  Will continue four days of Levaquin p.o. at home.  Encouraged patient to maintain a healthy diet with increased fluids for adequate nutrition and to prevent a future UTI.  Follow-up is scheduled for patient's primary care physician Dr. Marilee Witt with patient aware and verbalized understanding.    No new subjective & objective note has been filed under this hospital service since the last note was generated.      Assessment/Plan:      Chronic obstructive pulmonary disease  Patient's COPD is controlled currently.  Patient is currently on COPD Pathway. Continue scheduled inhalers Supplemental oxygen and monitor respiratory status closely.       VTE Risk Mitigation (From admission, onward)      None            Discharge Planning   JERRY: 3/27/2024     Code Status: DNR   Is the patient medically ready for discharge?:     Reason for patient still in hospital (select all that apply): Treatment  Discharge Plan A: Home with family   Discharge Delays: None known at this time              Anmol Reese DO  Department of Hospital Medicine   [facility]

## 2024-03-28 ENCOUNTER — PATIENT OUTREACH (OUTPATIENT)
Dept: ADMINISTRATIVE | Facility: CLINIC | Age: 89
End: 2024-03-28

## 2024-03-28 LAB — UA COMPLETE W REFLEX CULTURE PNL UR: NORMAL

## 2024-03-28 NOTE — PROGRESS NOTES
C3 nurse spoke with daughter Dianne Arteaga for a TCC post hospital discharge follow up call. The patient has a scheduled HOSFU appointment with Marilee Witt MD  on 4/6/24.

## 2024-05-13 ENCOUNTER — HOSPITAL ENCOUNTER (EMERGENCY)
Facility: HOSPITAL | Age: 89
Discharge: HOME | End: 2024-05-13
Attending: EMERGENCY MEDICINE
Payer: MEDICARE

## 2024-05-13 ENCOUNTER — HOSPITAL ENCOUNTER (EMERGENCY)
Facility: HOSPITAL | Age: 89
Discharge: ANOTHER HOSPITAL | End: 2024-05-13
Payer: MEDICARE

## 2024-05-13 VITALS
HEIGHT: 62 IN | SYSTOLIC BLOOD PRESSURE: 142 MMHG | DIASTOLIC BLOOD PRESSURE: 62 MMHG | RESPIRATION RATE: 17 BRPM | BODY MASS INDEX: 22.45 KG/M2 | WEIGHT: 122 LBS | TEMPERATURE: 98 F | HEART RATE: 65 BPM | OXYGEN SATURATION: 98 %

## 2024-05-13 VITALS
RESPIRATION RATE: 18 BRPM | OXYGEN SATURATION: 96 % | BODY MASS INDEX: 21.35 KG/M2 | WEIGHT: 116 LBS | HEART RATE: 79 BPM | SYSTOLIC BLOOD PRESSURE: 115 MMHG | DIASTOLIC BLOOD PRESSURE: 64 MMHG | TEMPERATURE: 98 F | HEIGHT: 62 IN

## 2024-05-13 DIAGNOSIS — R53.1 WEAKNESS: ICD-10-CM

## 2024-05-13 DIAGNOSIS — R10.13 EPIGASTRIC ABDOMINAL PAIN: ICD-10-CM

## 2024-05-13 DIAGNOSIS — I49.9 CARDIAC ARRHYTHMIA, UNSPECIFIED CARDIAC ARRHYTHMIA TYPE: ICD-10-CM

## 2024-05-13 DIAGNOSIS — M62.81 MUSCLE WEAKNESS (GENERALIZED): ICD-10-CM

## 2024-05-13 DIAGNOSIS — R10.9 ABDOMINAL PAIN: ICD-10-CM

## 2024-05-13 DIAGNOSIS — R10.12 LEFT UPPER QUADRANT ABDOMINAL PAIN: Primary | ICD-10-CM

## 2024-05-13 LAB
ALBUMIN SERPL BCP-MCNC: 3.4 G/DL (ref 3.5–5)
ALBUMIN/GLOB SERPL: 1.1 {RATIO}
ALP SERPL-CCNC: 67 U/L (ref 55–142)
ALT SERPL W P-5'-P-CCNC: 15 U/L (ref 13–56)
ANION GAP SERPL CALCULATED.3IONS-SCNC: 12 MMOL/L (ref 7–16)
AST SERPL W P-5'-P-CCNC: 12 U/L (ref 15–37)
BASOPHILS # BLD AUTO: 0.01 K/UL (ref 0–0.2)
BASOPHILS NFR BLD AUTO: 0.1 % (ref 0–1)
BILIRUB SERPL-MCNC: 0.5 MG/DL (ref ?–1.2)
BILIRUB UR QL STRIP: NEGATIVE
BUN SERPL-MCNC: 34 MG/DL (ref 7–18)
BUN/CREAT SERPL: 35 (ref 6–20)
CALCIUM SERPL-MCNC: 8.9 MG/DL (ref 8.5–10.1)
CHLORIDE SERPL-SCNC: 111 MMOL/L (ref 98–107)
CLARITY UR: CLEAR
CO2 SERPL-SCNC: 27 MMOL/L (ref 21–32)
COLOR UR: NORMAL
CREAT SERPL-MCNC: 0.97 MG/DL (ref 0.55–1.02)
DIFFERENTIAL METHOD BLD: ABNORMAL
EGFR (NO RACE VARIABLE) (RUSH/TITUS): 55 ML/MIN/1.73M2
EOSINOPHIL # BLD AUTO: 0.12 K/UL (ref 0–0.5)
EOSINOPHIL NFR BLD AUTO: 1 % (ref 1–4)
ERYTHROCYTE [DISTWIDTH] IN BLOOD BY AUTOMATED COUNT: 14.9 % (ref 11.5–14.5)
FLUAV AG UPPER RESP QL IA.RAPID: NEGATIVE
FLUBV AG UPPER RESP QL IA.RAPID: NEGATIVE
GLOBULIN SER-MCNC: 3.2 G/DL (ref 2–4)
GLUCOSE SERPL-MCNC: 148 MG/DL (ref 74–106)
GLUCOSE UR STRIP-MCNC: NORMAL MG/DL
GROUP A STREP MOLECULAR (OHS): NEGATIVE
HCT VFR BLD AUTO: 40.8 % (ref 38–47)
HGB BLD-MCNC: 12.2 G/DL (ref 12–16)
HYPOCHROMIA BLD QL SMEAR: ABNORMAL
KETONES UR STRIP-SCNC: NEGATIVE MG/DL
LACTATE SERPL-SCNC: 1.1 MMOL/L (ref 0.4–2)
LEUKOCYTE ESTERASE UR QL STRIP: NEGATIVE
LIPASE SERPL-CCNC: 16 U/L (ref 16–77)
LYMPHOCYTES # BLD AUTO: 5.97 K/UL (ref 1–4.8)
LYMPHOCYTES NFR BLD AUTO: 52 % (ref 27–41)
LYMPHOCYTES NFR BLD MANUAL: 59 % (ref 27–41)
MCH RBC QN AUTO: 29 PG (ref 27–31)
MCHC RBC AUTO-ENTMCNC: 29.9 G/DL (ref 32–36)
MCV RBC AUTO: 96.9 FL (ref 80–96)
MONOCYTES # BLD AUTO: 0.56 K/UL (ref 0–0.8)
MONOCYTES NFR BLD AUTO: 4.9 % (ref 2–6)
MONOCYTES NFR BLD MANUAL: 1 % (ref 2–6)
MPC BLD CALC-MCNC: 11.1 FL (ref 9.4–12.4)
NEUTROPHILS # BLD AUTO: 4.83 K/UL (ref 1.8–7.7)
NEUTROPHILS NFR BLD AUTO: 42 % (ref 53–65)
NEUTS BAND NFR BLD MANUAL: 1 % (ref 1–5)
NEUTS SEG NFR BLD MANUAL: 39 % (ref 50–62)
NITRITE UR QL STRIP: NEGATIVE
NRBC BLD MANUAL-RTO: ABNORMAL %
NT-PROBNP SERPL-MCNC: 2615 PG/ML (ref 1–450)
PH UR STRIP: 7 PH UNITS
PLATELET # BLD AUTO: 151 K/UL (ref 150–400)
PLATELET MORPHOLOGY: NORMAL
POTASSIUM SERPL-SCNC: 3.5 MMOL/L (ref 3.5–5.1)
PROT SERPL-MCNC: 6.6 G/DL (ref 6.4–8.2)
PROT UR QL STRIP: NEGATIVE
RBC # BLD AUTO: 4.21 M/UL (ref 4.2–5.4)
RBC # UR STRIP: NEGATIVE /UL
SARS-COV+SARS-COV-2 AG RESP QL IA.RAPID: NEGATIVE
SODIUM SERPL-SCNC: 146 MMOL/L (ref 136–145)
SP GR UR STRIP: 1.02
TROPONIN I SERPL DL<=0.01 NG/ML-MCNC: 146.4 PG/ML
UROBILINOGEN UR STRIP-ACNC: NORMAL MG/DL
WBC # BLD AUTO: 11.49 K/UL (ref 4.5–11)

## 2024-05-13 PROCEDURE — 85025 COMPLETE CBC W/AUTO DIFF WBC: CPT

## 2024-05-13 PROCEDURE — 84484 ASSAY OF TROPONIN QUANT: CPT

## 2024-05-13 PROCEDURE — 83605 ASSAY OF LACTIC ACID: CPT

## 2024-05-13 PROCEDURE — 83690 ASSAY OF LIPASE: CPT

## 2024-05-13 PROCEDURE — 99285 EMERGENCY DEPT VISIT HI MDM: CPT | Mod: 25

## 2024-05-13 PROCEDURE — 99284 EMERGENCY DEPT VISIT MOD MDM: CPT | Mod: ,,, | Performed by: EMERGENCY MEDICINE

## 2024-05-13 PROCEDURE — 93005 ELECTROCARDIOGRAM TRACING: CPT

## 2024-05-13 PROCEDURE — 96361 HYDRATE IV INFUSION ADD-ON: CPT

## 2024-05-13 PROCEDURE — 96374 THER/PROPH/DIAG INJ IV PUSH: CPT

## 2024-05-13 PROCEDURE — 25000003 PHARM REV CODE 250: Performed by: EMERGENCY MEDICINE

## 2024-05-13 PROCEDURE — 96375 TX/PRO/DX INJ NEW DRUG ADDON: CPT

## 2024-05-13 PROCEDURE — 83880 ASSAY OF NATRIURETIC PEPTIDE: CPT

## 2024-05-13 PROCEDURE — 99285 EMERGENCY DEPT VISIT HI MDM: CPT | Mod: EDII,,,

## 2024-05-13 PROCEDURE — 93010 ELECTROCARDIOGRAM REPORT: CPT | Mod: ,,, | Performed by: HOSPITALIST

## 2024-05-13 PROCEDURE — 25000003 PHARM REV CODE 250

## 2024-05-13 PROCEDURE — 63600175 PHARM REV CODE 636 W HCPCS: Performed by: EMERGENCY MEDICINE

## 2024-05-13 PROCEDURE — 80053 COMPREHEN METABOLIC PANEL: CPT

## 2024-05-13 PROCEDURE — 96360 HYDRATION IV INFUSION INIT: CPT

## 2024-05-13 PROCEDURE — 87428 SARSCOV & INF VIR A&B AG IA: CPT

## 2024-05-13 PROCEDURE — 36415 COLL VENOUS BLD VENIPUNCTURE: CPT

## 2024-05-13 PROCEDURE — 25500020 PHARM REV CODE 255: Performed by: EMERGENCY MEDICINE

## 2024-05-13 PROCEDURE — 87040 BLOOD CULTURE FOR BACTERIA: CPT

## 2024-05-13 PROCEDURE — 27000221 HC OXYGEN, UP TO 24 HOURS

## 2024-05-13 PROCEDURE — 87651 STREP A DNA AMP PROBE: CPT

## 2024-05-13 PROCEDURE — 94761 N-INVAS EAR/PLS OXIMETRY MLT: CPT

## 2024-05-13 PROCEDURE — 93010 ELECTROCARDIOGRAM REPORT: CPT | Performed by: FAMILY MEDICINE

## 2024-05-13 PROCEDURE — 81003 URINALYSIS AUTO W/O SCOPE: CPT | Performed by: EMERGENCY MEDICINE

## 2024-05-13 RX ORDER — SODIUM CHLORIDE 9 MG/ML
INJECTION, SOLUTION INTRAVENOUS
Status: COMPLETED
Start: 2024-05-13 | End: 2024-05-13

## 2024-05-13 RX ORDER — DIPHENHYDRAMINE HYDROCHLORIDE 50 MG/ML
50 INJECTION INTRAMUSCULAR; INTRAVENOUS
Status: COMPLETED | OUTPATIENT
Start: 2024-05-13 | End: 2024-05-13

## 2024-05-13 RX ORDER — SODIUM CHLORIDE 9 MG/ML
1000 INJECTION, SOLUTION INTRAVENOUS
Status: DISCONTINUED | OUTPATIENT
Start: 2024-05-13 | End: 2024-05-13 | Stop reason: HOSPADM

## 2024-05-13 RX ORDER — DEXAMETHASONE SODIUM PHOSPHATE 4 MG/ML
8 INJECTION, SOLUTION INTRA-ARTICULAR; INTRALESIONAL; INTRAMUSCULAR; INTRAVENOUS; SOFT TISSUE
Status: COMPLETED | OUTPATIENT
Start: 2024-05-13 | End: 2024-05-13

## 2024-05-13 RX ADMIN — DIPHENHYDRAMINE HYDROCHLORIDE 50 MG: 50 INJECTION, SOLUTION INTRAMUSCULAR; INTRAVENOUS at 01:05

## 2024-05-13 RX ADMIN — DEXAMETHASONE SODIUM PHOSPHATE 8 MG: 4 INJECTION, SOLUTION INTRA-ARTICULAR; INTRALESIONAL; INTRAMUSCULAR; INTRAVENOUS; SOFT TISSUE at 01:05

## 2024-05-13 RX ADMIN — SODIUM CHLORIDE 1000 ML: 9 INJECTION, SOLUTION INTRAVENOUS at 01:05

## 2024-05-13 RX ADMIN — IOPAMIDOL 100 ML: 755 INJECTION, SOLUTION INTRAVENOUS at 02:05

## 2024-05-13 RX ADMIN — SODIUM CHLORIDE 1000 ML: 9 INJECTION, SOLUTION INTRAVENOUS at 09:05

## 2024-05-13 NOTE — ED PROVIDER NOTES
Encounter Date: 5/13/2024    SCRIBE #1 NOTE: I, Triny Sarita, am scribing for, and in the presence of,  Clay Valdes MD.       History     Chief Complaint   Patient presents with    Abdominal Pain     Pt is a 89 y.o. Female that presents to the ED via EMS from [facility].Pt is a transfer.  Pt states she has pain in her abdomin and she has had it for a few months now.  The pain is in her LLQ. PMHx consist of Frequent UTI, Chronic CHF, COPD, CAD, and Actinic Keratoses. Pt states she has been feeling bad for a while.     The history is provided by the patient and the EMS personnel. No  was used.     Review of patient's allergies indicates:   Allergen Reactions    Bactrim [sulfamethoxazole-trimethoprim]     Ceftin [cefuroxime axetil]     Iodinated contrast media Other (See Comments)     Past Medical History:   Diagnosis Date    Actinic keratoses     Arthritis     CAD (coronary artery disease)     pt has and old heart stent    CAD (coronary artery disease) 12/01/2022    Chronic congestive heart failure 03/27/2024    COPD (chronic obstructive pulmonary disease)     Frequent UTI     GERD (gastroesophageal reflux disease)     High cholesterol     HTN (hypertension)     Leukemia, unspecified     just dx within the past year     Past Surgical History:   Procedure Laterality Date    APPENDECTOMY      heart stent      HYSTERECTOMY      pessary       Family History   Problem Relation Name Age of Onset    Hypertension Daughter      Diabetes Daughter      Melanoma Daughter       Social History     Tobacco Use    Smoking status: Never    Smokeless tobacco: Never   Substance Use Topics    Alcohol use: Never    Drug use: Never     Review of Systems   Constitutional:  Negative for fever.   Respiratory:  Positive for cough. Negative for shortness of breath.    Cardiovascular:  Negative for leg swelling.   Gastrointestinal:  Negative for diarrhea and nausea.   Neurological:  Positive for weakness.   All other systems  reviewed and are negative.      Physical Exam     Initial Vitals [05/13/24 1135]   BP Pulse Resp Temp SpO2   (!) 142/62 65 17 98.2 °F (36.8 °C) 98 %      MAP       --         Physical Exam    Nursing note and vitals reviewed.  Constitutional:   Weakness   HENT:   Head: Normocephalic and atraumatic.   Mouth/Throat: Oropharynx is clear and moist.   Eyes: Pupils are equal, round, and reactive to light.   Neck: Neck supple.   Normal range of motion.  Cardiovascular:  Normal rate and regular rhythm.           Pulmonary/Chest: Effort normal and breath sounds normal.   Abdominal: Abdomen is soft. She exhibits no distension. There is abdominal tenderness in the left lower quadrant.   LLQ pain   Musculoskeletal:         General: Normal range of motion.      Cervical back: Normal range of motion and neck supple.     Neurological: She is alert.   Skin: Skin is warm. Capillary refill takes less than 2 seconds.   Psychiatric: She has a normal mood and affect.         ED Course   Procedures  Labs Reviewed   URINALYSIS, REFLEX TO URINE CULTURE     EKG Readings: (Independently Interpreted)   Heart Rate: 68 bpm.   Sinus rhythm   Poor R wave progression   Anterolateral T wave abnormality is nonspecific   Borderline ECG       Imaging Results              CT Abdomen Pelvis With IV Contrast NO Oral Contrast (Final result)  Result time 05/13/24 14:50:30      Final result by Antwan Rubio DO (05/13/24 14:50:30)                   Impression:      No CT evidence of infection.      Electronically signed by: Antwan Rubio  Date:    05/13/2024  Time:    14:50               Narrative:    EXAMINATION:  CT ABDOMEN PELVIS WITH IV CONTRAST    CLINICAL HISTORY:  Abdominal abscess/infection suspected;    COMPARISON:  5/22/23    TECHNIQUE:  CT ABDOMEN PELVIS WITH IV [id number] cc of Isovue 370    Dose reduction:    The CT exam was performed using one or more dose reduction techniques: Automatic exposure control, automated adjustment of the  MA and/or kVP according to patient size, or use of iterative reconstruction technique.    FINDINGS:  Lower lobes: Lower lobe scarring, similar    Cardiac: No effusion.    Abdomen:    Hepatobiliary/gallbladder: No focal liver lesion.  Gallbladder is normal.  No ductal obstruction.    Spleen: Normal    Pancreas: Normal    Adrenal/Genitourinary system: Bilateral renal sinus cyst.  1.2 cm nonobstructing stone within the left kidney.  Additional subcentimeter nonobstructing stone within the left kidney.    Bowel and Mesentery: Diverticulosis.  No bowel obstruction.    Peritoneum: Normal.    Retroperitoneum: No enlarged lymph nodes.    Vasculature: Mild to moderate calcified vascular disease.    Reproductive: Uterus removed    Lymph nodes: No enlarged lymph nodes.    Abdominal wall: Fat and bowel containing right inguinal hernia.    Osseous structures: Multilevel degenerative change                                       Medications   sodium chloride 0.9% bolus 1,000 mL 1,000 mL (1,000 mLs Intravenous New Bag 5/13/24 1300)   dexAMETHasone injection 8 mg (8 mg Intravenous Given 5/13/24 1317)   diphenhydrAMINE injection 50 mg (50 mg Intravenous Given 5/13/24 1317)   iopamidoL (ISOVUE-370) injection 100 mL (100 mLs Intravenous Given 5/13/24 1408)     Medical Decision Making  Amount and/or Complexity of Data Reviewed  Radiology: ordered.    Risk  Prescription drug management.              Attending Attestation:           Physician Attestation for Scribe:  Physician Attestation Statement for Scribe #1: I, Clay Valdes MD, reviewed documentation, as scribed by Triny Stein in my presence, and it is both accurate and complete.             ED Course as of 05/13/24 1600   Mon May 13, 2024   1209 Medical decision-making:  Differential diagnosis includes left lower quadrant abdominal pain, diverticulitis, UTI, generalized weakness, dehydration.  No labs were performed here since patient had extensive labs performed at outside  [facility].  Will obtain CT abdomen since that was the reason patient was sent here although her abdomen is not that tender. [BB]   1434 Urinalysis negative. [BB]   1558 Urinalysis is negative. [BB]   1559 CT abdomen shows no acute findings. [BB]      ED Course User Index  [BB] Clay Valdes MD                           Clinical Impression:  Final diagnoses:  [R10.9] Abdominal pain          ED Disposition Condition    Discharge Stable          ED Prescriptions    None       Follow-up Information    None          Clay Valdes MD  05/13/24 1600

## 2024-05-13 NOTE — ED NOTES
Pt on phone with Dr. Valdes at [facility] for possible transfer. Pt accepted. Will transfer via EMS. Daughter and pt aware.

## 2024-05-13 NOTE — ED PROVIDER NOTES
Encounter Date: 5/13/2024       History     Chief Complaint   Patient presents with    Weakness     89-year-old  or white female brought into the ED by her daughter complaining of generalized weakness for several months.  Patient has left upper quad pain and tenderness upon palpation for approximately 1 month.  Patient also states she has epigastric pain that radiates to her shoulder blades for 3-4 days. On cardiac monitor, patient found to be in SVT with a rate of 157 and blood pressure 72/30.  She denies shortness or breath in his satting 96% on room air with respiratory rate 18 to 20.         The history is provided by the patient and a relative.     Review of patient's allergies indicates:   Allergen Reactions    Bactrim [sulfamethoxazole-trimethoprim]     Ceftin [cefuroxime axetil]     Iodinated contrast media Other (See Comments)     Past Medical History:   Diagnosis Date    Actinic keratoses     Arthritis     CAD (coronary artery disease)     pt has and old heart stent    CAD (coronary artery disease) 12/01/2022    Chronic congestive heart failure 03/27/2024    COPD (chronic obstructive pulmonary disease)     Frequent UTI     GERD (gastroesophageal reflux disease)     High cholesterol     HTN (hypertension)     Leukemia, unspecified     just dx within the past year     Past Surgical History:   Procedure Laterality Date    APPENDECTOMY      heart stent      HYSTERECTOMY      pessary       Family History   Problem Relation Name Age of Onset    Hypertension Daughter      Diabetes Daughter      Melanoma Daughter       Social History     Tobacco Use    Smoking status: Never    Smokeless tobacco: Never   Substance Use Topics    Alcohol use: Never    Drug use: Never     Review of Systems   Constitutional:  Positive for activity change and fatigue. Negative for chills, diaphoresis and fever.   HENT:  Negative for congestion, rhinorrhea and sore throat.    Eyes:  Negative for visual  disturbance.   Respiratory:  Negative for cough and shortness of breath.    Cardiovascular:  Positive for chest pain. Negative for palpitations and leg swelling.   Gastrointestinal:  Positive for abdominal pain (epigastric and left upper side). Negative for diarrhea, nausea and vomiting.   Genitourinary:  Negative for dysuria, flank pain, frequency and hematuria.   Musculoskeletal:  Positive for back pain (upper). Negative for neck pain and neck stiffness.   Skin:  Negative for rash and wound.   Neurological:  Positive for syncope and weakness. Negative for headaches.   Hematological:  Does not bruise/bleed easily.   Psychiatric/Behavioral:  Negative for suicidal ideas.        Physical Exam     Initial Vitals [05/13/24 0908]   BP Pulse Resp Temp SpO2   (!) 72/30 (!) 160 18 97.8 °F (36.6 °C) (!) 94 %      MAP       --         Physical Exam    Medical Screening Exam   See Full Note    ED Course   Procedures  Labs Reviewed   COMPREHENSIVE METABOLIC PANEL - Abnormal; Notable for the following components:       Result Value    Sodium 146 (*)     Chloride 111 (*)     Glucose 148 (*)     BUN 34 (*)     BUN/Creatinine Ratio 35 (*)     Albumin 3.4 (*)     AST 12 (*)     eGFR 55 (*)     All other components within normal limits   CBC WITH DIFFERENTIAL - Abnormal; Notable for the following components:    WBC 11.49 (*)     MCV 96.9 (*)     MCHC 29.9 (*)     RDW 14.9 (*)     Neutrophils % 42.0 (*)     Lymphocytes % 52.0 (*)     Lymphocytes, Absolute 5.97 (*)     All other components within normal limits   TROPONIN I - Abnormal; Notable for the following components:    Troponin I High Sensitivity 146.4 (*)     All other components within normal limits   NT-PRO NATRIURETIC PEPTIDE - Abnormal; Notable for the following components:    ProBNP 2,615 (*)     All other components within normal limits   MANUAL DIFFERENTIAL - Abnormal; Notable for the following components:    Segmented Neutrophils, Man % 39 (*)     Lymphocytes, Man % 59  (*)     Monocytes, Man % 1 (*)     All other components within normal limits   SARS-COV2 (COVID) W/ FLU ANTIGEN - Normal    Narrative:     Negative SARS-CoV results should not be used as the sole basis for treatment or patient management decisions; negative results should be considered in the context of a patient's recent exposures, history and the presene of clinical signs and symptoms consistent with COVID-19.  Negative results should be treated as presumptive and confirmed by molecular assay, if necessary for patient management.   STREP A BY MOLECULAR METHOD - Normal   LIPASE - Normal   LACTIC ACID, PLASMA - Normal   CULTURE, BLOOD   CULTURE, BLOOD   CBC W/ AUTO DIFFERENTIAL    Narrative:     The following orders were created for panel order CBC auto differential.  Procedure                               Abnormality         Status                     ---------                               -----------         ------                     CBC with Differential[[id number]]       Abnormal            Final result               Manual Differential[[id number]]         Abnormal            Final result                 Please view results for these tests on the individual orders.   URINALYSIS, REFLEX TO URINE CULTURE          Imaging Results              X-Ray Chest AP Portable (Final result)  Result time 05/13/24 10:04:01      Final result by Woo Murray DO (05/13/24 10:04:01)                   Impression:      As above.    Point of Service: [facility]      Electronically signed by: Woo Murray  Date:    05/13/2024  Time:    10:04               Narrative:    EXAMINATION:  XR CHEST AP PORTABLE    CLINICAL HISTORY:  Muscle weakness (generalized)    COMPARISON:  Chest x-ray March 25, 2024    TECHNIQUE:  Frontal view/views of the chest.    FINDINGS:  Continued cardiomegaly.  Bilateral interstitial infiltrate/edema present.  There is patchy bibasilar atelectasis/infiltrate.  Suspect small left pleural fluid.   Visualized osseous and surrounding soft tissue structures appear grossly unchanged.                                       Medications   sodium chloride 0.9% infusion (0 mL/hr  Stopped 5/13/24 1042)     Medical Decision Making  Amount and/or Complexity of Data Reviewed  Labs: ordered.     Details: COVID/flu/strep swabs all negative  Troponin 146  BNP-2615  CBC-WBC 11.49  Lactic-1.1      Radiology: ordered.     Details: CXR-Continued cardiomegaly.  Bilateral interstitial infiltrate/edema present.  There is patchy bibasilar atelectasis/infiltrate.  Suspect small left pleural fluid.  Visualized osseous and surrounding soft tissue structures appear grossly unchanged.       ECG/medicine tests:      Details: EKG #1 SVT rate 157 beats per minute supraventricular tach left ventricular hypertrophy with repolarization abnormality  EKG # 2 rate 83 beats per minute sinus rhythm with marked sinus arrhythmia               ED Course as of 05/13/24 1218   Mon May 13, 2024   0955 Spoke with Dr. Valdes for emergency medicine at [facility] concerning patient left upper quad and epigastric pain that radiates to upper back with CT reading unavailable at this location with SVT and hypotension upon arrival and he has accepted patient for transfer with PFC request placed in computer with patient and family member aware. [KT]      ED Course User Index  [KT] Malika Haddad NP                           Clinical Impression:   Final diagnoses:  [M62.81] Muscle weakness (generalized)  [R10.12] Left upper quadrant abdominal pain (Primary)  [R10.13] Epigastric abdominal pain  [I49.9] Cardiac arrhythmia, unspecified cardiac arrhythmia type        ED Disposition Condition    Transfer to Another Facility Stable                Malika Haddad NP  05/13/24 0950       Malika Haddad NP  05/13/24 1218       Malika Haddad NP  05/14/24 0817

## 2024-05-13 NOTE — ED NOTES
Report called [facility].  Daughter and pt aware. Notified EMS for transfer. Awaiting arrival.

## 2024-05-13 NOTE — DISCHARGE INSTRUCTIONS
Follow up in clinic with primary care provider in 2-3 days for recheck.  Follow up in clinic with cardiologist regarding episode of SVT.  Return to emergency department for any worsening or further problems.

## 2024-05-14 ENCOUNTER — TELEPHONE (OUTPATIENT)
Dept: EMERGENCY MEDICINE | Facility: HOSPITAL | Age: 89
End: 2024-05-14
Payer: MEDICARE

## 2024-05-14 NOTE — TELEPHONE ENCOUNTER
Pt daughter states pt is feeling better, instructed pt daughter to push fluids and fu with pcp, pt daughter vu

## 2024-05-19 LAB
BACTERIA BLD CULT: NORMAL
BACTERIA BLD CULT: NORMAL

## 2024-05-21 LAB
OHS QRS DURATION: 80 MS
OHS QTC CALCULATION: 389 MS

## 2024-05-29 LAB
OHS QRS DURATION: 82 MS
OHS QRS DURATION: 82 MS
OHS QRS DURATION: 96 MS
OHS QTC CALCULATION: 291 MS
OHS QTC CALCULATION: 425 MS
OHS QTC CALCULATION: 432 MS

## 2024-08-13 ENCOUNTER — HOSPITAL ENCOUNTER (EMERGENCY)
Facility: HOSPITAL | Age: 89
Discharge: HOME | End: 2024-08-13
Attending: EMERGENCY MEDICINE
Payer: MEDICARE

## 2024-08-13 VITALS
HEART RATE: 97 BPM | RESPIRATION RATE: 18 BRPM | DIASTOLIC BLOOD PRESSURE: 46 MMHG | OXYGEN SATURATION: 97 % | BODY MASS INDEX: 21.16 KG/M2 | HEIGHT: 62 IN | SYSTOLIC BLOOD PRESSURE: 149 MMHG | WEIGHT: 115 LBS | TEMPERATURE: 98 F

## 2024-08-13 DIAGNOSIS — E86.0 DEHYDRATION: Primary | ICD-10-CM

## 2024-08-13 DIAGNOSIS — R10.9 ABDOMINAL PAIN, UNSPECIFIED ABDOMINAL LOCATION: ICD-10-CM

## 2024-08-13 DIAGNOSIS — R06.00 ACUTE DYSPNEA: ICD-10-CM

## 2024-08-13 LAB
ALBUMIN SERPL BCP-MCNC: 3.5 G/DL (ref 3.5–5)
ALBUMIN/GLOB SERPL: 1 {RATIO}
ALP SERPL-CCNC: 78 U/L (ref 55–142)
ALT SERPL W P-5'-P-CCNC: 9 U/L (ref 13–56)
ANION GAP SERPL CALCULATED.3IONS-SCNC: 10 MMOL/L (ref 7–16)
AST SERPL W P-5'-P-CCNC: 11 U/L (ref 15–37)
BASOPHILS # BLD AUTO: 0.02 K/UL (ref 0–0.2)
BASOPHILS NFR BLD AUTO: 0.2 % (ref 0–1)
BILIRUB SERPL-MCNC: 0.3 MG/DL (ref ?–1.2)
BILIRUB UR QL STRIP: NEGATIVE
BUN SERPL-MCNC: 22 MG/DL (ref 7–18)
BUN/CREAT SERPL: 15 (ref 6–20)
CALCIUM SERPL-MCNC: 9.3 MG/DL (ref 8.5–10.1)
CHLORIDE SERPL-SCNC: 104 MMOL/L (ref 98–107)
CLARITY UR: CLEAR
CO2 SERPL-SCNC: 34 MMOL/L (ref 21–32)
COLOR UR: YELLOW
CREAT SERPL-MCNC: 1.43 MG/DL (ref 0.55–1.02)
D DIMER PPP FEU-MCNC: 2.81 ΜG/ML (ref 0–0.47)
DIFFERENTIAL METHOD BLD: ABNORMAL
EGFR (NO RACE VARIABLE) (RUSH/TITUS): 35 ML/MIN/1.73M2
EOSINOPHIL # BLD AUTO: 0.11 K/UL (ref 0–0.5)
EOSINOPHIL NFR BLD AUTO: 1 % (ref 1–4)
EOSINOPHIL NFR BLD MANUAL: 1 % (ref 1–4)
ERYTHROCYTE [DISTWIDTH] IN BLOOD BY AUTOMATED COUNT: 15.1 % (ref 11.5–14.5)
GLOBULIN SER-MCNC: 3.6 G/DL (ref 2–4)
GLUCOSE SERPL-MCNC: 135 MG/DL (ref 74–106)
GLUCOSE UR STRIP-MCNC: 500 MG/DL
GROUP A STREP MOLECULAR (OHS): NEGATIVE
HCT VFR BLD AUTO: 40.3 % (ref 38–47)
HGB BLD-MCNC: 12.8 G/DL (ref 12–16)
INFLUENZA A MOLECULAR (OHS): NEGATIVE
INFLUENZA B MOLECULAR (OHS): NEGATIVE
KETONES UR STRIP-SCNC: NEGATIVE MG/DL
LEUKOCYTE ESTERASE UR QL STRIP: NEGATIVE
LYMPHOCYTES # BLD AUTO: 4.22 K/UL (ref 1–4.8)
LYMPHOCYTES NFR BLD AUTO: 38.1 % (ref 27–41)
LYMPHOCYTES NFR BLD MANUAL: 38 % (ref 27–41)
MACROCYTES BLD QL SMEAR: NORMAL
MCH RBC QN AUTO: 33.4 PG (ref 27–31)
MCHC RBC AUTO-ENTMCNC: 31.8 G/DL (ref 32–36)
MCV RBC AUTO: 105.2 FL (ref 80–96)
MONOCYTES # BLD AUTO: 0.65 K/UL (ref 0–0.8)
MONOCYTES NFR BLD AUTO: 5.9 % (ref 2–6)
MONOCYTES NFR BLD MANUAL: 6 % (ref 2–6)
MPC BLD CALC-MCNC: 11 FL (ref 9.4–12.4)
NEUTROPHILS # BLD AUTO: 6.08 K/UL (ref 1.8–7.7)
NEUTROPHILS NFR BLD AUTO: 54.8 % (ref 53–65)
NEUTS SEG NFR BLD MANUAL: 55 % (ref 50–62)
NITRITE UR QL STRIP: NEGATIVE
NRBC BLD MANUAL-RTO: NORMAL %
NT-PROBNP SERPL-MCNC: 424 PG/ML (ref 1–450)
OHS QRS DURATION: 82 MS
OHS QRS DURATION: 86 MS
OHS QTC CALCULATION: 324 MS
OHS QTC CALCULATION: 444 MS
PH UR STRIP: 5.5 PH UNITS
PLATELET # BLD AUTO: 210 K/UL (ref 150–400)
PLATELET MORPHOLOGY: NORMAL
POTASSIUM SERPL-SCNC: 3.3 MMOL/L (ref 3.5–5.1)
PROT SERPL-MCNC: 7.1 G/DL (ref 6.4–8.2)
PROT UR QL STRIP: NEGATIVE
RBC # BLD AUTO: 3.83 M/UL (ref 4.2–5.4)
RBC # UR STRIP: NEGATIVE /UL
SARS-COV-2 RDRP RESP QL NAA+PROBE: NEGATIVE
SODIUM SERPL-SCNC: 145 MMOL/L (ref 136–145)
SP GR UR STRIP: 1.01
TROPONIN I SERPL DL<=0.01 NG/ML-MCNC: 12.3 PG/ML
TROPONIN I SERPL DL<=0.01 NG/ML-MCNC: 13.2 PG/ML
UROBILINOGEN UR STRIP-ACNC: 0.2 MG/DL
WBC # BLD AUTO: 11.08 K/UL (ref 4.5–11)

## 2024-08-13 PROCEDURE — 25000003 PHARM REV CODE 250: Performed by: EMERGENCY MEDICINE

## 2024-08-13 PROCEDURE — 81003 URINALYSIS AUTO W/O SCOPE: CPT | Performed by: EMERGENCY MEDICINE

## 2024-08-13 PROCEDURE — 36415 COLL VENOUS BLD VENIPUNCTURE: CPT | Performed by: EMERGENCY MEDICINE

## 2024-08-13 PROCEDURE — 99284 EMERGENCY DEPT VISIT MOD MDM: CPT | Mod: EDII,,, | Performed by: EMERGENCY MEDICINE

## 2024-08-13 PROCEDURE — 93005 ELECTROCARDIOGRAM TRACING: CPT

## 2024-08-13 PROCEDURE — 93010 ELECTROCARDIOGRAM REPORT: CPT | Performed by: FAMILY MEDICINE

## 2024-08-13 PROCEDURE — 83880 ASSAY OF NATRIURETIC PEPTIDE: CPT | Performed by: EMERGENCY MEDICINE

## 2024-08-13 PROCEDURE — 80053 COMPREHEN METABOLIC PANEL: CPT | Performed by: EMERGENCY MEDICINE

## 2024-08-13 PROCEDURE — 85025 COMPLETE CBC W/AUTO DIFF WBC: CPT | Performed by: EMERGENCY MEDICINE

## 2024-08-13 PROCEDURE — 84484 ASSAY OF TROPONIN QUANT: CPT | Performed by: EMERGENCY MEDICINE

## 2024-08-13 PROCEDURE — 96360 HYDRATION IV INFUSION INIT: CPT

## 2024-08-13 PROCEDURE — 87635 SARS-COV-2 COVID-19 AMP PRB: CPT | Performed by: EMERGENCY MEDICINE

## 2024-08-13 PROCEDURE — 85379 FIBRIN DEGRADATION QUANT: CPT | Performed by: EMERGENCY MEDICINE

## 2024-08-13 PROCEDURE — 27000221 HC OXYGEN, UP TO 24 HOURS

## 2024-08-13 PROCEDURE — 99285 EMERGENCY DEPT VISIT HI MDM: CPT | Mod: 25

## 2024-08-13 PROCEDURE — 94640 AIRWAY INHALATION TREATMENT: CPT

## 2024-08-13 PROCEDURE — 94761 N-INVAS EAR/PLS OXIMETRY MLT: CPT

## 2024-08-13 PROCEDURE — 87651 STREP A DNA AMP PROBE: CPT | Performed by: EMERGENCY MEDICINE

## 2024-08-13 PROCEDURE — 87502 INFLUENZA DNA AMP PROBE: CPT | Performed by: EMERGENCY MEDICINE

## 2024-08-13 PROCEDURE — 25000242 PHARM REV CODE 250 ALT 637 W/ HCPCS: Performed by: EMERGENCY MEDICINE

## 2024-08-13 RX ORDER — DAPAGLIFLOZIN 10 MG/1
10 TABLET, FILM COATED ORAL DAILY
COMMUNITY
Start: 2024-07-12

## 2024-08-13 RX ORDER — SPIRONOLACTONE 25 MG/1
25 TABLET ORAL DAILY
COMMUNITY
Start: 2024-07-12

## 2024-08-13 RX ORDER — ERGOCALCIFEROL 1.25 MG/1
50000 CAPSULE ORAL
COMMUNITY

## 2024-08-13 RX ORDER — AMIODARONE HYDROCHLORIDE 200 MG/1
200 TABLET ORAL DAILY
COMMUNITY
Start: 2024-07-11

## 2024-08-13 RX ORDER — NITROFURANTOIN 25 MG/5ML
SUSPENSION ORAL
COMMUNITY

## 2024-08-13 RX ORDER — TICAGRELOR 90 MG/1
90 TABLET ORAL 2 TIMES DAILY
COMMUNITY
Start: 2024-07-12

## 2024-08-13 RX ORDER — CARVEDILOL 6.25 MG/1
6.25 TABLET ORAL 2 TIMES DAILY
COMMUNITY
Start: 2024-07-23

## 2024-08-13 RX ORDER — IPRATROPIUM BROMIDE AND ALBUTEROL SULFATE 2.5; .5 MG/3ML; MG/3ML
3 SOLUTION RESPIRATORY (INHALATION) ONCE
Status: COMPLETED | OUTPATIENT
Start: 2024-08-13 | End: 2024-08-13

## 2024-08-13 RX ORDER — IPRATROPIUM BROMIDE AND ALBUTEROL 20; 100 UG/1; UG/1
1 SPRAY, METERED RESPIRATORY (INHALATION) 4 TIMES DAILY
COMMUNITY
Start: 2024-08-03

## 2024-08-13 RX ORDER — FUROSEMIDE 40 MG/1
40 TABLET ORAL DAILY
COMMUNITY
Start: 2024-07-23

## 2024-08-13 RX ADMIN — IPRATROPIUM BROMIDE AND ALBUTEROL SULFATE 3 ML: 2.5; .5 SOLUTION RESPIRATORY (INHALATION) at 04:08

## 2024-08-13 RX ADMIN — SODIUM CHLORIDE 250 ML: 9 INJECTION, SOLUTION INTRAVENOUS at 05:08

## 2024-08-13 NOTE — ED PROVIDER NOTES
"Encounter Date: 8/13/2024       History     Chief Complaint   Patient presents with    Shortness of Breath    Abdominal Pain    Weakness     Family states she has been weak for a few weeks now, family states she had an MI 2 months ago and had a stent placed      PT IS A 89 YR OLD WF WITH WEAKNESS AND DYSPNEA AT REST FOR 2 WEEKS AND LLQ ABDOMINAL PAIN WITH NAUSEA ONSET  UNDETERMINED AND WORSE TODAY.     PT DENIES CHEST PAIN, COUGH  X OCCASIONAL COUGH, FEVER/CHILLS, NO VOMITING OR DIARRHEA,PALPITATION AND SYNCOPE.       PT HAD PNEUMONIA, MI 2 MONTHS AGO TREATED AT [place] WITH LAD WITH STENT PER DR TURK ON BRILLINTA AND ASA 81 MG WHILE AT [facility].   PT LIVES WITH SON AND FAMILY LIVES NEARBY AND FAMILY IS ATTENTIVE  PT IS DNR PER HER DAUGHTER "MRS PRIETO"  PT HAD SHINGLES WITH LLQ PAIN 2 YRS AGO DAUGHTER STATES LLQ PAIN FOR 8 MONTHS.        Review of patient's allergies indicates:   Allergen Reactions    Bactrim [sulfamethoxazole-trimethoprim]     Ceftin [cefuroxime axetil]     Iodinated contrast media Other (See Comments)     Past Medical History:   Diagnosis Date    Actinic keratoses     Arthritis     CAD (coronary artery disease)     pt has and old heart stent    CAD (coronary artery disease) 12/01/2022    Chronic congestive heart failure 03/27/2024    COPD (chronic obstructive pulmonary disease)     Frequent UTI     GERD (gastroesophageal reflux disease)     High cholesterol     HTN (hypertension)     Leukemia, unspecified     just dx within the past year    ST elevation (STEMI) myocardial infarction of unspecified site     2 months ago with stent placement     Past Surgical History:   Procedure Laterality Date    APPENDECTOMY      CORONARY ANGIOPLASTY WITH STENT PLACEMENT      2nd one placed 2 months ago    heart stent      HYSTERECTOMY      pessary       Family History   Problem Relation Name Age of Onset    Hypertension Daughter      Diabetes Daughter      Melanoma Daughter       Social " History     Tobacco Use    Smoking status: Never    Smokeless tobacco: Never   Substance Use Topics    Alcohol use: Never    Drug use: Never     Review of Systems   Unable to perform ROS: Age   Constitutional:  Positive for fatigue. Negative for appetite change.   HENT: Negative.     Eyes: Negative.    Respiratory:  Positive for shortness of breath.    Gastrointestinal:  Positive for abdominal pain and nausea.   Endocrine: Negative.    Musculoskeletal: Negative.    Skin: Negative.    Neurological:  Positive for weakness.   Psychiatric/Behavioral: Negative.     All other systems reviewed and are negative.      Physical Exam     Initial Vitals [08/13/24 1625]   BP Pulse Resp Temp SpO2   (!) 147/65 (!) 48 (!) 26 97.9 °F (36.6 °C) (!) 94 %      MAP       --         Physical Exam    Nursing note and vitals reviewed.  Constitutional: She appears well-developed and well-nourished. She is cooperative. She appears distressed.   HENT:   Head: Normocephalic and atraumatic.   Right Ear: External ear normal.   Left Ear: External ear normal.   Nose: Nose normal.   Mouth/Throat: Oropharynx is clear and moist.   Eyes: Conjunctivae and EOM are normal. Pupils are equal, round, and reactive to light.   Neck: Trachea normal. Neck supple.   NO BRUIT   Normal range of motion.  Cardiovascular:  Normal rate, regular rhythm, normal heart sounds and intact distal pulses.     Exam reveals no gallop and no friction rub.       No murmur heard.  Pulses:       Radial pulses are 3+ on the right side and 3+ on the left side.        Dorsalis pedis pulses are 3+ on the right side and 3+ on the left side.   Pulmonary/Chest: Breath sounds normal. No respiratory distress. She has no wheezes. She has no rhonchi. She has no rales. She exhibits no tenderness.   Abdominal: Abdomen is soft. Bowel sounds are normal. She exhibits no distension. There is abdominal tenderness (LLQ). There is no rebound.   Musculoskeletal:         General: No tenderness or edema.  Normal range of motion.      Right shoulder: Normal.      Left shoulder: Normal.      Right upper arm: Normal.      Left upper arm: Normal.      Right elbow: Normal.      Left elbow: Normal.      Right forearm: Normal.      Left forearm: Normal.      Right wrist: Normal.      Left wrist: Normal.      Right hand: Normal.      Left hand: Normal.      Cervical back: Normal range of motion and neck supple.     Lymphadenopathy:     She has no cervical adenopathy.     She has no axillary adenopathy.   Neurological: She is alert and oriented to person, place, and time. She has normal strength. No cranial nerve deficit or sensory deficit. She displays a negative Romberg sign. GCS eye subscore is 4. GCS verbal subscore is 5. GCS motor subscore is 6.   Reflex Scores:       Bicep reflexes are 2+ on the right side and 2+ on the left side.       Patellar reflexes are 2+ on the right side and 2+ on the left side.  Skin: Skin is warm and dry. Capillary refill takes less than 2 seconds. No erythema.   Psychiatric: She has a normal mood and affect. Her speech is normal. Cognition and memory are normal.         Medical Screening Exam   See Full Note    ED Course   Procedures  Labs Reviewed   COMPREHENSIVE METABOLIC PANEL - Abnormal       Result Value    Sodium 145      Potassium 3.3 (*)     Chloride 104      CO2 34 (*)     Anion Gap 10      Glucose 135 (*)     BUN 22 (*)     Creatinine 1.43 (*)     BUN/Creatinine Ratio 15      Calcium 9.3      Total Protein 7.1      Albumin 3.5      Globulin 3.6      A/G Ratio 1.0      Bilirubin, Total 0.3      Alk Phos 78      ALT 9 (*)     AST 11 (*)     eGFR 35 (*)    D DIMER, QUANTITATIVE - Abnormal    D-Dimer 2.81 (*)    CBC WITH DIFFERENTIAL - Abnormal    WBC 11.08 (*)     RBC 3.83 (*)     Hemoglobin 12.8      Hematocrit 40.3      .2 (*)     MCH 33.4 (*)     MCHC 31.8 (*)     RDW 15.1 (*)     Platelet Count 210      MPV 11.0      Neutrophils % 54.8      Lymphocytes % 38.1       Neutrophils, Abs 6.08      Lymphocytes, Absolute 4.22      Diff Type Manual      Monocytes % 5.9      Eosinophils % 1.0      Basophils % 0.2      Monocytes, Absolute 0.65      Eosinophils, Absolute 0.11      Basophils, Absolute 0.02     URINALYSIS - Abnormal    Color, UA Yellow      Clarity, UA Clear      pH, UA 5.5      Leukocytes, UA Negative      Nitrites, UA Negative      Protein, UA Negative      Glucose,  (*)     Ketones, UA Negative      Urobilinogen, UA 0.2      Bilirubin, UA Negative      Blood, UA Negative      Specific Gravity, UA 1.015     INFLUENZA A & B BY MOLECULAR - Normal    INFLUENZA A MOLECULAR Negative      INFLUENZA B MOLECULAR  Negative     TROPONIN I - Normal    Troponin I High Sensitivity 13.2     NT-PRO NATRIURETIC PEPTIDE - Normal    ProBNP 424     SARS-COV-2 RNA AMPLIFICATION, QUAL - Normal    SARS COV-2 Molecular Negative      Narrative:     Negative SARS-CoV results should not be used as the sole basis for treatment or patient management decisions; negative results should be considered in the context of a patient's recent exposures, history and the presene of clinical signs and symptoms consistent with COVID-19.  Negative results should be treated as presumptive and confirmed by molecular assay, if necessary for patient management.   STREP A BY MOLECULAR METHOD - Normal    Group A Strep Molecular Negative     TROPONIN I - Normal    Troponin I High Sensitivity 12.3     CBC W/ AUTO DIFFERENTIAL    Narrative:     The following orders were created for panel order CBC Auto Differential.  Procedure                               Abnormality         Status                     ---------                               -----------         ------                     CBC with Differential[[id number]]       Abnormal            Final result               Manual Differential[[id number]]                             Final result                 Please view results for these tests on the individual  orders.   MANUAL DIFFERENTIAL    Segmented Neutrophils, Man % 55      Lymphocytes, Man % 38      Monocytes, Man % 6      Eosinophils, Man % 1      nRBC, Manual        Platelet Morphology Normal      Macrocytosis 1+       EKG Readings: (Independently Interpreted)   Initial Reading: No STEMI. Rhythm: Sinus Bradycardia. Heart Rate: 50. Ectopy: No Ectopy. T Waves Flipped: V1 and V2. Clinical Impression: Sinus Bradycardia     ECG Results              EKG 12-lead (Final result)        Collection Time Result Time QRS Duration OHS QTC Calculation    08/13/24 18:44:08 08/13/24 18:59:55 86 444                     Final result by Interface, Lab In [facility] (08/13/24 19:00:00)                   Narrative:    Test Reason : R07.9,    Vent. Rate : 050 BPM     Atrial Rate : 050 BPM     P-R Int : 182 ms          QRS Dur : 086 ms      QT Int : 488 ms       P-R-T Axes : 014 -13 071 degrees     QTc Int : 444 ms    Sinus bradycardia  Moderate voltage criteria for LVH, may be normal variant  Nonspecific T wave abnormality  Abnormal ECG  When compared with ECG of 13-AUG-2024 16:29,  T wave inversion now evident in Anterior leads  QT has lengthened  Confirmed by Anmol Reese DO ([id number]) on 8/13/2024 6:59:49 PM    Referred By: [email or web address]   SELF           Confirmed By:Anmol Reese DO                                     EKG 12-lead (Final result)        Collection Time Result Time QRS Duration OHS QTC Calculation    08/13/24 16:29:15 08/13/24 18:59:56 82 324                     Final result by Interface, Lab In [facility] (08/13/24 19:00:03)                   Narrative:    Test Reason : R06.00,    Vent. Rate : 050 BPM     Atrial Rate : 050 BPM     P-R Int : 200 ms          QRS Dur : 082 ms      QT Int : 356 ms       P-R-T Axes : 107 -19 089 degrees     QTc Int : 324 ms    Program found technically poor ECG  Sinus bradycardia  Nonspecific T wave abnormality  Abnormal ECG  When compared with ECG of 13-MAY-2024 11:33,  PREVIOUS ECG IS  PRESENT  Confirmed by Anmol Reese DO ([id number]) on 8/13/2024 6:59:53 PM    Referred By: [email or web address]   SELF           Confirmed By:Anmol Reese DO                                  Imaging Results              CT Abdomen Pelvis  Without Contrast (Final result)  Result time 08/13/24 18:03:39      Final result by Barrington Cho MD (08/13/24 18:03:39)                   Impression:      1. No definite acute abnormality within the abdomen or pelvis to explain patient's reported complaints.    2.  Right inguinal hernia with bowel loops with no definite obstruction or strangulation.  However, correlate with patient complaints.    3.  Colonic diverticulosis with no definite CT evidence of acute diverticulitis.  Nephrolithiasis with no obstruction definitely suggested.  Probable peripelvic cysts are noted.    Other incidental findings as above.    Place of service: [facility]      Electronically signed by: Barrington Cho  Date:    08/13/2024  Time:    18:03               Narrative:    EXAMINATION:  CT ABDOMEN PELVIS WITHOUT CONTRAST    CLINICAL HISTORY:  LLQ abdominal pain;    TECHNIQUE:  5 mm axial images were obtained from the lung bases through the ischial tuberosities without IV contrast. Coronal and sagittal reformatted images were additionally created and submitted for review. Total DLP: 686 mGy/cm.    Dose reduction:    The CT exam was performed using one or more dose reduction techniques: Automatic exposure control, automated adjustment of the MA and/or kVP according to patient size, or use of iterative reconstruction technique.    COMPARISON:  Prior CT 05/13/2024    FINDINGS:  ABDOMEN:    Lung bases: Lung bases are predominantly clear.  Minimal posterior basilar ground with density noted right lung base which is only partially imaged and nonspecific.  No significant pleural/pericardial effusion.      Liver/gallbladder: Unenhanced liver is grossly unremarkable.  No gallstones or biliary ductal  dilatation peer    Spleen: The spleen is not enlarged.    Pancreas: Is    Adrenals: Unremarkable.    Kidneys: Similar prominence of both central renal parenchyma may represent multiple parapelvic cysts or, less likely, is patulous dilatation of the renal pelvis.  No obstructing stones.  There are several calcific densities noted within both renal pelves and renal calices which may represent nonobstructing stones measuring up to 0.8 cm midpole left.    Bowel/mesentery: Small bowel is nondilated.  No free fluid/air.  Somewhat prominent colonic diverticulosis with no CT evidence of acute diverticulitis.  Which is lack of oral/intravenous contrast somewhat limits evaluation.  No evidence of acute diverticulitis.  There is partial herniation of bowel into the right inguinal canal which is not significantly changed.  No suggestion of obstruction or strangulation at this time.  This is best seen on coronal image 20.  Surgical assessment may be helpful.    Aorta/Retroperitoneum: Multifocal atherosclerotic changes noted throughout the abdominal aorta and iliac vasculature.  No enlarged retroperitoneal lymph nodes.    PELVIS:    Bladder: Bladder is unremarkable for degree of distension.    Lymph nodes: No adenopathy    Pelvic organs: Prior hysterectomy.    Fluid: No free fluid    BONES:    Osseous structures: No acute or suspicious osseous abnormality.  Multilevel mild degenerative changes noted within the lower lumbar facet.                                       X-Ray Chest 1 View (Final result)  Result time 08/13/24 19:15:24      Final result by Barrington Cho MD (08/13/24 19:15:24)                   Impression:      Probable underlying interstitial scarring.  Minimal basilar atelectasis and or superimposed infectious/inflammatory infiltrates are suspected.    Place of service: [facility]      Electronically signed by: Barrington hCo  Date:    08/13/2024  Time:    19:15               Narrative:     EXAMINATION:  XR CHEST 1 VIEW    CLINICAL HISTORY:  DYSPNEA;    TECHNIQUE:  Chest one view    COMPARISON:  Prior radiograph 05/13/2024    FINDINGS:  The cardiomediastinal silhouette is within normal limits. Patchy diffuse interstitial opacities are noted throughout the lungs, similar to prior suggestive mild scarring.  Minimal basilar atelectasis and or infiltrates are difficult to exclude.  There is no pneumothorax.    There is no acute osseous or soft tissue abnormality.                                    X-Rays:   Independently Interpreted Readings:   Chest X-Ray: Viewed and Other Results - Imaging    Updated   Order   08/13/24 1917  X-Ray Chest 1 View  Performed: 08/13/24 1727  Final         Impression: Probable underlying interstitial scarring. Minimal basilar atelectasis and or superimposed infectious/inflammatory infiltrates are suspected. Place of service: [facility]...    08/13/24 1806  CT Abdomen Pelvis  Without Contrast  Performed: 08/13/24 1728  Final         Impression: 1. No definite acute abnormality within the abdomen or pelvis to explain patient's reported complaints. 2. Right inguinal hernia with bowel loops with no definite obstruction or strangulation. However...         Abdomen: COMPARISON:Viewed and Other Results - Imaging    Updated   Order   08/13/24 1917  X-Ray Chest 1 View  Performed: 08/13/24 1727  Final         Impression: Probable underlying interstitial scarring. Minimal basilar atelectasis and or superimposed infectious/inflammatory infiltrates are suspected. [facility]    08/13/24 1806  CT Abdomen Pelvis  Without Contrast  Performed: 08/13/24 1728  Final         Impression: 1. No definite acute abnormality within the abdomen or pelvis to explain patient's reported complaints. 2. Right inguinal hernia with bowel loops with no definite obstruction or strangulation. However...        Prior CT 05/13/2024      FINDINGS:  ABDOMEN:     Lung bases: Lung bases are predominantly clear.  Minimal posterior basilar ground with density noted right lung base which is only partially imaged and nonspecific.  No significant pleural/pericardial effusion.        Liver/gallbladder: Unenhanced liver is grossly unremarkable.  No gallstones or biliary ductal dilatation peer     Spleen: The spleen is not enlarged.     Pancreas: Is     Adrenals: Unremarkable.     Kidneys: Similar prominence of both central renal parenchyma may represent multiple parapelvic cysts or, less likely, is patulous dilatation of the renal pelvis.  No obstructing stones.  There are several calcific densities noted within both renal pelves and renal calices which may represent nonobstructing stones measuring up to 0.8 cm midpole left.     Bowel/mesentery: Small bowel is nondilated.  No free fluid/air.  Somewhat prominent colonic diverticulosis with no CT evidence of acute diverticulitis.  Which is lack of oral/intravenous contrast somewhat limits evaluation.  No evidence of acute diverticulitis.  There is partial herniation of bowel into the right inguinal canal which is not significantly changed.  No suggestion of obstruction or strangulation at this time.  This is best seen on coronal image 20.  Surgical assessment may be helpful.     Aorta/Retroperitoneum: Multifocal atherosclerotic changes noted throughout the abdominal aorta and iliac vasculature.  No enlarged retroperitoneal lymph nodes.     PELVIS:     Bladder: Bladder is unremarkable for degree of distension.     Lymph nodes: No adenopathy     Pelvic organs: Prior hysterectomy.     Fluid: No free fluid     BONES:     Osseous structures: No acute or suspicious osseous abnormality.  Multilevel mild degenerative changes noted within the lower lumbar facet.     Impression:     1. No definite acute abnormality within the abdomen or pelvis to explain patient's reported complaints.     2.  Right inguinal hernia with  "bowel loops with no definite obstruction or strangulation.  However, correlate with patient complaints.     3.  Colonic diverticulosis with no definite CT evidence of acute diverticulitis.  Nephrolithiasis with no obstruction definitely suggested.  Probable peripelvic cysts are noted.     Other incidental findings as above.     [facility] of service: [facility]        Other Readings:  Viewed and Other Results - Imaging    Updated   Order   08/13/24 1917  X-Ray Chest 1 View  Performed: 08/13/24 1727  Final         Impression: Probable underlying interstitial scarring. Minimal basilar atelectasis and or superimposed infectious/inflammatory infiltrates are suspected. Place of service: [facility]...    08/13/24 1806  CT Abdomen Pelvis  Without Contrast  Performed: 08/13/24 1728  Final         Impression: 1. No definite acute abnormality within the abdomen or pelvis to explain patient's reported complaints. 2. Right inguinal hernia with bowel loops with no definite obstruction or strangulation. However...          Medications   albuterol-ipratropium 2.5 mg-0.5 mg/3 mL nebulizer solution 3 mL (3 mLs Nebulization Given 8/13/24 1649)   sodium chloride 0.9% bolus 250 mL 250 mL ( Intravenous Stopped 8/13/24 1834)     Medical Decision Making  PT IS A 89 YR OLD WF WITH WEAKNESS AND DYSPNEA AT REST FOR 2 WEEKS AND LLQ ABDOMINAL PAIN WITH NAUSEA ONSET  UNDETERMINED AND WORSE TODAY.     PT DENIES CHEST PAIN, COUGH  X OCCASIONAL COUGH, FEVER/CHILLS, NO VOMITING OR DIARRHEA,PALPITATION AND SYNCOPE.       PT HAD PNEUMONIA, MI 2 MONTHS AGO TREATED AT [place] WITH LAD WITH STENT PER DR TURK ON BRILLINTA AND ASA 81 MG WHILE AT [facility] BED.   PT LIVES WITH SON AND FAMILY LIVES NEARBY AND FAMILY IS ATTENTIVE  PT IS DNR PER HER DAUGHTER "MRS PRIETO"  PT HAD SHINGLES WITH LLQ PAIN 2 YRS AGO DAUGHTER STATES LLQ PAIN FOR 8 MONTHS.    Amount and/or Complexity of Data Reviewed  Independent Historian: " caregiver  Labs: ordered.     Details: New Results - Labs    Updated   Order   08/13/24 1720  D-Dimer, Quantitative  Collected: 08/13/24 1640  Final result  Specimen: Blood      D-Dimer 2.81 High  µg/mL       08/13/24 1716  CBC Auto Differential  Collected: 08/13/24 1640  Final result  Specimen: Blood         08/13/24 1716  CBC with Differential  Collected: 08/13/24 1640  Final result  Specimen: Blood      WBC 11.08 High  K/uL  RBC 3.83 Low  M/uL  Hemoglobin 12.8 g/dL  Hematocrit 40.3 %  .2 High  fL  MCH 33.4 High  pg  MCHC 31.8 Low  g/dL  RDW 15.1 High  %  Platelet Count 210 K/uL  MPV 11.0 fL  Neutrophils % 54.8 %  Lymphocytes % 38.1 %  Neutrophils, Abs 6.08 K/uL  Lymphocytes, Absolute 4.22 K/uL  Diff Type Manual  Monocytes % 5.9 %  Eosinophils % 1.0 %  Basophils % 0.2 %  Monocytes, Absolute 0.65 K/uL  Eosinophils, Absolute 0.11 K/uL  Basophils, Absolute 0.02 K/uL       08/13/24 1716  Manual Differential  Collected: 08/13/24 1640  Final result  Specimen: Blood      Segmented Neutrophils, Man % 55 %  Lymphocytes, Man % 38 %  Monocytes, Man % 6 %  Eosinophils, Man % 1 %  nRBC, Manual -  Platelet Morphology Normal  Macrocytosis 1+            [place] New Results as Viewed       Viewed and Other Results - Labs      Updated   Order   08/13/24 1702  NT-Pro Natriuretic Peptide  Collected: 08/13/24 1640  Final result  Specimen: Blood      ProBNP 424 pg/mL       08/13/24 1702  Troponin I  Collected: 08/13/24 1640  Final result  Specimen: Blood      Troponin I High Sensitivity 13.2 pg/mL       08/13/24 1702  Comprehensive metabolic panel  Collected: 08/13/24 1640  Final result  Specimen: Blood      Sodium 145 mmol/L  Potassium 3.3 Low  mmol/L  Chloride 104 mmol/L  CO2 34 High  mmol/L  Anion Gap 10 mmol/L  Glucose 135 High  mg/dL  BUN 22 High  mg/dL  Creatinine 1.43 High  mg/dL  BUN/Creatinine Ratio 15  Calcium 9.3 mg/dL  Total Protein 7.1 g/dL  Albumin 3.5 g/dL  Globulin 3.6 g/dL  A/G Ratio 1.0  Bilirubin,  Total 0.3 mg/dL  Alk Phos 78 U/L  ALT 9 Low  U/L  AST 11 Low  U/L  eGFR 35 Low  mL/min/1.73m2       08/13/24 1703  COVID-19 Rapid Screening  Collected: 08/13/24 1637  Final result  Specimen: Nasal Swab      SARS COV-2 Molecular Negative       08/13/24 1702  Influenza A & B by Molecular  Collected: 08/13/24 1637  Final result  Specimen: Nasopharyngeal Swab      INFLUENZA A MOLECULAR Negative  INFLUENZA B MOLECULAR Negative       08/13/24 1657  Strep A by Molecular Method  Collected: 08/13/24 1637  Final result  Specimen: Throat      Group A Strep Molecular Negative            Radiology: ordered.     Details: COMPARISON:  Prior CT 05/13/2024     FINDINGS:  ABDOMEN:     Lung bases: Lung bases are predominantly clear.  Minimal posterior basilar ground with density noted right lung base which is only partially imaged and nonspecific.  No significant pleural/pericardial effusion.        Liver/gallbladder: Unenhanced liver is grossly unremarkable.  No gallstones or biliary ductal dilatation peer     Spleen: The spleen is not enlarged.     Pancreas: Is     Adrenals: Unremarkable.     Kidneys: Similar prominence of both central renal parenchyma may represent multiple parapelvic cysts or, less likely, is patulous dilatation of the renal pelvis.  No obstructing stones.  There are several calcific densities noted within both renal pelves and renal calices which may represent nonobstructing stones measuring up to 0.8 cm midpole left.     Bowel/mesentery: Small bowel is nondilated.  No free fluid/air.  Somewhat prominent colonic diverticulosis with no CT evidence of acute diverticulitis.  Which is lack of oral/intravenous contrast somewhat limits evaluation.  No evidence of acute diverticulitis.  There is partial herniation of bowel into the right inguinal canal which is not significantly changed.  No suggestion of obstruction or strangulation at this time.  This is best seen on coronal image 20.  Surgical assessment may be  helpful.     Aorta/Retroperitoneum: Multifocal atherosclerotic changes noted throughout the abdominal aorta and iliac vasculature.  No enlarged retroperitoneal lymph nodes.     PELVIS:     Bladder: Bladder is unremarkable for degree of distension.     Lymph nodes: No adenopathy     Pelvic organs: Prior hysterectomy.     Fluid: No free fluid     BONES:     Osseous structures: No acute or suspicious osseous abnormality.  Multilevel mild degenerative changes noted within the lower lumbar facet.     Impression:     1. No definite acute abnormality within the abdomen or pelvis to explain patient's reported complaints.     2.  Right inguinal hernia with bowel loops with no definite obstruction or strangulation.  However, correlate with patient complaints.     3.  Colonic diverticulosis with no definite CT evidence of acute diverticulitis.  Nephrolithiasis with no obstruction definitely suggested.  Probable peripelvic cysts are noted.     Other incidental findings as above.     Place of service: [facility]       Discussion of management or test interpretation with external provider(s): EXAM  EKG NO STEMI  LABS TROPONIN NEG  D DIMER 2.8 BUT GFR 35, K 3.3, WBC 11 K  CXR CMG    IVF NS BOLUS       DC HOME IN STABLE AND IMPROVED CONDITION  CT ABD NO ACUTE CHANGE, NO LOWER LUNG PNA, R INGUINAL HERNIA  IVF  PT IMPROVED  DR ERWIN HERE , REPORT GIVEN        Risk  Prescription drug management.                                      Clinical Impression:   Final diagnoses:  [R06.00] Acute dyspnea  [E86.0] Dehydration (Primary)  [R10.9] Abdominal pain, unspecified abdominal location        ED Disposition Condition    Discharge Stable          ED Prescriptions    None       Follow-up Information    None          Cassandra Skelton MD  09/13/24 224       Cassandra Skelton MD  09/13/24 [id number]

## 2024-08-14 ENCOUNTER — TELEPHONE (OUTPATIENT)
Dept: EMERGENCY MEDICINE | Facility: HOSPITAL | Age: 89
End: 2024-08-14
Payer: MEDICARE

## 2024-09-30 ENCOUNTER — OFFICE VISIT (OUTPATIENT)
Dept: DERMATOLOGY | Facility: CLINIC | Age: 89
End: 2024-09-30
Payer: MEDICARE

## 2024-09-30 VITALS — WEIGHT: 115.06 LBS | BODY MASS INDEX: 21.17 KG/M2 | HEIGHT: 62 IN | RESPIRATION RATE: 18 BRPM

## 2024-09-30 DIAGNOSIS — Z80.8 FAMILY HISTORY OF MELANOMA: ICD-10-CM

## 2024-09-30 DIAGNOSIS — L57.8 OTHER SKIN CHANGES DUE TO CHRONIC EXPOSURE TO NONIONIZING RADIATION: Primary | ICD-10-CM

## 2024-09-30 DIAGNOSIS — L82.0 SEBORRHEIC KERATOSES, INFLAMED: ICD-10-CM

## 2024-09-30 DIAGNOSIS — D22.9 BENIGN NEVUS OF SKIN: ICD-10-CM

## 2024-09-30 DIAGNOSIS — L82.1 SEBORRHEIC KERATOSES: ICD-10-CM

## 2024-09-30 DIAGNOSIS — L21.9 SEBORRHEIC DERMATITIS: ICD-10-CM

## 2024-09-30 PROCEDURE — 1160F RVW MEDS BY RX/DR IN RCRD: CPT | Mod: CPTII,,, | Performed by: DERMATOLOGY

## 2024-09-30 PROCEDURE — 1159F MED LIST DOCD IN RCRD: CPT | Mod: CPTII,,, | Performed by: DERMATOLOGY

## 2024-09-30 PROCEDURE — 99214 OFFICE O/P EST MOD 30 MIN: CPT | Mod: 25,,, | Performed by: DERMATOLOGY

## 2024-09-30 PROCEDURE — 1101F PT FALLS ASSESS-DOCD LE1/YR: CPT | Mod: CPTII,,, | Performed by: DERMATOLOGY

## 2024-09-30 PROCEDURE — 3288F FALL RISK ASSESSMENT DOCD: CPT | Mod: CPTII,,, | Performed by: DERMATOLOGY

## 2024-09-30 PROCEDURE — 17110 DESTRUCTION B9 LES UP TO 14: CPT | Mod: ,,, | Performed by: DERMATOLOGY

## 2024-09-30 RX ORDER — TRIAMCINOLONE ACETONIDE 0.25 MG/G
CREAM TOPICAL
Qty: 80 G | Refills: 1 | Status: SHIPPED | OUTPATIENT
Start: 2024-09-30

## 2024-09-30 NOTE — PATIENT INSTRUCTIONS
The ABCDEs of Melanoma  Asymmetry - when one side is unlike the other  Border - irregular  Color - different shades of colors that can be black, brown, tan, white, red, grey, or blue  Diameter - as big as or larger than the size of a pencil eraser (6mm)  Evolving - changing in size, color, or shape or stands out from the rest of your moles;      Sunscreen Recommendations  I recommended a broad spectrum sunscreen with a SPF of 30 or higher that is water-resistant. SPF 30 sunscreens block approximately 97 percent of the sun's harmful rays.   Sunscreens should be applied at least 15 minutes prior to expected sun exposure and then every 90 minutes after that as long as sun exposure continues.   If swimming or exercising sunscreen should be reapplied every 45 minutes to an hour after getting wet or sweating.  One ounce, or the equivalent of a shot glass full of sunscreen, is adequate to protect the skin not covered by a bathing suit.   I also recommended a lip balm with a sunscreen as well.   Healthy Sun Protective Behaviors  Sun protective clothing can be used in lieu of sunscreen but must be worn the entire time you are exposed to the sun's rays.  Seek shade between 10 a.m. and 2 p.m.  Use extra caution near water, snow, or sand as they reflect sun rays  Avoid tanning beds and consider sunless self-tanning products instead  Perform monthly self skin exams     Cryotherapy  There will likely be a blister.   Clean the area daily with dial antibacterial soap and water.   Apply vaseline as needed.   The area will take 1-2 weeks to heal.

## 2024-09-30 NOTE — PROGRESS NOTES
[facility]   Kiara Silva MD    Patient Name: Pilar Reardon  Patient YOB: 1932   Date of Service: 9/30/24    CC: Full Skin Exam    HPI: Pilar Reardon is a 89 y.o. female presents today for a full skin exam.  Patient was last seen 9/28/2023 and dermatologic history includes AK's and family hx of melanoma. Patient is concerned today about a lesion located on the left temple.  It has been present for 4 month(s). It has not been treated in the past.      Past Medical History:   Diagnosis Date    Actinic keratoses     Arthritis     CAD (coronary artery disease)     pt has and old heart stent    CAD (coronary artery disease) 12/01/2022    Chronic congestive heart failure 03/27/2024    COPD (chronic obstructive pulmonary disease)     Frequent UTI     GERD (gastroesophageal reflux disease)     High cholesterol     HTN (hypertension)     Leukemia, unspecified     just dx within the past year    ST elevation (STEMI) myocardial infarction of unspecified site     2 months ago with stent placement     Past Surgical History:   Procedure Laterality Date    APPENDECTOMY      CORONARY ANGIOPLASTY WITH STENT PLACEMENT      2nd one placed 2 months ago    heart stent      HYSTERECTOMY      pessary       Review of patient's allergies indicates:   Allergen Reactions    Bactrim [sulfamethoxazole-trimethoprim]     Ceftin [cefuroxime axetil]     Iodinated contrast media Other (See Comments)       Current Outpatient Medications:     acetaZOLAMIDE (DIAMOX) 250 MG tablet, Take 250 mg by mouth 2 (two) times daily., Disp: , Rfl:     amiodarone (PACERONE) 200 MG Tab, Take 200 mg by mouth once daily., Disp: , Rfl:     amLODIPine (NORVASC) 5 MG tablet, Take 5 mg by mouth once daily., Disp: , Rfl:     aspirin (ECOTRIN) 81 MG EC tablet, Take 81 mg by mouth once daily., Disp: , Rfl:     benzonatate (TESSALON) 100 MG capsule, Take 100 mg by mouth 3 (three) times daily as needed for Cough., Disp: , Rfl:     BRILINTA 90  mg tablet, Take 90 mg by mouth 2 (two) times daily., Disp: , Rfl:     carvediloL (COREG) 6.25 MG tablet, Take 6.25 mg by mouth 2 (two) times daily., Disp: , Rfl:     cloNIDine (CATAPRES) 0.1 MG tablet, Take 0.1 mg by mouth 2 (two) times daily as needed. For systolic blood pressure  >170, Disp: , Rfl:     COMBIVENT RESPIMAT  mcg/actuation inhaler, Inhale 1 puff into the lungs 4 (four) times daily., Disp: , Rfl:     cyanocobalamin 1,000 mcg/mL injection, Inject 1,000 mcg into the muscle every 14 (fourteen) days. Next dose due this week, Disp: , Rfl:     doxazosin (CARDURA) 2 MG tablet, Take 2 mg by mouth every 12 (twelve) hours., Disp: , Rfl:     ergocalciferol (ERGOCALCIFEROL) 50,000 unit Cap, Take 50,000 Units by mouth every 7 days., Disp: , Rfl:     FARXIGA 10 mg tablet, Take 10 mg by mouth Daily., Disp: , Rfl:     folic acid (FOLVITE) 800 MCG Tab, Take 800 mcg by mouth once daily., Disp: , Rfl:     furosemide (LASIX) 40 MG tablet, Take 40 mg by mouth once daily., Disp: , Rfl:     isosorbide mononitrate (IMDUR) 30 MG 24 hr tablet, Take 30 mg by mouth once daily., Disp: , Rfl:     lactulose (CEPHULAC) 20 gram Pack, Take 20 g by mouth every 6 (six) hours as needed., Disp: , Rfl:     levothyroxine (SYNTHROID) 50 MCG tablet, Take 50 mcg by mouth before breakfast., Disp: , Rfl:     losartan (COZAAR) 100 MG tablet, Take 50 mg by mouth 2 (two) times daily., Disp: , Rfl:     meloxicam (MOBIC) 7.5 MG tablet, Take 7.5 mg by mouth daily as needed for Pain., Disp: , Rfl:     montelukast (SINGULAIR) 10 mg tablet, Take by mouth every evening., Disp: , Rfl:     NIFEdipine (ADALAT CC) 30 MG TbSR, Take 30 mg by mouth nightly., Disp: , Rfl:     nitrofurantoin (FURADANTIN) 25 mg/5 mL Susp, Take by mouth., Disp: , Rfl:     omeprazole (PRILOSEC) 40 MG capsule, Take 40 mg by mouth every morning., Disp: , Rfl:     primidone (MYSOLINE) 50 MG Tab, Take by mouth 2 (two) times a day., Disp: , Rfl:     revefenacin (YUPELRI) 175 mcg/3 mL  [place], Inhale into the lungs., Disp: , Rfl:     rOPINIRole (REQUIP) 0.5 MG tablet, Take 0.5 mg by mouth nightly as needed., Disp: , Rfl:     rosuvastatin (CRESTOR) 10 MG tablet, Take 10 mg by mouth every evening., Disp: , Rfl:     sertraline (ZOLOFT) 25 MG tablet, Take 25 mg by mouth once daily., Disp: , Rfl:     spironolactone (ALDACTONE) 25 MG tablet, Take 25 mg by mouth once daily., Disp: , Rfl:     tiotropium Br/olodaterol HCl (STIOLTO RESPIMAT INHL), Inhale 2 puffs into the lungs once daily., Disp: , Rfl:     travoprost (TRAVATAN Z) 0.004 % ophthalmic solution, Place 1 drop into both eyes every evening., Disp: , Rfl:     triamcinolone acetonide 0.025% (KENALOG) 0.025 % cream, Apply to rash on face BID, tapering with improvement, Disp: 80 g, Rfl: 1    vitamin D (VITAMIN D3) 1000 units Tab, Take 5,000 Units by mouth once daily., Disp: , Rfl:     ROS: A focused review of systems was obtained and negative.     Exam: A full skin exam was performed including scalp, hair, face, neck, chest, back, abdomen, right arm, left arm, right hand, left hand, nails, right leg, and left leg.  All areas examined were normal expect as per below in assessment and plan.  General Appearance of the patient is well developed and well nourished.  Orientation: alert and oriented x 3.  Mood and affect: pleasant.    Assessment:   The primary encounter diagnosis was Other skin changes due to chronic exposure to nonionizing radiation. Diagnoses of Benign nevus of skin, Seborrheic keratoses, Family history of melanoma, Seborrheic dermatitis, and Seborrheic keratoses, inflamed were also pertinent to this visit.    Plan:   Medications Ordered This Encounter   Medications    triamcinolone acetonide 0.025% (KENALOG) 0.025 % cream     Sig: Apply to rash on face BID, tapering with improvement     Dispense:  80 g     Refill:  1     Benign Nevus (D22.72)  - Dome shaped regular papule    Plan: Reassurance.    Plan: Counseling.  I counseled the patient  regarding the following:  Instructions: Monthly self-skin checks to monitor for any changes in moles are recommended.  Expectations: Benign Nevi are pigmented nests of cells within the skin. No treatment is necessary.  Contact Office if: Any moles change in size, shape or color; itch, burn or bleed.    Seborrheic Keratosis (L82.1)  - Stuck-on, warty, greasy brown papule with pseudo-horn cysts scattered on the trunk and extremities    Plan: Counseling.  I counseled the patient regarding the following:  Skin Care: Seborrheic Keratoses are benign. No treatment is necessary.  Expectations: Seborrheic Keratoses are benign warty growths. Patients get more of them as they age    Plan: Reassurance    Irritated Seborrheic Keratoses (L82.0)  Stuck-on inflamed papules with crust located on the left shoulder and left temple   Associated diagnoses: Pruritus and Cutaneous Inflammation    Plan: Liquid Nitrogen.  A total of 1 lesions were treated with liquid nitrogen, located on the above listed location.  This procedure was medically necessary because the lesions that were treated were: irritated and itchy. The  patient's consent was obtained including but not limited to risks of crusting, scabbing, blistering, scarring, darker  or lighter pigmentary change, recurrence, incomplete removal and infection.    Seborrheic Dermatitis  - Pink/orange scaly plaques located on the scalp, nasolabial folds, and eyebrows    Status: Inadequately controlled    Plan: Counseling.  I counseled the patient regarding the following:  Skin care: Emollients, shampoos with tar, selenium or zinc pyrithione can improve seborrheic dermatitis.  Expectations: Seborrheic Dermatitis is chronic in nature with periods of remissions and flares. Flares can be  triggered by stress.  Contact office if: Seborrheic dermatitis worsens, or fails to improve despite several months of treatment.    Plan: Prescription   - Will send in TAC 0.025    Family history of malignant  melanoma  - no suspicious lesions noted    Plan: Counseling  I counseled the patient regarding the following:  Skin Care: Patients with a family history of melanoma should wear broad spectrum sunscreen and sun protective clothing.  Expectations: Patients with a family history of melanoma from a 1st degree relative have a higher risk of developing a melanoma compared with the rest of the population. Monthly self-skin checks should be performed to monitor for any moles that change in size, shape or color, itch burn or bleed.  Contact Office if: Patient notices any new or changing moles.    Other Skin Changes Due to Chronic Exposure of Nonionizing Radiation (L57.8)    Plan: Monitoring.     Plan: Sunscreen Recommendations.  I recommended a broad spectrum sunscreen with a SPF of 30 or higher. I explained that SPF 30 sunscreens block approximately 97 percent of the  sun's harmful rays. Sunscreens should be applied at least 15 minutes prior to expected sun exposure and then every 2 hours after that as long as  sun exposure continues. If swimming or exercising sunscreen should be reapplied every 45 minutes to an hour after getting wet or sweating. One  ounce, or the equivalent of a shot glass full of sunscreen, is adequate to protect the skin not covered by a bathing suit. I also recommended a lip  balm with a sunscreen as well. Sun protective clothing can be used in lieu of sunscreen but must be worn the entire time you are exposed to the  sun's rays.      Follow up in about 1 year (around 9/30/2025) for Skin Check.    Kiara Silva MD

## 2024-10-07 ENCOUNTER — HOSPITAL ENCOUNTER (EMERGENCY)
Facility: HOSPITAL | Age: 89
Discharge: HOME | End: 2024-10-07
Payer: MEDICARE

## 2024-10-07 VITALS
TEMPERATURE: 98 F | SYSTOLIC BLOOD PRESSURE: 122 MMHG | HEIGHT: 62 IN | BODY MASS INDEX: 20.06 KG/M2 | OXYGEN SATURATION: 100 % | HEART RATE: 57 BPM | DIASTOLIC BLOOD PRESSURE: 60 MMHG | WEIGHT: 109 LBS | RESPIRATION RATE: 18 BRPM

## 2024-10-07 DIAGNOSIS — R07.9 CHEST PAIN: ICD-10-CM

## 2024-10-07 DIAGNOSIS — R53.1 GENERALIZED WEAKNESS: Primary | ICD-10-CM

## 2024-10-07 LAB
ALBUMIN SERPL BCP-MCNC: 3.3 G/DL (ref 3.5–5)
ALBUMIN/GLOB SERPL: 1 {RATIO}
ALP SERPL-CCNC: 56 U/L (ref 55–142)
ALT SERPL W P-5'-P-CCNC: 15 U/L (ref 13–56)
ANION GAP SERPL CALCULATED.3IONS-SCNC: 12 MMOL/L (ref 7–16)
AST SERPL W P-5'-P-CCNC: 13 U/L (ref 15–37)
BACTERIA #/AREA URNS HPF: ABNORMAL /HPF
BASOPHILS # BLD AUTO: 0.01 K/UL (ref 0–0.2)
BASOPHILS NFR BLD AUTO: 0.1 % (ref 0–1)
BILIRUB SERPL-MCNC: 0.2 MG/DL (ref ?–1.2)
BILIRUB UR QL STRIP: NEGATIVE
BUN SERPL-MCNC: 32 MG/DL (ref 7–18)
BUN/CREAT SERPL: 28 (ref 6–20)
CALCIUM SERPL-MCNC: 9.3 MG/DL (ref 8.5–10.1)
CHLORIDE SERPL-SCNC: 111 MMOL/L (ref 98–107)
CLARITY UR: CLEAR
CO2 SERPL-SCNC: 28 MMOL/L (ref 21–32)
COLOR UR: YELLOW
CREAT SERPL-MCNC: 1.15 MG/DL (ref 0.55–1.02)
DIFFERENTIAL METHOD BLD: ABNORMAL
EGFR (NO RACE VARIABLE) (RUSH/TITUS): 45 ML/MIN/1.73M2
EOSINOPHIL # BLD AUTO: 0.12 K/UL (ref 0–0.5)
EOSINOPHIL NFR BLD AUTO: 1.2 % (ref 1–4)
EOSINOPHIL NFR BLD MANUAL: 1 % (ref 1–4)
ERYTHROCYTE [DISTWIDTH] IN BLOOD BY AUTOMATED COUNT: 14.6 % (ref 11.5–14.5)
GLOBULIN SER-MCNC: 3.2 G/DL (ref 2–4)
GLUCOSE SERPL-MCNC: 110 MG/DL (ref 74–106)
GLUCOSE UR STRIP-MCNC: 500 MG/DL
HCT VFR BLD AUTO: 41 % (ref 38–47)
HGB BLD-MCNC: 12 G/DL (ref 12–16)
KETONES UR STRIP-SCNC: NEGATIVE MG/DL
LEUKOCYTE ESTERASE UR QL STRIP: NEGATIVE
LYMPHOCYTES # BLD AUTO: 5.19 K/UL (ref 1–4.8)
LYMPHOCYTES NFR BLD AUTO: 50 % (ref 27–41)
LYMPHOCYTES NFR BLD MANUAL: 50 % (ref 27–41)
MACROCYTES BLD QL SMEAR: ABNORMAL
MCH RBC QN AUTO: 31.6 PG (ref 27–31)
MCHC RBC AUTO-ENTMCNC: 29.3 G/DL (ref 32–36)
MCV RBC AUTO: 107.9 FL (ref 80–96)
MONOCYTES # BLD AUTO: 0.41 K/UL (ref 0–0.8)
MONOCYTES NFR BLD AUTO: 3.9 % (ref 2–6)
MONOCYTES NFR BLD MANUAL: 4 % (ref 2–6)
MPC BLD CALC-MCNC: 11.6 FL (ref 9.4–12.4)
NEUTROPHILS # BLD AUTO: 4.65 K/UL (ref 1.8–7.7)
NEUTROPHILS NFR BLD AUTO: 44.8 % (ref 53–65)
NEUTS SEG NFR BLD MANUAL: 45 % (ref 50–62)
NITRITE UR QL STRIP: NEGATIVE
NRBC BLD MANUAL-RTO: ABNORMAL %
PH UR STRIP: 6.5 PH UNITS
PLATELET # BLD AUTO: 137 K/UL (ref 150–400)
PLATELET MORPHOLOGY: ABNORMAL
POTASSIUM SERPL-SCNC: 4.5 MMOL/L (ref 3.5–5.1)
PROT SERPL-MCNC: 6.5 G/DL (ref 6.4–8.2)
PROT UR QL STRIP: NEGATIVE
RBC # BLD AUTO: 3.8 M/UL (ref 4.2–5.4)
RBC # UR STRIP: ABNORMAL /UL
RBC #/AREA URNS HPF: ABNORMAL /HPF
SODIUM SERPL-SCNC: 146 MMOL/L (ref 136–145)
SP GR UR STRIP: 1.01
SQUAMOUS #/AREA URNS LPF: ABNORMAL /LPF
TROPONIN I SERPL DL<=0.01 NG/ML-MCNC: 8.4 PG/ML
UROBILINOGEN UR STRIP-ACNC: 0.2 MG/DL
WBC # BLD AUTO: 10.38 K/UL (ref 4.5–11)
WBC #/AREA URNS HPF: ABNORMAL /HPF

## 2024-10-07 PROCEDURE — 93005 ELECTROCARDIOGRAM TRACING: CPT

## 2024-10-07 PROCEDURE — 99285 EMERGENCY DEPT VISIT HI MDM: CPT | Mod: 25

## 2024-10-07 PROCEDURE — 80053 COMPREHEN METABOLIC PANEL: CPT

## 2024-10-07 PROCEDURE — 36415 COLL VENOUS BLD VENIPUNCTURE: CPT

## 2024-10-07 PROCEDURE — 84484 ASSAY OF TROPONIN QUANT: CPT

## 2024-10-07 PROCEDURE — 81003 URINALYSIS AUTO W/O SCOPE: CPT

## 2024-10-07 PROCEDURE — 27000221 HC OXYGEN, UP TO 24 HOURS

## 2024-10-07 PROCEDURE — 85025 COMPLETE CBC W/AUTO DIFF WBC: CPT

## 2024-10-07 RX ORDER — MEGESTROL ACETATE 40 MG/1
40 TABLET ORAL DAILY
COMMUNITY

## 2024-10-07 RX ORDER — NITROFURANTOIN 25; 75 MG/1; MG/1
100 CAPSULE ORAL DAILY
COMMUNITY

## 2024-10-07 NOTE — ED PROVIDER NOTES
Encounter Date: 10/7/2024       History     Chief Complaint   Patient presents with    Weakness     Started yest    Dysuria     Started yest     89-year-old  or white female presents to the ED complaining of dysuria and weakness that started yesterday.  Upon further assessment, she reports left lower quad pain with tenderness to touch and left flank pain.  States she has intermittent epigastric chest pain that radiates to her back that she is unable to describe.  She denies fever, chills, shortness of breath, nausea, vomiting, diarrhea, or hematuria. Daughter reports patient was seen by her PCP and given IVF, Rocephin, and steroids. States patient has been seen 2 other times for same symptoms prior to that and thinks that patient is mistaking illness for just not being able to do the physical activity she has in the past.  PMH includes arthritis, CAD, STEMI with stent placement, COPD, GERD, high cholesterol, hypertension, actinic keratosis, frequent UTIs, and kidney stones.    The history is provided by the patient and a relative.     Review of patient's allergies indicates:   Allergen Reactions    Bactrim [sulfamethoxazole-trimethoprim]     Ceftin [cefuroxime axetil]     Iodinated contrast media Other (See Comments)     Past Medical History:   Diagnosis Date    Actinic keratoses     Arthritis     CAD (coronary artery disease)     pt has and old heart stent    CAD (coronary artery disease) 12/01/2022    Chronic congestive heart failure 03/27/2024    COPD (chronic obstructive pulmonary disease)     Frequent UTI     GERD (gastroesophageal reflux disease)     High cholesterol     HTN (hypertension)     Leukemia, unspecified     just dx within the past year    ST elevation (STEMI) myocardial infarction of unspecified site     2 months ago with stent placement (may 2024)     Past Surgical History:   Procedure Laterality Date    APPENDECTOMY      CORONARY ANGIOPLASTY WITH STENT PLACEMENT      2nd one placed 2  months ago    heart stent      HYSTERECTOMY      pessary       Family History   Problem Relation Name Age of Onset    Hypertension Daughter      Diabetes Daughter      Melanoma Daughter       Social History     Tobacco Use    Smoking status: Never    Smokeless tobacco: Never   Substance Use Topics    Alcohol use: Never    Drug use: Never     Review of Systems   Constitutional:  Negative for chills and fever.   HENT:  Negative for congestion, rhinorrhea and sore throat.    Eyes:  Negative for visual disturbance.   Respiratory:  Negative for cough and shortness of breath.    Cardiovascular:  Positive for chest pain. Negative for palpitations and leg swelling.   Gastrointestinal:  Positive for abdominal pain (LLQ). Negative for constipation, diarrhea, nausea and vomiting.   Genitourinary:  Positive for dysuria and flank pain (left). Negative for difficulty urinating, frequency and hematuria.   Musculoskeletal:  Negative for neck pain and neck stiffness.   Skin:  Negative for rash and wound.   Neurological:  Positive for weakness. Negative for dizziness, seizures, syncope, facial asymmetry, numbness and headaches.   Psychiatric/Behavioral:  Negative for suicidal ideas.        Physical Exam     Initial Vitals [10/07/24 1434]   BP Pulse Resp Temp SpO2   (!) 138/54 (!) 53 (!) 22 97.8 °F (36.6 °C) 99 %      MAP       --         Physical Exam    Nursing note and vitals reviewed.  Constitutional: She appears well-developed and well-nourished. No distress.   HENT:   Head: Normocephalic.   Right Ear: External ear normal.   Left Ear: External ear normal. Mouth/Throat: Oropharynx is clear and moist. No oropharyngeal exudate.   Eyes: Conjunctivae and EOM are normal. Pupils are equal, round, and reactive to light. No scleral icterus.   Neck:   Normal range of motion.  Cardiovascular:  Normal rate, regular rhythm, normal heart sounds and intact distal pulses.           No murmur heard.  Pulmonary/Chest: Breath sounds normal. No  respiratory distress. She has no wheezes.   Abdominal: Abdomen is soft. Bowel sounds are normal. She exhibits no distension. There is no abdominal tenderness.   Musculoskeletal:         General: No edema.      Cervical back: Normal range of motion.     Lymphadenopathy:     She has no cervical adenopathy.   Neurological: She is alert and oriented to person, place, and time. She has normal strength. GCS score is 15. GCS eye subscore is 4. GCS verbal subscore is 5. GCS motor subscore is 6.   Skin: Skin is warm and dry. Capillary refill takes less than 2 seconds.   Psychiatric: She has a normal mood and affect.         Medical Screening Exam   See Full Note    ED Course   Procedures  Labs Reviewed   COMPREHENSIVE METABOLIC PANEL - Abnormal       Result Value    Sodium 146 (*)     Potassium 4.5      Chloride 111 (*)     CO2 28      Anion Gap 12      Glucose 110 (*)     BUN 32 (*)     Creatinine 1.15 (*)     BUN/Creatinine Ratio 28 (*)     Calcium 9.3      Total Protein 6.5      Albumin 3.3 (*)     Globulin 3.2      A/G Ratio 1.0      Bilirubin, Total 0.2      Alk Phos 56      ALT 15      AST 13 (*)     eGFR 45 (*)    URINALYSIS, REFLEX TO URINE CULTURE - Abnormal    Color, UA Yellow      Clarity, UA Clear      pH, UA 6.5      Leukocytes, UA Negative      Nitrites, UA Negative      Protein, UA Negative      Glucose,  (*)     Ketones, UA Negative      Urobilinogen, UA 0.2      Bilirubin, UA Negative      Blood, UA Trace-Intact (*)     Specific [place], UA 1.015     CBC WITH DIFFERENTIAL - Abnormal    WBC 10.38      RBC 3.80 (*)     Hemoglobin 12.0      Hematocrit 41.0      .9 (*)     MCH 31.6 (*)     MCHC 29.3 (*)     RDW 14.6 (*)     Platelet Count 137 (*)     MPV 11.6      Neutrophils % 44.8 (*)     Lymphocytes % 50.0 (*)     Neutrophils, Abs 4.65      Lymphocytes, Absolute 5.19 (*)     Diff Type Manual      Monocytes % 3.9      Eosinophils % 1.2      Basophils % 0.1      Monocytes, Absolute 0.41       Eosinophils, Absolute 0.12      Basophils, Absolute 0.01     MANUAL DIFFERENTIAL - Abnormal    Segmented Neutrophils, Man % 45 (*)     Lymphocytes, Man % 50 (*)     Monocytes, Man % 4      Eosinophils, Man % 1      nRBC, Manual        Platelet Morphology Decreased (*)     Macrocytosis Few     URINALYSIS, MICROSCOPIC - Abnormal    WBC, UA 0-5      RBC, UA 3-5 (*)     Bacteria, UA Few (*)     Squamous Epithelial Cells, UA Few (*)    TROPONIN I - Normal    Troponin I High Sensitivity 8.4     CBC W/ AUTO DIFFERENTIAL    Narrative:     The following orders were created for panel order CBC auto differential.  Procedure                               Abnormality         Status                     ---------                               -----------         ------                     CBC with Differential[[id number]]       Abnormal            Final result               Manual Differential[[id number]]         Abnormal            Final result                 Please view results for these tests on the individual orders.          Imaging Results              CT Abdomen Pelvis  Without Contrast (Final result)  Result time 10/07/24 17:28:21      Final result by Bob Sen MD (10/07/24 17:28:21)                   Impression:      1. No acute abnormality  2. Possible constipation  3. Diverticulosis of the colon.  No evidence of acute diverticulitis.  4. Bilateral parapelvic renal cysts and bilateral nonobstructing nephrolithiasis.  5. Stable right inguinal hernia with protrusion of a loop of small bowel with no evidence of obstruction.  6. Minimal nonspecific bibasilar airspace disease.  7. Small hiatal hernia.  8. Small left adrenal adenoma.      Electronically signed by: Bob Sen  Date:    10/07/2024  Time:    17:28               Narrative:    EXAMINATION:  CT ABDOMEN PELVIS WITHOUT CONTRAST    CLINICAL HISTORY:  LLQ abdominal pain;    TECHNIQUE:  Low dose axial images, sagittal and coronal reformations were obtained  from the lung bases to the pubic symphysis, Oral contrast was not administered.    COMPARISON:  08/13/2024, 02/24/2017    FINDINGS:  Heart: Normal in size. No pericardial effusion.    Lung Bases: Minimal bibasilar airspace disease may be associated with minimal atelectasis, infiltrate or scarring.    Liver: Normal in size and attenuation, with no focal hepatic lesions.    Small hiatal hernia.    Gallbladder: No calcified gallstones.    Bile Ducts: No evidence of dilated ducts.    Pancreas: No mass or peripancreatic fat stranding.    Spleen: Unremarkable.    Adrenals: 1.9 cm left adrenal adenoma.    Kidneys/ Ureters: Kidneys are normal size.  Bilateral small parapelvic renal cysts.  Bilateral nonobstructing nephrolithiasis.  No perinephric fluid or stranding.  No hydronephrosis or hydroureter bilaterally.    Bladder: No evidence of wall thickening.    Reproductive organs: Status post hysterectomy.    GI Tract/Mesentery: No evidence of bowel obstruction.  There is diverticulosis throughout the colon.  No evidence of acute diverticulitis.  Moderate retained feces throughout the colon.    Right inguinal hernia with protrusion of a loop of small bowel, similar to the prior study.  No obstruction is detected.  Recommend clinical correlation.    Peritoneal Space: No ascites. No free air.    Retroperitoneum: No significant adenopathy.    Abdominal wall: Unremarkable.    Vasculature: Aortic atherosclerosis with minimal ectasia.  No evidence of aneurysm.    Bones: No acute fracture.                                       X-Ray Chest AP Portable (Final result)  Result time 10/07/24 18:37:48   Procedure changed from X-Ray Chest PA And Lateral     Final result by Bob Sen MD (10/07/24 18:37:48)                   Impression:      No significant change in cardiopulmonary status.  No acute radiographic abnormality.  See above comments.      Electronically signed by: Bob Sen  Date:    10/07/2024  Time:    18:37                Narrative:    EXAMINATION:  XR CHEST AP PORTABLE    CLINICAL HISTORY:  chest pn; Chest pain, unspecified    TECHNIQUE:  Single frontal view of the chest was performed.    COMPARISON:  08/13/2024    FINDINGS:  Cardiac silhouette is within normal limits.  Aortic atherosclerosis.    Lungs are mildly hyperexpanded.  Mild nonspecific interstitial changes may be associated with chronic scarring.  No mass or consolidation.  No effusion or pneumothorax.  No acute osseous abnormality.    No significant change.                                       Medications - No data to display  Medical Decision Making  Given the large differential diagnosis for Pilar Reardon, the decision making in this case is of high complexity.    After evaluating all of the data points in this case, the presentation of Pilar Reardon is NOT consistent with AAA; Mesenteric Ischemia; Bowel Perforation; Bowel Obstruction; Sigmoid Volvulus; Diverticulitis; Appendicitis; Peritonitis; Cholecystitis, ascending cholangitis or other gallbladder disease; perforated ulcer; significant GI bleeding, splenic rupture/infarction; Hepatic abscess; or other surgical/acute abdomen.    Similarly, this presentation is NOT consistent with ACS or Myocardial Ischemia or cardiac etiology; Pulmonary Embolism; fistula; incarcerated hernia; Pancreatitis, Aortic Dissection; Diabetic Ketoacidosis; Kidney Stone; Ischemic colitis; Psoas or other abscess; Methanol poisoning; Heavy metal toxicity; or porphyria.    Similarly, this case is NOT consistent with Vgmy-[id number] Syndrome, Ectopic Pregnancy, Placental Abruption, PID, Tubo-ovarian abscess, Ovarian Torsion, or STI.    Similarly, this presentation is NOT consistent with acute coronary syndrome, pulmonary embolism, dissection, borhaave's, arrythmia, pneumothorax, cardiac tamponade, or other emergent cardiopulmonary condition.    Similarly, this presentation is NOT consistent with sepsis, pyelonephritis, urinary  infection, pneumonia, or other focal bacterial infection.    Strict return and follow-up precautions have been given by me personally to the patient/family/caregiver(s).    Data Reviewed/Counseling: I have reviewed the patient's vital signs, nursing notes, and other relevant tests/information. I had a detailed discussion regarding the historical points, exam findings, and any diagnostic results supporting the discharge diagnosis. I also discussed the need for outpatient follow-up and the need to return to the ED if symptoms worsen or if there are any questions or concerns that arise at home.     Amount and/or Complexity of Data Reviewed  Independent Historian:      Details: 89-year-old  or white female presents to the ED complaining of dysuria and weakness that started yesterday.  Upon further assessment, she reports left lower quad pain with tenderness to touch and left flank pain.  States she has intermittent epigastric chest pain that radiates to her back that she is unable to describe.  She denies fever, chills, shortness of breath, nausea, vomiting, diarrhea, or hematuria. Daughter reports patient was seen by her PCP and given IVF, Rocephin, and steroids. States patient has been seen 2 other times for same symptoms prior to that and thinks that patient is mistaking illness for just not being able to do the physical activity she has in the past.  PMH includes arthritis, CAD, STEMI with stent placement, COPD, GERD, high cholesterol, hypertension, actinic keratosis, frequent UTIs, and kidney stones.  Labs: ordered.     Details: CBC-WBC 10.38, H&H 12/41 0.0, platelets 137  CMP- sodium 146, potassium 4.5, BUN/creatinine 32/1.5, BUN/creatinine ratio 28, GFR 45, glucose 110   Troponin 8.4  Urinalysis-glucose 500, blood trace intact, nitrites negative, leukocytes negative  Radiology: ordered.     Details: Chest PA and lateral-  CT abdomen/pelvis without contrast- No acute abnormality. Possible constipation.  Diverticulosis of the colon.  No evidence of acute diverticulitis. Bilateral parapelvic renal cysts and bilateral nonobstructing nephrolithiasis. Stable right inguinal hernia with protrusion of a loop of small bowel with no evidence of obstruction. Minimal nonspecific bibasilar airspace disease. Small hiatal hernia. Small left adrenal adenoma.                                           Clinical Impression:   Final diagnoses:  [R07.9] Chest pain  [R53.1] Generalized weakness (Primary)        ED Disposition Condition    Discharge Stable          ED Prescriptions    None       Follow-up Information    None          Malika Hdadad NP  10/07/24 [id number]

## 2024-10-07 NOTE — DISCHARGE INSTRUCTIONS
Please follow your primary care provider in the next 2-3 days.  Keep well hydrated.  Return to the ED for any new or worsening symptoms.

## 2024-10-09 ENCOUNTER — TELEPHONE (OUTPATIENT)
Dept: EMERGENCY MEDICINE | Facility: HOSPITAL | Age: 89
End: 2024-10-09
Payer: MEDICARE

## 2024-10-09 NOTE — TELEPHONE ENCOUNTER
Spoke with pt daughter, daughter states no change in pt condition, pt daughter is a MD and change some of her medication

## 2024-10-19 LAB
OHS QRS DURATION: 82 MS
OHS QTC CALCULATION: 419 MS